# Patient Record
Sex: MALE | Race: WHITE | NOT HISPANIC OR LATINO | Employment: UNEMPLOYED | URBAN - NONMETROPOLITAN AREA
[De-identification: names, ages, dates, MRNs, and addresses within clinical notes are randomized per-mention and may not be internally consistent; named-entity substitution may affect disease eponyms.]

---

## 2017-02-21 ENCOUNTER — COMMUNICATION - GICH (OUTPATIENT)
Dept: FAMILY MEDICINE | Facility: OTHER | Age: 52
End: 2017-02-21

## 2017-02-21 DIAGNOSIS — K21.9 GASTRO-ESOPHAGEAL REFLUX DISEASE WITHOUT ESOPHAGITIS: ICD-10-CM

## 2017-02-21 DIAGNOSIS — I10 ESSENTIAL (PRIMARY) HYPERTENSION: ICD-10-CM

## 2017-02-21 DIAGNOSIS — Z86.79 PERSONAL HISTORY OF OTHER DISEASES OF THE CIRCULATORY SYSTEM (CODE): ICD-10-CM

## 2017-03-13 ENCOUNTER — HISTORY (OUTPATIENT)
Dept: FAMILY MEDICINE | Facility: OTHER | Age: 52
End: 2017-03-13

## 2017-03-13 ENCOUNTER — OFFICE VISIT - GICH (OUTPATIENT)
Dept: FAMILY MEDICINE | Facility: OTHER | Age: 52
End: 2017-03-13

## 2017-03-13 DIAGNOSIS — Z00.00 ENCOUNTER FOR GENERAL ADULT MEDICAL EXAMINATION WITHOUT ABNORMAL FINDINGS: ICD-10-CM

## 2017-03-13 DIAGNOSIS — E78.5 HYPERLIPIDEMIA: ICD-10-CM

## 2017-03-13 DIAGNOSIS — G47.00 INSOMNIA: ICD-10-CM

## 2017-03-13 DIAGNOSIS — Z87.19 PERSONAL HISTORY OF OTHER DISEASES OF THE DIGESTIVE SYSTEM (CODE): ICD-10-CM

## 2017-03-13 DIAGNOSIS — I10 ESSENTIAL (PRIMARY) HYPERTENSION: ICD-10-CM

## 2017-03-13 DIAGNOSIS — K21.9 GASTRO-ESOPHAGEAL REFLUX DISEASE WITHOUT ESOPHAGITIS: ICD-10-CM

## 2017-03-13 DIAGNOSIS — F40.240 CLAUSTROPHOBIA: ICD-10-CM

## 2017-03-13 DIAGNOSIS — N18.2 CHRONIC KIDNEY DISEASE, STAGE II (MILD): ICD-10-CM

## 2017-03-13 LAB
A/G RATIO - HISTORICAL: 1.2 (ref 1–2)
ABSOLUTE BASOPHILS - HISTORICAL: 0.1 THOU/CU MM
ABSOLUTE EOSINOPHILS - HISTORICAL: 0.1 THOU/CU MM
ABSOLUTE LYMPHOCYTES - HISTORICAL: 1.2 THOU/CU MM (ref 0.9–2.9)
ABSOLUTE MONOCYTES - HISTORICAL: 1.2 THOU/CU MM
ABSOLUTE NEUTROPHILS - HISTORICAL: 5.3 THOU/CU MM (ref 1.7–7)
ALBUMIN SERPL-MCNC: 4.6 G/DL (ref 3.5–5.7)
ALP SERPL-CCNC: 28 IU/L (ref 34–104)
ALT (SGPT) - HISTORICAL: 39 IU/L (ref 7–52)
ANION GAP - HISTORICAL: 17 (ref 5–18)
AST SERPL-CCNC: 40 IU/L (ref 13–39)
BASOPHILS # BLD AUTO: 1.2 %
BILIRUB SERPL-MCNC: 1 MG/DL (ref 0.3–1)
BUN SERPL-MCNC: 27 MG/DL (ref 7–25)
BUN/CREAT RATIO - HISTORICAL: 13
CALCIUM SERPL-MCNC: 9.8 MG/DL (ref 8.6–10.3)
CHLORIDE SERPLBLD-SCNC: 85 MMOL/L (ref 98–107)
CHOL/HDL RATIO - HISTORICAL: 2.36
CHOLESTEROL TOTAL: 222 MG/DL
CO2 SERPL-SCNC: 26 MMOL/L (ref 21–31)
CREAT SERPL-MCNC: 2.06 MG/DL (ref 0.7–1.3)
EOSINOPHIL NFR BLD AUTO: 1.8 %
ERYTHROCYTE [DISTWIDTH] IN BLOOD BY AUTOMATED COUNT: 11 % (ref 11.5–15.5)
GFR IF NOT AFRICAN AMERICAN - HISTORICAL: 34 ML/MIN/1.73M2
GLOBULIN - HISTORICAL: 3.9 G/DL (ref 2–3.7)
GLUCOSE SERPL-MCNC: 111 MG/DL (ref 70–105)
HCT VFR BLD AUTO: 39.3 % (ref 37–53)
HDLC SERPL-MCNC: 94 MG/DL (ref 23–92)
HEMOGLOBIN: 13.4 G/DL (ref 13.5–17.5)
LDLC SERPL CALC-MCNC: 93 MG/DL
LIPASE SERPL-CCNC: 320.9 IU/L (ref 11–82)
LYMPHOCYTES NFR BLD AUTO: 14.9 % (ref 20–44)
MCH RBC QN AUTO: 30.7 PG (ref 26–34)
MCHC RBC AUTO-ENTMCNC: 34 G/DL (ref 32–36)
MCV RBC AUTO: 91 FL (ref 80–100)
MONOCYTES NFR BLD AUTO: 15.2 %
NEUTROPHILS NFR BLD AUTO: 67 % (ref 42–72)
NON-HDL CHOLESTEROL - HISTORICAL: 128 MG/DL
PATIENT STATUS - HISTORICAL: ABNORMAL
PLATELET # BLD AUTO: 184 THOU/CU MM (ref 140–440)
PMV BLD: 7.7 FL (ref 6.5–11)
POTASSIUM SERPL-SCNC: 3.5 MMOL/L (ref 3.5–5.1)
PROT SERPL-MCNC: 8.5 G/DL (ref 6.4–8.9)
RED BLOOD COUNT - HISTORICAL: 4.35 MIL/CU MM (ref 4.3–5.9)
SODIUM SERPL-SCNC: 128 MMOL/L (ref 133–143)
TRIGL SERPL-MCNC: 173 MG/DL
WHITE BLOOD COUNT - HISTORICAL: 7.8 THOU/CU MM (ref 4.5–11)

## 2017-04-25 ENCOUNTER — HISTORY (OUTPATIENT)
Dept: FAMILY MEDICINE | Facility: OTHER | Age: 52
End: 2017-04-25

## 2017-04-25 ENCOUNTER — OFFICE VISIT - GICH (OUTPATIENT)
Dept: FAMILY MEDICINE | Facility: OTHER | Age: 52
End: 2017-04-25

## 2017-04-25 DIAGNOSIS — G47.00 INSOMNIA: ICD-10-CM

## 2017-04-25 DIAGNOSIS — Z87.19 PERSONAL HISTORY OF OTHER DISEASES OF THE DIGESTIVE SYSTEM (CODE): ICD-10-CM

## 2017-04-25 DIAGNOSIS — R10.10 UPPER ABDOMINAL PAIN: ICD-10-CM

## 2017-04-25 LAB
A/G RATIO - HISTORICAL: 1.5 (ref 1–2)
ALBUMIN SERPL-MCNC: 4.6 G/DL (ref 3.5–5.7)
ALP SERPL-CCNC: 33 IU/L (ref 34–104)
ALT (SGPT) - HISTORICAL: 21 IU/L (ref 7–52)
AMYLASE SERPL-CCNC: 52 IU/L (ref 29–103)
ANION GAP - HISTORICAL: 12 (ref 5–18)
AST SERPL-CCNC: 25 IU/L (ref 13–39)
BILIRUB SERPL-MCNC: 0.5 MG/DL (ref 0.3–1)
BUN SERPL-MCNC: 15 MG/DL (ref 7–25)
BUN/CREAT RATIO - HISTORICAL: 12
CALCIUM SERPL-MCNC: 9.7 MG/DL (ref 8.6–10.3)
CHLORIDE SERPLBLD-SCNC: 98 MMOL/L (ref 98–107)
CO2 SERPL-SCNC: 25 MMOL/L (ref 21–31)
CREAT SERPL-MCNC: 1.29 MG/DL (ref 0.7–1.3)
GFR IF NOT AFRICAN AMERICAN - HISTORICAL: 59 ML/MIN/1.73M2
GLOBULIN - HISTORICAL: 3.1 G/DL (ref 2–3.7)
GLUCOSE SERPL-MCNC: 105 MG/DL (ref 70–105)
LIPASE SERPL-CCNC: 112.2 IU/L (ref 11–82)
POTASSIUM SERPL-SCNC: 3.6 MMOL/L (ref 3.5–5.1)
PROT SERPL-MCNC: 7.7 G/DL (ref 6.4–8.9)
SODIUM SERPL-SCNC: 135 MMOL/L (ref 133–143)

## 2017-04-25 ASSESSMENT — PATIENT HEALTH QUESTIONNAIRE - PHQ9: SUM OF ALL RESPONSES TO PHQ QUESTIONS 1-9: 1

## 2017-10-11 ENCOUNTER — HISTORY (OUTPATIENT)
Dept: EMERGENCY MEDICINE | Facility: OTHER | Age: 52
End: 2017-10-11

## 2018-01-03 NOTE — TELEPHONE ENCOUNTER
Patient Information     Patient Name MRN Sex Kolton Blake [id number] Male 1965      Telephone Encounter by Micky Cruz RN at 2017 12:49 PM     Author:  Micky Cruz RN Service:  (none) Author Type:  NURS- Registered Nurse     Filed:  2017  1:19 PM Encounter Date:  2017 Status:  Signed     :  Micky Cruz RN (NURS- Registered Nurse)            A[employer]itors    Office visit in the past 12 months or per provider note.    Last visit with TOMI MORALES was on: 2016 in [employer] [facility]  Next visit with TOMI MORALES is on: No future appointment listed with this provider  Next visit with Family Practice is on: No future appointment listed in this department    Lab test requirements:  Creatinine and Potassium annually, if ordering lab, order BMP.  CREATININE (mg/dL)    Date Value   2016 1.37 (H)     POTASSIUM (mmol/L)    Date Value   2016 3.8     Max refill for 12 months from last office visit or per provider note    Diuretics (may be prescribed for edema)    Office visit in the past 12 months or per provider note.    Last visit with TOMI MORALES was on: 2016 in [facility] [facility]  Next visit with TOMI MORALES is on: No future appointment listed with this provider  Next visit with Family Practice is on: No future appointment listed in this department    Lab testing requirements:  Creatinine and Potassium annually, if ordering lab, order BMP.  CREATININE (mg/dL)    Date Value   2016 1.37 (H)     POTASSIUM (mmol/L)    Date Value   2016 3.8     BP Readings from Last 4 Encounters:    16 130/80   16 157/91   16 124/76   09/16/15 118/64     Review last provider visit note.  If BP reviewed and plan is noted, can refill.  Max refill for 12 months from last office visit or per provider note.    Patient was last seen by PCP for HTN on 3/9/16. Patient was instructed to continue current medications, of which included  lisinopril, HCTZ. Patient was also hospitalized on 6/20/16, of which d/c summary states patient was to remain on HCTZ and Lisinopril at current dose. However, patient is due for medication management appointment with PCP to support continued use. Limited refill provided at this time and letter sent for reminder to patient. Prescription refilled per RN Medication Refill Policy.................... Micky Cruz RN ....................  2/21/2017   12:56 PM

## 2018-01-03 NOTE — PROGRESS NOTES
Patient Information     Patient Name MRN Sex Kolton Blake [id number] Male 1965      Progress Notes by Jono Gonzalez MD at 3/13/2017 10:15 AM     Author:  Jono Gonzalez MD Service:  (none) Author Type:  Physician     Filed:  3/13/2017 10:57 AM Encounter Date:  3/13/2017 Status:  Signed     :  Jono Gonzalez MD (Physician)            There are no exam notes on file for this visit.    SUBJECTIVE:  Kolton Aragon  is a 51 y.o. male who comes in today for complete evaluation. I last saw him in the summer after he was hospitalized for acute pancreatitis. We discussed a low-fat diet and complete abstinence from alcohol. He was apparently drinking too able to sleep. We did a prescription for Ambien which according to the  he filled twice, once in July and once in 2016.  He seems to be sleep walking.    We ordered an MR of the abdomen with and without contrast for MRCP one-month out after hospitalization, but he never had this done. Him to GI for follow-up and they had reviewed his notes and recommended upper endoscopy and endoscopic ultrasound but they were unable to get him scheduled for an appointment because he did not respond to them.  He has not been drinking.  He has been avoiding eating fatty foods. He has been avoiding eating out.     He also had referred him to podiatry for plantar fasciitis. He was molded for orthotics but declined injection. It doesn't appear that he followed up after that.    He is up to date on health maintenance issues otherwise. He has been eating healthier and has lost some weight. He is not exercising much.     He had the stomach flu over the last week.  He feels better. He needs a note to go back to work.     We discussed colon cancer screening after age 50.     Past Medical, Family, and Social History reviewed and updated as noted below.   ROS is negative except as noted above       Allergies      Allergen   Reactions     Dust [House Dust]  Other -  Describe In Comment Field     Sneezing, itchy eyes    ,   Family History       Problem   Relation Age of Onset     Good Health  Mother      Other  Father      Deserted as a child, patient knows father had a history of heart attack at 50       Heart Disease  Father      patient knows father had a history of heart attack at 50       Cancer  Other      History of cancer     ,   Current Outpatient Prescriptions on File Prior to Visit       Medication  Sig Dispense Refill     albuterol HFA (VENTOLIN HFA) 90 mcg/actuation inhaler Inhale 2 Puffs by mouth 4 times daily if needed. 1 Inhaler 0     ketoconazole 2% topical (NIZORAL) cream Apply  topically to affected area(s) 2 times daily.  0     No current facility-administered medications on file prior to visit.    ,   Past Medical History     Diagnosis  Date     S/P vasectomy    ,   Patient Active Problem List       Diagnosis  Date Noted     Acute pancreatitis  06/20/2016     Rash  06/20/2016     Chronic kidney disease, stage II (mild)  03/09/2016     Gastroesophageal reflux disease without esophagitis  05/18/2015     Dyslipidemia  12/29/2014     Hypertension  09/12/2013     Spondylolisthesis at L5-S1 level  07/09/2013     Patient has been referred for PT and has not followed through.  He was sent for MRI scan,and he was not able to do either the closed or open MRI due to claustrophobia.        Pancreatitis, alcoholic  06/21/2013     Insomnia  06/20/2013     Chewing tobacco use  05/17/2013     DJD (degenerative joint disease), lumbar  04/22/2013     REACTIVE AIRWAY DISEASE     ,   Past Surgical History      Procedure  Laterality Date     Vasectomy      and   Social History        Substance Use Topics          Smoking status:   Never Smoker      Smokeless tobacco:   Current User      Types:  Chew       Comment: trying to quit       Alcohol use   16.8 oz/week     28 Standard drinks or equivalent per week       OBJECTIVE:  Visit Vitals       /74     Pulse 76     Ht 1.727  "m (5' 8\")     Wt 98 kg (216 lb)     BMI 32.84 kg/m2      EXAM:  General Appearance: Pleasant, alert, appropriate appearance for age. No acute distress  Head Exam: Normal. Normocephalic, atraumatic.  Eye Exam:  Normal external eye, conjunctiva, lids, cornea. BIJAL. EOMI  Ear Exam: Normal TM's bilaterally. Normal auditory canals and external ears. Non-tender.  Nose Exam: Normal external nose, mucus membranes, and septum.  OroPharynx Exam:  Dental hygiene adequate. Normal buccal mucosa. Normal pharynx.  Neck Exam:  Supple, no masses or nodes. No audible bruits  Thyroid Exam: No nodules or enlargement.  Chest/Respiratory Exam: Normal chest wall and respirations. Clear to auscultation.  Cardiovascular Exam: Regular rate and rhythm. S1, S2, no murmur, click, gallop, or rubs.  Gastrointestinal Exam: Soft, non-tender, no masses or organomegaly.  Lymphatic Exam: Non-palpable nodes in neck, clavicular, axillary, or inguinal regions.  Musculoskeletal Exam: Back is straight and non-tender, full ROM of upper and lower extremities.  Foot Exam: Left and right foot: good pedal pulses, no lesions, nail hygiene good.  Skin: no rash or abnormalities  Neurologic Exam: Nonfocal,  normal gross motor, tone coordination and no tremor.  Psychiatric Exam: Alert and oriented - appropriate affect.   ASSESSMENT/Plan :      Kolton was seen today for physical.    Diagnoses and all orders for this visit:    Visit for preventive health examination  -     CBC AND DIFFERENTIAL; Future  -     COMP METABOLIC PANEL; Future  -     LIPID PANEL; Future  -     LIPASE; Future    Hypertension  -     lisinopril (PRINIVIL; ZESTRIL) 20 mg tablet; Take 1 tablet by mouth once daily.  -     hydroCHLOROthiazide (HCTZ) 25 mg tablet; Take 1 tablet by mouth once daily.  -     COMP METABOLIC PANEL; Future  -     LIPID PANEL; Future    Gastroesophageal reflux disease without esophagitis  -     omeprazole (PRILOSEC) 40 mg Delayed-Release capsule; Take 1 capsule by mouth " once daily before a meal.  -     CBC AND DIFFERENTIAL; Future    History of acute pancreatitis  -     COMP METABOLIC PANEL; Future  -     LIPID PANEL; Future  -     LIPASE; Future  -     MR ABDOMEN MRCP PANCREAS WWO; Future    Chronic kidney disease, stage II (mild)    Dyslipidemia    Insomnia, unspecified type  -     mirtazapine (REMERON) 15 mg tablet; Take 1 tablet by mouth at bedtime.    Claustrophobia  -     ALPRAZolam (XANAX) 1 mg tablet; One tablet one hour prior to procedure, may repeat x 1 if needed.    Will notify of lab results when available. Discussed diet, exercise and healthy lifestyle changes. Continue current medications. Reinforced not drinking.      Feel we need follow-up imaging of his pancreas with MRCP and order placed for that. Alprazolam to help with claustrophobia. At this point, we'll hold off on GI referral since he's been feeling well.    Discussed colon cancer screening at age 50.    Discussed insomnia. Okay to try melatonin, but we'll do a trial of mirtazapine 15 mg at at bedtime he decreasing or increasing based on side effects and efficacy. This was discussed.    Mr. Aragon's Body mass index is 32.84 kg/(m^2). This is out of the normal range for a 51 y.o. Normal range for ages 18-64 is between 18.5 and 24.9; normal range for ages 65+ is 23-30. To lose weight we reviewed risks and benefits of appropriate options such as diet, exercise, and medications. Patient's strategy will be  self-directed nutrition plan and self-directed exercise program       Jono Gonzalez MD

## 2018-01-03 NOTE — TELEPHONE ENCOUNTER
Patient Information     Patient Name MRN Sex Kolton Blake [id number] Male 1965      Telephone Encounter by Micky Cruz RN at 2017  1:03 PM     Author:  Micky Cruz RN Service:  (none) Author Type:  NURS- Registered Nurse     Filed:  2017  1:19 PM Encounter Date:  2017 Status:  Signed     :  Micky Cruz RN (NURS- Registered Nurse)            This is a Refill request from: [facility]  Name of Medication: Omeprazole  Quantity requested: 90 capsules  Last fill date: Unknown, medication is listed as historical in patient's chart  Due for refill: See above  Last visit with TOMI GONZALEZ was on: 2016 in [facility]  PCP:  Tomi oGnzalez MD  Controlled Substance Agreement:  N/A   Diagnosis r/t this medication request: GERD    Per chart review, last office visit with PCP to address GERD and use of omeprazole on 3/9/2016. Orders at that time as follows:    omeprazole (PRILOSEC) 20 mg Delayed-Release capsule; TAKE 2 CAPSULES BY MOUTH DA KAY AS NEEDED    Patient with hospitalization on 16, of which medication was entered in chart upon discharge as:    Omeprazole 20 mg delayed release capsule-take 1 tablet by mouth daily.    Medication is listed as historical in patient's chart on active med list.    Current rx request from pharmacy as follows:    Omeprazole 40 mg delayed release capsule-Take one capsule by mouth daily as needed.    Writer is unable to distinguish which dosing patient should be taking. Did attempt to contact patient to clarify and was unsuccessful. Will route to PCP for his consideration/approval.     Unable to complete prescription refill per RN Medication Refill Policy.................... Micky Cruz RN ....................  2017   1:03 PM

## 2018-01-04 NOTE — PROGRESS NOTES
Patient Information     Patient Name MRN Sex Kolton Blake [id number] Male 1965      Progress Notes by Jono Gonzalez MD at 2017  2:30 PM     Author:  Jono Gonzalez MD Service:  (none) Author Type:  Physician     Filed:  2017  5:32 PM Encounter Date:  2017 Status:  Signed     :  Jono oGnzalez MD (Physician)            Nursing Notes:   Radha Junior  2017  2:41 PM  Signed  He had upper abdominal pain for a week. It has been gone for 2 weeks now except yesterday he had just a slight amount of pain.  Radha Junior LPN..................2017   2:34 PM'      SUBJECTIVE:  Kolton Aragon  is a 51 y.o. male who comes in today for evaluation of stomach pain. He had some upper abdominal pain for about a week after he ate.  It would go away then come back. It disappeared for 2 weeks, and then yesterday it seemed to come back slightly. He has some symptoms when he eats. He didn't have any change in his stools.     I saw him about 6 weeks ago for a physical. Those notes are reviewed. He has a history in the past of acute pancreatitis. When I saw him last he had not been drinking, he was avoiding fatty foods, and was avoiding eating out. We ordered an MRCP for him but he did not follow through with that.    He was having insomnia and we did a trial of mirtazapine. He finds that they are working fine but he is taking 1/2 tab.     Past Medical, Family, and Social History reviewed and updated as noted below.   ROS is negative except as noted above       Allergies      Allergen   Reactions     Dust [House Dust]  Other - Describe In Comment Field     Sneezing, itchy eyes    ,   Family History       Problem   Relation Age of Onset     Good Health  Mother      Other  Father      Deserted as a child, patient knows father had a history of heart attack at 50       Heart Disease  Father      patient knows father had a history of heart attack at 50       Cancer  Other      History of  cancer     ,   Current Outpatient Prescriptions on File Prior to Visit       Medication  Sig Dispense Refill     albuterol HFA (VENTOLIN HFA) 90 mcg/actuation inhaler Inhale 2 Puffs by mouth 4 times daily if needed. 1 Inhaler 0     ALPRAZolam (XANAX) 1 mg tablet One tablet one hour prior to procedure, may repeat x 1 if needed. 2 tablet 0     hydroCHLOROthiazide (HCTZ) 25 mg tablet Take 1 tablet by mouth once daily. 90 tablet 4     ketoconazole 2% topical (NIZORAL) cream Apply  topically to affected area(s) 2 times daily.  0     lisinopril (PRINIVIL; ZESTRIL) 20 mg tablet Take 1 tablet by mouth once daily. 90 tablet 4     mirtazapine (REMERON) 15 mg tablet Take 1 tablet by mouth at bedtime. 30 tablet 5     omeprazole (PRILOSEC) 40 mg Delayed-Release capsule Take 1 capsule by mouth once daily before a meal. 90 capsule 4     No current facility-administered medications on file prior to visit.    ,   Past Medical History:     Diagnosis  Date     S/P vasectomy    ,   Patient Active Problem List       Diagnosis  Date Noted     Acute pancreatitis  06/20/2016     Rash  06/20/2016     Chronic kidney disease, stage II (mild)  03/09/2016     Gastroesophageal reflux disease without esophagitis  05/18/2015     Dyslipidemia  12/29/2014     Hypertension  09/12/2013     Spondylolisthesis at L5-S1 level  07/09/2013     Patient has been referred for PT and has not followed through.  He was sent for MRI scan,and he was not able to do either the closed or open MRI due to claustrophobia.        Pancreatitis, alcoholic  06/21/2013     Insomnia  06/20/2013     Chewing tobacco use  05/17/2013     DJD (degenerative joint disease), lumbar  04/22/2013     REACTIVE AIRWAY DISEASE     ,   Past Surgical History:      Procedure  Laterality Date     VASECTOMY      and   Social History        Substance Use Topics          Smoking status:   Never Smoker      Smokeless tobacco:   Current User      Types:  Chew       Comment: trying to quit        Alcohol use   16.8 oz/week     28 Standard drinks or equivalent per week       OBJECTIVE:  /82  Pulse 88  Temp 98.7  F (37.1  C) (Tympanic)   Wt 95.7 kg (211 lb)  BMI 32.08 kg/m2   EXAM:  Alert and cooperative, no distress. Sclerae nonicteric. Mucous membranes are moist. Neck is supple without significant adenopathy. Lungs are clear, no rales rhonchi or wheezes are heard. Cardiac RRR without murmur. Abdomen is protuberant and soft. No real tenderness to deep palpation in epigastrium or right upper quadrant. No rebound guarding or peritoneal signs.  Results for orders placed or performed in visit on 04/25/17      AMYLASE      Result  Value Ref Range    AMYLASE 52 29 - 103 IU/L   LIPASE      Result  Value Ref Range    LIPASE 112.2 (H) 11.0 - 82.0 IU/L   COMP METABOLIC PANEL      Result  Value Ref Range    SODIUM 135 133 - 143 mmol/L    POTASSIUM 3.6 3.5 - 5.1 mmol/L    CHLORIDE 98 98 - 107 mmol/L    CO2,TOTAL 25 21 - 31 mmol/L    ANION GAP 12 5 - 18                    GLUCOSE 105 70 - 105 mg/dL    CALCIUM 9.7 8.6 - 10.3 mg/dL    BUN 15 7 - 25 mg/dL    CREATININE 1.29 0.70 - 1.30 mg/dL    BUN/CREAT RATIO           12                    GFR if African American >60 >60 ml/min/1.73m2    GFR if not African American 59 (L) >60 ml/min/1.73m2    ALBUMIN 4.6 3.5 - 5.7 g/dL    PROTEIN,TOTAL 7.7 6.4 - 8.9 g/dL    GLOBULIN                  3.1 2.0 - 3.7 g/dL    A/G RATIO 1.5 1.0 - 2.0                    BILIRUBIN,TOTAL 0.5 0.3 - 1.0 mg/dL    ALK PHOSPHATASE 33 (L) 34 - 104 IU/L    ALT (SGPT) 21 7 - 52 IU/L    AST (SGOT) 25 13 - 39 IU/L      ASSESSMENT/Plan :      Kolton was seen today for abdominal pain.    Diagnoses and all orders for this visit:    Pain of upper abdomen  -     Cancel: HEPATIC FUNCTION PANEL; Future  -     AMYLASE; Future  -     LIPASE; Future  -     MR ABDOMEN MRCP PANCREAS WWO; Future  -     COMP METABOLIC PANEL; Future  -     AMYLASE  -     LIPASE  -     COMP METABOLIC PANEL    History of  pancreatitis  -     Cancel: HEPATIC FUNCTION PANEL; Future  -     AMYLASE; Future  -     LIPASE; Future  -     MR ABDOMEN MRCP PANCREAS WWO; Future  -     COMP METABOLIC PANEL; Future  -     AMYLASE  -     LIPASE  -     COMP METABOLIC PANEL    Insomnia, unspecified type      Will notify of lab results when available. Recommended that he follow through with MRCP as previously ordered. Will notify of those results when available and consider GI consultation. It is reassuring that his labs seem to have improved markedly.    Continue current medications.     Jono Gonzalez MD

## 2018-01-04 NOTE — NURSING NOTE
Patient Information     Patient Name MRN Sex Kolton Blake [id number] Male 1965      Nursing Note by Radha Junior at 2017  2:30 PM     Author:  Radha Junior Service:  (none) Author Type:  (none)     Filed:  2017  2:41 PM Encounter Date:  2017 Status:  Signed     :  Radha Junior            He had upper abdominal pain for a week. It has been gone for 2 weeks now except yesterday he had just a slight amount of pain.  Radha Junior LPN..................2017   2:34 PM'

## 2018-01-25 ENCOUNTER — HISTORY (OUTPATIENT)
Dept: EMERGENCY MEDICINE | Facility: OTHER | Age: 53
End: 2018-01-25

## 2018-01-26 VITALS
BODY MASS INDEX: 32.08 KG/M2 | TEMPERATURE: 98.7 F | WEIGHT: 211 LBS | SYSTOLIC BLOOD PRESSURE: 122 MMHG | HEART RATE: 88 BPM | DIASTOLIC BLOOD PRESSURE: 82 MMHG

## 2018-01-26 VITALS
BODY MASS INDEX: 32.74 KG/M2 | HEART RATE: 76 BPM | DIASTOLIC BLOOD PRESSURE: 74 MMHG | WEIGHT: 216 LBS | HEIGHT: 68 IN | SYSTOLIC BLOOD PRESSURE: 104 MMHG

## 2018-02-01 ASSESSMENT — PATIENT HEALTH QUESTIONNAIRE - PHQ9: SUM OF ALL RESPONSES TO PHQ QUESTIONS 1-9: 1

## 2018-03-01 ENCOUNTER — TELEPHONE (OUTPATIENT)
Dept: FAMILY MEDICINE | Facility: OTHER | Age: 53
End: 2018-03-01

## 2018-03-01 DIAGNOSIS — K21.9 GASTROESOPHAGEAL REFLUX DISEASE WITHOUT ESOPHAGITIS: Primary | ICD-10-CM

## 2018-03-01 PROBLEM — J45.909 REACTIVE AIRWAY DISEASE: Status: ACTIVE | Noted: 2018-03-01

## 2018-03-01 PROBLEM — N17.9 AKI (ACUTE KIDNEY INJURY) (H): Status: ACTIVE | Noted: 2017-10-11

## 2018-03-01 PROBLEM — E87.1 ACUTE HYPONATREMIA: Status: ACTIVE | Noted: 2017-10-11

## 2018-03-01 PROBLEM — E87.6 ACUTE HYPOKALEMIA: Status: ACTIVE | Noted: 2017-10-11

## 2018-03-01 PROBLEM — F10.10 ALCOHOL ABUSE: Status: ACTIVE | Noted: 2017-10-12

## 2018-03-01 PROBLEM — E83.51 HYPOCALCEMIA: Status: ACTIVE | Noted: 2017-10-12

## 2018-03-01 PROBLEM — R40.4 ALTERED LEVEL OF CONSCIOUSNESS: Status: ACTIVE | Noted: 2017-10-11

## 2018-03-01 RX ORDER — OMEPRAZOLE 40 MG/1
40 CAPSULE, DELAYED RELEASE ORAL
COMMUNITY
Start: 2017-03-13 | End: 2018-04-12

## 2018-03-01 RX ORDER — HYDROCHLOROTHIAZIDE 25 MG/1
25 TABLET ORAL
COMMUNITY
Start: 2017-03-13 | End: 2018-03-18

## 2018-03-01 RX ORDER — LORAZEPAM 0.5 MG/1
0.5 TABLET ORAL
COMMUNITY
Start: 2017-10-14 | End: 2018-06-30

## 2018-03-01 RX ORDER — LISINOPRIL 20 MG/1
20 TABLET ORAL
COMMUNITY
Start: 2017-03-13 | End: 2018-03-18

## 2018-03-01 NOTE — TELEPHONE ENCOUNTER
Writer received a fax from [phone]. Per fax:    Omeprazole isn't covered by insurance. PA was denied a few months back. Is there an alternative he can take?? If so, please send us an rx. Thanks!!    Call placed to [facility]. Spoke to Casi, pharmacist. Pharmacist reports that they are unable to tell what is covered in substitution to rx for omeprazole, however she would suggest either prevacid or nexium.     Writer will route this encounter to PCP for his consideration/input of a new rx in substitution for omeprazole.    Unable to complete prescription refill per RN Medication Refill Policy. Micky Cruz 3/1/2018 1:56 PM

## 2018-03-01 NOTE — TELEPHONE ENCOUNTER
Writer contacted patient. Was unable to reach patient at this time, but was able to leave a detailed message advising him of new rx as noted below to replace omeprazole as per PCP:       Disp Refills Start End ESCOBAR   ranitidine (ZANTAC) 300 MG tablet 30 tablet 11 3/1/2018  --   Sig: Take 1 tablet (300 mg) by mouth At Bedtime   Class: E-Prescribe   Route: Oral   Order: [id number]   E-Prescribing Status: Receipt confirmed by pharmacy (3/1/2018  3:33 PM CST)     Writer advised patient to contact [facility] with any questions or concerns in relation to plan as noted above. Writer will close this encounter at this time.    Micky Cruz RN on 3/1/2018 at 4:11 PM

## 2018-03-12 ENCOUNTER — DOCUMENTATION ONLY (OUTPATIENT)
Dept: FAMILY MEDICINE | Facility: OTHER | Age: 53
End: 2018-03-12

## 2018-03-18 DIAGNOSIS — I10 HYPERTENSION: Primary | ICD-10-CM

## 2018-03-18 NOTE — LETTER
March 22, 2018      Kolton Aragon    [address] [place] [address]        Dear Kolton,       This is to remind you that you are due for your annual appointment with Jono Gonzalez. Your last visit was on 4/25/17. Additional refills of your medication require you to complete this visit.    Please call [phone] to schedule your appointment.    Thank you for choosing [facility] and [facility] for your health care needs.    Sincerely,      Refill RN  [facility]

## 2018-03-22 RX ORDER — HYDROCHLOROTHIAZIDE 25 MG/1
TABLET ORAL
Qty: 90 TABLET | Refills: 0 | Status: SHIPPED | OUTPATIENT
Start: 2018-03-22 | End: 2018-04-12

## 2018-03-22 RX ORDER — LISINOPRIL 20 MG/1
TABLET ORAL
Qty: 90 TABLET | Refills: 0 | Status: SHIPPED | OUTPATIENT
Start: 2018-03-22 | End: 2018-04-12

## 2018-03-22 NOTE — TELEPHONE ENCOUNTER
Chart review shows that patient was last seen by PCP on 4/25/17. Both rxs as requested were noted in office visit notes on that date as noted below:    hydroCHLOROthiazide (HCTZ) 25 mg tablet Take 1 tablet by mouth once daily.     lisinopril (PRINIVIL; ZESTRIL) 20 mg tablet Take 1 tablet by mouth once daily.     No changes to medications noted in office visit notes on that date. Patient is coming due for an annual office visit however. Writer will refill both rxs as requested for a limited supply at this time, as well as will send patient a reminder letter.    Prescription refilled per RN Medication Refill Policy..................Micky Cruz 3/22/2018 2:08 PM

## 2018-04-12 ENCOUNTER — OFFICE VISIT (OUTPATIENT)
Dept: FAMILY MEDICINE | Facility: OTHER | Age: 53
End: 2018-04-12
Attending: FAMILY MEDICINE
Payer: COMMERCIAL

## 2018-04-12 ENCOUNTER — TELEPHONE (OUTPATIENT)
Dept: FAMILY MEDICINE | Facility: OTHER | Age: 53
End: 2018-04-12

## 2018-04-12 VITALS
SYSTOLIC BLOOD PRESSURE: 122 MMHG | BODY MASS INDEX: 31.63 KG/M2 | WEIGHT: 208 LBS | HEART RATE: 96 BPM | DIASTOLIC BLOOD PRESSURE: 84 MMHG

## 2018-04-12 DIAGNOSIS — F10.10 ALCOHOL ABUSE: ICD-10-CM

## 2018-04-12 DIAGNOSIS — I10 HYPERTENSION, UNSPECIFIED TYPE: Primary | ICD-10-CM

## 2018-04-12 DIAGNOSIS — Z12.11 SPECIAL SCREENING FOR MALIGNANT NEOPLASMS, COLON: ICD-10-CM

## 2018-04-12 DIAGNOSIS — K21.9 GASTROESOPHAGEAL REFLUX DISEASE WITHOUT ESOPHAGITIS: ICD-10-CM

## 2018-04-12 DIAGNOSIS — E78.5 DYSLIPIDEMIA: ICD-10-CM

## 2018-04-12 DIAGNOSIS — N18.2 CHRONIC KIDNEY DISEASE, STAGE II (MILD): ICD-10-CM

## 2018-04-12 DIAGNOSIS — K86.1 CHRONIC PANCREATITIS, UNSPECIFIED PANCREATITIS TYPE (H): ICD-10-CM

## 2018-04-12 DIAGNOSIS — N42.9 PROSTATE DISORDER: ICD-10-CM

## 2018-04-12 PROBLEM — E83.51 HYPOCALCEMIA: Status: RESOLVED | Noted: 2017-10-12 | Resolved: 2018-04-12

## 2018-04-12 PROBLEM — N17.9 AKI (ACUTE KIDNEY INJURY) (H): Status: RESOLVED | Noted: 2017-10-11 | Resolved: 2018-04-12

## 2018-04-12 PROBLEM — E87.1 ACUTE HYPONATREMIA: Status: RESOLVED | Noted: 2017-10-11 | Resolved: 2018-04-12

## 2018-04-12 PROBLEM — E87.6 ACUTE HYPOKALEMIA: Status: RESOLVED | Noted: 2017-10-11 | Resolved: 2018-04-12

## 2018-04-12 LAB
ALBUMIN SERPL-MCNC: 3.9 G/DL (ref 3.5–5.7)
ALP SERPL-CCNC: 37 U/L (ref 34–104)
ALT SERPL W P-5'-P-CCNC: 14 U/L (ref 7–52)
ANION GAP SERPL CALCULATED.3IONS-SCNC: 12 MMOL/L (ref 3–14)
AST SERPL W P-5'-P-CCNC: 15 U/L (ref 13–39)
BASOPHILS # BLD AUTO: 0 10E9/L (ref 0–0.2)
BASOPHILS NFR BLD AUTO: 0.9 %
BILIRUB SERPL-MCNC: 0.2 MG/DL (ref 0.3–1)
BUN SERPL-MCNC: 19 MG/DL (ref 7–25)
CALCIUM SERPL-MCNC: 9.2 MG/DL (ref 8.6–10.3)
CHLORIDE SERPL-SCNC: 96 MMOL/L (ref 98–107)
CHOLEST SERPL-MCNC: 170 MG/DL
CO2 SERPL-SCNC: 23 MMOL/L (ref 21–31)
CREAT SERPL-MCNC: 1.24 MG/DL (ref 0.7–1.3)
DIFFERENTIAL METHOD BLD: ABNORMAL
EOSINOPHIL # BLD AUTO: 0.2 10E9/L (ref 0–0.7)
EOSINOPHIL NFR BLD AUTO: 5.6 %
ERYTHROCYTE [DISTWIDTH] IN BLOOD BY AUTOMATED COUNT: 10.8 % (ref 10–15)
GFR SERPL CREATININE-BSD FRML MDRD: 62 ML/MIN/1.7M2
GLUCOSE SERPL-MCNC: 145 MG/DL (ref 70–105)
HCT VFR BLD AUTO: 33.3 % (ref 40–53)
HDLC SERPL-MCNC: 49 MG/DL (ref 23–92)
HGB BLD-MCNC: 12 G/DL (ref 13.3–17.7)
IMM GRANULOCYTES # BLD: 0 10E9/L (ref 0–0.4)
IMM GRANULOCYTES NFR BLD: 0.5 %
LDLC SERPL CALC-MCNC: 52 MG/DL
LIPASE SERPL-CCNC: 108 U/L (ref 11–82)
LYMPHOCYTES # BLD AUTO: 1.2 10E9/L (ref 0.8–5.3)
LYMPHOCYTES NFR BLD AUTO: 27.9 %
MCH RBC QN AUTO: 33.1 PG (ref 26.5–33)
MCHC RBC AUTO-ENTMCNC: 36 G/DL (ref 31.5–36.5)
MCV RBC AUTO: 92 FL (ref 78–100)
MONOCYTES # BLD AUTO: 0.7 10E9/L (ref 0–1.3)
MONOCYTES NFR BLD AUTO: 15.7 %
NEUTROPHILS # BLD AUTO: 2.1 10E9/L (ref 1.6–8.3)
NEUTROPHILS NFR BLD AUTO: 49.4 %
NONHDLC SERPL-MCNC: 121 MG/DL
PLATELET # BLD AUTO: 245 10E9/L (ref 150–450)
POTASSIUM SERPL-SCNC: 3.8 MMOL/L (ref 3.5–5.1)
PROT SERPL-MCNC: 7.8 G/DL (ref 6.4–8.9)
PSA SERPL-MCNC: 0.28 NG/ML
RBC # BLD AUTO: 3.62 10E12/L (ref 4.4–5.9)
SODIUM SERPL-SCNC: 131 MMOL/L (ref 134–144)
TRIGL SERPL-MCNC: 347 MG/DL
WBC # BLD AUTO: 4.3 10E9/L (ref 4–11)

## 2018-04-12 PROCEDURE — 80061 LIPID PANEL: CPT | Performed by: FAMILY MEDICINE

## 2018-04-12 PROCEDURE — 85025 COMPLETE CBC W/AUTO DIFF WBC: CPT | Performed by: FAMILY MEDICINE

## 2018-04-12 PROCEDURE — 80053 COMPREHEN METABOLIC PANEL: CPT | Performed by: FAMILY MEDICINE

## 2018-04-12 PROCEDURE — G0463 HOSPITAL OUTPT CLINIC VISIT: HCPCS

## 2018-04-12 PROCEDURE — 99215 OFFICE O/P EST HI 40 MIN: CPT | Performed by: FAMILY MEDICINE

## 2018-04-12 PROCEDURE — 83690 ASSAY OF LIPASE: CPT | Performed by: FAMILY MEDICINE

## 2018-04-12 PROCEDURE — 36415 COLL VENOUS BLD VENIPUNCTURE: CPT | Performed by: FAMILY MEDICINE

## 2018-04-12 PROCEDURE — 84153 ASSAY OF PSA TOTAL: CPT | Performed by: FAMILY MEDICINE

## 2018-04-12 RX ORDER — HYDROCHLOROTHIAZIDE 25 MG/1
25 TABLET ORAL DAILY
Qty: 90 TABLET | Refills: 11 | Status: SHIPPED | OUTPATIENT
Start: 2018-04-12 | End: 2019-04-07

## 2018-04-12 RX ORDER — LISINOPRIL 20 MG/1
20 TABLET ORAL DAILY
Qty: 90 TABLET | Refills: 11 | Status: SHIPPED | OUTPATIENT
Start: 2018-04-12 | End: 2019-04-07

## 2018-04-12 RX ORDER — OMEPRAZOLE 40 MG/1
40 CAPSULE, DELAYED RELEASE ORAL DAILY
Qty: 90 CAPSULE | Refills: 11 | Status: SHIPPED | OUTPATIENT
Start: 2018-04-12 | End: 2019-04-17 | Stop reason: DRUGHIGH

## 2018-04-12 ASSESSMENT — PAIN SCALES - GENERAL: PAINLEVEL: NO PAIN (0)

## 2018-04-12 NOTE — LETTER
April 12, 2018      Kolton Aragon    [address] [place] [address]        Dear Kolton,     Your lab tests look ok. Your complete blood count is improved from the ER visit labs. Your comprehensive metabolic panel (a test that looks at liver and kidney function, blood sugar, electrolytes, and nutritional status) was normal. Your pancreatic function test indicates that the pancreas is still a bit inflamed. Your prostate test is normal.    As you know, completely staying away from alcohol is very important and will allow things to continue to normalize.    It was a pleasure seeing you the other day.  If you have any questions, please don't hesitate to call us.       Sincerely,        Jono Gonzalez MD                              Results for orders placed or performed in visit on 04/12/18   CBC with platelets differential   Result Value Ref Range    WBC 4.3 4.0 - 11.0 10e9/L    RBC Count 3.62 (L) 4.4 - 5.9 10e12/L    Hemoglobin 12.0 (L) 13.3 - 17.7 g/dL    Hematocrit 33.3 (L) 40.0 - 53.0 %    MCV 92 78 - 100 fl    MCH 33.1 (H) 26.5 - 33.0 pg    MCHC 36.0 31.5 - 36.5 g/dL    RDW 10.8 10.0 - 15.0 %    Platelet Count 245 150 - 450 10e9/L    Diff Method Automated Method     % Neutrophils 49.4 %    % Lymphocytes 27.9 %    % Monocytes 15.7 %    % Eosinophils 5.6 %    % Basophils 0.9 %    % Immature Granulocytes 0.5 %    Absolute Neutrophil 2.1 1.6 - 8.3 10e9/L    Absolute Lymphocytes 1.2 0.8 - 5.3 10e9/L    Absolute Monocytes 0.7 0.0 - 1.3 10e9/L    Absolute Eosinophils 0.2 0.0 - 0.7 10e9/L    Absolute Basophils 0.0 0.0 - 0.2 10e9/L    Abs Immature Granulocytes 0.0 0 - 0.4 10e9/L   Comprehensive metabolic panel   Result Value Ref Range    Sodium 131 (L) 134 - 144 mmol/L    Potassium 3.8 3.5 - 5.1 mmol/L    Chloride 96 (L) 98 - 107 mmol/L    Carbon Dioxide 23 21 - 31 mmol/L    Anion Gap 12 3 - 14 mmol/L    Glucose 145 (H) 70 - 105 mg/dL    Urea Nitrogen 19 7 - 25 mg/dL    Creatinine 1.24 0.70 - 1.30 mg/dL     GFR Estimate 62 >60 mL/min/1.7m2    GFR Estimate If Black 75 >60 mL/min/1.7m2    Calcium 9.2 8.6 - 10.3 mg/dL    Bilirubin Total 0.2 (L) 0.3 - 1.0 mg/dL    Albumin 3.9 3.5 - 5.7 g/dL    Protein Total 7.8 6.4 - 8.9 g/dL    Alkaline Phosphatase 37 34 - 104 U/L    ALT 14 7 - 52 U/L    AST 15 13 - 39 U/L   Lipid Profile   Result Value Ref Range    Cholesterol 170 <200 mg/dL    Triglycerides 347 (H) <150 mg/dL    HDL Cholesterol 49 23 - 92 mg/dL    LDL Cholesterol Calculated 52 <100 mg/dL    Non HDL Cholesterol 121 <130 mg/dL   Prostate Specific Antigen GH   Result Value Ref Range    Prostate Specific Antigen 0.282 <3.100 ng/mL   Lipase   Result Value Ref Range    Lipase 108 (H) 11 - 82 U/L

## 2018-04-12 NOTE — PROGRESS NOTES
Nursing Notes:   Radha Junior LPN  4/12/2018  9:44 AM  Signed  He is here today for medication refills.  Radha Junior LPN..................4/12/2018   9:40 AM      SUBJECTIVE:  Kolton Aragon  is a 52 year old male who comes in today for follow-up and medication renewal.  He continues on omeprazole for reflux and lisinopril 20 mg and HCTZ 25 mg for hypertension.    He was in the emergency room in January because apparently he lost his job and began drinking.  He was apparently suicidal and was brought in.  He was intoxicated, and his initial blood alcohol level was 0.345.  He was observed for an extended period of time to allow clearing of his intoxication and once he was sober, he was evaluated by CRT, a safety plan was made.  He was also hospitalized in October with alcoholic pancreatitis.  I have not seen him since April of last year.  At that time follow-up with GI have been done and he had been recommended to have MRCP since he would not respond to them regard to upper endoscopy and endoscopic ultrasound.    He is currently not working.  He has not been drinking.     He went off omeprazole and used Zantac but had recurrence of GERD.  He is better on the PPI. He was having all day long heartburn.     Past Medical, Family, and Social History reviewed and updated as noted below.   ROS is negative except as noted above       Allergies   Allergen Reactions     Dust Mite Extract Other (See Comments)     Sneezing, itchy eyes   ,   Family History   Problem Relation Age of Onset     Family History Negative Mother      Good Health     Other - See Comments Father      Deserted as a child, patient knows father had a history of heart attack at 50     HEART DISEASE Father      Heart Disease,patient knows father had a history of heart attack at 50     CANCER Other      Cancer,History of cancer   ,   Current Outpatient Prescriptions   Medication     hydrochlorothiazide (HYDRODIURIL) 25 MG tablet     lisinopril  (PRINIVIL/ZESTRIL) 20 MG tablet     omeprazole (PRILOSEC) 40 MG capsule     LORazepam (ATIVAN) 0.5 MG tablet     nicotine polacrilex (NICORETTE) 4 MG gum     ranitidine (ZANTAC) 300 MG tablet     [DISCONTINUED] lisinopril (PRINIVIL/ZESTRIL) 20 MG tablet     [DISCONTINUED] hydrochlorothiazide (HYDRODIURIL) 25 MG tablet     No current facility-administered medications for this visit.    ,   Past Medical History:   Diagnosis Date     Alcohol-induced acute pancreatitis without infection or necrosis     06/20/2016     Essential (primary) hypertension     9/12/2013     Gastro-esophageal reflux disease without esophagitis     5/18/2015     Spondylolisthesis of lumbosacral region     7/9/2013,Patient has been referred for PT and has not followed through.  He was sent for MRI scan,and he was not able to do either the closed or open MRI due to claustrophobia.     Spondylosis of lumbar region without myelopathy or radiculopathy     4/22/2013     Tobacco use     5/17/2013     Uncomplicated asthma     No Comments Provided   ,   Patient Active Problem List    Diagnosis Date Noted     Reactive airway disease 03/01/2018     Priority: Medium     Alcohol abuse 10/12/2017     Priority: Medium     Altered level of consciousness 10/11/2017     Priority: Medium     Acute alcoholic pancreatitis 06/20/2016     Priority: Medium     Chronic kidney disease, stage II (mild) 03/09/2016     Priority: Medium     Gastroesophageal reflux disease without esophagitis 05/18/2015     Priority: Medium     Dyslipidemia 12/29/2014     Priority: Medium     Hypertension 09/12/2013     Priority: Medium     Spondylolisthesis at L5-S1 level 07/09/2013     Priority: Medium     Overview:   Patient has been referred for PT and has not followed through.  He was sent for MRI scan,and he was not able to do either the closed or open MRI due to claustrophobia.       Insomnia 06/20/2013     Priority: Medium     Chewing tobacco use 05/17/2013     Priority: Medium      DJD (degenerative joint disease), lumbar 04/22/2013     Priority: Medium   ,   Past Surgical History:   Procedure Laterality Date     VASECTOMY      No Comments Provided    and   Social History   Substance Use Topics     Smoking status: Never Smoker     Smokeless tobacco: Current User     Types: Chew      Comment: Quit smoking: trying to quit chew     Alcohol use 16.8 oz/week     OBJECTIVE:  /84 (BP Location: Right arm, Patient Position: Sitting, Cuff Size: Adult Regular)  Pulse 96  Wt 208 lb (94.3 kg)  BMI 31.63 kg/m2   EXAM:  General Appearance: Pleasant, alert, appropriate appearance for age. No acute distress  Head Exam: Normal. Normocephalic, atraumatic.  Eye Exam:  Normal external eye, conjunctiva, lids, cornea. BIJAL. EOMI  Ear Exam: Normal TM's bilaterally. Normal auditory canals and external ears. Non-tender.  Nose Exam: Normal external nose, mucus membranes, and septum.  OroPharynx Exam:  Dental hygiene adequate. Normal buccal mucosa. Normal pharynx.  Neck Exam:  Supple, no masses or nodes. No audible bruits  Thyroid Exam: No nodules or enlargement.  Chest/Respiratory Exam: Normalchest wall and respirations. Clear to auscultation.  Cardiovascular Exam: Regular rate and rhythm. S1, S2, no murmur, click, gallop, or rubs.  Gastrointestinal Exam: Soft, non-tender, no masses or organomegaly.  Lymphatic Exam: Non-palpable nodes in neck, clavicular regions.  Musculoskeletal Exam: Back is straight and non-tender, full ROM of upper and lower extremities.  Foot Exam: Left and right foot: good pedal pulses  Skin: no rash or abnormalities  Neurologic Exam: Nonfocal, normal gross motor, tone coordinationand no tremor.  Psychiatric Exam: Alert and oriented - appropriate affect.     Results for orders placed or performed in visit on 04/12/18   CBC with platelets differential   Result Value Ref Range    WBC 4.3 4.0 - 11.0 10e9/L    RBC Count 3.62 (L) 4.4 - 5.9 10e12/L    Hemoglobin 12.0 (L) 13.3 - 17.7 g/dL     Hematocrit 33.3 (L) 40.0 - 53.0 %    MCV 92 78 - 100 fl    MCH 33.1 (H) 26.5 - 33.0 pg    MCHC 36.0 31.5 - 36.5 g/dL    RDW 10.8 10.0 - 15.0 %    Platelet Count 245 150 - 450 10e9/L    Diff Method Automated Method     % Neutrophils 49.4 %    % Lymphocytes 27.9 %    % Monocytes 15.7 %    % Eosinophils 5.6 %    % Basophils 0.9 %    % Immature Granulocytes 0.5 %    Absolute Neutrophil 2.1 1.6 - 8.3 10e9/L    Absolute Lymphocytes 1.2 0.8 - 5.3 10e9/L    Absolute Monocytes 0.7 0.0 - 1.3 10e9/L    Absolute Eosinophils 0.2 0.0 - 0.7 10e9/L    Absolute Basophils 0.0 0.0 - 0.2 10e9/L    Abs Immature Granulocytes 0.0 0 - 0.4 10e9/L   Comprehensive metabolic panel   Result Value Ref Range    Sodium 131 (L) 134 - 144 mmol/L    Potassium 3.8 3.5 - 5.1 mmol/L    Chloride 96 (L) 98 - 107 mmol/L    Carbon Dioxide 23 21 - 31 mmol/L    Anion Gap 12 3 - 14 mmol/L    Glucose 145 (H) 70 - 105 mg/dL    Urea Nitrogen 19 7 - 25 mg/dL    Creatinine 1.24 0.70 - 1.30 mg/dL    GFR Estimate 62 >60 mL/min/1.7m2    GFR Estimate If Black 75 >60 mL/min/1.7m2    Calcium 9.2 8.6 - 10.3 mg/dL    Bilirubin Total 0.2 (L) 0.3 - 1.0 mg/dL    Albumin 3.9 3.5 - 5.7 g/dL    Protein Total 7.8 6.4 - 8.9 g/dL    Alkaline Phosphatase 37 34 - 104 U/L    ALT 14 7 - 52 U/L    AST 15 13 - 39 U/L   Lipid Profile   Result Value Ref Range    Cholesterol 170 <200 mg/dL    Triglycerides 347 (H) <150 mg/dL    HDL Cholesterol 49 23 - 92 mg/dL    LDL Cholesterol Calculated 52 <100 mg/dL    Non HDL Cholesterol 121 <130 mg/dL   Prostate Specific Antigen GH   Result Value Ref Range    Prostate Specific Antigen 0.282 <3.100 ng/mL   Lipase   Result Value Ref Range    Lipase 108 (H) 11 - 82 U/L      ASSESSMENT/Plan :    Kolton was seen today for recheck medication.    Diagnoses and all orders for this visit:    Hypertension, unspecified type  -     hydrochlorothiazide (HYDRODIURIL) 25 MG tablet; Take 1 tablet (25 mg) by mouth daily  -     lisinopril (PRINIVIL/ZESTRIL) 20 MG  tablet; Take 1 tablet (20 mg) by mouth daily  -     Comprehensive metabolic panel    Chronic kidney disease, stage II (mild)  -     CBC with platelets differential  -     Comprehensive metabolic panel    Gastroesophageal reflux disease without esophagitis  -     omeprazole (PRILOSEC) 40 MG capsule; Take 1 capsule (40 mg) by mouth daily  -     CBC with platelets differential    Dyslipidemia  -     Comprehensive metabolic panel  -     Lipid Profile    Alcohol abuse    Prostate disorder  -     Prostate Specific Antigen GH    Chronic pancreatitis, unspecified pancreatitis type (H)  -     Lipase    Special screening for malignant neoplasms, colon  -     GASTROENTEROLOGY ADULT REF PROCEDURE ONLY    Will notify of lab results when available. Discussed diet, exercise and healthy lifestyle changes. Continue current medications.  He needs to be on a PPI as he had breakthrough reflux on H2 blocker.      Recommend abstaining completely from alcohol.    Referred for colonoscopy and should be low risk for same.      Jono Gonzalez MD

## 2018-04-12 NOTE — MR AVS SNAPSHOT
After Visit Summary   4/12/2018    Kolton Aragon    MRN: [id number]           Patient Information     Date Of Birth          1965        Visit Information        Provider Department      4/12/2018 9:30 AM Jono Gonzalez MD [facility]        Today's Diagnoses     Hypertension, unspecified type    -  1    Chronic kidney disease, stage II (mild)        Gastroesophageal reflux disease without esophagitis        Dyslipidemia        Alcohol abuse        Prostate disorder        Chronic pancreatitis, unspecified pancreatitis type (H)        Special screening for malignant neoplasms, colon           Follow-ups after your visit        Additional Services     GASTROENTEROLOGY ADULT REF PROCEDURE ONLY       Last Lab Result: Creatinine (mg/dL)       Date                     Value                 04/12/2018               1.24             ----------  Body mass index is 31.63 kg/(m^2).     Needed:  No  Language:  English    Patient will be contacted to schedule procedure.     Please be aware that coverage of these services is subject to the terms and limitations of your health insurance plan.  Call member services at your health plan with any benefit or coverage questions.  Any procedures must be performed at a [facility] facility OR coordinated by your clinic's referral office.    Please bring the following with you to your appointment:    (1) Any X-Rays, CTs or MRIs which have been performed.  Contact the facility where they were done to arrange for  prior to your scheduled appointment.    (2) List of current medications   (3) This referral request   (4) Any documents/labs given to you for this referral                  Who to contact     If you have questions or need follow up information about today's clinic visit or your schedule please contact [facility] directly at [phone].  Normal or non-critical lab and imaging results will be  "communicated to you by [employer], letter or phone within 4 business days after the clinic has received the results. If you do not hear from us within 7 days, please contact the clinic through [employer] or phone. If you have a critical or abnormal lab result, we will notify you by phone as soon as possible.  Submit refill requests through [employer] or call your pharmacy and they will forward the refill request to us. Please allow 3 business days for your refill to be completed.          Additional Information About Your Visit        [employer] Information     [employer] lets you send messages to your doctor, view your test results, renew your prescriptions, schedule appointments and more. To sign up, go to [email or web address] . Click on \"Log in\" on the left side of the screen, which will take you to the Welcome page. Then click on \"Sign up Now\" on the right side of the page.     You will be asked to enter the access code listed below, as well as some personal information. Please follow the directions to create your username and password.     Your access code is: [email or web address]  Expires: 2018  5:57 PM     Your access code will  in 90 days. If you need help or a new code, please call your [facility] clinic or [phone].        Care EveryWhere ID     This is your Care EveryWhere ID. This could be used by other organizations to access your [facility] medical records  [id number]        Your Vitals Were     Pulse BMI (Body Mass Index)                96 31.63 kg/m2           Blood Pressure from Last 3 Encounters:   18 122/84   17 122/82   17 104/74    Weight from Last 3 Encounters:   18 208 lb (94.3 kg)   17 211 lb (95.7 kg)   17 216 lb (98 kg)              We Performed the Following     CBC with platelets differential     Comprehensive metabolic panel     GASTROENTEROLOGY ADULT REF PROCEDURE ONLY     Lipase     Lipid Profile     Prostate Specific Antigen GH          Today's Medication " Changes          These changes are accurate as of 4/12/18  5:57 PM.  If you have any questions, ask your nurse or doctor.               These medicines have changed or have updated prescriptions.        Dose/Directions    hydrochlorothiazide 25 MG tablet   Commonly known as:  HYDRODIURIL   This may have changed:  See the new instructions.   Used for:  Hypertension, unspecified type   Changed by:  Jono Gonzalez MD        Dose:  25 mg   Take 1 tablet (25 mg) by mouth daily   Quantity:  90 tablet   Refills:  11       lisinopril 20 MG tablet   Commonly known as:  PRINIVIL/ZESTRIL   This may have changed:  See the new instructions.   Used for:  Hypertension, unspecified type   Changed by:  Jono Gonzalez MD        Dose:  20 mg   Take 1 tablet (20 mg) by mouth daily   Quantity:  90 tablet   Refills:  11       omeprazole 40 MG capsule   Commonly known as:  priLOSEC   This may have changed:  when to take this   Used for:  Gastroesophageal reflux disease without esophagitis   Changed by:  Jono Gonzalez MD        Dose:  40 mg   Take 1 capsule (40 mg) by mouth daily   Quantity:  90 capsule   Refills:  11            Where to get your medicines      These medications were sent to [id number] - [place], [place] - [address]  [address], [place] [address]     Phone:  [phone]     hydrochlorothiazide 25 MG tablet    lisinopril 20 MG tablet    omeprazole 40 MG capsule                Primary Care Provider Office Phone # Fax #    Jono Gonzalez [phone]       [address] [facility] [address]        Equal Access to Services     [facility] : Hadii carli thompson hadankito Sojacksonali, waaxda luqadaha, qaybta kaalmada adeegyada, andrew robles. So [facility] [phone].    ATENCIÓN: Si habla español, tiene a rachel disposición servicios gratuitos de asistencia lingüística. Llame al [phone].    We comply with applicable [facility] civil  rights laws and [place] laws. We do not discriminate on the basis of race, color, national origin, age, disability, sex, sexual orientation, or gender identity.            Thank you!     Thank you for choosing [facility] AND [facility]  for your care. Our goal is always to provide you with excellent care. Hearing back from our patients is one way we can continue to improve our services. Please take a few minutes to complete the written survey that you may receive in the mail after your visit with us. Thank you!             Your Updated Medication List - Protect others around you: Learn how to safely use, store and throw away your medicines at www.disposemymeds.org.          This list is accurate as of 4/12/18  5:57 PM.  Always use your most recent med list.                   Brand Name Dispense Instructions for use Diagnosis    hydrochlorothiazide 25 MG tablet    HYDRODIURIL    90 tablet    Take 1 tablet (25 mg) by mouth daily    Hypertension, unspecified type       lisinopril 20 MG tablet    PRINIVIL/ZESTRIL    90 tablet    Take 1 tablet (20 mg) by mouth daily    Hypertension, unspecified type       LORazepam 0.5 MG tablet    ATIVAN     Take 0.5 mg by mouth        nicotine polacrilex 4 MG gum    NICORETTE     Take 4 mg by mouth        omeprazole 40 MG capsule    priLOSEC    90 capsule    Take 1 capsule (40 mg) by mouth daily    Gastroesophageal reflux disease without esophagitis       ranitidine 300 MG tablet    ZANTAC    30 tablet    Take 1 tablet (300 mg) by mouth At Bedtime    Gastroesophageal reflux disease without esophagitis

## 2018-04-12 NOTE — TELEPHONE ENCOUNTER
Called patient to schedule screening colonoscopy,  He did not want to schedule at this time.  He said that he would call back to schedule at a later time. Hetal Ramirez on 4/12/2018 at 3:21 PM

## 2018-06-30 ENCOUNTER — HOSPITAL ENCOUNTER (EMERGENCY)
Facility: OTHER | Age: 53
Discharge: LEFT BEFORE DISCHARGE | End: 2018-06-30
Attending: EMERGENCY MEDICINE | Admitting: EMERGENCY MEDICINE
Payer: COMMERCIAL

## 2018-06-30 ENCOUNTER — APPOINTMENT (OUTPATIENT)
Dept: GENERAL RADIOLOGY | Facility: OTHER | Age: 53
End: 2018-06-30
Attending: EMERGENCY MEDICINE
Payer: COMMERCIAL

## 2018-06-30 VITALS
SYSTOLIC BLOOD PRESSURE: 128 MMHG | OXYGEN SATURATION: 90 % | RESPIRATION RATE: 20 BRPM | DIASTOLIC BLOOD PRESSURE: 92 MMHG | TEMPERATURE: 96.6 F

## 2018-06-30 DIAGNOSIS — R07.9 ACUTE CHEST PAIN: ICD-10-CM

## 2018-06-30 LAB
ANION GAP SERPL CALCULATED.3IONS-SCNC: 15 MMOL/L (ref 3–14)
BASOPHILS # BLD AUTO: 0.1 10E9/L (ref 0–0.2)
BASOPHILS NFR BLD AUTO: 0.5 %
BUN SERPL-MCNC: 22 MG/DL (ref 7–25)
CALCIUM SERPL-MCNC: 8.6 MG/DL (ref 8.6–10.3)
CHLORIDE SERPL-SCNC: 101 MMOL/L (ref 98–107)
CO2 SERPL-SCNC: 20 MMOL/L (ref 21–31)
CREAT SERPL-MCNC: 1.27 MG/DL (ref 0.7–1.3)
DIFFERENTIAL METHOD BLD: ABNORMAL
EOSINOPHIL # BLD AUTO: 0.1 10E9/L (ref 0–0.7)
EOSINOPHIL NFR BLD AUTO: 1.5 %
ERYTHROCYTE [DISTWIDTH] IN BLOOD BY AUTOMATED COUNT: 11.8 % (ref 10–15)
GFR SERPL CREATININE-BSD FRML MDRD: 60 ML/MIN/1.7M2
GLUCOSE SERPL-MCNC: 170 MG/DL (ref 70–105)
HCT VFR BLD AUTO: 34.8 % (ref 40–53)
HGB BLD-MCNC: 11.9 G/DL (ref 13.3–17.7)
IMM GRANULOCYTES # BLD: 0 10E9/L (ref 0–0.4)
IMM GRANULOCYTES NFR BLD: 0.4 %
LYMPHOCYTES # BLD AUTO: 1.1 10E9/L (ref 0.8–5.3)
LYMPHOCYTES NFR BLD AUTO: 11.7 %
MCH RBC QN AUTO: 32 PG (ref 26.5–33)
MCHC RBC AUTO-ENTMCNC: 34.2 G/DL (ref 31.5–36.5)
MCV RBC AUTO: 94 FL (ref 78–100)
MONOCYTES # BLD AUTO: 0.8 10E9/L (ref 0–1.3)
MONOCYTES NFR BLD AUTO: 8.6 %
NEUTROPHILS # BLD AUTO: 7.1 10E9/L (ref 1.6–8.3)
NEUTROPHILS NFR BLD AUTO: 77.3 %
PLATELET # BLD AUTO: 245 10E9/L (ref 150–450)
POTASSIUM SERPL-SCNC: 3.2 MMOL/L (ref 3.5–5.1)
RBC # BLD AUTO: 3.72 10E12/L (ref 4.4–5.9)
SODIUM SERPL-SCNC: 136 MMOL/L (ref 134–144)
TROPONIN I SERPL-MCNC: <0.03 UG/L (ref 0–0.03)
WBC # BLD AUTO: 9.2 10E9/L (ref 4–11)

## 2018-06-30 PROCEDURE — 93010 ELECTROCARDIOGRAM REPORT: CPT | Performed by: INTERNAL MEDICINE

## 2018-06-30 PROCEDURE — 36415 COLL VENOUS BLD VENIPUNCTURE: CPT | Performed by: EMERGENCY MEDICINE

## 2018-06-30 PROCEDURE — 99285 EMERGENCY DEPT VISIT HI MDM: CPT | Mod: 25 | Performed by: EMERGENCY MEDICINE

## 2018-06-30 PROCEDURE — 84484 ASSAY OF TROPONIN QUANT: CPT | Performed by: EMERGENCY MEDICINE

## 2018-06-30 PROCEDURE — 85025 COMPLETE CBC W/AUTO DIFF WBC: CPT | Performed by: EMERGENCY MEDICINE

## 2018-06-30 PROCEDURE — 71045 X-RAY EXAM CHEST 1 VIEW: CPT

## 2018-06-30 PROCEDURE — 80048 BASIC METABOLIC PNL TOTAL CA: CPT | Performed by: EMERGENCY MEDICINE

## 2018-06-30 PROCEDURE — 99284 EMERGENCY DEPT VISIT MOD MDM: CPT | Mod: Z6 | Performed by: EMERGENCY MEDICINE

## 2018-06-30 PROCEDURE — 93005 ELECTROCARDIOGRAM TRACING: CPT | Performed by: EMERGENCY MEDICINE

## 2018-06-30 ASSESSMENT — ENCOUNTER SYMPTOMS
CHILLS: 0
DIARRHEA: 0
ABDOMINAL PAIN: 1
FEVER: 0
LIGHT-HEADEDNESS: 0
WEAKNESS: 0
NAUSEA: 0
SHORTNESS OF BREATH: 0
VOMITING: 0
HEADACHES: 0
DIZZINESS: 0

## 2018-06-30 NOTE — ED AVS SNAPSHOT
[facility]    [address]    [place] [address]    Phone:  [phone]    Fax:  [phone]                                       Kolton Aragon   MRN: [id number]    Department:  [facility]   Date of Visit:  6/30/2018           Patient Information     Date Of Birth          1965        Your diagnoses for this visit were:     Acute chest pain        You were seen by Brendan Hart MD.        Discharge Instructions       1.  If having chest pain again return to ER  2.  Otherwise follow-up with your primary care physician Monday for further check    24 Hour Appointment Hotline     To schedule an appointment at [facility], please call [phone]. If you don't have a family doctor or clinic, we will help you find one. [facility] clinics are conveniently located to serve the needs of you and your family.           Review of your medicines      Our records show that you are taking the medicines listed below. If these are incorrect, please call your family doctor or clinic.        Dose / Directions Last dose taken    hydrochlorothiazide 25 MG tablet   Commonly known as:  HYDRODIURIL   Dose:  25 mg   Quantity:  90 tablet        Take 1 tablet (25 mg) by mouth daily   Refills:  11        lisinopril 20 MG tablet   Commonly known as:  PRINIVIL/ZESTRIL   Dose:  20 mg   Quantity:  90 tablet        Take 1 tablet (20 mg) by mouth daily   Refills:  11        omeprazole 40 MG capsule   Commonly known as:  priLOSEC   Dose:  40 mg   Quantity:  90 capsule        Take 1 capsule (40 mg) by mouth daily   Refills:  11        ranitidine 300 MG tablet   Commonly known as:  ZANTAC   Dose:  300 mg   Quantity:  30 tablet        Take 1 tablet (300 mg) by mouth At Bedtime   Refills:  11                Procedures and tests performed during your visit     Basic metabolic panel    CBC with platelets differential    EKG 12-lead, tracing only    Troponin I    XR Chest Port 1 View  "     Orders Needing Specimen Collection     None      Pending Results     Date and Time Order Name Status Description    2018 0932 XR Chest Port 1 View In process             Pending Culture Results     No orders found from 2018 to 2018.            Pending Results Instructions     If you had any lab results that were not finalized at the time of your Discharge, you can call the ED Lab Result RN at [phone]. You will be contacted by this team for any positive Lab results or changes in treatment. The nurses are available 7 days a week from 10A to 6:30P.  You can leave a message 24 hours per day and they will return your call.        Thank you for choosing [facility]       Thank you for choosing [facility] for your care. Our goal is always to provide you with excellent care. Hearing back from our patients is one way we can continue to improve our services. Please take a few minutes to complete the written survey that you may receive in the mail after you visit with us. Thank you!        [employer] Information     [employer] lets you send messages to your doctor, view your test results, renew your prescriptions, schedule appointments and more. To sign up, go to [email or web address] . Click on \"Log in\" on the left side of the screen, which will take you to the Welcome page. Then click on \"Sign up Now\" on the right side of the page.     You will be asked to enter the access code listed below, as well as some personal information. Please follow the directions to create your username and password.     Your access code is: [email or web address]  Expires: 2018  5:57 PM     Your access code will  in 90 days. If you need help or a new code, please call your [facility] clinic or [phone].        Care EveryWhere ID     This is your Care EveryWhere ID. This could be used by other organizations to access your [facility] medical records  [id number]        Equal Access to Services     [employer] [facility]: Rocío thompson " haley Torre, js whaley, jarek kemp, andrew robles. So [facility] [phone].    ATENCIÓN: Si habla español, tiene a rachel disposición servicios gratuitos de asistencia lingüística. Llame al [phone].    We comply with applicable federal civil rights laws and [place] laws. We do not discriminate on the basis of race, color, national origin, age, disability, sex, sexual orientation, or gender identity.            After Visit Summary       This is your record. Keep this with you and show to your community pharmacist(s) and doctor(s) at your next visit.

## 2018-06-30 NOTE — DISCHARGE INSTRUCTIONS
1.  If having chest pain again return to ER  2.  Otherwise follow-up with your primary care physician Monday for further check

## 2018-06-30 NOTE — ED PROVIDER NOTES
History     Chief Complaint   Patient presents with     Chest Pain     HPI Comments: This is a 52-year-old male with history of hypertension who denies any other significant past medical history brought into the emergency room by the paramedics from home for acute chest pain which started around 8:00 this morning and persisted since.  The paramedics report patient was hypotension with systolic blood pressure in the 80s when they arrived at his place and they gave him 250 cc IV bolus of normal saline which normalized with the systolic blood pressure.  Patient received nitroglycerin sprays twice which reportedly did not help the pain for the patient.  Patient states that he is not able to describe the character of the pain is having but he reports initially he was little sweaty when the pain started.  The paramedics also report patient was bradycardic in the 50s at home.  Patient denies lightheadedness or dizziness or faint sensation.  He says the pain seems to be moving from one side to the other but staying within the left side of the chest.  He denies abdominal pain, headache, fever or chills or cough.  Patient is sleepy keeping his eyes closed but answering questions appropriately.  He received fentanyl 50 mcg IV as he was coming to the emergency room by the paramedics.  He denies any illicit drug use but states he had alcohol to drink last night and he drinks on a regular basis.  Denies history of DVT or PE.  No leg pain or leg swelling or recent prolonged bed rest or travel or illness.      Problem List:    Patient Active Problem List    Diagnosis Date Noted     Reactive airway disease 03/01/2018     Priority: Medium     Alcohol abuse 10/12/2017     Priority: Medium     Altered level of consciousness 10/11/2017     Priority: Medium     Acute alcoholic pancreatitis 06/20/2016     Priority: Medium     Chronic kidney disease, stage II (mild) 03/09/2016     Priority: Medium     Gastroesophageal reflux disease  without esophagitis 05/18/2015     Priority: Medium     Dyslipidemia 12/29/2014     Priority: Medium     Hypertension 09/12/2013     Priority: Medium     Spondylolisthesis at L5-S1 level 07/09/2013     Priority: Medium     Overview:   Patient has been referred for PT and has not followed through.  He was sent for MRI scan,and he was not able to do either the closed or open MRI due to claustrophobia.       Insomnia 06/20/2013     Priority: Medium     Chewing tobacco use 05/17/2013     Priority: Medium     DJD (degenerative joint disease), lumbar 04/22/2013     Priority: Medium        Past Medical History:    Past Medical History:   Diagnosis Date     Alcohol-induced acute pancreatitis without infection or necrosis      Essential (primary) hypertension      Gastro-esophageal reflux disease without esophagitis      Spondylolisthesis of lumbosacral region      Spondylosis of lumbar region without myelopathy or radiculopathy      Tobacco use      Uncomplicated asthma        Past Surgical History:    Past Surgical History:   Procedure Laterality Date     VASECTOMY      No Comments Provided       Family History:    Family History   Problem Relation Age of Onset     Family History Negative Mother      Good Health     Other - See Comments Father      Deserted as a child, patient knows father had a history of heart attack at 50     HEART DISEASE Father      Heart Disease,patient knows father had a history of heart attack at 50     Cancer Other      Cancer,History of cancer       Social History:  Marital Status:   [4]  Social History   Substance Use Topics     Smoking status: Never Smoker     Smokeless tobacco: Current User     Types: Chew      Comment: Quit smoking: trying to quit chew     Alcohol use 16.8 oz/week        Medications:      hydrochlorothiazide (HYDRODIURIL) 25 MG tablet   lisinopril (PRINIVIL/ZESTRIL) 20 MG tablet   omeprazole (PRILOSEC) 40 MG capsule   ranitidine (ZANTAC) 300 MG tablet         Review  of Systems   Constitutional: Negative for chills and fever.   Respiratory: Negative for shortness of breath.    Cardiovascular: Positive for chest pain.   Gastrointestinal: Positive for abdominal pain. Negative for diarrhea, nausea and vomiting.   Neurological: Negative for dizziness, weakness, light-headedness and headaches.   All other systems reviewed and are negative.      Physical Exam   BP: 99/60  Heart Rate: 68  Temp: 96.6  F (35.9  C)  Resp: 15  SpO2: 91 %      Physical Exam   Constitutional: He is oriented to person, place, and time. He appears well-developed and well-nourished. No distress.   HENT:   Head: Normocephalic and atraumatic.   Cardiovascular: Normal rate, regular rhythm, normal heart sounds and intact distal pulses.    Pulmonary/Chest: Effort normal and breath sounds normal. No respiratory distress. He has no wheezes. He has no rales. He exhibits no tenderness.   Reproducible left-sided chest pain on palpation   Abdominal: Soft. Bowel sounds are normal. He exhibits no distension. There is no tenderness. There is no rebound and no guarding.   Musculoskeletal: Normal range of motion. He exhibits no edema or tenderness.   Neurological: He is oriented to person, place, and time.       ED Course     Presents with acute left-sided chest pain associated with reported hypotension systolic in the 80s and bradycardia with ventricular response in the 50s at home.  His blood pressure has improved and his heart rate is 60 bpm.  EKG reveals flipped T waves in inferior leads III as well as V1.  There is relatively peak T waves diffusely.  His previous EKGs revealed right bundle branch block which is not evident on today's EKG.  Patient's pain has completely resolved.  He did receive en to the emergency room nitroglycerin sprays ×2 which did not help with the pain.  However he received fentanyl 50 mcg which she thinks really made him sleepy.  His pain is gone likely secondary to the fentanyl.  Will be worked up  "for potential ACS.  PE is considered very low probability given the history and examination and overall clinical gestalt.  Symptoms have completely dissipated and his vitals remained stable.  Initial troponin is unremarkable.  Other basic labs are unrevealing.  Chest x-ray is also unremarkable.  Delta troponin ordered.  However, patient is now stating that he wants to leave because \"I feel fine.\"  He does not want to have the delta troponin drawn despite being convinced for us to do so.  He signed AMA.  He is able to ablate without difficulties and is telling me he has no pain or shortness of breath.  He is advised to consult with his primary care physician Monday.  Patient is also advised that should he develop chest pain again he should to ER for reevaluation.      ED Course     Procedures               Critical Care time:  none               Results for orders placed or performed during the hospital encounter of 06/30/18 (from the past 24 hour(s))   CBC with platelets differential   Result Value Ref Range    WBC 9.2 4.0 - 11.0 10e9/L    RBC Count 3.72 (L) 4.4 - 5.9 10e12/L    Hemoglobin 11.9 (L) 13.3 - 17.7 g/dL    Hematocrit 34.8 (L) 40.0 - 53.0 %    MCV 94 78 - 100 fl    MCH 32.0 26.5 - 33.0 pg    MCHC 34.2 31.5 - 36.5 g/dL    RDW 11.8 10.0 - 15.0 %    Platelet Count 245 150 - 450 10e9/L    Diff Method Automated Method     % Neutrophils 77.3 %    % Lymphocytes 11.7 %    % Monocytes 8.6 %    % Eosinophils 1.5 %    % Basophils 0.5 %    % Immature Granulocytes 0.4 %    Absolute Neutrophil 7.1 1.6 - 8.3 10e9/L    Absolute Lymphocytes 1.1 0.8 - 5.3 10e9/L    Absolute Monocytes 0.8 0.0 - 1.3 10e9/L    Absolute Eosinophils 0.1 0.0 - 0.7 10e9/L    Absolute Basophils 0.1 0.0 - 0.2 10e9/L    Abs Immature Granulocytes 0.0 0 - 0.4 10e9/L   Basic metabolic panel   Result Value Ref Range    Sodium 136 134 - 144 mmol/L    Potassium 3.2 (L) 3.5 - 5.1 mmol/L    Chloride 101 98 - 107 mmol/L    Carbon Dioxide 20 (L) 21 - 31 " mmol/L    Anion Gap 15 (H) 3 - 14 mmol/L    Glucose 170 (H) 70 - 105 mg/dL    Urea Nitrogen 22 7 - 25 mg/dL    Creatinine 1.27 0.70 - 1.30 mg/dL    GFR Estimate 60 (L) >60 mL/min/1.7m2    GFR Estimate If Black 73 >60 mL/min/1.7m2    Calcium 8.6 8.6 - 10.3 mg/dL   Troponin I   Result Value Ref Range    Troponin I ES <0.030 0.000 - 0.034 ug/L       Medications - No data to display    Assessments & Plan (with Medical Decision Making)     I have reviewed the nursing notes.    I have reviewed the findings, diagnosis, plan and need for follow up with the patient.       New Prescriptions    No medications on file       Final diagnoses:   Acute chest pain       6/30/2018   [facility]     Brendan Hart MD  06/30/18 [id number]

## 2018-06-30 NOTE — ED AVS SNAPSHOT
[facility]    [address]    [place] [address]    Phone:  [phone]    Fax:  [phone]                                       Kolton Aragon   MRN: [id number]    Department:  [facility]   Date of Visit:  6/30/2018           After Visit Summary Signature Page     I have received my discharge instructions, and my questions have been answered. I have discussed any challenges I see with this plan with the nurse or doctor.    ..........................................................................................................................................  Patient/Patient Representative Signature      ..........................................................................................................................................  Patient Representative Print Name and Relationship to Patient    ..................................................               ................................................  Date                                            Time    ..........................................................................................................................................  Reviewed by Signature/Title    ...................................................              ..............................................  Date                                                            Time

## 2018-07-19 ENCOUNTER — OFFICE VISIT (OUTPATIENT)
Dept: FAMILY MEDICINE | Facility: OTHER | Age: 53
End: 2018-07-19
Attending: NURSE PRACTITIONER
Payer: COMMERCIAL

## 2018-07-19 VITALS
WEIGHT: 208.13 LBS | HEART RATE: 86 BPM | DIASTOLIC BLOOD PRESSURE: 80 MMHG | BODY MASS INDEX: 31.65 KG/M2 | TEMPERATURE: 97.3 F | SYSTOLIC BLOOD PRESSURE: 124 MMHG

## 2018-07-19 DIAGNOSIS — R10.12 LUQ ABDOMINAL PAIN: Primary | ICD-10-CM

## 2018-07-19 DIAGNOSIS — R10.13 ABDOMINAL PAIN, EPIGASTRIC: ICD-10-CM

## 2018-07-19 LAB
ALBUMIN SERPL-MCNC: 3.7 G/DL (ref 3.5–5.7)
ALP SERPL-CCNC: 35 U/L (ref 34–104)
ALT SERPL W P-5'-P-CCNC: 8 U/L (ref 7–52)
AMYLASE SERPL-CCNC: 37 U/L (ref 29–103)
ANION GAP SERPL CALCULATED.3IONS-SCNC: 11 MMOL/L (ref 3–14)
AST SERPL W P-5'-P-CCNC: 12 U/L (ref 13–39)
BILIRUB SERPL-MCNC: 0.2 MG/DL (ref 0.3–1)
BUN SERPL-MCNC: 18 MG/DL (ref 7–25)
CALCIUM SERPL-MCNC: 9.3 MG/DL (ref 8.6–10.3)
CHLORIDE SERPL-SCNC: 96 MMOL/L (ref 98–107)
CO2 SERPL-SCNC: 22 MMOL/L (ref 21–31)
CREAT SERPL-MCNC: 1.13 MG/DL (ref 0.7–1.3)
ERYTHROCYTE [DISTWIDTH] IN BLOOD BY AUTOMATED COUNT: 11 % (ref 10–15)
GFR SERPL CREATININE-BSD FRML MDRD: 69 ML/MIN/1.7M2
GLUCOSE SERPL-MCNC: 173 MG/DL (ref 70–105)
HCT VFR BLD AUTO: 32.4 % (ref 40–53)
HGB BLD-MCNC: 11.1 G/DL (ref 13.3–17.7)
LIPASE SERPL-CCNC: 69 U/L (ref 11–82)
MCH RBC QN AUTO: 31.3 PG (ref 26.5–33)
MCHC RBC AUTO-ENTMCNC: 34.3 G/DL (ref 31.5–36.5)
MCV RBC AUTO: 91 FL (ref 78–100)
PLATELET # BLD AUTO: 372 10E9/L (ref 150–450)
POTASSIUM SERPL-SCNC: 3.9 MMOL/L (ref 3.5–5.1)
PROT SERPL-MCNC: 7.4 G/DL (ref 6.4–8.9)
RBC # BLD AUTO: 3.55 10E12/L (ref 4.4–5.9)
SODIUM SERPL-SCNC: 129 MMOL/L (ref 134–144)
WBC # BLD AUTO: 7.5 10E9/L (ref 4–11)

## 2018-07-19 PROCEDURE — 99214 OFFICE O/P EST MOD 30 MIN: CPT | Performed by: NURSE PRACTITIONER

## 2018-07-19 PROCEDURE — G0463 HOSPITAL OUTPT CLINIC VISIT: HCPCS

## 2018-07-19 PROCEDURE — 80053 COMPREHEN METABOLIC PANEL: CPT | Performed by: NURSE PRACTITIONER

## 2018-07-19 PROCEDURE — 82150 ASSAY OF AMYLASE: CPT | Performed by: NURSE PRACTITIONER

## 2018-07-19 PROCEDURE — 36415 COLL VENOUS BLD VENIPUNCTURE: CPT | Performed by: NURSE PRACTITIONER

## 2018-07-19 PROCEDURE — 83690 ASSAY OF LIPASE: CPT | Performed by: NURSE PRACTITIONER

## 2018-07-19 PROCEDURE — 85027 COMPLETE CBC AUTOMATED: CPT | Performed by: NURSE PRACTITIONER

## 2018-07-19 ASSESSMENT — ANXIETY QUESTIONNAIRES
2. NOT BEING ABLE TO STOP OR CONTROL WORRYING: NOT AT ALL
7. FEELING AFRAID AS IF SOMETHING AWFUL MIGHT HAPPEN: NOT AT ALL
6. BECOMING EASILY ANNOYED OR IRRITABLE: NOT AT ALL
3. WORRYING TOO MUCH ABOUT DIFFERENT THINGS: NOT AT ALL
GAD7 TOTAL SCORE: 0
1. FEELING NERVOUS, ANXIOUS, OR ON EDGE: NOT AT ALL
5. BEING SO RESTLESS THAT IT IS HARD TO SIT STILL: NOT AT ALL
IF YOU CHECKED OFF ANY PROBLEMS ON THIS QUESTIONNAIRE, HOW DIFFICULT HAVE THESE PROBLEMS MADE IT FOR YOU TO DO YOUR WORK, TAKE CARE OF THINGS AT HOME, OR GET ALONG WITH OTHER PEOPLE: NOT DIFFICULT AT ALL

## 2018-07-19 ASSESSMENT — PATIENT HEALTH QUESTIONNAIRE - PHQ9: 5. POOR APPETITE OR OVEREATING: NOT AT ALL

## 2018-07-19 ASSESSMENT — PAIN SCALES - GENERAL: PAINLEVEL: NO PAIN (1)

## 2018-07-19 NOTE — PROGRESS NOTES
HPI:    Kolton Aragon is a 52 year old male who presents to clinic today for LLQ abdominal pain. He reports the pain started about 2 weeks ago while lifting something. The pain was sharp shortly after lifting but not sure that this was related. Had increased pain after eating at that time. Has had soreness since then. Feels sx have been getting better. The pain was severe initially but this has improved. Reports no pain yesterday. He is eating and drinking well. No n/v, normal BM's. Pain worse with bending forward. Has used tylenol and aleve for the pain. He does take prilosec daily for reflux. Dx of alcohol abuse.     Past Medical History:   Diagnosis Date     Alcohol-induced acute pancreatitis without infection or necrosis     06/20/2016     Essential (primary) hypertension     9/12/2013     Gastro-esophageal reflux disease without esophagitis     5/18/2015     Spondylolisthesis of lumbosacral region     7/9/2013,Patient has been referred for PT and has not followed through.  He was sent for MRI scan,and he was not able to do either the closed or open MRI due to claustrophobia.     Spondylosis of lumbar region without myelopathy or radiculopathy     4/22/2013     Tobacco use     5/17/2013     Uncomplicated asthma     No Comments Provided       Past Surgical History:   Procedure Laterality Date     VASECTOMY      No Comments Provided       Current Outpatient Prescriptions   Medication Sig Dispense Refill     hydrochlorothiazide (HYDRODIURIL) 25 MG tablet Take 1 tablet (25 mg) by mouth daily 90 tablet 11     lisinopril (PRINIVIL/ZESTRIL) 20 MG tablet Take 1 tablet (20 mg) by mouth daily 90 tablet 11     omeprazole (PRILOSEC) 40 MG capsule Take 1 capsule (40 mg) by mouth daily 90 capsule 11       Allergies   Allergen Reactions     Dust Mite Extract Other (See Comments)     Sneezing, itchy eyes       ROS:  Pertinent positives and negatives are noted in HPI.    EXAM:  General appearance: well appearing overweight  male, in no acute distress  Respiratory: clear to auscultation bilaterally  Cardiac: RRR with no murmurs  Abdomen: soft, epigastric and LUQ tenderness with guarding, no masses or organomegally, normal BS  Psychological: normal affect, alert and pleasant  Lab:   Results for orders placed or performed in visit on 07/19/18   CBC W PLT No Diff   Result Value Ref Range    WBC 7.5 4.0 - 11.0 10e9/L    RBC Count 3.55 (L) 4.4 - 5.9 10e12/L    Hemoglobin 11.1 (L) 13.3 - 17.7 g/dL    Hematocrit 32.4 (L) 40.0 - 53.0 %    MCV 91 78 - 100 fl    MCH 31.3 26.5 - 33.0 pg    MCHC 34.3 31.5 - 36.5 g/dL    RDW 11.0 10.0 - 15.0 %    Platelet Count 372 150 - 450 10e9/L   Comprehensive Metabolic Panel   Result Value Ref Range    Sodium 129 (L) 134 - 144 mmol/L    Potassium 3.9 3.5 - 5.1 mmol/L    Chloride 96 (L) 98 - 107 mmol/L    Carbon Dioxide 22 21 - 31 mmol/L    Anion Gap 11 3 - 14 mmol/L    Glucose 173 (H) 70 - 105 mg/dL    Urea Nitrogen 18 7 - 25 mg/dL    Creatinine 1.13 0.70 - 1.30 mg/dL    GFR Estimate 69 >60 mL/min/1.7m2    GFR Estimate If Black 83 >60 mL/min/1.7m2    Calcium 9.3 8.6 - 10.3 mg/dL    Bilirubin Total 0.2 (L) 0.3 - 1.0 mg/dL    Albumin 3.7 3.5 - 5.7 g/dL    Protein Total 7.4 6.4 - 8.9 g/dL    Alkaline Phosphatase 35 34 - 104 U/L    ALT 8 7 - 52 U/L    AST 12 (L) 13 - 39 U/L   Amylase   Result Value Ref Range    Amylase 37 29 - 103 U/L   Lipase   Result Value Ref Range    Lipase 69 11 - 82 U/L         ASSESSMENT AND PLAN:    1. LUQ abdominal pain    2. Abdominal pain, epigastric      Labs are stable currently. Order for abdominal US. Will f/u with these results when available. Discussed differentials including pulled muscle, viral gastroenteritis, gall bladder, pancreatitis, etc.       Norma Villatoro..................7/19/2018 8:22 AM

## 2018-07-19 NOTE — MR AVS SNAPSHOT
"              After Visit Summary   7/19/2018    Kolton Aragon    MRN: [id number]           Patient Information     Date Of Birth          1965        Visit Information        Provider Department      7/19/2018 8:30 AM Norma Villatoro APRN CNP [facility]        Today's Diagnoses     LUQ abdominal pain    -  1    Abdominal pain, epigastric           Follow-ups after your visit        Future tests that were ordered for you today     Open Future Orders        Priority Expected Expires Ordered    US Abdomen Limited Routine  7/19/2019 7/19/2018            Who to contact     If you have questions or need follow up information about today's clinic visit or your schedule please contact [facility] directly at [phone].  Normal or non-critical lab and imaging results will be communicated to you by [employer], letter or phone within 4 business days after the clinic has received the results. If you do not hear from us within 7 days, please contact the clinic through [employer] or phone. If you have a critical or abnormal lab result, we will notify you by phone as soon as possible.  Submit refill requests through mSchool or call your pharmacy and they will forward the refill request to us. Please allow 3 business days for your refill to be completed.          Additional Information About Your Visit        MyChart Information     mSchool lets you send messages to your doctor, view your test results, renew your prescriptions, schedule appointments and more. To sign up, go to [email or web address] . Click on \"Log in\" on the left side of the screen, which will take you to the Welcome page. Then click on \"Sign up Now\" on the right side of the page.     You will be asked to enter the access code listed below, as well as some personal information. Please follow the directions to create your username and password.     Your access code is: [email or web address]  Expires: 10/17/2018 11:16 " AM     Your access code will  in 90 days. If you need help or a new code, please call your [place] clinic or [phone].        Care EveryWhere ID     This is your Care EveryWhere ID. This could be used by other organizations to access your [place] medical records  [id number]        Your Vitals Were     Pulse Temperature BMI (Body Mass Index)             86 97.3  F (36.3  C) (Tympanic) 31.65 kg/m2          Blood Pressure from Last 3 Encounters:   18 124/80   18 (!) 128/92   18 122/84    Weight from Last 3 Encounters:   18 208 lb 2 oz (94.4 kg)   18 208 lb (94.3 kg)   17 211 lb (95.7 kg)              We Performed the Following     Amylase     CBC W PLT No Diff     Comprehensive Metabolic Panel     Lipase        Primary Care Provider Office Phone # Fax #    Jono DELORIS Gonzalez [phone]       [address] [facility] [address]        Equal Access to Services     [facility]: Hadii aad ku hadasho Soomaali, waaxda luqadaha, qaybta kaalmada adeegyada, waxdelfino velázquez hayjulien jaramillo . So [facility] [phone].    ATENCIÓN: Si habla español, tiene a rachel disposición servicios gratuitos de asistencia lingüística. Llame al [phone].    We comply with applicable federal civil rights laws and [place] laws. We do not discriminate on the basis of race, color, national origin, age, disability, sex, sexual orientation, or gender identity.            Thank you!     Thank you for choosing [facility]  for your care. Our goal is always to provide you with excellent care. Hearing back from our patients is one way we can continue to improve our services. Please take a few minutes to complete the written survey that you may receive in the mail after your visit with us. Thank you!             Your Updated Medication List - Protect others around you: Learn how to safely use, store and throw away your medicines at  www.disposemymeds.org.          This list is accurate as of 7/19/18 11:16 AM.  Always use your most recent med list.                   Brand Name Dispense Instructions for use Diagnosis    hydrochlorothiazide 25 MG tablet    HYDRODIURIL    90 tablet    Take 1 tablet (25 mg) by mouth daily    Hypertension, unspecified type       lisinopril 20 MG tablet    PRINIVIL/ZESTRIL    90 tablet    Take 1 tablet (20 mg) by mouth daily    Hypertension, unspecified type       omeprazole 40 MG capsule    priLOSEC    90 capsule    Take 1 capsule (40 mg) by mouth daily    Gastroesophageal reflux disease without esophagitis

## 2018-07-19 NOTE — NURSING NOTE
Patient presents to clinic today for left sided abdominal pain. He states two weeks ago he was lifting something and he has been sore ever since. He does feel it's improving.   Aarti Sen LPN...................7/19/2018  8:25 AM

## 2018-07-20 ASSESSMENT — ANXIETY QUESTIONNAIRES: GAD7 TOTAL SCORE: 0

## 2018-07-20 ASSESSMENT — PATIENT HEALTH QUESTIONNAIRE - PHQ9: SUM OF ALL RESPONSES TO PHQ QUESTIONS 1-9: 0

## 2018-07-23 NOTE — PROGRESS NOTES
Patient Information     Patient Name  Kolton Aragon MRN  [id number] Sex  Male   1965      Letter by Jono Gonzalez MD at      Author:  Jono Gonzalez MD Service:  (none) Author Type:  (none)    Filed:   Encounter Date:  2017 Status:  (Other)           Kolton Aragon  [address]  [address] [facility] [address]          2017    Dear Mr. Aragon:    This is to remind you that you are due for your 1 year follow-up/medication management appointment with Jono Gonzalez MD in relation to HTN and GERD.  Your last visit was on 3/9/2016. Additional refills of your medication require you to complete this visit.    Please call [phone] to schedule your appointment.    Thank you for choosing [facility] for your health care needs.    Sincerely,      Refill RN  [facility]

## 2018-07-24 NOTE — PROGRESS NOTES
Patient Information     Patient Name  Kolton Aragon MRN  [id number] Sex  Male   1965      Letter by Jono Gonzalez MD at      Author:  Jono Gonzalez MD Service:  (none) Author Type:  (none)    Filed:   Encounter Date:  3/13/2017 Status:  (Other)             Work/School Excuse and Restrictions       Arrived:               Discharged:                                                              10:49 AM  3/13/2017        Kolton Aragon  was seen today in the [facility] for illness. He was ill from -2017.    Kolton is able to return to work with no restrictions.            If you have any questions regarding the validity of this note please call the number above.             Jono Gonzalez MD

## 2018-07-26 ENCOUNTER — HOSPITAL ENCOUNTER (OUTPATIENT)
Dept: ULTRASOUND IMAGING | Facility: OTHER | Age: 53
Discharge: HOME | End: 2018-07-26
Attending: NURSE PRACTITIONER | Admitting: NURSE PRACTITIONER
Payer: COMMERCIAL

## 2018-07-26 DIAGNOSIS — R10.13 ABDOMINAL PAIN, EPIGASTRIC: ICD-10-CM

## 2018-07-26 PROCEDURE — 76700 US EXAM ABDOM COMPLETE: CPT

## 2018-12-30 ENCOUNTER — HOSPITAL ENCOUNTER (EMERGENCY)
Facility: OTHER | Age: 53
Discharge: HOME | End: 2018-12-30
Attending: FAMILY MEDICINE | Admitting: FAMILY MEDICINE
Payer: COMMERCIAL

## 2018-12-30 VITALS
BODY MASS INDEX: 28.79 KG/M2 | HEIGHT: 68 IN | HEART RATE: 87 BPM | TEMPERATURE: 99.1 F | SYSTOLIC BLOOD PRESSURE: 109 MMHG | DIASTOLIC BLOOD PRESSURE: 95 MMHG | OXYGEN SATURATION: 90 % | RESPIRATION RATE: 24 BRPM | WEIGHT: 190 LBS

## 2018-12-30 DIAGNOSIS — E87.1 HYPONATREMIA: ICD-10-CM

## 2018-12-30 DIAGNOSIS — K85.20 ALCOHOL-INDUCED ACUTE PANCREATITIS WITHOUT INFECTION OR NECROSIS: ICD-10-CM

## 2018-12-30 DIAGNOSIS — N28.9 ACUTE RENAL INSUFFICIENCY: ICD-10-CM

## 2018-12-30 DIAGNOSIS — E86.0 DEHYDRATION: ICD-10-CM

## 2018-12-30 DIAGNOSIS — F10.21 ALCOHOL DEPENDENCE IN REMISSION (H): ICD-10-CM

## 2018-12-30 DIAGNOSIS — E87.8 HYPOCHLOREMIA: ICD-10-CM

## 2018-12-30 DIAGNOSIS — E87.6 HYPOKALEMIA: ICD-10-CM

## 2018-12-30 DIAGNOSIS — K52.9 GASTROENTERITIS: ICD-10-CM

## 2018-12-30 LAB
ABO + RH BLD: NORMAL
ABO + RH BLD: NORMAL
ALBUMIN SERPL-MCNC: 3.9 G/DL (ref 3.5–5.7)
ALBUMIN UR-MCNC: 30 MG/DL
ALP SERPL-CCNC: 75 U/L (ref 34–104)
ALT SERPL W P-5'-P-CCNC: 42 U/L (ref 7–52)
ANION GAP SERPL CALCULATED.3IONS-SCNC: 15 MMOL/L (ref 3–14)
APPEARANCE UR: ABNORMAL
AST SERPL W P-5'-P-CCNC: 55 U/L (ref 13–39)
BACTERIA #/AREA URNS HPF: ABNORMAL /HPF
BASOPHILS # BLD AUTO: 0 10E9/L (ref 0–0.2)
BASOPHILS NFR BLD AUTO: 0.3 %
BILIRUB SERPL-MCNC: 1.7 MG/DL (ref 0.3–1)
BILIRUB UR QL STRIP: NEGATIVE
BLD GP AB SCN SERPL QL: NORMAL
BLOOD BANK CMNT PATIENT-IMP: NORMAL
BUN SERPL-MCNC: 37 MG/DL (ref 7–25)
CALCIUM SERPL-MCNC: 8.4 MG/DL (ref 8.6–10.3)
CHLORIDE SERPL-SCNC: 89 MMOL/L (ref 98–107)
CO2 SERPL-SCNC: 19 MMOL/L (ref 21–31)
COLOR UR AUTO: YELLOW
CREAT SERPL-MCNC: 2.14 MG/DL (ref 0.7–1.3)
DIFFERENTIAL METHOD BLD: ABNORMAL
EOSINOPHIL # BLD AUTO: 0 10E9/L (ref 0–0.7)
EOSINOPHIL NFR BLD AUTO: 0.2 %
ERYTHROCYTE [DISTWIDTH] IN BLOOD BY AUTOMATED COUNT: 10.9 % (ref 10–15)
GFR SERPL CREATININE-BSD FRML MDRD: 33 ML/MIN/{1.73_M2}
GLUCOSE SERPL-MCNC: 200 MG/DL (ref 70–105)
GLUCOSE UR STRIP-MCNC: NEGATIVE MG/DL
HCT VFR BLD AUTO: 37.5 % (ref 40–53)
HGB BLD-MCNC: 13.4 G/DL (ref 13.3–17.7)
HGB UR QL STRIP: ABNORMAL
IMM GRANULOCYTES # BLD: 0.1 10E9/L (ref 0–0.4)
IMM GRANULOCYTES NFR BLD: 0.5 %
KETONES UR STRIP-MCNC: NEGATIVE MG/DL
LEUKOCYTE ESTERASE UR QL STRIP: NEGATIVE
LIPASE SERPL-CCNC: 133 U/L (ref 11–82)
LYMPHOCYTES # BLD AUTO: 0.9 10E9/L (ref 0.8–5.3)
LYMPHOCYTES NFR BLD AUTO: 9 %
MCH RBC QN AUTO: 31.2 PG (ref 26.5–33)
MCHC RBC AUTO-ENTMCNC: 35.7 G/DL (ref 31.5–36.5)
MCV RBC AUTO: 87 FL (ref 78–100)
MONOCYTES # BLD AUTO: 1.4 10E9/L (ref 0–1.3)
MONOCYTES NFR BLD AUTO: 13.6 %
NEUTROPHILS # BLD AUTO: 8 10E9/L (ref 1.6–8.3)
NEUTROPHILS NFR BLD AUTO: 76.4 %
NITRATE UR QL: NEGATIVE
NON-SQ EPI CELLS #/AREA URNS LPF: ABNORMAL /LPF
PH UR STRIP: 5 PH (ref 5–9)
PLATELET # BLD AUTO: 232 10E9/L (ref 150–450)
POTASSIUM SERPL-SCNC: 3 MMOL/L (ref 3.5–5.1)
PROT SERPL-MCNC: 7.8 G/DL (ref 6.4–8.9)
RBC # BLD AUTO: 4.3 10E12/L (ref 4.4–5.9)
RBC #/AREA URNS AUTO: ABNORMAL /HPF
SODIUM SERPL-SCNC: 123 MMOL/L (ref 134–144)
SOURCE: ABNORMAL
SP GR UR STRIP: >1.03 (ref 1–1.03)
SPECIMEN EXP DATE BLD: NORMAL
UROBILINOGEN UR STRIP-ACNC: 0.2 EU/DL (ref 0.2–1)
WBC # BLD AUTO: 10.5 10E9/L (ref 4–11)
WBC #/AREA URNS AUTO: ABNORMAL /HPF

## 2018-12-30 PROCEDURE — 85025 COMPLETE CBC W/AUTO DIFF WBC: CPT | Performed by: FAMILY MEDICINE

## 2018-12-30 PROCEDURE — 99284 EMERGENCY DEPT VISIT MOD MDM: CPT | Mod: 25 | Performed by: FAMILY MEDICINE

## 2018-12-30 PROCEDURE — 96361 HYDRATE IV INFUSION ADD-ON: CPT | Performed by: FAMILY MEDICINE

## 2018-12-30 PROCEDURE — 96374 THER/PROPH/DIAG INJ IV PUSH: CPT | Performed by: FAMILY MEDICINE

## 2018-12-30 PROCEDURE — 25000128 H RX IP 250 OP 636: Performed by: FAMILY MEDICINE

## 2018-12-30 PROCEDURE — 86900 BLOOD TYPING SEROLOGIC ABO: CPT | Performed by: FAMILY MEDICINE

## 2018-12-30 PROCEDURE — 81001 URINALYSIS AUTO W/SCOPE: CPT | Performed by: FAMILY MEDICINE

## 2018-12-30 PROCEDURE — 86850 RBC ANTIBODY SCREEN: CPT | Performed by: FAMILY MEDICINE

## 2018-12-30 PROCEDURE — 80053 COMPREHEN METABOLIC PANEL: CPT | Performed by: FAMILY MEDICINE

## 2018-12-30 PROCEDURE — 36415 COLL VENOUS BLD VENIPUNCTURE: CPT | Performed by: FAMILY MEDICINE

## 2018-12-30 PROCEDURE — 25000132 ZZH RX MED GY IP 250 OP 250 PS 637: Performed by: FAMILY MEDICINE

## 2018-12-30 PROCEDURE — 86901 BLOOD TYPING SEROLOGIC RH(D): CPT | Performed by: FAMILY MEDICINE

## 2018-12-30 PROCEDURE — 99283 EMERGENCY DEPT VISIT LOW MDM: CPT | Mod: Z6 | Performed by: FAMILY MEDICINE

## 2018-12-30 PROCEDURE — 83690 ASSAY OF LIPASE: CPT | Performed by: FAMILY MEDICINE

## 2018-12-30 RX ORDER — ONDANSETRON 4 MG/1
4 TABLET, ORALLY DISINTEGRATING ORAL EVERY 8 HOURS PRN
Qty: 5 TABLET | Refills: 0 | Status: SHIPPED | OUTPATIENT
Start: 2018-12-30 | End: 2019-04-23

## 2018-12-30 RX ORDER — POTASSIUM CHLORIDE 1500 MG/1
40 TABLET, EXTENDED RELEASE ORAL ONCE
Status: COMPLETED | OUTPATIENT
Start: 2018-12-30 | End: 2018-12-30

## 2018-12-30 RX ORDER — SODIUM CHLORIDE 9 MG/ML
1000 INJECTION, SOLUTION INTRAVENOUS CONTINUOUS
Status: DISCONTINUED | OUTPATIENT
Start: 2018-12-30 | End: 2018-12-30 | Stop reason: HOSPADM

## 2018-12-30 RX ORDER — SODIUM CHLORIDE 9 MG/ML
INJECTION, SOLUTION INTRAVENOUS CONTINUOUS
Status: DISCONTINUED | OUTPATIENT
Start: 2018-12-30 | End: 2018-12-30 | Stop reason: HOSPADM

## 2018-12-30 RX ORDER — ONDANSETRON 2 MG/ML
4 INJECTION INTRAMUSCULAR; INTRAVENOUS EVERY 30 MIN PRN
Status: DISCONTINUED | OUTPATIENT
Start: 2018-12-30 | End: 2018-12-30 | Stop reason: HOSPADM

## 2018-12-30 RX ADMIN — POTASSIUM CHLORIDE 40 MEQ: 1500 TABLET, EXTENDED RELEASE ORAL at 13:01

## 2018-12-30 RX ADMIN — SODIUM CHLORIDE: 900 INJECTION, SOLUTION INTRAVENOUS at 10:02

## 2018-12-30 RX ADMIN — SODIUM CHLORIDE 1000 ML: 900 INJECTION, SOLUTION INTRAVENOUS at 11:33

## 2018-12-30 RX ADMIN — ONDANSETRON 4 MG: 2 INJECTION INTRAMUSCULAR; INTRAVENOUS at 09:58

## 2018-12-30 ASSESSMENT — ENCOUNTER SYMPTOMS
HEMATURIA: 1
MUSCULOSKELETAL NEGATIVE: 1
VOMITING: 1
DIARRHEA: 1
SORE THROAT: 0
CARDIOVASCULAR NEGATIVE: 1
FEVER: 0
ABDOMINAL PAIN: 1
DYSURIA: 1
DYSPHORIC MOOD: 1
EYES NEGATIVE: 1
RESPIRATORY NEGATIVE: 1
NAUSEA: 1

## 2018-12-30 ASSESSMENT — MIFFLIN-ST. JEOR: SCORE: 1691.33

## 2018-12-30 NOTE — DISCHARGE INSTRUCTIONS
Kolton,  It was nice to meet you.  As we talked, you have several electrolyte abnormalities (low sodium, potassium, chloride) and kidney injury from your dehydration caused by your stomach flu as well as your alcohol use.  It will be most important to keep up with your fluids so your kidneys recover and your electrolytes return to normal.      Continue to stay away from alcohol and follow up with detox or in clinic if you need help.      Recheck in clinic in the next week.    Dr. Shaan Villatoro

## 2018-12-30 NOTE — ED TRIAGE NOTES
Patient states that he has been vomiting the last few days, but has been able to eat a little bit. He states that he has had blood in urine the last two days, and states to have abdominal pain rating 4/10.

## 2018-12-30 NOTE — ED AVS SNAPSHOT
[facility]  [address]  [place] [address]  Phone:  [phone]  Fax:  [phone]                                    Kolton Aragon   MRN: [id number]    Department:  [facility]   Date of Visit:  12/30/2018           After Visit Summary Signature Page    I have received my discharge instructions, and my questions have been answered. I have discussed any challenges I see with this plan with the nurse or doctor.    ..........................................................................................................................................  Patient/Patient Representative Signature      ..........................................................................................................................................  Patient Representative Print Name and Relationship to Patient    ..................................................               ................................................  Date                                   Time    ..........................................................................................................................................  Reviewed by Signature/Title    ...................................................              ..............................................  Date                                               Time          22EPIC Rev 08/18

## 2018-12-30 NOTE — ED PROVIDER NOTES
History     Chief Complaint   Patient presents with     Nausea, Vomiting, & Diarrhea     Hematuria     HPI  Kolton Aragon is a 53 year old male daily drinker of about a 6 pack a day presenting with 3 days of nausea/vomiting/diarrhea illness who reports gross hematuria and dysuria yesterday.  His hematuria seems to have resolved but he continues to have some dysuria.  No new sexual partners.  Chronic insomnia for which he escalates his alcohol use.    Problem List:    Patient Active Problem List    Diagnosis Date Noted     Reactive airway disease 03/01/2018     Priority: Medium     Alcohol abuse 10/12/2017     Priority: Medium     Altered level of consciousness 10/11/2017     Priority: Medium     Acute alcoholic pancreatitis 06/20/2016     Priority: Medium     Chronic kidney disease, stage II (mild) 03/09/2016     Priority: Medium     Gastroesophageal reflux disease without esophagitis 05/18/2015     Priority: Medium     Dyslipidemia 12/29/2014     Priority: Medium     Hypertension 09/12/2013     Priority: Medium     Spondylolisthesis at L5-S1 level 07/09/2013     Priority: Medium     Overview:   Patient has been referred for PT and has not followed through.  He was sent for MRI scan,and he was not able to do either the closed or open MRI due to claustrophobia.       Insomnia 06/20/2013     Priority: Medium     Chewing tobacco use 05/17/2013     Priority: Medium     DJD (degenerative joint disease), lumbar 04/22/2013     Priority: Medium        Past Medical History:    Past Medical History:   Diagnosis Date     Alcohol-induced acute pancreatitis without infection or necrosis      Essential (primary) hypertension      Gastro-esophageal reflux disease without esophagitis      Spondylolisthesis of lumbosacral region      Spondylosis of lumbar region without myelopathy or radiculopathy      Tobacco use      Uncomplicated asthma        Past Surgical History:    Past Surgical History:   Procedure Laterality Date      "VASECTOMY      No Comments Provided       Family History:    Family History   Problem Relation Age of Onset     Family History Negative Mother         Good Health     Other - See Comments Father         Deserted as a child, patient knows father had a history of heart attack at 50     Heart Disease Father         Heart Disease,patient knows father had a history of heart attack at 50     Cancer Other         Cancer,History of cancer       Social History:  Marital Status:   [4]  Social History     Tobacco Use     Smoking status: Never Smoker     Smokeless tobacco: Current User     Types: Chew     Tobacco comment: Quit smoking: trying to quit chew   Substance Use Topics     Alcohol use: Yes     Alcohol/week: 16.8 oz     Drug use: No     Comment: Drug use: No        Medications:      hydrochlorothiazide (HYDRODIURIL) 25 MG tablet   lisinopril (PRINIVIL/ZESTRIL) 20 MG tablet   omeprazole (PRILOSEC) 40 MG capsule   ondansetron (ZOFRAN-ODT) 4 MG ODT tab         Review of Systems   Constitutional: Negative for fever.   HENT: Negative for sore throat.    Eyes: Negative.    Respiratory: Negative.    Cardiovascular: Negative.    Gastrointestinal: Positive for abdominal pain (RLQ), diarrhea, nausea and vomiting.   Genitourinary: Positive for dysuria and hematuria.   Musculoskeletal: Negative.    Psychiatric/Behavioral: Positive for dysphoric mood.       Physical Exam   BP: (!) 131/92  Pulse: 130  Temp: 99.1  F (37.3  C)  Resp: 24  Height: 172.7 cm (5' 8\")  Weight: 86.2 kg (190 lb)  SpO2: 93 %      Physical Exam   Constitutional: He appears well-developed and well-nourished. He appears distressed.   HENT:   Head: Normocephalic and atraumatic.   Right Ear: External ear normal.   Left Ear: External ear normal.   Red throat.   Eyes: Conjunctivae and EOM are normal. Pupils are equal, round, and reactive to light.   Sallow sclera.   Neck: Normal range of motion. Neck supple.   Cardiovascular: Normal rate, regular rhythm and " normal heart sounds.   Pulmonary/Chest: Effort normal and breath sounds normal.   Abdominal: Soft. Bowel sounds are normal. He exhibits no distension. There is tenderness (RLQ).   Musculoskeletal: Normal range of motion.   Neurological: He is alert.   Skin: Skin is warm and dry. Capillary refill takes less than 2 seconds. No rash noted. He is not diaphoretic.   Nursing note and vitals reviewed.      ED Course        Procedures               Critical Care time:  none               Results for orders placed or performed during the hospital encounter of 12/30/18 (from the past 24 hour(s))   *UA reflex to Microscopic   Result Value Ref Range    Color Urine Yellow     Appearance Urine Cloudy     Glucose Urine Negative NEG^Negative mg/dL    Bilirubin Urine Negative NEG^Negative    Ketones Urine Negative NEG^Negative mg/dL    Specific Gravity Urine >1.030 (H) 1.000 - 1.030    Blood Urine Large (A) NEG^Negative    pH Urine 5.0 5.0 - 9.0 pH    Protein Albumin Urine 30 (A) NEG^Negative mg/dL    Urobilinogen Urine 0.2 0.2 - 1.0 EU/dL    Nitrite Urine Negative NEG^Negative    Leukocyte Esterase Urine Negative NEG^Negative    Source Midstream Urine    Urine Microscopic   Result Value Ref Range    WBC Urine 0 - 5 OTO5^0 - 5 /HPF    RBC Urine O - 2 OTO2^O - 2 /HPF    Squamous Epithelial /LPF Urine Few FEW^Few /LPF    Bacteria Urine Few (A) NEG^Negative /HPF   CBC with platelets differential   Result Value Ref Range    WBC 10.5 4.0 - 11.0 10e9/L    RBC Count 4.30 (L) 4.4 - 5.9 10e12/L    Hemoglobin 13.4 13.3 - 17.7 g/dL    Hematocrit 37.5 (L) 40.0 - 53.0 %    MCV 87 78 - 100 fl    MCH 31.2 26.5 - 33.0 pg    MCHC 35.7 31.5 - 36.5 g/dL    RDW 10.9 10.0 - 15.0 %    Platelet Count 232 150 - 450 10e9/L    Diff Method Automated Method     % Neutrophils 76.4 %    % Lymphocytes 9.0 %    % Monocytes 13.6 %    % Eosinophils 0.2 %    % Basophils 0.3 %    % Immature Granulocytes 0.5 %    Absolute Neutrophil 8.0 1.6 - 8.3 10e9/L    Absolute  Lymphocytes 0.9 0.8 - 5.3 10e9/L    Absolute Monocytes 1.4 (H) 0.0 - 1.3 10e9/L    Absolute Eosinophils 0.0 0.0 - 0.7 10e9/L    Absolute Basophils 0.0 0.0 - 0.2 10e9/L    Abs Immature Granulocytes 0.1 0 - 0.4 10e9/L   Comprehensive metabolic panel   Result Value Ref Range    Sodium 123 (L) 134 - 144 mmol/L    Potassium 3.0 (L) 3.5 - 5.1 mmol/L    Chloride 89 (L) 98 - 107 mmol/L    Carbon Dioxide 19 (L) 21 - 31 mmol/L    Anion Gap 15 (H) 3 - 14 mmol/L    Glucose 200 (H) 70 - 105 mg/dL    Urea Nitrogen 37 (H) 7 - 25 mg/dL    Creatinine 2.14 (H) 0.70 - 1.30 mg/dL    GFR Estimate 33 (L) >60 mL/min/[1.73_m2]    GFR Estimate If Black 40 (L) >60 mL/min/[1.73_m2]    Calcium 8.4 (L) 8.6 - 10.3 mg/dL    Bilirubin Total 1.7 (H) 0.3 - 1.0 mg/dL    Albumin 3.9 3.5 - 5.7 g/dL    Protein Total 7.8 6.4 - 8.9 g/dL    Alkaline Phosphatase 75 34 - 104 U/L    ALT 42 7 - 52 U/L    AST 55 (H) 13 - 39 U/L   ABO/Rh type and screen   Result Value Ref Range    ABO A     RH(D) Neg     Antibody Screen Neg     Test Valid Only At [facility]        Specimen Expires 01/02/2019    Lipase   Result Value Ref Range    Lipase 133 (H) 11 - 82 U/L       Medications   ondansetron (ZOFRAN) injection 4 mg (4 mg Intravenous Given 12/30/18 0979)   sodium chloride 0.9% infusion ( Intravenous Canceled Entry 12/30/18 1134)   0.9% sodium chloride BOLUS (1,000 mLs Intravenous New Bag 12/30/18 1133)     Followed by   sodium chloride 0.9% infusion (1,000 mLs Intravenous Rate/Dose Change 12/30/18 1134)   potassium chloride ER (K-DUR/KLOR-CON M) CR tablet 40 mEq (40 mEq Oral Given 12/30/18 1301)     1:14 PM recheck patient and he is feeling better.  Examined patient standing with chronic right inguinal tenderness but no hernia.  No penile discharge.  Normal testes.  Inguinal pain is different from his RLQ tenderness.  He is taking PO fluids well and took his Potassium well.     4:45 PM and is doing better and taking PO well and will discharge  home.    Assessments & Plan (with Medical Decision Making)   53-year-old male with alcohol dependence suffering from gastroenteritis over the past week now dehydrated with acute renal insufficiency and electrolyte abnormalities including hypokalemia hypo-natremia hypochloremia as well as mild pancreatitis.  He has been off alcohol for 3 days and does not seem to be suffering from significant withdrawal.  He declines detox referral.  He is much better after IV normal saline times 2 L with oral potassium replacement and taking p.o. now.  Urine is clearing.  He should follow-up in clinic in the next week for recheck of his electrolytes and pace.  He is encouraged to stay off alcohol.    I have reviewed the nursing notes.    I have reviewed the findings, diagnosis, plan and need for follow up with the patient.          Medication List      Started    ondansetron 4 MG ODT tab  Commonly known as:  ZOFRAN-ODT  4 mg, Oral, EVERY 8 HOURS PRN            Final diagnoses:   Gastroenteritis   Hyponatremia   Hypokalemia   Hypochloremia   Acute renal insufficiency   Dehydration   Alcohol dependence in remission (H)   Alcohol-induced acute pancreatitis without infection or necrosis       12/30/2018   [facility] [place]     Rick Villatoro MD  12/30/18 9437

## 2019-04-07 DIAGNOSIS — I10 HYPERTENSION, UNSPECIFIED TYPE: ICD-10-CM

## 2019-04-10 RX ORDER — LISINOPRIL 20 MG/1
20 TABLET ORAL DAILY
Qty: 90 TABLET | Refills: 3 | Status: SHIPPED | OUTPATIENT
Start: 2019-04-10 | End: 2019-09-17

## 2019-04-10 RX ORDER — HYDROCHLOROTHIAZIDE 25 MG/1
25 TABLET ORAL DAILY
Qty: 90 TABLET | Refills: 3 | Status: SHIPPED | OUTPATIENT
Start: 2019-04-10 | End: 2019-09-17

## 2019-04-10 NOTE — TELEPHONE ENCOUNTER
RN refill protocol fails for both Rxs as requested. Chart review shows that LOV with PCP was on 4/12/18. Call placed to patient to discuss. Patient reports that he is willing to see PCP in the office for annual med management and labs. He was transferred to scheduling staff for an appointment. Writer will clair up and route Rx request to PCP for his consideration/approval.    Unable to complete prescription refill per RN Medication Refill Policy. Micky Cruz 4/10/2019 10:49 AM

## 2019-04-17 ENCOUNTER — OFFICE VISIT (OUTPATIENT)
Dept: FAMILY MEDICINE | Facility: OTHER | Age: 54
End: 2019-04-17
Attending: FAMILY MEDICINE
Payer: COMMERCIAL

## 2019-04-17 VITALS
HEART RATE: 76 BPM | TEMPERATURE: 98.1 F | SYSTOLIC BLOOD PRESSURE: 126 MMHG | DIASTOLIC BLOOD PRESSURE: 88 MMHG | WEIGHT: 204 LBS | BODY MASS INDEX: 30.92 KG/M2 | HEIGHT: 68 IN | RESPIRATION RATE: 16 BRPM

## 2019-04-17 DIAGNOSIS — I10 ESSENTIAL HYPERTENSION: ICD-10-CM

## 2019-04-17 DIAGNOSIS — E83.42 HYPOMAGNESEMIA: ICD-10-CM

## 2019-04-17 DIAGNOSIS — K21.9 GASTROESOPHAGEAL REFLUX DISEASE WITHOUT ESOPHAGITIS: ICD-10-CM

## 2019-04-17 DIAGNOSIS — F10.10 ALCOHOL ABUSE: ICD-10-CM

## 2019-04-17 DIAGNOSIS — Z00.00 VISIT FOR PREVENTIVE HEALTH EXAMINATION: Primary | ICD-10-CM

## 2019-04-17 DIAGNOSIS — Z12.11 SPECIAL SCREENING FOR MALIGNANT NEOPLASMS, COLON: ICD-10-CM

## 2019-04-17 DIAGNOSIS — Z12.5 SCREENING FOR PROSTATE CANCER: ICD-10-CM

## 2019-04-17 DIAGNOSIS — E78.5 DYSLIPIDEMIA: ICD-10-CM

## 2019-04-17 DIAGNOSIS — Z13.0 SCREENING FOR DEFICIENCY ANEMIA: ICD-10-CM

## 2019-04-17 DIAGNOSIS — R73.9 HYPERGLYCEMIA: ICD-10-CM

## 2019-04-17 DIAGNOSIS — N18.2 CHRONIC KIDNEY DISEASE, STAGE II (MILD): ICD-10-CM

## 2019-04-17 DIAGNOSIS — Z13.228 SCREENING FOR METABOLIC DISORDER: ICD-10-CM

## 2019-04-17 DIAGNOSIS — E11.9 TYPE 2 DIABETES MELLITUS WITHOUT COMPLICATION, WITHOUT LONG-TERM CURRENT USE OF INSULIN (H): ICD-10-CM

## 2019-04-17 LAB
ALBUMIN SERPL-MCNC: 4.2 G/DL (ref 3.5–5.7)
ALP SERPL-CCNC: 33 U/L (ref 34–104)
ALT SERPL W P-5'-P-CCNC: 16 U/L (ref 7–52)
ANION GAP SERPL CALCULATED.3IONS-SCNC: 7 MMOL/L (ref 3–14)
AST SERPL W P-5'-P-CCNC: 13 U/L (ref 13–39)
BASOPHILS # BLD AUTO: 0.1 10E9/L (ref 0–0.2)
BASOPHILS NFR BLD AUTO: 1.1 %
BILIRUB SERPL-MCNC: 0.3 MG/DL (ref 0.3–1)
BUN SERPL-MCNC: 20 MG/DL (ref 7–25)
CALCIUM SERPL-MCNC: 9.2 MG/DL (ref 8.6–10.3)
CHLORIDE SERPL-SCNC: 94 MMOL/L (ref 98–107)
CHOLEST SERPL-MCNC: 147 MG/DL
CO2 SERPL-SCNC: 31 MMOL/L (ref 21–31)
CREAT SERPL-MCNC: 1.43 MG/DL (ref 0.7–1.3)
DIFFERENTIAL METHOD BLD: ABNORMAL
EOSINOPHIL # BLD AUTO: 0.4 10E9/L (ref 0–0.7)
EOSINOPHIL NFR BLD AUTO: 4.3 %
ERYTHROCYTE [DISTWIDTH] IN BLOOD BY AUTOMATED COUNT: 11.8 % (ref 10–15)
GFR SERPL CREATININE-BSD FRML MDRD: 52 ML/MIN/{1.73_M2}
GLUCOSE SERPL-MCNC: 338 MG/DL (ref 70–105)
HBA1C MFR BLD: 8.5 % (ref 4–6)
HCT VFR BLD AUTO: 34.9 % (ref 40–53)
HDLC SERPL-MCNC: 32 MG/DL (ref 23–92)
HGB BLD-MCNC: 11.7 G/DL (ref 13.3–17.7)
IMM GRANULOCYTES # BLD: 0 10E9/L (ref 0–0.4)
IMM GRANULOCYTES NFR BLD: 0.4 %
LDLC SERPL CALC-MCNC: 85 MG/DL
LYMPHOCYTES # BLD AUTO: 1.1 10E9/L (ref 0.8–5.3)
LYMPHOCYTES NFR BLD AUTO: 12.6 %
MAGNESIUM SERPL-MCNC: 1.2 MG/DL (ref 1.9–2.7)
MCH RBC QN AUTO: 32.1 PG (ref 26.5–33)
MCHC RBC AUTO-ENTMCNC: 33.5 G/DL (ref 31.5–36.5)
MCV RBC AUTO: 96 FL (ref 78–100)
MONOCYTES # BLD AUTO: 0.8 10E9/L (ref 0–1.3)
MONOCYTES NFR BLD AUTO: 9.3 %
NEUTROPHILS # BLD AUTO: 6.5 10E9/L (ref 1.6–8.3)
NEUTROPHILS NFR BLD AUTO: 72.3 %
NONHDLC SERPL-MCNC: 115 MG/DL
PLATELET # BLD AUTO: 315 10E9/L (ref 150–450)
POTASSIUM SERPL-SCNC: 4 MMOL/L (ref 3.5–5.1)
PROT SERPL-MCNC: 7.9 G/DL (ref 6.4–8.9)
PSA SERPL-MCNC: 0.21 NG/ML
RBC # BLD AUTO: 3.64 10E12/L (ref 4.4–5.9)
SODIUM SERPL-SCNC: 132 MMOL/L (ref 134–144)
TRIGL SERPL-MCNC: 149 MG/DL
WBC # BLD AUTO: 9 10E9/L (ref 4–11)

## 2019-04-17 PROCEDURE — 83036 HEMOGLOBIN GLYCOSYLATED A1C: CPT | Performed by: FAMILY MEDICINE

## 2019-04-17 PROCEDURE — G0463 HOSPITAL OUTPT CLINIC VISIT: HCPCS

## 2019-04-17 PROCEDURE — 99213 OFFICE O/P EST LOW 20 MIN: CPT | Performed by: FAMILY MEDICINE

## 2019-04-17 PROCEDURE — 83735 ASSAY OF MAGNESIUM: CPT | Performed by: FAMILY MEDICINE

## 2019-04-17 PROCEDURE — 84153 ASSAY OF PSA TOTAL: CPT | Performed by: FAMILY MEDICINE

## 2019-04-17 PROCEDURE — 36415 COLL VENOUS BLD VENIPUNCTURE: CPT | Performed by: FAMILY MEDICINE

## 2019-04-17 PROCEDURE — 80061 LIPID PANEL: CPT | Performed by: FAMILY MEDICINE

## 2019-04-17 PROCEDURE — 85025 COMPLETE CBC W/AUTO DIFF WBC: CPT | Performed by: FAMILY MEDICINE

## 2019-04-17 PROCEDURE — 99396 PREV VISIT EST AGE 40-64: CPT | Performed by: FAMILY MEDICINE

## 2019-04-17 PROCEDURE — 80053 COMPREHEN METABOLIC PANEL: CPT | Performed by: FAMILY MEDICINE

## 2019-04-17 RX ORDER — GLUCOSAMINE HCL/CHONDROITIN SU 500-400 MG
CAPSULE ORAL
Qty: 100 EACH | Refills: 3 | Status: SHIPPED | OUTPATIENT
Start: 2019-04-17 | End: 2020-06-04

## 2019-04-17 RX ORDER — LANCETS
EACH MISCELLANEOUS
Qty: 100 EACH | Refills: 11 | Status: SHIPPED | OUTPATIENT
Start: 2019-04-17 | End: 2020-05-15

## 2019-04-17 RX ORDER — METFORMIN HCL 500 MG
500 TABLET, EXTENDED RELEASE 24 HR ORAL 2 TIMES DAILY WITH MEALS
Qty: 60 TABLET | Refills: 11 | Status: SHIPPED | OUTPATIENT
Start: 2019-04-17 | End: 2019-08-20 | Stop reason: SINTOL

## 2019-04-17 ASSESSMENT — ASTHMA QUESTIONNAIRES
QUESTION_4 LAST FOUR WEEKS HOW OFTEN HAVE YOU USED YOUR RESCUE INHALER OR NEBULIZER MEDICATION (SUCH AS ALBUTEROL): NOT AT ALL
ACUTE_EXACERBATION_TODAY: NO
QUESTION_2 LAST FOUR WEEKS HOW OFTEN HAVE YOU HAD SHORTNESS OF BREATH: NOT AT ALL
QUESTION_5 LAST FOUR WEEKS HOW WOULD YOU RATE YOUR ASTHMA CONTROL: COMPLETELY CONTROLLED
QUESTION_1 LAST FOUR WEEKS HOW MUCH OF THE TIME DID YOUR ASTHMA KEEP YOU FROM GETTING AS MUCH DONE AT WORK, SCHOOL OR AT HOME: NONE OF THE TIME
QUESTION_3 LAST FOUR WEEKS HOW OFTEN DID YOUR ASTHMA SYMPTOMS (WHEEZING, COUGHING, SHORTNESS OF BREATH, CHEST TIGHTNESS OR PAIN) WAKE YOU UP AT NIGHT OR EARLIER THAN USUAL IN THE MORNING: NOT AT ALL
ACT_TOTALSCORE: 25

## 2019-04-17 ASSESSMENT — PAIN SCALES - GENERAL: PAINLEVEL: MILD PAIN (2)

## 2019-04-17 ASSESSMENT — MIFFLIN-ST. JEOR: SCORE: 1754.84

## 2019-04-17 NOTE — NURSING NOTE
"Chief Complaint   Patient presents with     Recheck Medication       Initial /88   Pulse 76   Temp 98.1  F (36.7  C) (Temporal)   Resp 16   Ht 1.727 m (5' 8\")   Wt 92.5 kg (204 lb)   BMI 31.02 kg/m   Estimated body mass index is 31.02 kg/m  as calculated from the following:    Height as of this encounter: 1.727 m (5' 8\").    Weight as of this encounter: 92.5 kg (204 lb).  Medication Reconciliation: complete    Radha Junior LPN  "

## 2019-04-17 NOTE — PROGRESS NOTES
"Nursing Notes:   Radha Junior, LPN  4/17/2019  4:19 PM  Signed  Chief Complaint   Patient presents with     Recheck Medication       Initial /88   Pulse 76   Temp 98.1  F (36.7  C) (Temporal)   Resp 16   Ht 1.727 m (5' 8\")   Wt 92.5 kg (204 lb)   BMI 31.02 kg/m    Estimated body mass index is 31.02 kg/m  as calculated from the following:    Height as of this encounter: 1.727 m (5' 8\").    Weight as of this encounter: 92.5 kg (204 lb).  Medication Reconciliation: complete    Radha Junior LPN    SUBJECTIVE:  Kolton Aragon  is a 53 year old male who comes in today for complete evaluation. I last saw him a year ago.  At the time of this visit then, he had relapsed and had alcoholic pancreatitis but then was back in recovery by last April.  It was recommended that he have MRCP and we referred him for GI consultation but he never followed through with that.  He was due for screening colonoscopy but again did not follow through with that.  He continues on omeprazole for GERD and lisinopril and HCTZ for hypertension. Patient with epigastric pain last summer showing dilation of the pancreatic duct and probable gallstones. He was seen in December of last year for dysuria, and at that time he was actively drinking regularly.  He was hydrated and felt better and was discharged on encouraged to abstain from alcohol.    He has not had colonoscopy.  Immunizations are up-to-date but he has not had Shingrix.     He was working at [employer] but found it stressful and quit.  He has been sober now for 17 days.  Still has friends with his ex-wife.  Primarily lives with his mother's house.  Currently unemployed.    Past Medical, Family, and Social History reviewed and updated as noted below.   ROS is negative except as noted above       Allergies   Allergen Reactions     Dust Mite Extract Other (See Comments)     Sneezing, itchy eyes   ,   Family History   Problem Relation Age of Onset     Family History Negative " Mother         Good Health     Other - See Comments Father         Deserted as a child, patient knows father had a history of heart attack at 50     Heart Disease Father         Heart Disease,patient knows father had a history of heart attack at 50     Cancer Other         Cancer,History of cancer   ,   Current Outpatient Medications   Medication     hydrochlorothiazide (HYDRODIURIL) 25 MG tablet     lisinopril (PRINIVIL/ZESTRIL) 20 MG tablet     omeprazole (PRILOSEC) 20 MG DR capsule     No current facility-administered medications for this visit.    ,   Past Medical History:   Diagnosis Date     Alcohol-induced acute pancreatitis without infection or necrosis     06/20/2016     Essential (primary) hypertension     9/12/2013     Gastro-esophageal reflux disease without esophagitis     5/18/2015     Spondylolisthesis of lumbosacral region     7/9/2013,Patient has been referred for PT and has not followed through.  He was sent for MRI scan,and he was not able to do either the closed or open MRI due to claustrophobia.     Spondylosis of lumbar region without myelopathy or radiculopathy     4/22/2013     Tobacco use     5/17/2013     Uncomplicated asthma     No Comments Provided   ,   Patient Active Problem List    Diagnosis Date Noted     Type 2 diabetes mellitus without complication, without long-term current use of insulin (H) 04/17/2019     Priority: Medium     Reactive airway disease 03/01/2018     Priority: Medium     Alcohol abuse 10/12/2017     Priority: Medium     Altered level of consciousness 10/11/2017     Priority: Medium     Acute alcoholic pancreatitis 06/20/2016     Priority: Medium     Chronic kidney disease, stage II (mild) 03/09/2016     Priority: Medium     Gastroesophageal reflux disease without esophagitis 05/18/2015     Priority: Medium     Dyslipidemia 12/29/2014     Priority: Medium     Hypertension 09/12/2013     Priority: Medium     Spondylolisthesis at L5-S1 level 07/09/2013     Priority:  "Medium     Overview:   Patient has been referred for PT and has not followed through.  He was sent for MRI scan,and he was not able to do either the closed or open MRI due to claustrophobia.       Insomnia 06/20/2013     Priority: Medium     Chewing tobacco use 05/17/2013     Priority: Medium     DJD (degenerative joint disease), lumbar 04/22/2013     Priority: Medium   ,   Past Surgical History:   Procedure Laterality Date     VASECTOMY      No Comments Provided    and   Social History     Tobacco Use     Smoking status: Never Smoker     Smokeless tobacco: Current User     Types: Chew     Tobacco comment: Quit smoking: trying to quit chew   Substance Use Topics     Alcohol use: Yes     Alcohol/week: 16.8 oz     OBJECTIVE:  /88   Pulse 76   Temp 98.1  F (36.7  C) (Temporal)   Resp 16   Ht 1.727 m (5' 8\")   Wt 92.5 kg (204 lb)   BMI 31.02 kg/m     EXAM:  General Appearance: Pleasant, alert, appropriate appearance for age. No acute distress  Head Exam: Normal. Normocephalic, atraumatic.  Eye Exam:  Normal external eyes, conjunctivae, lids, cornea. BIJAL. EOMI  Ear Exam: Normal TM's bilaterally. Normal auditory canals and external ears. Non-tender.  Nose Exam: Normal external nose, mucus membranes, and septum.  OroPharynx Exam:  Dental hygiene adequate. Normal buccal mucosa. Normal pharynx.  Neck Exam:  Supple, no masses or nodes. No audible bruits  Thyroid Exam: No nodules or enlargement.  Chest/Respiratory Exam: Normal chest wall and respirations. Clear to auscultation.  Cardiovascular Exam: Regular rate and rhythm. S1, S2, no murmur, click, gallop, or rubs.  Gastrointestinal Exam: Soft, non-tender, no masses or organomegaly.  Lymphatic Exam: Non-palpable nodes in neck, clavicular regions.  Musculoskeletal Exam: Back is straight and non-tender, full ROM of upper and lower extremities.  Foot Exam: Left and right foot: good pedal pulses  Skin: no rash or abnormalities  Neurologic Exam: Nonfocal, normal gross " motor, tone coordination and no tremor.  Psychiatric Exam: Alert and oriented - appropriate affect.     Results for orders placed or performed in visit on 04/17/19   Hemoglobin A1c   Result Value Ref Range    Hemoglobin A1C 8.5 (H) 4.0 - 6.0 %   Magnesium   Result Value Ref Range    Magnesium 1.2 (L) 1.9 - 2.7 mg/dL   Prostate Specific Antigen GH   Result Value Ref Range    Prostate Specific Antigen 0.214 <3.100 ng/mL   Lipid Profile   Result Value Ref Range    Cholesterol 147 <200 mg/dL    Triglycerides 149 <150 mg/dL    HDL Cholesterol 32 23 - 92 mg/dL    LDL Cholesterol Calculated 85 <100 mg/dL    Non HDL Cholesterol 115 <130 mg/dL   Comprehensive metabolic panel   Result Value Ref Range    Sodium 132 (L) 134 - 144 mmol/L    Potassium 4.0 3.5 - 5.1 mmol/L    Chloride 94 (L) 98 - 107 mmol/L    Carbon Dioxide 31 21 - 31 mmol/L    Anion Gap 7 3 - 14 mmol/L    Glucose 338 (H) 70 - 105 mg/dL    Urea Nitrogen 20 7 - 25 mg/dL    Creatinine 1.43 (H) 0.70 - 1.30 mg/dL    GFR Estimate 52 (L) >60 mL/min/[1.73_m2]    GFR Estimate If Black 63 >60 mL/min/[1.73_m2]    Calcium 9.2 8.6 - 10.3 mg/dL    Bilirubin Total 0.3 0.3 - 1.0 mg/dL    Albumin 4.2 3.5 - 5.7 g/dL    Protein Total 7.9 6.4 - 8.9 g/dL    Alkaline Phosphatase 33 (L) 34 - 104 U/L    ALT 16 7 - 52 U/L    AST 13 13 - 39 U/L   CBC with platelets differential   Result Value Ref Range    WBC 9.0 4.0 - 11.0 10e9/L    RBC Count 3.64 (L) 4.4 - 5.9 10e12/L    Hemoglobin 11.7 (L) 13.3 - 17.7 g/dL    Hematocrit 34.9 (L) 40.0 - 53.0 %    MCV 96 78 - 100 fl    MCH 32.1 26.5 - 33.0 pg    MCHC 33.5 31.5 - 36.5 g/dL    RDW 11.8 10.0 - 15.0 %    Platelet Count 315 150 - 450 10e9/L    Diff Method Automated Method     % Neutrophils 72.3 %    % Lymphocytes 12.6 %    % Monocytes 9.3 %    % Eosinophils 4.3 %    % Basophils 1.1 %    % Immature Granulocytes 0.4 %    Absolute Neutrophil 6.5 1.6 - 8.3 10e9/L    Absolute Lymphocytes 1.1 0.8 - 5.3 10e9/L    Absolute Monocytes 0.8 0.0 - 1.3  10e9/L    Absolute Eosinophils 0.4 0.0 - 0.7 10e9/L    Absolute Basophils 0.1 0.0 - 0.2 10e9/L    Abs Immature Granulocytes 0.0 0 - 0.4 10e9/L      ASSESSMENT/Plan :    Kolton was seen today for recheck medication.    Diagnoses and all orders for this visit:    Visit for preventive health examination    Essential hypertension  -     Comprehensive metabolic panel; Future  -     Comprehensive metabolic panel    Gastroesophageal reflux disease without esophagitis  -     CBC with platelets differential; Future  -     CBC with platelets differential    Chronic kidney disease, stage II (mild)  -     Comprehensive metabolic panel; Future  -     Comprehensive metabolic panel    Alcohol abuse  -     Comprehensive metabolic panel; Future  -     Comprehensive metabolic panel    Screening for deficiency anemia    Screening for metabolic disorder  -     Comprehensive metabolic panel; Future  -     Comprehensive metabolic panel    Screening for prostate cancer  -     Prostate Specific Antigen GH; Future  -     Prostate Specific Antigen GH    Hypomagnesemia  -     Magnesium; Future  -     Magnesium    Hyperglycemia  -     Comprehensive metabolic panel; Future  -     Hemoglobin A1c; Future  -     Hemoglobin A1c  -     Comprehensive metabolic panel    Dyslipidemia  -     Lipid Profile; Future  -     Lipid Profile    Special screening for malignant neoplasms, colon  -     Cancel: GASTROENTEROLOGY ADULT REF PROCEDURE ONLY Other; ([facility])    Type 2 diabetes mellitus without complication, without long-term current use of insulin (H)      Will notify of lab results when available. Continue current medications.  Congratulated on his sobriety and encouraged him to connect with AA for meetings.    Discussed colonoscopy but he would prefer to do Cologuard so order is sent for same.     Blood sugar is markedly elevated to 338 consistent with type 2 diabetes.  Hemoglobin A1c is 8.5%.  His GFR is 52 so should tolerate Metformin.  Referred to  diabetic education and will need prescription for diabetes monitoring supplies.  Recommend follow-up with me in 1 month, sooner if needed, or if he can get into diabetes education in a timely fashion.      Jono Gonzalez MD

## 2019-04-17 NOTE — LETTER
April 18, 2019      Kolton Aragon  [address] [address]        Dear John,     Your labs indicate that you have diabetes. Your blood sugar is markedly elevated at 338 and a test that tells what your blood sugar has been over several months was also elevated.     I sent a referral for diabetes education along with diabetic testing supplies.  I ordered a medication called metformin that should help to lower your blood sugars.    We also sent a referral for the dietitian.  The first steps for diet would be to eliminate simple sugars (pop, candy, sweetened beverages, etc) and limit carbohydrate intake (breads, crackers, potatoes, pasta, etc).    If you can't get into the diabetes nurse in a week or so, I'd like to see you back to talk about this a little more.    It was a pleasure seeing you the other day.  If you have any questions, please don't hesitate to call us.       Sincerely,        oJno Gonzalez MD                      Results for orders placed or performed in visit on 04/17/19   Hemoglobin A1c   Result Value Ref Range    Hemoglobin A1C 8.5 (H) 4.0 - 6.0 %   Magnesium   Result Value Ref Range    Magnesium 1.2 (L) 1.9 - 2.7 mg/dL   Prostate Specific Antigen GH   Result Value Ref Range    Prostate Specific Antigen 0.214 <3.100 ng/mL   Lipid Profile   Result Value Ref Range    Cholesterol 147 <200 mg/dL    Triglycerides 149 <150 mg/dL    HDL Cholesterol 32 23 - 92 mg/dL    LDL Cholesterol Calculated 85 <100 mg/dL    Non HDL Cholesterol 115 <130 mg/dL   Comprehensive metabolic panel   Result Value Ref Range    Sodium 132 (L) 134 - 144 mmol/L    Potassium 4.0 3.5 - 5.1 mmol/L    Chloride 94 (L) 98 - 107 mmol/L    Carbon Dioxide 31 21 - 31 mmol/L    Anion Gap 7 3 - 14 mmol/L    Glucose 338 (H) 70 - 105 mg/dL    Urea Nitrogen 20 7 - 25 mg/dL    Creatinine 1.43 (H) 0.70 - 1.30 mg/dL    GFR Estimate 52 (L) >60 mL/min/[1.73_m2]    GFR Estimate If Black 63 >60 mL/min/[1.73_m2]    Calcium 9.2  8.6 - 10.3 mg/dL    Bilirubin Total 0.3 0.3 - 1.0 mg/dL    Albumin 4.2 3.5 - 5.7 g/dL    Protein Total 7.9 6.4 - 8.9 g/dL    Alkaline Phosphatase 33 (L) 34 - 104 U/L    ALT 16 7 - 52 U/L    AST 13 13 - 39 U/L   CBC with platelets differential   Result Value Ref Range    WBC 9.0 4.0 - 11.0 10e9/L    RBC Count 3.64 (L) 4.4 - 5.9 10e12/L    Hemoglobin 11.7 (L) 13.3 - 17.7 g/dL    Hematocrit 34.9 (L) 40.0 - 53.0 %    MCV 96 78 - 100 fl    MCH 32.1 26.5 - 33.0 pg    MCHC 33.5 31.5 - 36.5 g/dL    RDW 11.8 10.0 - 15.0 %    Platelet Count 315 150 - 450 10e9/L    Diff Method Automated Method     % Neutrophils 72.3 %    % Lymphocytes 12.6 %    % Monocytes 9.3 %    % Eosinophils 4.3 %    % Basophils 1.1 %    % Immature Granulocytes 0.4 %    Absolute Neutrophil 6.5 1.6 - 8.3 10e9/L    Absolute Lymphocytes 1.1 0.8 - 5.3 10e9/L    Absolute Monocytes 0.8 0.0 - 1.3 10e9/L    Absolute Eosinophils 0.4 0.0 - 0.7 10e9/L    Absolute Basophils 0.1 0.0 - 0.2 10e9/L    Abs Immature Granulocytes 0.0 0 - 0.4 10e9/L

## 2019-04-18 ASSESSMENT — ASTHMA QUESTIONNAIRES: ACT_TOTALSCORE: 25

## 2019-04-22 ENCOUNTER — TELEPHONE (OUTPATIENT)
Dept: FAMILY MEDICINE | Facility: OTHER | Age: 54
End: 2019-04-22

## 2019-04-22 NOTE — TELEPHONE ENCOUNTER
I'm happy to see him. Diabetes is confirmed and the lab work doesn't need to be repeated yet, but I'd like to see him to discuss.  Jono Gonzalez MD on 4/22/2019 at 12:47 PM

## 2019-04-22 NOTE — TELEPHONE ENCOUNTER
J[facility]-Pt would like to discuss diabetes concerns and possible more bloodwork to confirm diagnosis. Please call. Thank you.  Yasemin Ochoa

## 2019-04-22 NOTE — TELEPHONE ENCOUNTER
After patient's name and date of birth verified the below information was given.  Transferred patient to the appointment line to assist in scheduling.    Luz Whitaker ---- 4/22/2019 2:05 PM

## 2019-04-22 NOTE — TELEPHONE ENCOUNTER
Pt is wondering if e could come in on wesdnesday and get another round of lab work done  Bettina Ram LPN on 4/22/2019 at 10:38 AM

## 2019-04-23 ENCOUNTER — OFFICE VISIT (OUTPATIENT)
Dept: FAMILY MEDICINE | Facility: OTHER | Age: 54
End: 2019-04-23
Attending: FAMILY MEDICINE
Payer: COMMERCIAL

## 2019-04-23 VITALS
TEMPERATURE: 97.7 F | WEIGHT: 204 LBS | BODY MASS INDEX: 31.02 KG/M2 | RESPIRATION RATE: 18 BRPM | OXYGEN SATURATION: 98 % | SYSTOLIC BLOOD PRESSURE: 122 MMHG | HEART RATE: 72 BPM | DIASTOLIC BLOOD PRESSURE: 78 MMHG

## 2019-04-23 DIAGNOSIS — E11.9 TYPE 2 DIABETES MELLITUS WITHOUT COMPLICATION, WITHOUT LONG-TERM CURRENT USE OF INSULIN (H): Primary | ICD-10-CM

## 2019-04-23 LAB — GLUCOSE SERPL-MCNC: 122 MG/DL (ref 70–105)

## 2019-04-23 PROCEDURE — 82947 ASSAY GLUCOSE BLOOD QUANT: CPT | Mod: ZL | Performed by: FAMILY MEDICINE

## 2019-04-23 PROCEDURE — G0463 HOSPITAL OUTPT CLINIC VISIT: HCPCS

## 2019-04-23 PROCEDURE — 99214 OFFICE O/P EST MOD 30 MIN: CPT | Performed by: FAMILY MEDICINE

## 2019-04-23 PROCEDURE — 36415 COLL VENOUS BLD VENIPUNCTURE: CPT | Mod: ZL | Performed by: FAMILY MEDICINE

## 2019-04-23 ASSESSMENT — PAIN SCALES - GENERAL: PAINLEVEL: NO PAIN (0)

## 2019-04-23 NOTE — PROGRESS NOTES
Nursing Notes:   Tita Snowden LPN  4/23/2019 10:41 AM  Signed  Patient is here for diabetic check, states is fasting since yesterday at 2 pm.  has not been checking sugar,  is not able to poke himself, and has not been taking metformin, due to possible side effect of loose stool.  is hoping last test was inaccurate due to previous intake of sugar food and sweets. Would like to have checked again.   Tita Snowden LPN .............4/23/2019     10:28 AM        Medication Reconciliation: complete    Tita Snowden LPN  4/23/2019 10:35 AM      SUBJECTIVE:  Kolton Aragon  is a 53 year old male who comes in today to discuss type 2 diabetes.  He was recently diagnosed.his visit last week and wanted to come in and talk about this.  He had a random blood sugar over 300 and hemoglobin A1c of 8.5%. He sees Karlene on May 15. He has been drinking more water. No urinary frequency.     He has been fasting since 2:00 yesterday and wanted to have his blood checked again.  We discussed with him the fact that regardless of what that showed that he met qualifications and diagnostic criteria for type 2 diabetes.    Past Medical, Family, and Social History reviewed and updated as noted below.   ROS is negative except as noted above       Allergies   Allergen Reactions     Dust Mite Extract Other (See Comments)     Sneezing, itchy eyes   ,   Family History   Problem Relation Age of Onset     Family History Negative Mother         Good Health     Other - See Comments Father         Deserted as a child, patient knows father had a history of heart attack at 50     Heart Disease Father         Heart Disease,patient knows father had a history of heart attack at 50     Cancer Other         Cancer,History of cancer   ,   Current Outpatient Medications   Medication     hydrochlorothiazide (HYDRODIURIL) 25 MG tablet     lisinopril (PRINIVIL/ZESTRIL) 20 MG tablet     omeprazole (PRILOSEC) 20 MG DR capsule     alcohol  swab prep pads     blood glucose (NO BRAND SPECIFIED) test strip     blood glucose calibration (NO BRAND SPECIFIED) solution     blood glucose monitoring (NO BRAND SPECIFIED) meter device kit     metFORMIN (GLUCOPHAGE-XR) 500 MG 24 hr tablet     thin (NO BRAND SPECIFIED) lancets     No current facility-administered medications for this visit.    ,   Past Medical History:   Diagnosis Date     Alcohol-induced acute pancreatitis without infection or necrosis     06/20/2016     Essential (primary) hypertension     9/12/2013     Gastro-esophageal reflux disease without esophagitis     5/18/2015     Spondylolisthesis of lumbosacral region     7/9/2013,Patient has been referred for PT and has not followed through.  He was sent for MRI scan,and he was not able to do either the closed or open MRI due to claustrophobia.     Spondylosis of lumbar region without myelopathy or radiculopathy     4/22/2013     Tobacco use     5/17/2013     Uncomplicated asthma     No Comments Provided   ,   Patient Active Problem List    Diagnosis Date Noted     Type 2 diabetes mellitus without complication, without long-term current use of insulin (H) 04/17/2019     Priority: Medium     Reactive airway disease 03/01/2018     Priority: Medium     Alcohol abuse 10/12/2017     Priority: Medium     Altered level of consciousness 10/11/2017     Priority: Medium     Acute alcoholic pancreatitis 06/20/2016     Priority: Medium     Chronic kidney disease, stage II (mild) 03/09/2016     Priority: Medium     Gastroesophageal reflux disease without esophagitis 05/18/2015     Priority: Medium     Dyslipidemia 12/29/2014     Priority: Medium     Hypertension 09/12/2013     Priority: Medium     Spondylolisthesis at L5-S1 level 07/09/2013     Priority: Medium     Overview:   Patient has been referred for PT and has not followed through.  He was sent for MRI scan,and he was not able to do either the closed or open MRI due to claustrophobia.       Insomnia  06/20/2013     Priority: Medium     Chewing tobacco use 05/17/2013     Priority: Medium     DJD (degenerative joint disease), lumbar 04/22/2013     Priority: Medium   ,   Past Surgical History:   Procedure Laterality Date     VASECTOMY      No Comments Provided    and   Social History     Tobacco Use     Smoking status: Never Smoker     Smokeless tobacco: Current User     Types: Chew     Tobacco comment: Quit smoking: trying to quit chew   Substance Use Topics     Alcohol use: Yes     Alcohol/week: 16.8 oz     OBJECTIVE:  /78 (BP Location: Right arm, Patient Position: Sitting, Cuff Size: Adult Large)   Pulse 72   Temp 97.7  F (36.5  C) (Tympanic)   Resp 18   Wt 92.5 kg (204 lb)   SpO2 98%   BMI 31.02 kg/m     EXAM:  Alert and cooperative, no distress.  Affect is appropriate.    Results for orders placed or performed in visit on 04/23/19   Glucose   Result Value Ref Range    Glucose 122 (H) 70 - 105 mg/dL      ASSESSMENT/Plan :    Kolton was seen today for diabetes.    Diagnoses and all orders for this visit:    Type 2 diabetes mellitus without complication, without long-term current use of insulin (H)  -     Glucose; Future  -     Glucose      Lengthy discussion with regard to type 2 diabetes.  Discussed insulin resistance, carbohydrates and the role in glycemic control.  Discussed insulin sensitizing medication such as metformin.  Discussed potential side effects of metformin but at this point feel that he would do well with a long-acting version.  Discussed blood sugar monitoring and referral to diabetes education has been sent.    We did not discuss aspirin or statin at this point, but may likely be appropriate in the future.    Recommend follow-up in 1 month, sooner if needed.    A total of 25 minutes was spent with the patient, greater than 50% of the time was spent in counseling/discussion of the aforementioned concerns.     Jono Gonzalez MD

## 2019-04-23 NOTE — NURSING NOTE
Patient is here for diabetic check, states is fasting since yesterday at 2 pm. States has not been checking sugar,  is not able to poke himself, and has not been taking metformin, due to possible side effect of loose stool. States is hoping last test was inaccurate due to previous intake of sugar food and sweets. Would like to have checked again.   Tita Snowden LPN .............4/23/2019     10:28 AM        Medication Reconciliation: complete    Tita Snowden LPN  4/23/2019 10:35 AM

## 2019-08-11 ENCOUNTER — HOSPITAL ENCOUNTER (EMERGENCY)
Facility: OTHER | Age: 54
Discharge: HOME | End: 2019-08-11
Attending: EMERGENCY MEDICINE | Admitting: EMERGENCY MEDICINE
Payer: COMMERCIAL

## 2019-08-11 ENCOUNTER — APPOINTMENT (OUTPATIENT)
Dept: GENERAL RADIOLOGY | Facility: OTHER | Age: 54
End: 2019-08-11
Attending: EMERGENCY MEDICINE
Payer: COMMERCIAL

## 2019-08-11 VITALS
TEMPERATURE: 99.1 F | SYSTOLIC BLOOD PRESSURE: 122 MMHG | DIASTOLIC BLOOD PRESSURE: 86 MMHG | BODY MASS INDEX: 29.42 KG/M2 | HEIGHT: 68 IN | OXYGEN SATURATION: 98 % | RESPIRATION RATE: 18 BRPM | WEIGHT: 194.1 LBS | HEART RATE: 82 BPM

## 2019-08-11 DIAGNOSIS — E86.0 DEHYDRATION: ICD-10-CM

## 2019-08-11 DIAGNOSIS — R55 NEAR SYNCOPE: ICD-10-CM

## 2019-08-11 LAB
ALBUMIN SERPL-MCNC: 4.2 G/DL (ref 3.5–5.7)
ALP SERPL-CCNC: 54 U/L (ref 34–104)
ALT SERPL W P-5'-P-CCNC: 93 U/L (ref 7–52)
ANION GAP SERPL CALCULATED.3IONS-SCNC: 11 MMOL/L (ref 3–14)
ANION GAP SERPL CALCULATED.3IONS-SCNC: 12 MMOL/L (ref 3–14)
AST SERPL W P-5'-P-CCNC: 62 U/L (ref 13–39)
BASOPHILS # BLD AUTO: 0.1 10E9/L (ref 0–0.2)
BASOPHILS NFR BLD AUTO: 0.9 %
BILIRUB SERPL-MCNC: 0.7 MG/DL (ref 0.3–1)
BUN SERPL-MCNC: 28 MG/DL (ref 7–25)
BUN SERPL-MCNC: 29 MG/DL (ref 7–25)
CALCIUM SERPL-MCNC: 7.7 MG/DL (ref 8.6–10.3)
CALCIUM SERPL-MCNC: 8.4 MG/DL (ref 8.6–10.3)
CHLORIDE SERPL-SCNC: 102 MMOL/L (ref 98–107)
CHLORIDE SERPL-SCNC: 99 MMOL/L (ref 98–107)
CO2 SERPL-SCNC: 22 MMOL/L (ref 21–31)
CO2 SERPL-SCNC: 26 MMOL/L (ref 21–31)
CREAT SERPL-MCNC: 1.53 MG/DL (ref 0.7–1.3)
CREAT SERPL-MCNC: 1.84 MG/DL (ref 0.7–1.3)
DIFFERENTIAL METHOD BLD: ABNORMAL
EOSINOPHIL # BLD AUTO: 0.2 10E9/L (ref 0–0.7)
EOSINOPHIL NFR BLD AUTO: 3.8 %
ERYTHROCYTE [DISTWIDTH] IN BLOOD BY AUTOMATED COUNT: 12 % (ref 10–15)
GFR SERPL CREATININE-BSD FRML MDRD: 39 ML/MIN/{1.73_M2}
GFR SERPL CREATININE-BSD FRML MDRD: 48 ML/MIN/{1.73_M2}
GLUCOSE SERPL-MCNC: 158 MG/DL (ref 70–105)
GLUCOSE SERPL-MCNC: 199 MG/DL (ref 70–105)
HCT VFR BLD AUTO: 37.4 % (ref 40–53)
HGB BLD-MCNC: 12.6 G/DL (ref 13.3–17.7)
IMM GRANULOCYTES # BLD: 0 10E9/L (ref 0–0.4)
IMM GRANULOCYTES NFR BLD: 0.3 %
LACTATE SERPL-SCNC: 1.3 MMOL/L (ref 0.5–2.2)
LACTATE SERPL-SCNC: 2.4 MMOL/L (ref 0.5–2.2)
LIPASE SERPL-CCNC: 31 U/L (ref 11–82)
LYMPHOCYTES # BLD AUTO: 1.3 10E9/L (ref 0.8–5.3)
LYMPHOCYTES NFR BLD AUTO: 22.8 %
MCH RBC QN AUTO: 30.1 PG (ref 26.5–33)
MCHC RBC AUTO-ENTMCNC: 33.7 G/DL (ref 31.5–36.5)
MCV RBC AUTO: 90 FL (ref 78–100)
MONOCYTES # BLD AUTO: 0.8 10E9/L (ref 0–1.3)
MONOCYTES NFR BLD AUTO: 13.2 %
NEUTROPHILS # BLD AUTO: 3.4 10E9/L (ref 1.6–8.3)
NEUTROPHILS NFR BLD AUTO: 59 %
NT-PROBNP SERPL-MCNC: 19 PG/ML (ref 0–100)
PLATELET # BLD AUTO: 242 10E9/L (ref 150–450)
POTASSIUM SERPL-SCNC: 3.3 MMOL/L (ref 3.5–5.1)
POTASSIUM SERPL-SCNC: 3.4 MMOL/L (ref 3.5–5.1)
PROT SERPL-MCNC: 7.7 G/DL (ref 6.4–8.9)
RBC # BLD AUTO: 4.18 10E12/L (ref 4.4–5.9)
SODIUM SERPL-SCNC: 135 MMOL/L (ref 134–144)
SODIUM SERPL-SCNC: 137 MMOL/L (ref 134–144)
TROPONIN I SERPL-MCNC: <0.03 UG/L (ref 0–0.03)
TROPONIN I SERPL-MCNC: <0.03 UG/L (ref 0–0.03)
WBC # BLD AUTO: 5.8 10E9/L (ref 4–11)

## 2019-08-11 PROCEDURE — 83605 ASSAY OF LACTIC ACID: CPT | Performed by: EMERGENCY MEDICINE

## 2019-08-11 PROCEDURE — 80048 BASIC METABOLIC PNL TOTAL CA: CPT | Performed by: EMERGENCY MEDICINE

## 2019-08-11 PROCEDURE — 83605 ASSAY OF LACTIC ACID: CPT | Mod: 91 | Performed by: EMERGENCY MEDICINE

## 2019-08-11 PROCEDURE — 25800030 ZZH RX IP 258 OP 636: Performed by: EMERGENCY MEDICINE

## 2019-08-11 PROCEDURE — 36415 COLL VENOUS BLD VENIPUNCTURE: CPT | Performed by: EMERGENCY MEDICINE

## 2019-08-11 PROCEDURE — 96361 HYDRATE IV INFUSION ADD-ON: CPT | Mod: XU | Performed by: EMERGENCY MEDICINE

## 2019-08-11 PROCEDURE — 99283 EMERGENCY DEPT VISIT LOW MDM: CPT | Mod: Z6 | Performed by: EMERGENCY MEDICINE

## 2019-08-11 PROCEDURE — 36415 COLL VENOUS BLD VENIPUNCTURE: CPT | Mod: 91,GZ | Performed by: EMERGENCY MEDICINE

## 2019-08-11 PROCEDURE — 80053 COMPREHEN METABOLIC PANEL: CPT | Performed by: EMERGENCY MEDICINE

## 2019-08-11 PROCEDURE — 71045 X-RAY EXAM CHEST 1 VIEW: CPT | Mod: TC

## 2019-08-11 PROCEDURE — 25000128 H RX IP 250 OP 636: Performed by: EMERGENCY MEDICINE

## 2019-08-11 PROCEDURE — 96360 HYDRATION IV INFUSION INIT: CPT | Mod: XU | Performed by: EMERGENCY MEDICINE

## 2019-08-11 PROCEDURE — 83880 ASSAY OF NATRIURETIC PEPTIDE: CPT | Performed by: EMERGENCY MEDICINE

## 2019-08-11 PROCEDURE — 84484 ASSAY OF TROPONIN QUANT: CPT | Mod: 91 | Performed by: EMERGENCY MEDICINE

## 2019-08-11 PROCEDURE — 93005 ELECTROCARDIOGRAM TRACING: CPT | Performed by: EMERGENCY MEDICINE

## 2019-08-11 PROCEDURE — 84484 ASSAY OF TROPONIN QUANT: CPT | Performed by: EMERGENCY MEDICINE

## 2019-08-11 PROCEDURE — 85025 COMPLETE CBC W/AUTO DIFF WBC: CPT | Performed by: EMERGENCY MEDICINE

## 2019-08-11 PROCEDURE — 93010 ELECTROCARDIOGRAM REPORT: CPT | Performed by: INTERNAL MEDICINE

## 2019-08-11 PROCEDURE — 83690 ASSAY OF LIPASE: CPT | Performed by: EMERGENCY MEDICINE

## 2019-08-11 PROCEDURE — 99285 EMERGENCY DEPT VISIT HI MDM: CPT | Mod: 25 | Performed by: EMERGENCY MEDICINE

## 2019-08-11 RX ORDER — SODIUM CHLORIDE 9 MG/ML
1000 INJECTION, SOLUTION INTRAVENOUS CONTINUOUS
Status: DISCONTINUED | OUTPATIENT
Start: 2019-08-11 | End: 2019-08-11 | Stop reason: HOSPADM

## 2019-08-11 RX ORDER — SODIUM CHLORIDE 9 MG/ML
1000 INJECTION, SOLUTION INTRAVENOUS CONTINUOUS
Status: DISCONTINUED | OUTPATIENT
Start: 2019-08-11 | End: 2019-08-11

## 2019-08-11 RX ADMIN — SODIUM CHLORIDE 1000 ML: 9 INJECTION, SOLUTION INTRAVENOUS at 14:19

## 2019-08-11 RX ADMIN — SODIUM CHLORIDE 1000 ML: 9 INJECTION, SOLUTION INTRAVENOUS at 15:55

## 2019-08-11 RX ADMIN — SODIUM CHLORIDE 1000 ML: 9 INJECTION, SOLUTION INTRAVENOUS at 12:59

## 2019-08-11 ASSESSMENT — MIFFLIN-ST. JEOR: SCORE: 1709.93

## 2019-08-11 NOTE — ED TRIAGE NOTES
EMS Arrival Note  ________________________________  Kolton Aragon is a 53 year old Male that arrives via [facility] ambulance [facility] from [facility]) after feeling and near syncopal with x8 vomiting of coffee ground emesis.  Pre hospital clinical presentation per patient  and EMS personnel includes near syncope as above..  Pre hospital personnel report vital signs of:  B/P 122/80; HR 98, RR 14;SpO2 100%  Pre Hospital Cardiac rhythm reported as Normal Sinus  Pre hospital care included: Medication: ASA:,   Patient arrives with:  GCS Total = 15  Airway intact  Breathing Assessment Normal.    Circulation Assessment Normal.  Patient arrives with a 18 gauge IV at his and her right anticubital with 100 ml of normal saline infused upon arrival.    Placed in room 901, gowned, warm blanket provided, side rails up,  ID verified and band placed, and call light within reach.       Previous living situation Alone

## 2019-08-11 NOTE — ED AVS SNAPSHOT
[facility]  [address]  [place] [address]  Phone:  [phone]  Fax:  [phone]                                    Kolton Aragon   MRN: [id number]    Department:  [facility]   Date of Visit:  8/11/2019           After Visit Summary Signature Page    I have received my discharge instructions, and my questions have been answered. I have discussed any challenges I see with this plan with the nurse or doctor.    ..........................................................................................................................................  Patient/Patient Representative Signature      ..........................................................................................................................................  Patient Representative Print Name and Relationship to Patient    ..................................................               ................................................  Date                                   Time    ..........................................................................................................................................  Reviewed by Signature/Title    ...................................................              ..............................................  Date                                               Time          22EPIC Rev 08/18

## 2019-08-11 NOTE — ED PROVIDER NOTES
[facility]  Emergency Department Visit Note    Loss of Consciousness and possible AMI      History of Present Illness     HPI:  Kolton Aragon is a 53 year old male presenting with near syncope while at work. This occurred 40 minutes ago and was witnessed by co-workers. Onset was while at work and he suddenly got hot and felt like he would pass out.  He sat down and coworkers called 911.. A similar episode has not happened before. The duration of symptoms was approximately 20 minutes and the patient never had confusion, headache, or cognitive delay. At time of emergency department evaluation.  Prior to the symptoms patient had had about a dozen episodes of emesis over the last 24 hours and did vomit at the time of his near syncopal episode.  Patient denies diarrhea.  Patient has been trying to keep up with fluids.  No chest pain, shortness of breath, recent trauma or fall.     Medications:  Prior to Admission medications    Medication Sig Last Dose Taking? Auth Provider   hydrochlorothiazide (HYDRODIURIL) 25 MG tablet TAKE 1 TABLET (25 MG) BY MOUTH DAILY 8/11/2019 at 0800 Yes Jono Gonzalez MD   lisinopril (PRINIVIL/ZESTRIL) 20 MG tablet TAKE 1 TABLET (20 MG) BY MOUTH DAILY 8/11/2019 at 0800 Yes Jono Gonzalez MD   omeprazole (PRILOSEC) 20 MG DR capsule Take 20 mg by mouth daily 8/11/2019 at 0800 Yes Reported, Patient   alcohol swab prep pads Use to swab area of injection/kellen as directed.  Patient not taking: Reported on 4/23/2019 Unknown at Unknown time  Jono Gonzalez MD   blood glucose (NO BRAND SPECIFIED) test strip Use to test blood sugar 4 times daily or as directed.  Patient not taking: Reported on 4/23/2019 Unknown at Unknown time  Jono Gonzalez MD   blood glucose calibration (NO BRAND SPECIFIED) solution Use to calibrate blood glucose monitor as needed as directed.  Patient not taking: Reported on 4/23/2019 Unknown at Unknown time  Jono Gonzalez MD   blood glucose  monitoring (NO BRAND SPECIFIED) meter device kit Use to test blood sugar 4 times daily or as directed.  Patient not taking: Reported on 4/23/2019 Unknown at Unknown time  Jono Gonzalez MD   metFORMIN (GLUCOPHAGE-XR) 500 MG 24 hr tablet Take 1 tablet (500 mg) by mouth 2 times daily (with meals)  Patient not taking: Reported on 4/23/2019 More than a month at Unknown time  Jono Gonzalez MD   thin (NO BRAND SPECIFIED) lancets Use with lanceting device.  Patient not taking: Reported on 4/23/2019 Unknown at Unknown time  Jono Gonzalez MD       Allergies:  Allergies   Allergen Reactions     Dust Mite Extract Other (See Comments)     Sneezing, itchy eyes       Problem List:  Patient Active Problem List   Diagnosis     Acute alcoholic pancreatitis     Alcohol abuse     Altered level of consciousness     Chewing tobacco use     Chronic kidney disease, stage II (mild)     DJD (degenerative joint disease), lumbar     Dyslipidemia     Gastroesophageal reflux disease without esophagitis     Hypertension     Insomnia     Reactive airway disease     Spondylolisthesis at L5-S1 level     Type 2 diabetes mellitus without complication, without long-term current use of insulin (H)       Past Medical History:  Past Medical History:   Diagnosis Date     Alcohol-induced acute pancreatitis without infection or necrosis     06/20/2016     Essential (primary) hypertension     9/12/2013     Gastro-esophageal reflux disease without esophagitis     5/18/2015     Spondylolisthesis of lumbosacral region     7/9/2013,Patient has been referred for PT and has not followed through.  He was sent for MRI scan,and he was not able to do either the closed or open MRI due to claustrophobia.     Spondylosis of lumbar region without myelopathy or radiculopathy     4/22/2013     Tobacco use     5/17/2013     Uncomplicated asthma     No Comments Provided       Past Surgical History:  Past Surgical History:   Procedure Laterality Date     VASECTOMY    "   No Comments Provided       Social History:  Social History     Tobacco Use     Smoking status: Never Smoker     Smokeless tobacco: Current User     Types: Chew     Tobacco comment: Quit smoking: trying to quit chew using 1 pack week   Substance Use Topics     Alcohol use: Yes     Alcohol/week: 16.8 oz     Comment: a few beers a day     Drug use: No     Comment: Drug use: No       Review of Systems:  10 point review of systems obtained and pertinent positive and negative findings noted in HPI. Review of systems otherwise negative.      Physical Exam     Vital signs: /86   Pulse 82   Temp 99.1  F (37.3  C) (Tympanic)   Resp 18   Ht 1.727 m (5' 8\")   Wt 88 kg (194 lb 1.6 oz)   SpO2 98%   BMI 29.51 kg/m      Physical Exam:    General: awake and alert, comfortable  HEENT: atraumatic, no scleral injection, no nasal discharge, neck supple  Chest: clear to auscultation bilaterally without wheezes or crackles, non labored respirations  Cardiovascular: regular rate and rhythm, no murmurs or gallops  Abdomen: soft, nontender, no rebound or guarding, nondistended  Extremities: no deformities, edema, or tenderness  Skin: warm, dry, no rashes  Neuro: alert and oriented x 3, moving extremities x 4, ambulates without difficulty      Medical Decision Making & ED Course     Kolton Aragon is a 53 year old male presenting with syncope. Differential includes arrhythmia, acute coronary syndrome, medication adverse effect, hypoglycemia, hypovolemia, orthostatic hypotension, vasovagal syncope, seizure, anemia, intracranial hemorrhage. Given the patient's age, preceding prodrome, lack of a post-ictal period, low risk medical history, benign physical exam including vital signs, and unremarkable laboratory results, this patient is unlikely to have syncope secondary to a life threatening condition. The history and lab studies suggests that the etiology of this patient's syncope may be dehydration secondary to his GI illness. "  His delta troponin is unchanged.  His EKG did show right bundle branch block.  With this analysis, the patient does not require further emergent diagnostic or therapeutic intervention and is stable for outpatient evaluation and management. Patient given instructions on follow-up and warning signs for which to return to ED. All questions were answered and the patient is comfortable with plan for discharge. The patient was discharged in stable condition.     I have reviewed the patient's medical record, medical imaging, laboratory studies, and EKG.    Diagnosis & Disposition     Diagnosis:  1. Near syncope    2. Dehydration          Disposition:  HOme    Yasemin Sigala MD  08/11/19 [id number]

## 2019-08-20 ENCOUNTER — OFFICE VISIT (OUTPATIENT)
Dept: FAMILY MEDICINE | Facility: OTHER | Age: 54
End: 2019-08-20
Attending: NURSE PRACTITIONER
Payer: COMMERCIAL

## 2019-08-20 VITALS
WEIGHT: 205.6 LBS | RESPIRATION RATE: 16 BRPM | SYSTOLIC BLOOD PRESSURE: 138 MMHG | DIASTOLIC BLOOD PRESSURE: 62 MMHG | BODY MASS INDEX: 31.26 KG/M2 | TEMPERATURE: 97.6 F | HEART RATE: 60 BPM

## 2019-08-20 DIAGNOSIS — I15.8 OTHER SECONDARY HYPERTENSION: ICD-10-CM

## 2019-08-20 DIAGNOSIS — K27.9 PUD (PEPTIC ULCER DISEASE): ICD-10-CM

## 2019-08-20 DIAGNOSIS — E11.9 DM TYPE 2, NOT AT GOAL (H): Primary | ICD-10-CM

## 2019-08-20 DIAGNOSIS — F10.10 ALCOHOL ABUSE: ICD-10-CM

## 2019-08-20 LAB
ANION GAP SERPL CALCULATED.3IONS-SCNC: 8 MMOL/L (ref 3–14)
BUN SERPL-MCNC: 22 MG/DL (ref 7–25)
CALCIUM SERPL-MCNC: 8.7 MG/DL (ref 8.6–10.3)
CHLORIDE SERPL-SCNC: 102 MMOL/L (ref 98–107)
CO2 SERPL-SCNC: 27 MMOL/L (ref 21–31)
CREAT SERPL-MCNC: 1.32 MG/DL (ref 0.7–1.3)
GFR SERPL CREATININE-BSD FRML MDRD: 57 ML/MIN/{1.73_M2}
GLUCOSE SERPL-MCNC: 158 MG/DL (ref 70–105)
HBA1C MFR BLD: 7.5 % (ref 4–6)
POTASSIUM SERPL-SCNC: 3.9 MMOL/L (ref 3.5–5.1)
SODIUM SERPL-SCNC: 137 MMOL/L (ref 134–144)

## 2019-08-20 PROCEDURE — 80048 BASIC METABOLIC PNL TOTAL CA: CPT | Mod: ZL | Performed by: NURSE PRACTITIONER

## 2019-08-20 PROCEDURE — 36415 COLL VENOUS BLD VENIPUNCTURE: CPT | Mod: ZL | Performed by: NURSE PRACTITIONER

## 2019-08-20 PROCEDURE — 83036 HEMOGLOBIN GLYCOSYLATED A1C: CPT | Mod: ZL | Performed by: NURSE PRACTITIONER

## 2019-08-20 PROCEDURE — G0463 HOSPITAL OUTPT CLINIC VISIT: HCPCS

## 2019-08-20 PROCEDURE — 99214 OFFICE O/P EST MOD 30 MIN: CPT | Performed by: NURSE PRACTITIONER

## 2019-08-20 RX ORDER — GLIPIZIDE 5 MG/1
5 TABLET, FILM COATED, EXTENDED RELEASE ORAL DAILY
Qty: 90 TABLET | Refills: 0 | Status: SHIPPED | OUTPATIENT
Start: 2019-08-20 | End: 2019-09-17

## 2019-08-20 ASSESSMENT — ENCOUNTER SYMPTOMS: LIGHT-HEADEDNESS: 1

## 2019-08-20 ASSESSMENT — PAIN SCALES - GENERAL: PAINLEVEL: NO PAIN (0)

## 2019-08-20 NOTE — LETTER
August 20, 2019      Kolton Aragon  [address] [address]        Dear ,    I am happy to report your A1c has improved from previous without taking the metformin.  I have sent a prescription for medication called glipizide extended release that you   Can try taking it breakfast in the morning daily, please call if you are having any side effects from this--otherwise I would like you to review this with Dr. Gonzalez at your follow-up visit in September.  Your other labs show normal electrolytes, your kidney function lab is slightly above normal limit  This can be checked when you see Dr. Gonzalez in September.  Overall things have improved.  Please let me know if you need a referral for any support to help reduce if not quit your drinking as this causes cardiovascular disease and worsens diabetic control of glucose.    Results for orders placed or performed in visit on 08/20/19   Hemoglobin A1c   Result Value Ref Range    Hemoglobin A1C 7.5 (H) 4.0 - 6.0 %   Basic Metabolic Panel   Result Value Ref Range    Sodium 137 134 - 144 mmol/L    Potassium 3.9 3.5 - 5.1 mmol/L    Chloride 102 98 - 107 mmol/L    Carbon Dioxide 27 21 - 31 mmol/L    Anion Gap 8 3 - 14 mmol/L    Glucose 158 (H) 70 - 105 mg/dL    Urea Nitrogen 22 7 - 25 mg/dL    Creatinine 1.32 (H) 0.70 - 1.30 mg/dL    GFR Estimate 57 (L) >60 mL/min/[1.73_m2]    GFR Estimate If Black 69 >60 mL/min/[1.73_m2]    Calcium 8.7 8.6 - 10.3 mg/dL             If you have any questions or concerns, please call the clinic at the number listed above.       Sincerely,        TAYLOR Payan CNP

## 2019-08-20 NOTE — PROGRESS NOTES
"Nursing Notes:   Mary Bhat LPN  8/20/2019  2:06 PM  Signed  Chief Complaint   Patient presents with     RECHECK     Er F/u Dehydration.       Initial /62 (BP Location: Right arm, Patient Position: Sitting, Cuff Size: Adult Regular)   Pulse 60   Temp 97.6  F (36.4  C) (Tympanic)   Resp 16   Wt 93.3 kg (205 lb 9.6 oz)   BMI 31.26 kg/m    Estimated body mass index is 31.26 kg/m  as calculated from the following:    Height as of 8/11/19: 1.727 m (5' 8\").    Weight as of this encounter: 93.3 kg (205 lb 9.6 oz).  Medication Reconciliation: Completed     Mary Bhat LPN  Nursing note reviewed with patient.  Accurracy and completeness verified.   Mr. Aragon is a 53 year old male who:  Patient presents with:  RECHECK: Er F/u Dehydration.      ICD-10-CM    1. DM type 2, not at goal (H) E11.9 Basic Metabolic Panel     Hemoglobin A1c     Hemoglobin A1c     Basic Metabolic Panel     glipiZIDE (GLUCOTROL XL) 5 MG 24 hr tablet   2. Alcohol abuse F10.10    3. Other secondary hypertension I15.8 Basic Metabolic Panel     Hemoglobin A1c     Hemoglobin A1c     Basic Metabolic Panel   4. PUD (peptic ulcer disease) K27.9 omeprazole (PRILOSEC) 20 MG DR capsule     HPI     Follow-up about 10 days ago is working felt dizzy lightheaded and fainted was brought into the ER  Treated for dehydration.  He reports he had had vomiting several times in the 24 hours before this occurred.  He reports a history of peptic ulcer disease  He does report drinking 4-6 beers a day or every other day, has not thought about quitting his drinking until I mentioned it today.  He would like a refill on his omeprazole, he was taking it once a day because his insurance was not covering it  Would like me to refill it at twice a day.  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  Denies any vomiting or diarrhea, denies any fever chills or weakness  Has been working full shifts since the day after his ED episode and feeling completely back to baseline  He has been taking in water and sports drinks to maintain hydration  He is due for A1c, and follow-up labs had some mild kidney injury with his dehydration however labs 's were trending back to normal range on discharge  He denies any chest pain, pressure, dyspnea or any presyncopal or syncopal episodes  Certainly at risk for vascular disease--- would like him to follow-up with his primary in a few weeks       Review of Systems   Neurological: Positive for light-headedness.   All other systems reviewed and are negative.     All other systems reviewed and negative.     GEMMA:   GEMMA-7 SCORE 7/19/2018   Total Score 0     PHQ9:  PHQ-9 SCORE 4/25/2017 7/19/2018   PHQ-9 Total Score 1 0       I have personally reviewed the past medical history, past surgical history, medications, allergies, family and social history as listed below, on 8/20/2019.    Allergies   Allergen Reactions     Dust Mite Extract Other (See Comments)     Sneezing, itchy eyes       Current Outpatient Medications   Medication Sig Dispense Refill     glipiZIDE (GLUCOTROL XL) 5 MG 24 hr tablet Take 1 tablet (5 mg) by mouth daily 90 tablet 0     omeprazole (PRILOSEC) 20 MG DR capsule Take 1 capsule (20 mg) by mouth 2 times daily 120 capsule 1     alcohol swab prep pads Use to swab area of injection/kellen as directed. (Patient not taking: Reported on 4/23/2019) 100 each 3     blood glucose (NO BRAND SPECIFIED) test strip Use to test blood sugar 4 times daily or as directed. (Patient not taking: Reported on 4/23/2019) 100 strip 6     blood glucose calibration (NO BRAND SPECIFIED) solution Use to calibrate blood glucose monitor as needed as directed. (Patient not taking: Reported on 4/23/2019) 1 Bottle 3     blood  glucose monitoring (NO BRAND SPECIFIED) meter device kit Use to test blood sugar 4 times daily or as directed. (Patient not taking: Reported on 4/23/2019) 1 kit 0     hydrochlorothiazide (HYDRODIURIL) 25 MG tablet TAKE 1 TABLET (25 MG) BY MOUTH DAILY 90 tablet 3     lisinopril (PRINIVIL/ZESTRIL) 20 MG tablet TAKE 1 TABLET (20 MG) BY MOUTH DAILY 90 tablet 3     thin (NO BRAND SPECIFIED) lancets Use with lanceting device. (Patient not taking: Reported on 4/23/2019) 100 each 11        Patient Active Problem List    Diagnosis Date Noted     Type 2 diabetes mellitus without complication, without long-term current use of insulin (H) 04/17/2019     Priority: Medium     Reactive airway disease 03/01/2018     Priority: Medium     Alcohol abuse 10/12/2017     Priority: Medium     Altered level of consciousness 10/11/2017     Priority: Medium     Acute alcoholic pancreatitis 06/20/2016     Priority: Medium     Chronic kidney disease, stage II (mild) 03/09/2016     Priority: Medium     Gastroesophageal reflux disease without esophagitis 05/18/2015     Priority: Medium     Dyslipidemia 12/29/2014     Priority: Medium     Hypertension 09/12/2013     Priority: Medium     Spondylolisthesis at L5-S1 level 07/09/2013     Priority: Medium     Overview:   Patient has been referred for PT and has not followed through.  He was sent for MRI scan,and he was not able to do either the closed or open MRI due to claustrophobia.       Insomnia 06/20/2013     Priority: Medium     Chewing tobacco use 05/17/2013     Priority: Medium     DJD (degenerative joint disease), lumbar 04/22/2013     Priority: Medium     Past Medical History:   Diagnosis Date     Alcohol-induced acute pancreatitis without infection or necrosis     06/20/2016     Essential (primary) hypertension     9/12/2013     Gastro-esophageal reflux disease without esophagitis     5/18/2015     Spondylolisthesis of lumbosacral region     7/9/2013,Patient has been referred for PT and  has not followed through.  He was sent for MRI scan,and he was not able to do either the closed or open MRI due to claustrophobia.     Spondylosis of lumbar region without myelopathy or radiculopathy     4/22/2013     Tobacco use     5/17/2013     Uncomplicated asthma     No Comments Provided     Past Surgical History:   Procedure Laterality Date     VASECTOMY      No Comments Provided     Social History     Socioeconomic History     Marital status:      Spouse name: None     Number of children: None     Years of education: None     Highest education level: None   Occupational History     None   Social Needs     Financial resource strain: None     Food insecurity:     Worry: None     Inability: None     Transportation needs:     Medical: None     Non-medical: None   Tobacco Use     Smoking status: Never Smoker     Smokeless tobacco: Current User     Types: Chew     Tobacco comment: Quit smoking: trying to quit chew using 1 pack week   Substance and Sexual Activity     Alcohol use: Yes     Alcohol/week: 16.8 oz     Comment: 4-6 beers a day or every other day     Drug use: No     Comment: Drug use: No     Sexual activity: Not Currently   Lifestyle     Physical activity:     Days per week: None     Minutes per session: None     Stress: None   Relationships     Social connections:     Talks on phone: None     Gets together: None     Attends Oriental orthodox service: None     Active member of club or organization: None     Attends meetings of clubs or organizations: None     Relationship status: None     Intimate partner violence:     Fear of current or ex partner: None     Emotionally abused: None     Physically abused: None     Forced sexual activity: None   Other Topics Concern     Parent/sibling w/ CABG, MI or angioplasty before 65F 55M? Not Asked   Social History Narrative    , Two children, Worked at [employer], lives independently in [place]. He is working at [employer].         He was in  "the [employer] for 2 years.      Family History   Problem Relation Age of Onset     Family History Negative Mother         Good Health     Other - See Comments Father         Deserted as a child, patient knows father had a history of heart attack at 50     Heart Disease Father         Heart Disease,patient knows father had a history of heart attack at 50     Cancer Other         Cancer,History of cancer       EXAM:   Vitals:    08/20/19 1350   BP: 138/62   BP Location: Right arm   Patient Position: Sitting   Cuff Size: Adult Regular   Pulse: 60   Resp: 16   Temp: 97.6  F (36.4  C)   TempSrc: Tympanic   Weight: 93.3 kg (205 lb 9.6 oz)       Current Pain Score: No Pain (0)     BP Readings from Last 3 Encounters:   08/20/19 138/62   08/11/19 122/86   04/23/19 122/78      Wt Readings from Last 3 Encounters:   08/20/19 93.3 kg (205 lb 9.6 oz)   08/11/19 88 kg (194 lb 1.6 oz)   04/23/19 92.5 kg (204 lb)      Estimated body mass index is 31.26 kg/m  as calculated from the following:    Height as of 8/11/19: 1.727 m (5' 8\").    Weight as of this encounter: 93.3 kg (205 lb 9.6 oz).     Physical Exam   Constitutional: He is oriented to person, place, and time. He appears well-developed and well-nourished.   Cardiovascular: Normal rate and regular rhythm.   Pulmonary/Chest: Effort normal.   Musculoskeletal: Normal range of motion.   Neurological: He is alert and oriented to person, place, and time.   Skin: Skin is warm and dry.   Psychiatric: He has a normal mood and affect. His behavior is normal. Judgment and thought content normal.   Nursing note and vitals reviewed.      INVESTIGATIONS:  Results for orders placed or performed in visit on 08/20/19   Hemoglobin A1c   Result Value Ref Range    Hemoglobin A1C 7.5 (H) 4.0 - 6.0 %   Basic Metabolic Panel   Result Value Ref Range    Sodium 137 134 - 144 mmol/L    Potassium 3.9 3.5 - 5.1 mmol/L    Chloride 102 98 - 107 mmol/L    Carbon Dioxide 27 21 - 31 mmol/L    Anion Gap 8 3 - 14 " mmol/L    Glucose 158 (H) 70 - 105 mg/dL    Urea Nitrogen 22 7 - 25 mg/dL    Creatinine 1.32 (H) 0.70 - 1.30 mg/dL    GFR Estimate 57 (L) >60 mL/min/[1.73_m2]    GFR Estimate If Black 69 >60 mL/min/[1.73_m2]    Calcium 8.7 8.6 - 10.3 mg/dL       ASSESSMENT AND PLAN:  Problem List Items Addressed This Visit        Nervous and Auditory    Alcohol abuse       Endocrine    Type 2 diabetes mellitus without complication, without long-term current use of insulin (H) - Primary    Relevant Medications    glipiZIDE (GLUCOTROL XL) 5 MG 24 hr tablet       Circulatory    Hypertension    Relevant Orders    Basic Metabolic Panel (Completed)    Hemoglobin A1c (Completed)      Other Visit Diagnoses     PUD (peptic ulcer disease)        Relevant Medications    omeprazole (PRILOSEC) 20 MG DR capsule        Letter sent with lab results--scheduled follow-up with Dr. Gonzalez in a couple of weeks to follow-up on his drinking, diabetes control  Peptic ulcer disease, and progress after his syncopal episode    Strongly encouraged to quit drinking--- pre-contemplative    Refilled omeprazole at twice daily    Is already returned back to work signed form as he is able to and has been back at full capacity    Was assisted with scheduling follow-up appointment with primary before leaving the clinic today      -- Expected clinical course discussed    -- Medications and their side effects discussed    There are no Patient Instructions on file for this visit.  Vicki Paniagua CNP  [facility]     Portions of this note were dictated using speech recognition software. The note has been proofread but errors in the text may have been overlooked. Please contact me if there are any concerns regarding the accuracy of the dictation.

## 2019-08-20 NOTE — NURSING NOTE
"Chief Complaint   Patient presents with     RECHECK     Er F/u Dehydration.       Initial /62 (BP Location: Right arm, Patient Position: Sitting, Cuff Size: Adult Regular)   Pulse 60   Temp 97.6  F (36.4  C) (Tympanic)   Resp 16   Wt 93.3 kg (205 lb 9.6 oz)   BMI 31.26 kg/m   Estimated body mass index is 31.26 kg/m  as calculated from the following:    Height as of 8/11/19: 1.727 m (5' 8\").    Weight as of this encounter: 93.3 kg (205 lb 9.6 oz).  Medication Reconciliation: Completed     Mary Bhat LPN  "

## 2019-08-21 ENCOUNTER — TELEPHONE (OUTPATIENT)
Dept: FAMILY MEDICINE | Facility: OTHER | Age: 54
End: 2019-08-21

## 2019-08-21 DIAGNOSIS — K21.9 ACID REFLUX: ICD-10-CM

## 2019-08-21 DIAGNOSIS — K27.9 PUD (PEPTIC ULCER DISEASE): Primary | ICD-10-CM

## 2019-08-21 RX ORDER — OMEPRAZOLE 40 MG/1
40 CAPSULE, DELAYED RELEASE ORAL DAILY
Qty: 30 CAPSULE | Refills: 11 | Status: SHIPPED | OUTPATIENT
Start: 2019-08-21 | End: 2019-09-17

## 2019-08-21 NOTE — TELEPHONE ENCOUNTER
Need new Rx for the dose and amount they are asking for insurance to cover.  Guy Dee LPN, MITZIN ..............8/21/2019 1:28 PM

## 2019-08-21 NOTE — TELEPHONE ENCOUNTER
I am not certain what you are asking  Do You need a new prescription ?    Thanks  Vicki Paniagua, APRN CNP   August 21, 2019

## 2019-08-21 NOTE — TELEPHONE ENCOUNTER
[employer] denied the prior authorization request for the Omeprazole because of quantity limit. They advise one 40MG/day and 30 capsules per 30 days. I will fax this to [phone] for nurse review if an appeal is needed.

## 2019-09-17 ENCOUNTER — OFFICE VISIT (OUTPATIENT)
Dept: FAMILY MEDICINE | Facility: OTHER | Age: 54
End: 2019-09-17
Attending: FAMILY MEDICINE
Payer: COMMERCIAL

## 2019-09-17 VITALS
RESPIRATION RATE: 16 BRPM | BODY MASS INDEX: 32.05 KG/M2 | TEMPERATURE: 98.4 F | OXYGEN SATURATION: 98 % | SYSTOLIC BLOOD PRESSURE: 129 MMHG | HEART RATE: 69 BPM | DIASTOLIC BLOOD PRESSURE: 78 MMHG | HEIGHT: 67 IN | WEIGHT: 204.2 LBS

## 2019-09-17 DIAGNOSIS — I10 HYPERTENSION, UNSPECIFIED TYPE: ICD-10-CM

## 2019-09-17 DIAGNOSIS — F10.20 ALCOHOLISM (H): ICD-10-CM

## 2019-09-17 DIAGNOSIS — K27.9 PUD (PEPTIC ULCER DISEASE): ICD-10-CM

## 2019-09-17 DIAGNOSIS — E11.9 TYPE 2 DIABETES MELLITUS WITHOUT COMPLICATION, WITHOUT LONG-TERM CURRENT USE OF INSULIN (H): Primary | ICD-10-CM

## 2019-09-17 DIAGNOSIS — I15.8 OTHER SECONDARY HYPERTENSION: ICD-10-CM

## 2019-09-17 DIAGNOSIS — E11.9 DM TYPE 2, NOT AT GOAL (H): ICD-10-CM

## 2019-09-17 DIAGNOSIS — N18.2 CHRONIC KIDNEY DISEASE, STAGE II (MILD): ICD-10-CM

## 2019-09-17 PROCEDURE — 99214 OFFICE O/P EST MOD 30 MIN: CPT | Performed by: FAMILY MEDICINE

## 2019-09-17 PROCEDURE — G0463 HOSPITAL OUTPT CLINIC VISIT: HCPCS

## 2019-09-17 RX ORDER — OMEPRAZOLE 40 MG/1
40 CAPSULE, DELAYED RELEASE ORAL DAILY
Qty: 30 CAPSULE | Refills: 11 | Status: SHIPPED | OUTPATIENT
Start: 2019-09-17 | End: 2020-05-05 | Stop reason: DRUGHIGH

## 2019-09-17 RX ORDER — HYDROCHLOROTHIAZIDE 25 MG/1
25 TABLET ORAL DAILY
Qty: 90 TABLET | Refills: 3 | Status: SHIPPED | OUTPATIENT
Start: 2019-09-17 | End: 2020-05-05

## 2019-09-17 RX ORDER — LISINOPRIL 20 MG/1
20 TABLET ORAL DAILY
Qty: 90 TABLET | Refills: 3 | Status: SHIPPED | OUTPATIENT
Start: 2019-09-17 | End: 2020-05-05

## 2019-09-17 RX ORDER — GLIPIZIDE 5 MG/1
5 TABLET, FILM COATED, EXTENDED RELEASE ORAL DAILY
Qty: 90 TABLET | Refills: 11 | Status: SHIPPED | OUTPATIENT
Start: 2019-09-17 | End: 2020-05-05

## 2019-09-17 ASSESSMENT — PAIN SCALES - GENERAL: PAINLEVEL: MILD PAIN (3)

## 2019-09-17 ASSESSMENT — MIFFLIN-ST. JEOR: SCORE: 1743.84

## 2019-09-17 NOTE — NURSING NOTE
"Patient presents to clinic for follow up ED-dehydration and near syncope. States his heels hurt him quite a bit, especially after walking. States pain in legs and wondering if related to Lisinopril.   Chief Complaint   Patient presents with     Clinic Care Coordination - Follow-up     dehydration and near syncope       Initial /78   Pulse 69   Temp 98.4  F (36.9  C) (Tympanic)   Resp 16   Ht 1.708 m (5' 7.25\")   Wt 92.6 kg (204 lb 3.2 oz)   SpO2 98%   BMI 31.74 kg/m   Estimated body mass index is 31.74 kg/m  as calculated from the following:    Height as of this encounter: 1.708 m (5' 7.25\").    Weight as of this encounter: 92.6 kg (204 lb 3.2 oz).  Medication Reconciliation: complete    Elysia aLboy LPN    "

## 2019-09-17 NOTE — PROGRESS NOTES
"Nursing Notes:   Elysia Laboy LPN  9/17/2019  3:49 PM  Signed  Patient presents to clinic for follow up ED-dehydration and near syncope. States his heels hurt him quite a bit, especially after walking. States pain in legs and wondering if related to Lisinopril.   Chief Complaint   Patient presents with     Clinic Care Coordination - Follow-up     dehydration and near syncope       Initial /78   Pulse 69   Temp 98.4  F (36.9  C) (Tympanic)   Resp 16   Ht 1.708 m (5' 7.25\")   Wt 92.6 kg (204 lb 3.2 oz)   SpO2 98%   BMI 31.74 kg/m    Estimated body mass index is 31.74 kg/m  as calculated from the following:    Height as of this encounter: 1.708 m (5' 7.25\").    Weight as of this encounter: 92.6 kg (204 lb 3.2 oz).  Medication Reconciliation: alta Laboy LPN      SUBJECTIVE:  Kolton Aragon  is a 53 year old male who comes in today for follow-up.  I last saw him in April.  He has type 2 diabetes that was diagnosed at that time.  He was referred to diabetes education on May 15 but did not keep his appointment.  We started him on metformin extended release and recommended follow-up in a month.  He did not return.  He was not started on aspirin or statin at his first diagnostic appointment.      He was seen in the emergency room in August with near syncope and dehydration.  He had a GI illness and had been vomiting a lot.  He was fluid resuscitated and sent home.  He had follow-up with Vicki Paniagua a week later.  He had hemoglobin A1c done at that time which was 7.5%.  He had minimal elevation of his creatinine at 1.32.  Blood sugar was 158 at that time.  He was continuing omeprazole twice daily and apparently had been drinking again. He has been sober again and is doing well.  He apparently was not taking metformin and was placed on glipizide 5 mg daily.  He stopped metformin because of diarrhea.     He is taking melatonin and is sleeping well. He is working at [employer] as a . His feet " are better since he is not drinking and his blood sugars are better.     Past Medical, Family, and Social History reviewed and updated as noted below.   ROS is negative except as noted above       Allergies   Allergen Reactions     Dust Mite Extract Other (See Comments)     Sneezing, itchy eyes   ,   Family History   Problem Relation Age of Onset     Family History Negative Mother         Good Health     Other - See Comments Father         Deserted as a child, patient knows father had a history of heart attack at 50     Heart Disease Father         Heart Disease,patient knows father had a history of heart attack at 50     Cancer Other         Cancer,History of cancer   ,   Current Outpatient Medications   Medication     alcohol swab prep pads     blood glucose (NO BRAND SPECIFIED) test strip     blood glucose calibration (NO BRAND SPECIFIED) solution     blood glucose monitoring (NO BRAND SPECIFIED) meter device kit     glipiZIDE (GLUCOTROL XL) 5 MG 24 hr tablet     hydrochlorothiazide (HYDRODIURIL) 25 MG tablet     lisinopril (PRINIVIL/ZESTRIL) 20 MG tablet     omeprazole (PRILOSEC) 40 MG DR capsule     thin (NO BRAND SPECIFIED) lancets     No current facility-administered medications for this visit.    ,   Past Medical History:   Diagnosis Date     Alcohol-induced acute pancreatitis without infection or necrosis     06/20/2016     Essential (primary) hypertension     9/12/2013     Gastro-esophageal reflux disease without esophagitis     5/18/2015     Spondylolisthesis of lumbosacral region     7/9/2013,Patient has been referred for PT and has not followed through.  He was sent for MRI scan,and he was not able to do either the closed or open MRI due to claustrophobia.     Spondylosis of lumbar region without myelopathy or radiculopathy     4/22/2013     Tobacco use     5/17/2013     Uncomplicated asthma     No Comments Provided   ,   Patient Active Problem List    Diagnosis Date Noted     Type 2 diabetes mellitus  "without complication, without long-term current use of insulin (H) 04/17/2019     Priority: Medium     Reactive airway disease 03/01/2018     Priority: Medium     Alcoholism (H) 10/12/2017     Priority: Medium     Altered level of consciousness 10/11/2017     Priority: Medium     Acute alcoholic pancreatitis 06/20/2016     Priority: Medium     Chronic kidney disease, stage II (mild) 03/09/2016     Priority: Medium     Gastroesophageal reflux disease without esophagitis 05/18/2015     Priority: Medium     Dyslipidemia 12/29/2014     Priority: Medium     Hypertension 09/12/2013     Priority: Medium     Spondylolisthesis at L5-S1 level 07/09/2013     Priority: Medium     Overview:   Patient has been referred for PT and has not followed through.  He was sent for MRI scan,and he was not able to do either the closed or open MRI due to claustrophobia.       Insomnia 06/20/2013     Priority: Medium     Chewing tobacco use 05/17/2013     Priority: Medium     DJD (degenerative joint disease), lumbar 04/22/2013     Priority: Medium   ,   Past Surgical History:   Procedure Laterality Date     VASECTOMY      No Comments Provided    and   Social History     Tobacco Use     Smoking status: Never Smoker     Smokeless tobacco: Current User     Types: Chew     Tobacco comment: Quit smoking: trying to quit chew using 1 pack week   Substance Use Topics     Alcohol use: Yes     Alcohol/week: 16.8 oz     Comment: 4-6 beers a day or every other day     OBJECTIVE:  /78   Pulse 69   Temp 98.4  F (36.9  C) (Tympanic)   Resp 16   Ht 1.708 m (5' 7.25\")   Wt 92.6 kg (204 lb 3.2 oz)   SpO2 98%   BMI 31.74 kg/m     EXAM:  Alert and cooperative, no distress.  Has some heel pain consistent with plantar fasciitis.  Improvement in the tingling in his feet since he quit drinking and his blood sugars are better.  We did not do monofilament testing today we will plan to do at his next visit.  ASSESSMENT/Plan :    Kolton was seen today for " clinic care coordination - follow-up.    Diagnoses and all orders for this visit:    Type 2 diabetes mellitus without complication, without long-term current use of insulin (H)    Other secondary hypertension    Chronic kidney disease, stage II (mild)    DM type 2, not at goal (H)  -     glipiZIDE (GLUCOTROL XL) 5 MG 24 hr tablet; Take 1 tablet (5 mg) by mouth daily    PUD (peptic ulcer disease)  -     omeprazole (PRILOSEC) 40 MG DR capsule; Take 1 capsule (40 mg) by mouth daily    Hypertension, unspecified type  -     hydrochlorothiazide (HYDRODIURIL) 25 MG tablet; Take 1 tablet (25 mg) by mouth daily  -     lisinopril (PRINIVIL/ZESTRIL) 20 MG tablet; Take 1 tablet (20 mg) by mouth daily    Alcoholism (H)      Lengthy discussion again with regard to dietary modifications with carbohydrate etc. recommend that he continue with his current medications and follow-up in 3 months, sooner if needed.  Note given for him because he is sun sensitive which he will give to his employer to limit his time outdoors.    Congratulated on his ongoing sobriety.    A total of 25 minutes was spent with the patient, greater than 50% of the time was spent in counseling/discussion of the aforementioned concerns.     Jono Gonzalez MD

## 2019-09-17 NOTE — LETTER
[facility] \[facility]\""  [address]  [place] [address]  Phone: [phone]  Fax: [phone]    September 17, 2019        Kolton Aragon  [address] [place] [id number]          To whom it may concern:    RE: Kolton Aragon    This patient has sun sensitivity and needs to limit time in the sunlight.    Please contact me for questions or concerns.      Sincerely,        Jono Gonzalez MD

## 2020-04-23 ENCOUNTER — TRANSFERRED RECORDS (OUTPATIENT)
Dept: HEALTH INFORMATION MANAGEMENT | Facility: OTHER | Age: 55
End: 2020-04-23

## 2020-04-23 ENCOUNTER — HOSPITAL ENCOUNTER (EMERGENCY)
Facility: OTHER | Age: 55
Discharge: ANOTHER HOSPITAL | End: 2020-04-23
Attending: STUDENT IN AN ORGANIZED HEALTH CARE EDUCATION/TRAINING PROGRAM | Admitting: STUDENT IN AN ORGANIZED HEALTH CARE EDUCATION/TRAINING PROGRAM
Payer: COMMERCIAL

## 2020-04-23 ENCOUNTER — APPOINTMENT (OUTPATIENT)
Dept: ULTRASOUND IMAGING | Facility: OTHER | Age: 55
End: 2020-04-23
Attending: STUDENT IN AN ORGANIZED HEALTH CARE EDUCATION/TRAINING PROGRAM
Payer: COMMERCIAL

## 2020-04-23 ENCOUNTER — APPOINTMENT (OUTPATIENT)
Dept: CT IMAGING | Facility: OTHER | Age: 55
End: 2020-04-23
Attending: STUDENT IN AN ORGANIZED HEALTH CARE EDUCATION/TRAINING PROGRAM
Payer: COMMERCIAL

## 2020-04-23 ENCOUNTER — APPOINTMENT (OUTPATIENT)
Dept: GENERAL RADIOLOGY | Facility: OTHER | Age: 55
End: 2020-04-23
Attending: STUDENT IN AN ORGANIZED HEALTH CARE EDUCATION/TRAINING PROGRAM
Payer: COMMERCIAL

## 2020-04-23 VITALS
SYSTOLIC BLOOD PRESSURE: 153 MMHG | RESPIRATION RATE: 17 BRPM | WEIGHT: 204 LBS | OXYGEN SATURATION: 99 % | HEART RATE: 92 BPM | BODY MASS INDEX: 31.71 KG/M2 | DIASTOLIC BLOOD PRESSURE: 111 MMHG | TEMPERATURE: 96.1 F

## 2020-04-23 DIAGNOSIS — E11.10 DIABETIC KETOACIDOSIS WITHOUT COMA ASSOCIATED WITH TYPE 2 DIABETES MELLITUS (H): ICD-10-CM

## 2020-04-23 DIAGNOSIS — K63.1 PERFORATION OF INTESTINE (H): ICD-10-CM

## 2020-04-23 DIAGNOSIS — N17.9 ACUTE RENAL FAILURE, UNSPECIFIED ACUTE RENAL FAILURE TYPE (H): ICD-10-CM

## 2020-04-23 DIAGNOSIS — A41.9 SEPSIS, DUE TO UNSPECIFIED ORGANISM, UNSPECIFIED WHETHER ACUTE ORGAN DYSFUNCTION PRESENT (H): ICD-10-CM

## 2020-04-23 DIAGNOSIS — K81.0 ACUTE CHOLECYSTITIS: ICD-10-CM

## 2020-04-23 LAB
ALBUMIN SERPL-MCNC: 3.6 G/DL (ref 3.5–5.7)
ALBUMIN UR-MCNC: 30 MG/DL
ALP SERPL-CCNC: 89 U/L (ref 34–104)
ALT SERPL W P-5'-P-CCNC: 17 U/L (ref 7–52)
ANION GAP SERPL CALCULATED.3IONS-SCNC: 31 MMOL/L (ref 3–14)
ANION GAP SERPL CALCULATED.3IONS-SCNC: 34 MMOL/L (ref 3–14)
APPEARANCE UR: CLEAR
AST SERPL W P-5'-P-CCNC: 10 U/L (ref 13–39)
BACTERIA #/AREA URNS HPF: ABNORMAL /HPF
BASOPHILS # BLD AUTO: 0 10E9/L (ref 0–0.2)
BASOPHILS NFR BLD AUTO: 0 %
BILIRUB SERPL-MCNC: 0.4 MG/DL (ref 0.3–1)
BILIRUB UR QL STRIP: NEGATIVE
BUN SERPL-MCNC: 103 MG/DL (ref 7–25)
BUN SERPL-MCNC: 106 MG/DL (ref 7–25)
CALCIUM SERPL-MCNC: 8.4 MG/DL (ref 8.6–10.3)
CALCIUM SERPL-MCNC: 9.2 MG/DL (ref 8.6–10.3)
CHLORIDE SERPL-SCNC: 66 MMOL/L (ref 98–107)
CHLORIDE SERPL-SCNC: 75 MMOL/L (ref 98–107)
CO2 SERPL-SCNC: 10 MMOL/L (ref 21–31)
CO2 SERPL-SCNC: 8 MMOL/L (ref 21–31)
COLOR UR AUTO: ABNORMAL
CREAT SERPL-MCNC: 4.07 MG/DL (ref 0.7–1.3)
CREAT SERPL-MCNC: 4.57 MG/DL (ref 0.7–1.3)
CRP SERPL-MCNC: 19.8 MG/L
DIFFERENTIAL METHOD BLD: ABNORMAL
EOSINOPHIL # BLD AUTO: 0 10E9/L (ref 0–0.7)
EOSINOPHIL NFR BLD AUTO: 0 %
ERYTHROCYTE [DISTWIDTH] IN BLOOD BY AUTOMATED COUNT: 12.3 % (ref 10–15)
GFR SERPL CREATININE-BSD FRML MDRD: 14 ML/MIN/{1.73_M2}
GFR SERPL CREATININE-BSD FRML MDRD: 16 ML/MIN/{1.73_M2}
GLUCOSE SERPL-MCNC: 1030 MG/DL (ref 70–105)
GLUCOSE SERPL-MCNC: 1245 MG/DL (ref 70–105)
GLUCOSE UR STRIP-MCNC: >1000 MG/DL
HCO3 BLDV-SCNC: 10 MMOL/L (ref 21–28)
HCO3 BLDV-SCNC: 9 MMOL/L (ref 21–28)
HCT VFR BLD AUTO: 36.3 % (ref 40–53)
HGB BLD-MCNC: 12.2 G/DL (ref 13.3–17.7)
HGB UR QL STRIP: ABNORMAL
KETONES BLD-SCNC: 6.5 MMOL/L (ref 0–0.6)
KETONES UR STRIP-MCNC: 40 MG/DL
LACTATE BLD-SCNC: 2.1 MMOL/L (ref 0.7–2)
LACTATE BLD-SCNC: 3.6 MMOL/L (ref 0.7–2)
LEUKOCYTE ESTERASE UR QL STRIP: NEGATIVE
LIPASE SERPL-CCNC: 745 U/L (ref 11–82)
LYMPHOCYTES # BLD AUTO: 0.7 10E9/L (ref 0.8–5.3)
LYMPHOCYTES NFR BLD AUTO: 4 %
MCH RBC QN AUTO: 31.5 PG (ref 26.5–33)
MCHC RBC AUTO-ENTMCNC: 33.6 G/DL (ref 31.5–36.5)
MCV RBC AUTO: 94 FL (ref 78–100)
MONOCYTES # BLD AUTO: 1 10E9/L (ref 0–1.3)
MONOCYTES NFR BLD AUTO: 6 %
NEUTROPHILS # BLD AUTO: 15.6 10E9/L (ref 1.6–8.3)
NEUTROPHILS NFR BLD AUTO: 90 %
NITRATE UR QL: NEGATIVE
O2/TOTAL GAS SETTING VFR VENT: 0 %
O2/TOTAL GAS SETTING VFR VENT: 0 %
OXYHGB MFR BLDV: 51 %
OXYHGB MFR BLDV: 80 %
PCO2 BLDV: 29 MM HG (ref 40–50)
PCO2 BLDV: 33 MM HG (ref 40–50)
PH BLDV: 7.09 PH (ref 7.32–7.43)
PH BLDV: 7.1 PH (ref 7.32–7.43)
PH UR STRIP: 5 PH (ref 5–7)
PLATELET # BLD AUTO: 475 10E9/L (ref 150–450)
PLATELET # BLD EST: ABNORMAL 10*3/UL
PO2 BLDV: 36 MM HG (ref 25–47)
PO2 BLDV: 60 MM HG (ref 25–47)
POTASSIUM SERPL-SCNC: 4.6 MMOL/L (ref 3.5–5.1)
POTASSIUM SERPL-SCNC: 5.2 MMOL/L (ref 3.5–5.1)
PROT SERPL-MCNC: 8.2 G/DL (ref 6.4–8.9)
RBC # BLD AUTO: 3.87 10E12/L (ref 4.4–5.9)
RBC #/AREA URNS AUTO: 1 /HPF (ref 0–2)
RBC MORPH BLD: ABNORMAL
SODIUM SERPL-SCNC: 110 MMOL/L (ref 134–144)
SODIUM SERPL-SCNC: 114 MMOL/L (ref 134–144)
SOURCE: ABNORMAL
SP GR UR STRIP: 1.02 (ref 1–1.03)
TROPONIN I SERPL-MCNC: 10.6 PG/ML
UROBILINOGEN UR STRIP-MCNC: NORMAL MG/DL (ref 0–2)
WBC # BLD AUTO: 17.3 10E9/L (ref 4–11)
WBC #/AREA URNS AUTO: 2 /HPF (ref 0–5)

## 2020-04-23 PROCEDURE — 83605 ASSAY OF LACTIC ACID: CPT | Mod: 91 | Performed by: STUDENT IN AN ORGANIZED HEALTH CARE EDUCATION/TRAINING PROGRAM

## 2020-04-23 PROCEDURE — 93005 ELECTROCARDIOGRAM TRACING: CPT | Performed by: STUDENT IN AN ORGANIZED HEALTH CARE EDUCATION/TRAINING PROGRAM

## 2020-04-23 PROCEDURE — 74176 CT ABD & PELVIS W/O CONTRAST: CPT

## 2020-04-23 PROCEDURE — 86140 C-REACTIVE PROTEIN: CPT | Performed by: STUDENT IN AN ORGANIZED HEALTH CARE EDUCATION/TRAINING PROGRAM

## 2020-04-23 PROCEDURE — 25000131 ZZH RX MED GY IP 250 OP 636 PS 637: Performed by: STUDENT IN AN ORGANIZED HEALTH CARE EDUCATION/TRAINING PROGRAM

## 2020-04-23 PROCEDURE — 99285 EMERGENCY DEPT VISIT HI MDM: CPT | Mod: 25 | Performed by: STUDENT IN AN ORGANIZED HEALTH CARE EDUCATION/TRAINING PROGRAM

## 2020-04-23 PROCEDURE — 82010 KETONE BODYS QUAN: CPT | Performed by: STUDENT IN AN ORGANIZED HEALTH CARE EDUCATION/TRAINING PROGRAM

## 2020-04-23 PROCEDURE — 84484 ASSAY OF TROPONIN QUANT: CPT | Performed by: STUDENT IN AN ORGANIZED HEALTH CARE EDUCATION/TRAINING PROGRAM

## 2020-04-23 PROCEDURE — 25800030 ZZH RX IP 258 OP 636: Performed by: STUDENT IN AN ORGANIZED HEALTH CARE EDUCATION/TRAINING PROGRAM

## 2020-04-23 PROCEDURE — 96366 THER/PROPH/DIAG IV INF ADDON: CPT | Performed by: STUDENT IN AN ORGANIZED HEALTH CARE EDUCATION/TRAINING PROGRAM

## 2020-04-23 PROCEDURE — 87040 BLOOD CULTURE FOR BACTERIA: CPT | Performed by: STUDENT IN AN ORGANIZED HEALTH CARE EDUCATION/TRAINING PROGRAM

## 2020-04-23 PROCEDURE — 96365 THER/PROPH/DIAG IV INF INIT: CPT | Performed by: STUDENT IN AN ORGANIZED HEALTH CARE EDUCATION/TRAINING PROGRAM

## 2020-04-23 PROCEDURE — 76705 ECHO EXAM OF ABDOMEN: CPT

## 2020-04-23 PROCEDURE — 87077 CULTURE AEROBIC IDENTIFY: CPT | Performed by: STUDENT IN AN ORGANIZED HEALTH CARE EDUCATION/TRAINING PROGRAM

## 2020-04-23 PROCEDURE — 96375 TX/PRO/DX INJ NEW DRUG ADDON: CPT | Performed by: STUDENT IN AN ORGANIZED HEALTH CARE EDUCATION/TRAINING PROGRAM

## 2020-04-23 PROCEDURE — 93010 ELECTROCARDIOGRAM REPORT: CPT | Performed by: INTERNAL MEDICINE

## 2020-04-23 PROCEDURE — 80048 BASIC METABOLIC PNL TOTAL CA: CPT | Performed by: STUDENT IN AN ORGANIZED HEALTH CARE EDUCATION/TRAINING PROGRAM

## 2020-04-23 PROCEDURE — 71046 X-RAY EXAM CHEST 2 VIEWS: CPT

## 2020-04-23 PROCEDURE — 82805 BLOOD GASES W/O2 SATURATION: CPT | Performed by: STUDENT IN AN ORGANIZED HEALTH CARE EDUCATION/TRAINING PROGRAM

## 2020-04-23 PROCEDURE — 81001 URINALYSIS AUTO W/SCOPE: CPT | Performed by: STUDENT IN AN ORGANIZED HEALTH CARE EDUCATION/TRAINING PROGRAM

## 2020-04-23 PROCEDURE — 80053 COMPREHEN METABOLIC PANEL: CPT | Performed by: STUDENT IN AN ORGANIZED HEALTH CARE EDUCATION/TRAINING PROGRAM

## 2020-04-23 PROCEDURE — 99285 EMERGENCY DEPT VISIT HI MDM: CPT | Mod: Z6 | Performed by: STUDENT IN AN ORGANIZED HEALTH CARE EDUCATION/TRAINING PROGRAM

## 2020-04-23 PROCEDURE — 25000125 ZZHC RX 250: Performed by: STUDENT IN AN ORGANIZED HEALTH CARE EDUCATION/TRAINING PROGRAM

## 2020-04-23 PROCEDURE — 25000128 H RX IP 250 OP 636: Performed by: STUDENT IN AN ORGANIZED HEALTH CARE EDUCATION/TRAINING PROGRAM

## 2020-04-23 PROCEDURE — 83690 ASSAY OF LIPASE: CPT | Performed by: STUDENT IN AN ORGANIZED HEALTH CARE EDUCATION/TRAINING PROGRAM

## 2020-04-23 PROCEDURE — 36415 COLL VENOUS BLD VENIPUNCTURE: CPT | Mod: XU | Performed by: STUDENT IN AN ORGANIZED HEALTH CARE EDUCATION/TRAINING PROGRAM

## 2020-04-23 PROCEDURE — 85025 COMPLETE CBC W/AUTO DIFF WBC: CPT | Performed by: STUDENT IN AN ORGANIZED HEALTH CARE EDUCATION/TRAINING PROGRAM

## 2020-04-23 PROCEDURE — 93005 ELECTROCARDIOGRAM TRACING: CPT | Mod: 76 | Performed by: STUDENT IN AN ORGANIZED HEALTH CARE EDUCATION/TRAINING PROGRAM

## 2020-04-23 RX ORDER — HYDROMORPHONE HYDROCHLORIDE 1 MG/ML
0.5 INJECTION, SOLUTION INTRAMUSCULAR; INTRAVENOUS; SUBCUTANEOUS ONCE
Status: COMPLETED | OUTPATIENT
Start: 2020-04-23 | End: 2020-04-23

## 2020-04-23 RX ORDER — ONDANSETRON 2 MG/ML
4 INJECTION INTRAMUSCULAR; INTRAVENOUS EVERY 30 MIN PRN
Status: DISCONTINUED | OUTPATIENT
Start: 2020-04-23 | End: 2020-04-23 | Stop reason: HOSPADM

## 2020-04-23 RX ORDER — NICOTINE POLACRILEX 4 MG
15-30 LOZENGE BUCCAL
Status: DISCONTINUED | OUTPATIENT
Start: 2020-04-23 | End: 2020-04-23 | Stop reason: HOSPADM

## 2020-04-23 RX ORDER — ONDANSETRON 2 MG/ML
4 INJECTION INTRAMUSCULAR; INTRAVENOUS ONCE
Status: COMPLETED | OUTPATIENT
Start: 2020-04-23 | End: 2020-04-23

## 2020-04-23 RX ORDER — SODIUM CHLORIDE 9 MG/ML
1000 INJECTION, SOLUTION INTRAVENOUS CONTINUOUS
Status: DISCONTINUED | OUTPATIENT
Start: 2020-04-23 | End: 2020-04-23

## 2020-04-23 RX ORDER — SODIUM BICARBONATE 42 MG/ML
5 INJECTION, SOLUTION INTRAVENOUS ONCE
Status: COMPLETED | OUTPATIENT
Start: 2020-04-23 | End: 2020-04-23

## 2020-04-23 RX ORDER — DEXTROSE MONOHYDRATE 25 G/50ML
25-50 INJECTION, SOLUTION INTRAVENOUS
Status: DISCONTINUED | OUTPATIENT
Start: 2020-04-23 | End: 2020-04-23 | Stop reason: HOSPADM

## 2020-04-23 RX ADMIN — SODIUM CHLORIDE 1000 ML: 9 INJECTION, SOLUTION INTRAVENOUS at 09:55

## 2020-04-23 RX ADMIN — SODIUM BICARBONATE 5 MEQ: 42 INJECTION, SOLUTION INTRAVENOUS at 13:35

## 2020-04-23 RX ADMIN — SODIUM CHLORIDE 5.5 UNITS/HR: 9 INJECTION, SOLUTION INTRAVENOUS at 12:04

## 2020-04-23 RX ADMIN — ONDANSETRON 4 MG: 2 INJECTION INTRAMUSCULAR; INTRAVENOUS at 09:55

## 2020-04-23 RX ADMIN — TAZOBACTAM SODIUM AND PIPERACILLIN SODIUM 4.5 G: 500; 4 INJECTION, SOLUTION INTRAVENOUS at 11:02

## 2020-04-23 RX ADMIN — HYDROMORPHONE HYDROCHLORIDE 0.5 MG: 1 INJECTION, SOLUTION INTRAMUSCULAR; INTRAVENOUS; SUBCUTANEOUS at 12:13

## 2020-04-23 RX ADMIN — SODIUM CHLORIDE 1000 ML: 9 INJECTION, SOLUTION INTRAVENOUS at 11:03

## 2020-04-23 NOTE — ED NOTES
BG >500 on BG monitor, MD aware.  Patient kept on the monitor and accompanied by RN to CT.  Patient is now back from CT, attempting to obtain second 18G PIV at this time

## 2020-04-23 NOTE — ED NOTES
Patient left facility via life flight.  Report given to [facility] ICU.  Mother updated on patient status

## 2020-04-23 NOTE — ED PROVIDER NOTES
Kolton Aragon  : 1965 Age: 54 year old Sex: male MRN: [id number]    CC: abd pain, n/v    HPI: Kolton Aragon is a 54 year old male with PMH significant for Dm2, h/o alcoholic pancreatitis, GERD who is presenting for evaluation of abdominal pain, nausea and vomiting.  Patient states that his symptoms started over a week ago,  with generalized malaise, mild epigastric right upper quadrant abdominal pain, intermittent nausea and vomiting, initially he was trying to manage it at home however the symptoms have gotten progressively worse to the point where he is having constant nausea and emesis in last 48 hours, he feels extremely weak and dehydrated, continues to have right-sided abdominal pain that radiates to his back.  No fevers no chills, no changes to urination, no falls, he does endorse some mild chest discomfort with the symptoms, no cough.  No headache, no changes to vision, no focal weakness.    ED Course and MDM:  Pt is a 54-year-old man with a past medical history of type 2 diabetes not on insulin, history of alcoholic pancreatitis, GERD who is presenting for 1 week of abdominal pain and nausea and vomiting.  On arrival patient ill-appearing, vital signs stable, afebrile, physical exam notable for epigastric and right upper quadrant tenderness; reassuringly abdomen is soft and there is no guarding or rigidity.  Presentation quite concerning given her ill appearance and not feeling well for an extended period of time therefore evaluated for broad differential diagnosis of intra-abdominal pathologies including hepatobiliary causes, infection, perforation, pancreatitis, etc.  Including CMP, CBC, lipase, BCx2, VBG, UA/UC, CRP, CXR, trop, lactate, and abd US and CT; given 1L NS bolus immediately while workup pending. The labs show DKA w/ DKA (Glu  1245, bicarb 10, pH 7.10, ketones 6.5, AG 34), renal failure w/ Creatinine 4.57 (from prior 1.3), K 5.2, Na 110; elevated lactate, elevated lipase  (745) and leukocytosis.  Abdominal imaging concerning for duodenal perforation and cholecystitis versus gallbladder wall perforation.  Given the findings begin treatment for DKA and consulted general surgery for consideration operative treatment, after discussion with hospitalist and general surgeon initiated transfer to higher level of care given medical/surgical complexity. Spoken w/ Dr. Resendez who accepted the pt for transfer.    At the time of transfer pt remains critically ill however w/ relatively stable hemodynamics: SBP in 120s, HR 80-90, no resp distress, sats > 95% on RA. Repeat EKG prior to transfer unchanged (no peaking of t-waves), reassuringly pt urinated ~ 250cc of urine, repeat lactate 2.1 (from 3.6), VBG unchanged, BMP pending; pt protecting airway - no indication for intubation, no indication for starting pressors at this time.    Addendum: pt's BMP resulted as pt departing ED shows K 4.6 (from 5.2), Na 114 (from 110), Cr 4.57 (4.07), bicarb 8 (from 10), AG 31 (from 34).    Total tx at the time of departure:  - 3L of NS IVF  - insulin gtt  - zosyn 4.5mg given  - zofran x1  - diluadid 0.5mg  - 1amp bicarb prior to departure    Final Plan:  - sp 3L IVF, abx given, insulin drip started  - BCx2 pending  - tx to higher level of care for mgmt of sepsis, DKA and abd perforation    Final Clinical Impression:  - Sepsis  - DKA  - Pancreatitis  - renal failure  - GI vs. Gallbladder perforation    Jayy Middleton MD      Physical Exam:  /77   Pulse 87   Temp 96.1  F (35.6  C) (Tympanic)   Resp 18   Wt 92.5 kg (204 lb)   SpO2 99%   BMI 31.71 kg/m      Gen: ill appearing  HEENT: Normocephalic, atraumatic, pupils equal and reactive  CV: Regular rate and rhythm, no murmurs or gallop  Resp: Chest clear, no respiratory distress, no wheezing or crackles, normal work of breathing  Abd: Obese, soft, mild tenderness to palpation in right upper quadrant  Skin: No rashes  Neuro: Alert and oriented x3, no  focal deficits, cranial nerves grossly intact    ROS:  ROS performed and noted in HPI    Family history:  Family History   Problem Relation Age of Onset     Family History Negative Mother         Good Health     Other - See Comments Father         Deserted as a child, patient knows father had a history of heart attack at 50     Heart Disease Father         Heart Disease,patient knows father had a history of heart attack at 50     Cancer Other         Cancer,History of cancer       Social History:   Social History     Socioeconomic History     Marital status:      Spouse name: Not on file     Number of children: Not on file     Years of education: Not on file     Highest education level: Not on file   Occupational History     Not on file   Social Needs     Financial resource strain: Not on file     Food insecurity     Worry: Not on file     Inability: Not on file     Transportation needs     Medical: Not on file     Non-medical: Not on file   Tobacco Use     Smoking status: Never Smoker     Smokeless tobacco: Current User     Types: Chew     Tobacco comment: Quit smoking: trying to quit chew using 1 pack week   Substance and Sexual Activity     Alcohol use: Yes     Alcohol/week: 28.0 standard drinks     Comment: 4-6 beers a day or every other day     Drug use: No     Comment: Drug use: No     Sexual activity: Not Currently   Lifestyle     Physical activity     Days per week: Not on file     Minutes per session: Not on file     Stress: Not on file   Relationships     Social connections     Talks on phone: Not on file     Gets together: Not on file     Attends Bahai service: Not on file     Active member of club or organization: Not on file     Attends meetings of clubs or organizations: Not on file     Relationship status: Not on file     Intimate partner violence     Fear of current or ex partner: Not on file     Emotionally abused: Not on file     Physically abused: Not on file     Forced sexual  activity: Not on file   Other Topics Concern     Parent/sibling w/ CABG, MI or angioplasty before 65F 55M? Not Asked   Social History Narrative    , Two children, Worked at [employer], lives independently in [place]. He was working at [employer]. He is working at [employer] again.         He was in the [employer] for 2 years.        Surgical History:  Past Surgical History:   Procedure Laterality Date     VASECTOMY      No Comments Provided          Jayy Middleton MD  04/23/20 5855       Jayy Middleton MD  04/23/20 8829

## 2020-04-23 NOTE — ED TRIAGE NOTES
Patient presents to the ED with complaints of generalized weakness, nausea, an vomiting x48hrs.  Patient states he has attempted to drink Gatorade and water, however, has not been able to keep anything down.  Patient also states feeling light headed/dizzy upon standing.  Patient denies any recent exposure to anyone he knows has been sick.

## 2020-04-24 LAB
INTERPRETATION ECG - MUSE: NORMAL
INTERPRETATION ECG - MUSE: NORMAL

## 2020-04-26 LAB
BACTERIA SPEC CULT: ABNORMAL
SPECIMEN SOURCE: ABNORMAL

## 2020-04-27 LAB
BACTERIA SPEC CULT: NORMAL
SPECIMEN SOURCE: NORMAL

## 2020-04-30 ENCOUNTER — TELEPHONE (OUTPATIENT)
Dept: FAMILY MEDICINE | Facility: OTHER | Age: 55
End: 2020-04-30

## 2020-04-30 DIAGNOSIS — E11.10 DKA (DIABETIC KETOACIDOSES): ICD-10-CM

## 2020-04-30 DIAGNOSIS — K63.1 PERFORATION OF INTESTINE (H): Primary | ICD-10-CM

## 2020-04-30 DIAGNOSIS — E11.9 TYPE 2 DIABETES MELLITUS WITHOUT COMPLICATION, WITHOUT LONG-TERM CURRENT USE OF INSULIN (H): Primary | ICD-10-CM

## 2020-04-30 RX ORDER — INSULIN GLARGINE 100 [IU]/ML
22 INJECTION, SOLUTION SUBCUTANEOUS
COMMUNITY
Start: 2020-04-29 | End: 2020-06-24

## 2020-04-30 NOTE — TELEPHONE ENCOUNTER
Patient just need pen needles but not lantus.  He was just sent home from hospital ([facility])  He states that they sent him home with the insulin pens but not the needles to administer it with.  States that the has already called them about this and was told to call his primary doctor.   Mary Bhat LPN........................4/30/2020  9:41 AM

## 2020-05-05 ENCOUNTER — OFFICE VISIT (OUTPATIENT)
Dept: FAMILY MEDICINE | Facility: OTHER | Age: 55
End: 2020-05-05
Attending: FAMILY MEDICINE
Payer: COMMERCIAL

## 2020-05-05 VITALS
OXYGEN SATURATION: 98 % | BODY MASS INDEX: 31.86 KG/M2 | HEART RATE: 76 BPM | HEIGHT: 67 IN | WEIGHT: 203 LBS | TEMPERATURE: 97.3 F | RESPIRATION RATE: 18 BRPM | DIASTOLIC BLOOD PRESSURE: 82 MMHG | SYSTOLIC BLOOD PRESSURE: 136 MMHG

## 2020-05-05 DIAGNOSIS — G47.00 INSOMNIA, UNSPECIFIED TYPE: ICD-10-CM

## 2020-05-05 DIAGNOSIS — N18.2 CHRONIC KIDNEY DISEASE, STAGE II (MILD): ICD-10-CM

## 2020-05-05 DIAGNOSIS — B96.1 BACTEREMIA DUE TO KLEBSIELLA PNEUMONIAE: Primary | ICD-10-CM

## 2020-05-05 DIAGNOSIS — K26.9 DUODENAL ULCER: ICD-10-CM

## 2020-05-05 DIAGNOSIS — E11.9 TYPE 2 DIABETES MELLITUS WITHOUT COMPLICATION, WITHOUT LONG-TERM CURRENT USE OF INSULIN (H): ICD-10-CM

## 2020-05-05 DIAGNOSIS — E83.42 HYPOMAGNESEMIA: ICD-10-CM

## 2020-05-05 DIAGNOSIS — R78.81 BACTEREMIA DUE TO KLEBSIELLA PNEUMONIAE: Primary | ICD-10-CM

## 2020-05-05 DIAGNOSIS — F10.20 ALCOHOLISM (H): ICD-10-CM

## 2020-05-05 DIAGNOSIS — I10 ESSENTIAL HYPERTENSION: ICD-10-CM

## 2020-05-05 LAB
ALBUMIN SERPL-MCNC: 3.2 G/DL (ref 3.5–5.7)
ALP SERPL-CCNC: 38 U/L (ref 34–104)
ALT SERPL W P-5'-P-CCNC: 12 U/L (ref 7–52)
ANION GAP SERPL CALCULATED.3IONS-SCNC: 9 MMOL/L (ref 3–14)
AST SERPL W P-5'-P-CCNC: 16 U/L (ref 13–39)
BASOPHILS # BLD AUTO: 0.1 10E9/L (ref 0–0.2)
BASOPHILS NFR BLD AUTO: 0.8 %
BILIRUB SERPL-MCNC: 0.2 MG/DL (ref 0.3–1)
BUN SERPL-MCNC: 7 MG/DL (ref 7–25)
CALCIUM SERPL-MCNC: 8.4 MG/DL (ref 8.6–10.3)
CHLORIDE SERPL-SCNC: 107 MMOL/L (ref 98–107)
CO2 SERPL-SCNC: 23 MMOL/L (ref 21–31)
CREAT SERPL-MCNC: 1.13 MG/DL (ref 0.7–1.3)
DIFFERENTIAL METHOD BLD: ABNORMAL
EOSINOPHIL # BLD AUTO: 0.2 10E9/L (ref 0–0.7)
EOSINOPHIL NFR BLD AUTO: 2.5 %
ERYTHROCYTE [DISTWIDTH] IN BLOOD BY AUTOMATED COUNT: 13 % (ref 10–15)
GFR SERPL CREATININE-BSD FRML MDRD: 68 ML/MIN/{1.73_M2}
GLUCOSE SERPL-MCNC: 83 MG/DL (ref 70–105)
HBA1C MFR BLD: >14 % (ref 4–6)
HCT VFR BLD AUTO: 27.4 % (ref 40–53)
HGB BLD-MCNC: 8.6 G/DL (ref 13.3–17.7)
IMM GRANULOCYTES # BLD: 0.1 10E9/L (ref 0–0.4)
IMM GRANULOCYTES NFR BLD: 1 %
LYMPHOCYTES # BLD AUTO: 1.3 10E9/L (ref 0.8–5.3)
LYMPHOCYTES NFR BLD AUTO: 17.8 %
MAGNESIUM SERPL-MCNC: 1.3 MG/DL (ref 1.9–2.7)
MCH RBC QN AUTO: 30.4 PG (ref 26.5–33)
MCHC RBC AUTO-ENTMCNC: 31.4 G/DL (ref 31.5–36.5)
MCV RBC AUTO: 97 FL (ref 78–100)
MONOCYTES # BLD AUTO: 0.9 10E9/L (ref 0–1.3)
MONOCYTES NFR BLD AUTO: 12.2 %
NEUTROPHILS # BLD AUTO: 4.7 10E9/L (ref 1.6–8.3)
NEUTROPHILS NFR BLD AUTO: 65.7 %
PLATELET # BLD AUTO: 334 10E9/L (ref 150–450)
POTASSIUM SERPL-SCNC: 3.5 MMOL/L (ref 3.5–5.1)
PROT SERPL-MCNC: 6.6 G/DL (ref 6.4–8.9)
RBC # BLD AUTO: 2.83 10E12/L (ref 4.4–5.9)
SODIUM SERPL-SCNC: 139 MMOL/L (ref 134–144)
WBC # BLD AUTO: 7.1 10E9/L (ref 4–11)

## 2020-05-05 PROCEDURE — 85025 COMPLETE CBC W/AUTO DIFF WBC: CPT | Mod: ZL | Performed by: FAMILY MEDICINE

## 2020-05-05 PROCEDURE — 83735 ASSAY OF MAGNESIUM: CPT | Mod: ZL | Performed by: FAMILY MEDICINE

## 2020-05-05 PROCEDURE — 99215 OFFICE O/P EST HI 40 MIN: CPT | Performed by: FAMILY MEDICINE

## 2020-05-05 PROCEDURE — 83036 HEMOGLOBIN GLYCOSYLATED A1C: CPT | Mod: ZL | Performed by: FAMILY MEDICINE

## 2020-05-05 PROCEDURE — 36415 COLL VENOUS BLD VENIPUNCTURE: CPT | Mod: ZL | Performed by: FAMILY MEDICINE

## 2020-05-05 PROCEDURE — 80053 COMPREHEN METABOLIC PANEL: CPT | Mod: ZL | Performed by: FAMILY MEDICINE

## 2020-05-05 PROCEDURE — G0463 HOSPITAL OUTPT CLINIC VISIT: HCPCS

## 2020-05-05 RX ORDER — CIPROFLOXACIN 500 MG/1
TABLET, FILM COATED ORAL
COMMUNITY
Start: 2020-04-29 | End: 2020-06-10

## 2020-05-05 RX ORDER — THIAMINE MONONITRATE (VIT B1) 100 MG
TABLET ORAL
COMMUNITY
Start: 2020-04-29 | End: 2020-06-10

## 2020-05-05 RX ORDER — TRAZODONE HYDROCHLORIDE 50 MG/1
50 TABLET, FILM COATED ORAL AT BEDTIME
Qty: 30 TABLET | Refills: 3 | Status: SHIPPED | OUTPATIENT
Start: 2020-05-05 | End: 2020-09-10

## 2020-05-05 RX ORDER — INSULIN ASPART 100 [IU]/ML
5 INJECTION, SOLUTION INTRAVENOUS; SUBCUTANEOUS
COMMUNITY
Start: 2020-04-29 | End: 2020-06-24

## 2020-05-05 RX ORDER — MAGNESIUM OXIDE 400 MG/1
TABLET ORAL
COMMUNITY
Start: 2020-04-29 | End: 2020-06-10

## 2020-05-05 RX ORDER — VANCOMYCIN HYDROCHLORIDE 125 MG/1
CAPSULE ORAL
COMMUNITY
Start: 2020-04-29 | End: 2020-06-10

## 2020-05-05 RX ORDER — MELATONIN/PYRIDOXINE HCL (B6) 10 MG-10MG
10 TABLET,IMMED, EXTENDED RELEASE, BIPHASIC ORAL AT BEDTIME
COMMUNITY

## 2020-05-05 ASSESSMENT — PAIN SCALES - GENERAL: PAINLEVEL: NO PAIN (0)

## 2020-05-05 ASSESSMENT — ASTHMA QUESTIONNAIRES
QUESTION_3 LAST FOUR WEEKS HOW OFTEN DID YOUR ASTHMA SYMPTOMS (WHEEZING, COUGHING, SHORTNESS OF BREATH, CHEST TIGHTNESS OR PAIN) WAKE YOU UP AT NIGHT OR EARLIER THAN USUAL IN THE MORNING: NOT AT ALL
QUESTION_2 LAST FOUR WEEKS HOW OFTEN HAVE YOU HAD SHORTNESS OF BREATH: NOT AT ALL
ACT_TOTALSCORE: 25
QUESTION_4 LAST FOUR WEEKS HOW OFTEN HAVE YOU USED YOUR RESCUE INHALER OR NEBULIZER MEDICATION (SUCH AS ALBUTEROL): NOT AT ALL
QUESTION_5 LAST FOUR WEEKS HOW WOULD YOU RATE YOUR ASTHMA CONTROL: COMPLETELY CONTROLLED
QUESTION_1 LAST FOUR WEEKS HOW MUCH OF THE TIME DID YOUR ASTHMA KEEP YOU FROM GETTING AS MUCH DONE AT WORK, SCHOOL OR AT HOME: NONE OF THE TIME

## 2020-05-05 ASSESSMENT — MIFFLIN-ST. JEOR: SCORE: 1733.39

## 2020-05-05 NOTE — PROGRESS NOTES
"Nursing Notes:   Radha Junior LPN  5/5/2020  1:53 PM  Signed  Chief Complaint   Patient presents with     Hospital F/U       Initial /82   Pulse 76   Temp 97.3  F (36.3  C) (Temporal)   Resp 18   Ht 1.708 m (5' 7.25\")   Wt 92.1 kg (203 lb)   SpO2 98%   BMI 31.56 kg/m   Estimated body mass index is 31.56 kg/m  as calculated from the following:    Height as of this encounter: 1.708 m (5' 7.25\").    Weight as of this encounter: 92.1 kg (203 lb).  Medication Reconciliation: complete    Radha Junior LPN     Previous A1C is at goal of <8  Lab Results   Component Value Date    A1C 7.5 08/20/2019    A1C 8.5 04/17/2019     Urine microalbumin:creatine: due  Foot exam due  Eye exam last year    Tobacco User no  Patient is not on a daily aspirin  Patient is not on a Statin.  Blood pressure today of:     BP Readings from Last 1 Encounters:   05/05/20 136/82      is at the goal of <139/89 for diabetics.    Radha Junior LPN on 5/5/2020 at 1:51 PM      SUBJECTIVE:  Kolton Aragon  is a 54 year old male who comes in today for post hospital follow-up.  He was admitted to [facility] on April 23-29, 2020.  He was found to be in DKA and treated appropriately.  About a month prior, he had had an alcohol induced pancreatitis but had been alcohol free since that time.  He had a question of a perforated duodenal ulcer or thickened gallbladder wall.  He had acute renal failure and it was felt that he needed medical treatment.  He was found to have a pneumonia and a possible microperforation of his duodenal ulcer.  Blood cultures were positive for Klebsiella.  He was treated for several days with IV antibiotics in the form of Zosyn.  General surgery recommended a watchful waiting.  Without intervention with surgery.  His diet was gradually advanced from clears to full liquids after a week.  It was recommended that he have EGD completed in 6 weeks to evaluate his duodenal ulcer and potentially have " evaluation for his gallbladder electively at the future if he became symptomatic in the interim.  He received insulin infusion and was transitioned to subcutaneous Lantus and mealtime NovoLog.  His discharge dose was 22 units of Lantus and mealtime NovoLog at 5 units per meal.    He was found to have diarrhea and was positive for C. difficile was sent home on oral vancomycin.  He was transitioned from Zosyn to Cipro 500 mg twice daily for a 14-day course for his Klebsiella pneumoniae bacteremia and will complete that in 2 more days.  He was supposed to continue oral vancomycin through 16 May.  He was also sent home on a magnesium supplement.  His renal function had normalized to 1.13 creatinine prior to discharge.    He has been using 50 mg Unisom and melatonin and still has a hard time sleeping.     He has quit chewing after 35 years.    He was working at [employer] and worked one shift before being sick. 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         Past Medical, Family, and Social History reviewed and updated as noted below.   ROS is negative except as noted above       Allergies   Allergen Reactions     Dust Mite Extract Other (See Comments)     Sneezing, itchy eyes   ,   Family History   Problem Relation Age of Onset     Family History Negative Mother         Good Health     Other - See Comments Father         Deserted as a child, patient knows father had a history of heart attack at 50     Heart Disease Father         Heart Disease,patient knows father had a history of heart attack at 50     Cancer Other         Cancer,History of cancer   ,   Current Outpatient Medications   Medication     alcohol swab prep pads     blood glucose (NO BRAND SPECIFIED) test strip     blood glucose calibration (NO BRAND SPECIFIED) solution     blood glucose monitoring (NO BRAND SPECIFIED) meter device kit     ciprofloxacin (CIPRO) 500 MG tablet     diclofenac (VOLTAREN) 1 % topical gel     doxylamine (UNISOM) 25 MG TABS tablet      insulin pen needle (29G X 12.7MM) 29G X 12.7MM miscellaneous     LANTUS SOLOSTAR 100 UNIT/ML soln     magnesium oxide (MAG-OX) 400 MG tablet     Melatonin 10-10 MG TBCR     NOVOLOG FLEXPEN 100 UNIT/ML soln     omeprazole (PRILOSEC) 20 MG DR capsule     thin (NO BRAND SPECIFIED) lancets     traZODone (DESYREL) 50 MG tablet     vancomycin (VANCOCIN) 125 MG capsule     VITAMIN B-1 100 MG tablet     No current facility-administered medications for this visit.    ,   Past Medical History:   Diagnosis Date     Alcohol-induced acute pancreatitis without infection or necrosis     06/20/2016     Essential (primary) hypertension     9/12/2013     Gastro-esophageal reflux disease without esophagitis     5/18/2015     Spondylolisthesis of lumbosacral region     7/9/2013,Patient has been referred for PT and has not followed through.  He was sent for MRI scan,and he was not able to do either the closed or open MRI due to claustrophobia.     Spondylosis of lumbar region without myelopathy or radiculopathy     4/22/2013     Tobacco use     5/17/2013     Uncomplicated asthma     No Comments Provided   ,   Patient Active Problem List    Diagnosis Date Noted     Type 2 diabetes mellitus without complication, without long-term current use of insulin (H) 04/17/2019     Priority: Medium     Reactive airway disease 03/01/2018     Priority: Medium     Alcoholism (H) 10/12/2017     Priority: Medium     Altered level of consciousness 10/11/2017     Priority: Medium     Acute alcoholic pancreatitis 06/20/2016     Priority: Medium     Chronic kidney disease, stage II (mild) 03/09/2016     Priority: Medium     Gastroesophageal reflux disease without esophagitis 05/18/2015     Priority: Medium     Dyslipidemia 12/29/2014     Priority: Medium     Hypertension 09/12/2013     Priority: Medium     Spondylolisthesis at L5-S1 level 07/09/2013     Priority: Medium     Overview:   Patient has been referred for PT and has not followed through.  He  "was sent for MRI scan,and he was not able to do either the closed or open MRI due to claustrophobia.       Insomnia 06/20/2013     Priority: Medium     Chewing tobacco use 05/17/2013     Priority: Medium     DJD (degenerative joint disease), lumbar 04/22/2013     Priority: Medium   ,   Past Surgical History:   Procedure Laterality Date     VASECTOMY      No Comments Provided    and   Social History     Tobacco Use     Smoking status: Never Smoker     Smokeless tobacco: Former User     Types: Chew     Tobacco comment: Quit smoking: trying to quit chew using 1 pack week   Substance Use Topics     Alcohol use: Not Currently     Alcohol/week: 28.0 standard drinks     OBJECTIVE:  /82   Pulse 76   Temp 97.3  F (36.3  C) (Temporal)   Resp 18   Ht 1.708 m (5' 7.25\")   Wt 92.1 kg (203 lb)   SpO2 98%   BMI 31.56 kg/m     EXAM:  Alert and cooperative, no distress.  Affect is appropriate.  Patient is very talkative today.  He is wearing a mask.  Neck is supple without significant adenopathy.  Lungs are clear, no rales rhonchi or wheezes are heard.  Cardiac RRR without murmur.  Abdomen is soft and nontender.  He has no pedal edema    Results for orders placed or performed in visit on 05/05/20   Magnesium     Status: Abnormal   Result Value Ref Range    Magnesium 1.3 (L) 1.9 - 2.7 mg/dL   Comprehensive metabolic panel     Status: Abnormal   Result Value Ref Range    Sodium 139 134 - 144 mmol/L    Potassium 3.5 3.5 - 5.1 mmol/L    Chloride 107 98 - 107 mmol/L    Carbon Dioxide 23 21 - 31 mmol/L    Anion Gap 9 3 - 14 mmol/L    Glucose 83 70 - 105 mg/dL    Urea Nitrogen 7 7 - 25 mg/dL    Creatinine 1.13 0.70 - 1.30 mg/dL    GFR Estimate 68 >60 mL/min/[1.73_m2]    GFR Estimate If Black 82 >60 mL/min/[1.73_m2]    Calcium 8.4 (L) 8.6 - 10.3 mg/dL    Bilirubin Total 0.2 (L) 0.3 - 1.0 mg/dL    Albumin 3.2 (L) 3.5 - 5.7 g/dL    Protein Total 6.6 6.4 - 8.9 g/dL    Alkaline Phosphatase 38 34 - 104 U/L    ALT 12 7 - 52 U/L    " AST 16 13 - 39 U/L   CBC with platelets differential     Status: Abnormal   Result Value Ref Range    WBC 7.1 4.0 - 11.0 10e9/L    RBC Count 2.83 (L) 4.4 - 5.9 10e12/L    Hemoglobin 8.6 (L) 13.3 - 17.7 g/dL    Hematocrit 27.4 (L) 40.0 - 53.0 %    MCV 97 78 - 100 fl    MCH 30.4 26.5 - 33.0 pg    MCHC 31.4 (L) 31.5 - 36.5 g/dL    RDW 13.0 10.0 - 15.0 %    Platelet Count 334 150 - 450 10e9/L    Diff Method Automated Method     % Neutrophils 65.7 %    % Lymphocytes 17.8 %    % Monocytes 12.2 %    % Eosinophils 2.5 %    % Basophils 0.8 %    % Immature Granulocytes 1.0 %    Absolute Neutrophil 4.7 1.6 - 8.3 10e9/L    Absolute Lymphocytes 1.3 0.8 - 5.3 10e9/L    Absolute Monocytes 0.9 0.0 - 1.3 10e9/L    Absolute Eosinophils 0.2 0.0 - 0.7 10e9/L    Absolute Basophils 0.1 0.0 - 0.2 10e9/L    Abs Immature Granulocytes 0.1 0 - 0.4 10e9/L   Hemoglobin A1c     Status: Abnormal   Result Value Ref Range    Hemoglobin A1C >14.0 (H) 4.0 - 6.0 %      ASSESSMENT/Plan :    Kolton was seen today for hospital f/u.    Diagnoses and all orders for this visit:    Bacteremia due to Klebsiella pneumoniae    Type 2 diabetes mellitus without complication, without long-term current use of insulin (H)  -     AMBULATORY ADULT DIABETES EDUCATOR REFERRAL; Future  -     Hemoglobin A1c; Future  -     Hemoglobin A1c    Chronic kidney disease, stage II (mild)  -     CBC with platelets differential; Future  -     Comprehensive metabolic panel; Future  -     Comprehensive metabolic panel  -     CBC with platelets differential    Alcoholism (H)    Essential hypertension    Hypomagnesemia  -     Magnesium; Future  -     Magnesium    Insomnia, unspecified type  -     traZODone (DESYREL) 50 MG tablet; Take 1 tablet (50 mg) by mouth At Bedtime    Duodenal ulcer  -     GASTROENTEROLOGY ADULT REF PROCEDURE ONLY [facility] [phone] (6 weeks); Future      His admission hemoglobin was 12 but there is no subsequent listing of his  hemoglobin anywhere and has paperwork that we got from [facility] over not sure what his discharge hemoglobin was.  His renal function has stayed stable and his liver function tests are fine.  Hemoglobin A1c is greater than 14 which is not surprising given his recent DKA.    At this time we will continue with his current Lantus and NovoLog doses.  Referral sent for diabetes nurse educator.  He will continue to watch his carbohydrates as he advances his diet.    Continue with his PPI and okay to advance to a regular diet at this time.  We will plan to do EGD in 6 weeks post hospitalization.    He will stay off the lisinopril and HCTZ as his blood pressures been well controlled and likely will stay that way as long as he is not drinking.    Congratulated on his sobriety and again encouraged him to use some type of support mechanism although he did not really find AA terribly helpful for him in the past.    He will finish out his last 2 days of Cipro and then also finish out the vancomycin orally for C. Difficile.    Letter given for him to be off work until his follow-up appointment with me in early June.  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 difficile and asymptomatic.    A total of 40 minutes was spent with the patient, greater than 50% of the time was spent in counseling/discussion of the aforementioned concerns.     Jono Gonzalez MD

## 2020-05-05 NOTE — PATIENT INSTRUCTIONS
Ok to advance diet to more regular food.  Watch the carbohydrates.     Referral sent for the diabetes educator.    Trial of trazodone 50 mg at bedtime for sleep.    Finish out the antibiotics (both).    Stay off the lisinopril and HCTZ.     You will need to finish the antibiotics for the C. Diff and have no further diarrhea prior to being able to go back to work at [employer].  Follow up with Dr. Gonzalez in another month and we will consider releasing you to go back to work/school.    We will schedule your upper scope to look into your stomach in about 6 weeks.

## 2020-05-05 NOTE — LETTER
May 6, 2020      Kolton Aragon  [address] [address]        Dear John,     Your blood tests look as expected. Your comprehensive metabolic panel (a test that looks at liver and kidney function, blood sugar, electrolytes, and nutritional status) was normal. Your magnesium is still a bit low, so stay on the magnesium supplement.  Your hemoglobin A1C was over 14% which will improve over the next 3 months with better blood sugar control.    Your hemoglobin is low after being so sick consistent with anemia.  This will improve over time and we will follow it.  Eating red meat and higher iron containing foods like green leafy vegetables will also help.    It was a pleasure seeing you the other day.  If you have any questions, please don't hesitate to call us.       Sincerely,        Jono Gonzalez MD                              Results for orders placed or performed in visit on 05/05/20   Magnesium     Status: Abnormal   Result Value Ref Range    Magnesium 1.3 (L) 1.9 - 2.7 mg/dL   Comprehensive metabolic panel     Status: Abnormal   Result Value Ref Range    Sodium 139 134 - 144 mmol/L    Potassium 3.5 3.5 - 5.1 mmol/L    Chloride 107 98 - 107 mmol/L    Carbon Dioxide 23 21 - 31 mmol/L    Anion Gap 9 3 - 14 mmol/L    Glucose 83 70 - 105 mg/dL    Urea Nitrogen 7 7 - 25 mg/dL    Creatinine 1.13 0.70 - 1.30 mg/dL    GFR Estimate 68 >60 mL/min/[1.73_m2]    GFR Estimate If Black 82 >60 mL/min/[1.73_m2]    Calcium 8.4 (L) 8.6 - 10.3 mg/dL    Bilirubin Total 0.2 (L) 0.3 - 1.0 mg/dL    Albumin 3.2 (L) 3.5 - 5.7 g/dL    Protein Total 6.6 6.4 - 8.9 g/dL    Alkaline Phosphatase 38 34 - 104 U/L    ALT 12 7 - 52 U/L    AST 16 13 - 39 U/L   CBC with platelets differential     Status: Abnormal   Result Value Ref Range    WBC 7.1 4.0 - 11.0 10e9/L    RBC Count 2.83 (L) 4.4 - 5.9 10e12/L    Hemoglobin 8.6 (L) 13.3 - 17.7 g/dL    Hematocrit 27.4 (L) 40.0 - 53.0 %    MCV 97 78 - 100 fl    MCH 30.4 26.5 - 33.0  pg    MCHC 31.4 (L) 31.5 - 36.5 g/dL    RDW 13.0 10.0 - 15.0 %    Platelet Count 334 150 - 450 10e9/L    Diff Method Automated Method     % Neutrophils 65.7 %    % Lymphocytes 17.8 %    % Monocytes 12.2 %    % Eosinophils 2.5 %    % Basophils 0.8 %    % Immature Granulocytes 1.0 %    Absolute Neutrophil 4.7 1.6 - 8.3 10e9/L    Absolute Lymphocytes 1.3 0.8 - 5.3 10e9/L    Absolute Monocytes 0.9 0.0 - 1.3 10e9/L    Absolute Eosinophils 0.2 0.0 - 0.7 10e9/L    Absolute Basophils 0.1 0.0 - 0.2 10e9/L    Abs Immature Granulocytes 0.1 0 - 0.4 10e9/L   Hemoglobin A1c     Status: Abnormal   Result Value Ref Range    Hemoglobin A1C >14.0 (H) 4.0 - 6.0 %

## 2020-05-05 NOTE — NURSING NOTE
"Chief Complaint   Patient presents with     Hospital F/U       Initial /82   Pulse 76   Temp 97.3  F (36.3  C) (Temporal)   Resp 18   Ht 1.708 m (5' 7.25\")   Wt 92.1 kg (203 lb)   SpO2 98%   BMI 31.56 kg/m   Estimated body mass index is 31.56 kg/m  as calculated from the following:    Height as of this encounter: 1.708 m (5' 7.25\").    Weight as of this encounter: 92.1 kg (203 lb).  Medication Reconciliation: complete    Radha Junior LPN     Previous A1C is at goal of <8  Lab Results   Component Value Date    A1C 7.5 08/20/2019    A1C 8.5 04/17/2019     Urine microalbumin:creatine: due  Foot exam due  Eye exam last year    Tobacco User no  Patient is not on a daily aspirin  Patient is not on a Statin.  Blood pressure today of:     BP Readings from Last 1 Encounters:   05/05/20 136/82      is at the goal of <139/89 for diabetics.    Radha Junior LPN on 5/5/2020 at 1:51 PM      "

## 2020-05-05 NOTE — LETTER
May 5, 2020      RE:Kolton Aragon (: 1965)  [address]   [facility] [address]        To Whom It May Concern,     I am the primary physician for this patient. He was gravely ill with sepsis and had C. Diff colitis thereafter.  He did not have Covid-19.  He had a prolonged hospitalization. He will complete his C. Diff treatment on May 16. He will have follow up with me around early  at which time we are hopeful that he will be released to go back to work without restrictions.        Sincerely,        Jono Gonzalez MD

## 2020-05-06 ASSESSMENT — ASTHMA QUESTIONNAIRES: ACT_TOTALSCORE: 25

## 2020-05-12 ENCOUNTER — PATIENT OUTREACH (OUTPATIENT)
Dept: EDUCATION SERVICES | Facility: OTHER | Age: 55
End: 2020-05-12
Attending: FAMILY MEDICINE
Payer: COMMERCIAL

## 2020-05-12 DIAGNOSIS — E11.9 TYPE 2 DIABETES MELLITUS WITHOUT COMPLICATION, WITHOUT LONG-TERM CURRENT USE OF INSULIN (H): ICD-10-CM

## 2020-05-12 NOTE — PROGRESS NOTES
Unable to visit with patient today.  Request patient call to reschedule DBED visit, as needed.    Karlene Fletcher RN, BSN, [facility]  5/12/2020 10:48 AM

## 2020-05-14 DIAGNOSIS — E11.9 TYPE 2 DIABETES MELLITUS WITHOUT COMPLICATION, WITHOUT LONG-TERM CURRENT USE OF INSULIN (H): ICD-10-CM

## 2020-05-15 NOTE — TELEPHONE ENCOUNTER
" Disp  Refills  Start  End  ESCOBAR    blood glucose (NO BRAND SPECIFIED) test strip  100 strip  6  4/17/2019   --    Sig: Use to test blood sugar 4 times daily or as directed.    Sent to pharmacy as: blood glucose (NO BRAND SPECIFIED) test strip      thin (NO BRAND SPECIFIED) lancets  100 each  11  4/17/2019   --    Sig: Use with lanceting device.        LOV: 5/5/2020  Future Office visit: No future appointment scheduled at this time.      Requested Prescriptions   Pending Prescriptions Disp Refills     CONTOUR NEXT TEST test strip [Pharmacy Med Name: CONTOUR NEXT TEST STRIP] 100 each      Sig: USE TO TEST BLOOD SUGAR 4 TIMES DAILY OR AS DIRECTED.       Diabetic Supplies Protocol Passed - 5/14/2020 10:10 AM        Passed - Medication is active on med list        Passed - Patient is 18 years of age or older        Passed - Recent (6 mo) or future (30 days) visit within the authorizing provider's specialty     Patient had office visit in the last 6 months or has a visit in the next 30 days with authorizing provider.  See \"Patient Info\" tab in inbasket, or \"Choose Columns\" in Meds & Orders section of the refill encounter.               blood glucose monitoring (JOEY MICROLET) lancets [Pharmacy Med Name: MICROLET LANCET] 100 each 11     Sig: USE WITH LANCETING DEVICE. TESTING 4 TIMES DAILY.       Diabetic Supplies Protocol Passed - 5/14/2020 10:10 AM        Passed - Medication is active on med list        Passed - Patient is 18 years of age or older        Passed - Recent (6 mo) or future (30 days) visit within the authorizing provider's specialty     Patient had office visit in the last 6 months or has a visit in the next 30 days with authorizing provider.  See \"Patient Info\" tab in inbasket, or \"Choose Columns\" in Meds & Orders section of the refill encounter.             Susie Mason RN  ....................  5/15/2020   2:20 PM      "

## 2020-05-18 ENCOUNTER — TELEPHONE (OUTPATIENT)
Dept: SURGERY | Facility: OTHER | Age: 55
End: 2020-05-18

## 2020-05-18 NOTE — TELEPHONE ENCOUNTER
Screening Questions for the Scheduling of Screening Colonoscopies   (If Colonoscopy is diagnostic, Provider should review the chart before scheduling.)  Are you younger than 50 or older than 80?  NO   Do you take aspirin or fish oil?  NO  (if yes, tell patient to stop 1 week prior to Colonoscopy)  Do you take warfarin (Coumadin), clopidogrel (Plavix), apixaban (Eliquis), dabigatram (Pradaxa), rivaroxaban (Xarelto) or any blood thinner? NO   Do you use oxygen at home?  NO   Do you have kidney disease? YES - STAGE 1 ?  Are you on dialysis? NO   Have you had a stroke or heart attack in the last year? NO   Have you had a stent in your heart or any blood vessel in the last year? NO  Have you had a transplant of any organ? NO   Have you had a colonoscopy or upper endoscopy (EGD) before? NO          When?    Date of scheduled EGD 06/16/2020  Provider [facility]   Pharmacy

## 2020-06-04 DIAGNOSIS — E11.9 TYPE 2 DIABETES MELLITUS WITHOUT COMPLICATION, WITHOUT LONG-TERM CURRENT USE OF INSULIN (H): ICD-10-CM

## 2020-06-04 RX ORDER — ISOPROPYL ALCOHOL 0.75 G/1
SWAB TOPICAL
Qty: 100 EACH | Refills: 3 | Status: SHIPPED | OUTPATIENT
Start: 2020-06-04 | End: 2022-07-14

## 2020-06-04 NOTE — TELEPHONE ENCOUNTER
Requested Prescriptions   Pending Prescriptions Disp Refills     Alcohol Swabs (B-D SINGLE USE SWABS REGULAR) PADS [Pharmacy Med Name: BD ALCOHOL SWABS] 100 each 3     Sig: USE TO SWAB AREA OF INJECTION/SOM AS DIRECTED.       There is no refill protocol information for this order      Prescription approved per [facility] Refill Protocol.  LOV 05/05/2020 with Jono Gonzalez MD.  Lisbeth Sharma RN on 6/4/2020 at 11:09 AM

## 2020-06-10 ENCOUNTER — OFFICE VISIT (OUTPATIENT)
Dept: FAMILY MEDICINE | Facility: OTHER | Age: 55
End: 2020-06-10
Attending: FAMILY MEDICINE
Payer: COMMERCIAL

## 2020-06-10 VITALS
WEIGHT: 196 LBS | BODY MASS INDEX: 30.47 KG/M2 | TEMPERATURE: 97.6 F | HEART RATE: 81 BPM | RESPIRATION RATE: 16 BRPM | DIASTOLIC BLOOD PRESSURE: 78 MMHG | OXYGEN SATURATION: 98 % | SYSTOLIC BLOOD PRESSURE: 148 MMHG

## 2020-06-10 DIAGNOSIS — Z20.822 ENCOUNTER FOR LABORATORY TESTING FOR COVID-19 VIRUS: ICD-10-CM

## 2020-06-10 DIAGNOSIS — K21.9 GASTROESOPHAGEAL REFLUX DISEASE WITHOUT ESOPHAGITIS: ICD-10-CM

## 2020-06-10 DIAGNOSIS — D64.9 ANEMIA, UNSPECIFIED TYPE: ICD-10-CM

## 2020-06-10 DIAGNOSIS — I10 HYPERTENSION, UNSPECIFIED TYPE: ICD-10-CM

## 2020-06-10 DIAGNOSIS — N18.2 CHRONIC KIDNEY DISEASE, STAGE II (MILD): ICD-10-CM

## 2020-06-10 DIAGNOSIS — K26.4 GASTROINTESTINAL HEMORRHAGE ASSOCIATED WITH DUODENAL ULCER: ICD-10-CM

## 2020-06-10 DIAGNOSIS — E11.9 TYPE 2 DIABETES MELLITUS WITHOUT COMPLICATION, WITHOUT LONG-TERM CURRENT USE OF INSULIN (H): ICD-10-CM

## 2020-06-10 DIAGNOSIS — I10 ESSENTIAL HYPERTENSION: Primary | ICD-10-CM

## 2020-06-10 DIAGNOSIS — F10.20 ALCOHOLISM (H): ICD-10-CM

## 2020-06-10 DIAGNOSIS — H61.21 IMPACTED CERUMEN OF RIGHT EAR: ICD-10-CM

## 2020-06-10 LAB
ALBUMIN SERPL-MCNC: 4.6 G/DL (ref 3.5–5.7)
ALP SERPL-CCNC: 103 U/L (ref 34–104)
ALT SERPL W P-5'-P-CCNC: 285 U/L (ref 7–52)
ANION GAP SERPL CALCULATED.3IONS-SCNC: 9 MMOL/L (ref 3–14)
AST SERPL W P-5'-P-CCNC: 276 U/L (ref 13–39)
BASOPHILS # BLD AUTO: 0 10E9/L (ref 0–0.2)
BASOPHILS NFR BLD AUTO: 0 %
BILIRUB SERPL-MCNC: 1 MG/DL (ref 0.3–1)
BUN SERPL-MCNC: 13 MG/DL (ref 7–25)
CALCIUM SERPL-MCNC: 9.7 MG/DL (ref 8.6–10.3)
CHLORIDE SERPL-SCNC: 96 MMOL/L (ref 98–107)
CO2 SERPL-SCNC: 30 MMOL/L (ref 21–31)
CREAT SERPL-MCNC: 1.23 MG/DL (ref 0.7–1.3)
DIFFERENTIAL METHOD BLD: NORMAL
EOSINOPHIL # BLD AUTO: 0 10E9/L (ref 0–0.7)
EOSINOPHIL NFR BLD AUTO: 1 %
ERYTHROCYTE [DISTWIDTH] IN BLOOD BY AUTOMATED COUNT: 13.7 % (ref 10–15)
GFR SERPL CREATININE-BSD FRML MDRD: 62 ML/MIN/{1.73_M2}
GLUCOSE SERPL-MCNC: 89 MG/DL (ref 70–105)
HCT VFR BLD AUTO: 41.8 % (ref 40–53)
HGB BLD-MCNC: 13.7 G/DL (ref 13.3–17.7)
LYMPHOCYTES # BLD AUTO: 1.1 10E9/L (ref 0.8–5.3)
LYMPHOCYTES NFR BLD AUTO: 25 %
MCH RBC QN AUTO: 31.1 PG (ref 26.5–33)
MCHC RBC AUTO-ENTMCNC: 32.8 G/DL (ref 31.5–36.5)
MCV RBC AUTO: 95 FL (ref 78–100)
MONOCYTES # BLD AUTO: 0.9 10E9/L (ref 0–1.3)
MONOCYTES NFR BLD AUTO: 21 %
NEUTROPHILS # BLD AUTO: 2.4 10E9/L (ref 1.6–8.3)
NEUTROPHILS NFR BLD AUTO: 53 %
PLATELET # BLD AUTO: 265 10E9/L (ref 150–450)
POTASSIUM SERPL-SCNC: 4 MMOL/L (ref 3.5–5.1)
PROT SERPL-MCNC: 8.8 G/DL (ref 6.4–8.9)
RBC # BLD AUTO: 4.4 10E12/L (ref 4.4–5.9)
SODIUM SERPL-SCNC: 135 MMOL/L (ref 134–144)
WBC # BLD AUTO: 4.5 10E9/L (ref 4–11)

## 2020-06-10 PROCEDURE — 80053 COMPREHEN METABOLIC PANEL: CPT | Mod: ZL | Performed by: FAMILY MEDICINE

## 2020-06-10 PROCEDURE — G0463 HOSPITAL OUTPT CLINIC VISIT: HCPCS | Mod: 25

## 2020-06-10 PROCEDURE — 99214 OFFICE O/P EST MOD 30 MIN: CPT | Performed by: FAMILY MEDICINE

## 2020-06-10 PROCEDURE — 69209 REMOVE IMPACTED EAR WAX UNI: CPT

## 2020-06-10 PROCEDURE — G0463 HOSPITAL OUTPT CLINIC VISIT: HCPCS

## 2020-06-10 PROCEDURE — 85025 COMPLETE CBC W/AUTO DIFF WBC: CPT | Mod: ZL | Performed by: FAMILY MEDICINE

## 2020-06-10 PROCEDURE — 36415 COLL VENOUS BLD VENIPUNCTURE: CPT | Mod: ZL | Performed by: FAMILY MEDICINE

## 2020-06-10 RX ORDER — LISINOPRIL 20 MG/1
10 TABLET ORAL DAILY
Qty: 90 TABLET | Refills: 3 | COMMUNITY
Start: 2020-06-10 | End: 2020-08-17

## 2020-06-10 ASSESSMENT — PAIN SCALES - GENERAL: PAINLEVEL: NO PAIN (0)

## 2020-06-10 NOTE — LETTER
Silvana 10, 2020      Kolton Aragon  [address] [id number]        Dear Kolton,     Your labs are done.  Your complete blood count is back to normal. Your comprehensive metabolic panel (a test that looks at liver and kidney function, blood sugar, electrolytes, and nutritional status) was normal with the exception of markedly elevated liver function tests that are probably directly related to your alcohol use.  You really do need to abstain completely from alcohol as your body is not tolerating this well.    I look forward to seeing you in a couple weeks to recheck your labs to make sure that they have improved and that her blood pressure is improved.  I think the alcohol is having a direct impact on your blood pressure control as well.    It was a pleasure seeing you the other day.  If you have any questions, please don't hesitate to call us.       Sincerely,        Jono Gonzalez MD                            Results for orders placed or performed in visit on 06/10/20   Comprehensive metabolic panel     Status: Abnormal   Result Value Ref Range    Sodium 135 134 - 144 mmol/L    Potassium 4.0 3.5 - 5.1 mmol/L    Chloride 96 (L) 98 - 107 mmol/L    Carbon Dioxide 30 21 - 31 mmol/L    Anion Gap 9 3 - 14 mmol/L    Glucose 89 70 - 105 mg/dL    Urea Nitrogen 13 7 - 25 mg/dL    Creatinine 1.23 0.70 - 1.30 mg/dL    GFR Estimate 62 >60 mL/min/[1.73_m2]    GFR Estimate If Black 75 >60 mL/min/[1.73_m2]    Calcium 9.7 8.6 - 10.3 mg/dL    Bilirubin Total 1.0 0.3 - 1.0 mg/dL    Albumin 4.6 3.5 - 5.7 g/dL    Protein Total 8.8 6.4 - 8.9 g/dL    Alkaline Phosphatase 103 34 - 104 U/L     (H) 7 - 52 U/L     (H) 13 - 39 U/L   CBC with platelets differential     Status: None (In process)   Result Value Ref Range    WBC 4.5 4.0 - 11.0 10e9/L    RBC Count 4.40 4.4 - 5.9 10e12/L    Hemoglobin 13.7 13.3 - 17.7 g/dL    Hematocrit 41.8 40.0 - 53.0 %    MCV 95 78 - 100 fl    MCH 31.1 26.5 - 33.0 pg     MCHC 32.8 31.5 - 36.5 g/dL    RDW 13.7 10.0 - 15.0 %    Platelet Count 265 150 - 450 10e9/L    Diff Method PENDING

## 2020-06-10 NOTE — PROGRESS NOTES
"Nursing Notes:   Radha Junior LPN  6/10/2020  4:00 PM  Signed  Chief Complaint   Patient presents with     RECHECK     2 week follow up   He is here to follow up and hoping to be released to go back to work.  Radha Junior LPN..................6/10/2020   3:59 PM      Initial BP (!) 164/94   Pulse 81   Temp 97.6  F (36.4  C) (Temporal)   Resp 16   Wt 88.9 kg (196 lb)   SpO2 98%   BMI 30.47 kg/m   Estimated body mass index is 30.47 kg/m  as calculated from the following:    Height as of 5/5/20: 1.708 m (5' 7.25\").    Weight as of this encounter: 88.9 kg (196 lb).  Medication Reconciliation: complete    Radha Junior LPN   Previous A1C is not at goal of <8  Lab Results   Component Value Date    A1C >14.0 05/05/2020    A1C 7.5 08/20/2019    A1C 8.5 04/17/2019     Urine microalbumin:creatine: due  Foot exam due  Eye exam is going to set up an appointment    Tobacco User no  Patient is not on a daily aspirin  Patient is not on a Statin.  Blood pressure today of:     BP Readings from Last 1 Encounters:   06/10/20 (!) 164/94      is not at the goal of <139/89 for diabetics.    Radha Junior LPN on 6/10/2020 at 3:59 PM        SUBJECTIVE:  Kolton Aragon  is a 54 year old male who comes in today for follow-up. I last saw him about a month ago after he was hospitalized at [facility] for pneumonia and possible microperforation of a duodenal ulcer.  He was treated conservatively with watchful waiting and his pneumonia treated with antibiotics.  He is scheduled for EGD next week to evaluate for healing of his duodenal ulcer.    His hemoglobin A1c was greater than 14.  His hemoglobin itself was 8.6.  He needs follow-up of that.  Renal function had normalized.  At his last visit, we sent in referral to the diabetic nurse educator but has not been there yet.  He was continuing on Lantus and NovoLog.  He was to continue with his PPI until after his EGD.  He has been on a regular diet now for about a month.  " He has been off of lisinopril and hydrochlorothiazide.    His right ear is plugged. He has had trouble with wax in the past.     His blood sugars have been better.  It has been around the low 100's.  He is watching his diet well.      He had a few drinks a few days ago.  He has not been chewing for a couple of months.     Past Medical, Family, and Social History reviewed and updated as noted below.   ROS is negative except as noted above       Allergies   Allergen Reactions     Dust Mite Extract Other (See Comments)     Sneezing, itchy eyes   ,   Family History   Problem Relation Age of Onset     Family History Negative Mother         Good Health     Other - See Comments Father         Deserted as a child, patient knows father had a history of heart attack at 50     Heart Disease Father         Heart Disease,patient knows father had a history of heart attack at 50     Cancer Other         Cancer,History of cancer   ,   Current Outpatient Medications   Medication     Alcohol Swabs (B-D SINGLE USE SWABS REGULAR) PADS     blood glucose calibration (NO BRAND SPECIFIED) solution     blood glucose monitoring (JOEY MICROLET) lancets     blood glucose monitoring (NO BRAND SPECIFIED) meter device kit     CONTOUR NEXT TEST test strip     diclofenac (VOLTAREN) 1 % topical gel     doxylamine (UNISOM) 25 MG TABS tablet     glucose (BD GLUCOSE) 4 g chewable tablet     insulin pen needle (29G X 12.7MM) 29G X 12.7MM miscellaneous     LANTUS SOLOSTAR 100 UNIT/ML soln     lisinopril (ZESTRIL) 20 MG tablet     Melatonin 10-10 MG TBCR     NOVOLOG FLEXPEN 100 UNIT/ML soln     omeprazole (PRILOSEC) 20 MG DR capsule     traZODone (DESYREL) 50 MG tablet     No current facility-administered medications for this visit.    ,   Past Medical History:   Diagnosis Date     Alcohol-induced acute pancreatitis without infection or necrosis     06/20/2016     Essential (primary) hypertension     9/12/2013     Gastro-esophageal reflux disease without  esophagitis     5/18/2015     Spondylolisthesis of lumbosacral region     7/9/2013,Patient has been referred for PT and has not followed through.  He was sent for MRI scan,and he was not able to do either the closed or open MRI due to claustrophobia.     Spondylosis of lumbar region without myelopathy or radiculopathy     4/22/2013     Tobacco use     5/17/2013     Uncomplicated asthma     No Comments Provided   ,   Patient Active Problem List    Diagnosis Date Noted     Type 2 diabetes mellitus without complication, without long-term current use of insulin (H) 04/17/2019     Priority: Medium     Reactive airway disease 03/01/2018     Priority: Medium     Alcoholism (H) 10/12/2017     Priority: Medium     Altered level of consciousness 10/11/2017     Priority: Medium     Acute alcoholic pancreatitis 06/20/2016     Priority: Medium     Chronic kidney disease, stage II (mild) 03/09/2016     Priority: Medium     Gastroesophageal reflux disease without esophagitis 05/18/2015     Priority: Medium     Dyslipidemia 12/29/2014     Priority: Medium     Hypertension 09/12/2013     Priority: Medium     Spondylolisthesis at L5-S1 level 07/09/2013     Priority: Medium     Overview:   Patient has been referred for PT and has not followed through.  He was sent for MRI scan,and he was not able to do either the closed or open MRI due to claustrophobia.       Insomnia 06/20/2013     Priority: Medium     Chewing tobacco use 05/17/2013     Priority: Medium     DJD (degenerative joint disease), lumbar 04/22/2013     Priority: Medium   ,   Past Surgical History:   Procedure Laterality Date     VASECTOMY      No Comments Provided    and   Social History     Tobacco Use     Smoking status: Never Smoker     Smokeless tobacco: Former User     Types: Chew     Tobacco comment: Quit smoking: trying to quit chew using 1 pack week   Substance Use Topics     Alcohol use: Not Currently     Alcohol/week: 28.0 standard drinks     OBJECTIVE:  BP (!)  148/78   Pulse 81   Temp 97.6  F (36.4  C) (Temporal)   Resp 16   Wt 88.9 kg (196 lb)   SpO2 98%   BMI 30.47 kg/m     EXAM:  Alert and cooperative, no distress.  Mucous membranes are moist.  TM on the right is obstructed by impacted cerumen on the left is clear.  Throat is clear.  Neck is supple without significant adenopathy.  Lungs are clear, no rales rhonchi or wheezes are heard.  Cardiac RRR without murmur    Results for orders placed or performed in visit on 06/10/20   Comprehensive metabolic panel     Status: Abnormal   Result Value Ref Range    Sodium 135 134 - 144 mmol/L    Potassium 4.0 3.5 - 5.1 mmol/L    Chloride 96 (L) 98 - 107 mmol/L    Carbon Dioxide 30 21 - 31 mmol/L    Anion Gap 9 3 - 14 mmol/L    Glucose 89 70 - 105 mg/dL    Urea Nitrogen 13 7 - 25 mg/dL    Creatinine 1.23 0.70 - 1.30 mg/dL    GFR Estimate 62 >60 mL/min/[1.73_m2]    GFR Estimate If Black 75 >60 mL/min/[1.73_m2]    Calcium 9.7 8.6 - 10.3 mg/dL    Bilirubin Total 1.0 0.3 - 1.0 mg/dL    Albumin 4.6 3.5 - 5.7 g/dL    Protein Total 8.8 6.4 - 8.9 g/dL    Alkaline Phosphatase 103 34 - 104 U/L     (H) 7 - 52 U/L     (H) 13 - 39 U/L   CBC with platelets differential     Status: None (In process)   Result Value Ref Range    WBC 4.5 4.0 - 11.0 10e9/L    RBC Count 4.40 4.4 - 5.9 10e12/L    Hemoglobin 13.7 13.3 - 17.7 g/dL    Hematocrit 41.8 40.0 - 53.0 %    MCV 95 78 - 100 fl    MCH 31.1 26.5 - 33.0 pg    MCHC 32.8 31.5 - 36.5 g/dL    RDW 13.7 10.0 - 15.0 %    Platelet Count 265 150 - 450 10e9/L    Diff Method PENDING       ASSESSMENT/Plan :    Kolton was seen today for recheck.    Diagnoses and all orders for this visit:    Essential hypertension  -     CBC with platelets differential; Future  -     Comprehensive metabolic panel; Future  -     Comprehensive metabolic panel  -     CBC with platelets differential    Anemia, unspecified type  -     CBC with platelets differential; Future  -     CBC with platelets  differential    Gastrointestinal hemorrhage associated with duodenal ulcer  -     CBC with platelets differential; Future  -     CBC with platelets differential    Chronic kidney disease, stage II (mild)  -     CBC with platelets differential; Future  -     Comprehensive metabolic panel; Future  -     Comprehensive metabolic panel  -     CBC with platelets differential    Type 2 diabetes mellitus without complication, without long-term current use of insulin (H)  -     Comprehensive metabolic panel; Future  -     Comprehensive metabolic panel  -     AMBULATORY ADULT DIABETES EDUCATOR REFERRAL; Future  -     glucose (BD GLUCOSE) 4 g chewable tablet; Take 1 tablet by mouth every hour as needed for low blood sugar    Gastroesophageal reflux disease without esophagitis  -     CBC with platelets differential; Future  -     Comprehensive metabolic panel; Future  -     Comprehensive metabolic panel  -     CBC with platelets differential    Alcoholism (H)    Encounter for laboratory testing for COVID-19 virus  -     Asymptomatic COVID-19 Virus (Coronavirus) by PCR; Future    Hypertension, unspecified type    Impacted cerumen of right ear  -     Removal Impacted Cerumen - GICH ONLY      Cerumen lavaged to clear from his right ear.    His anemia has resolved.  He should be fine for going ahead with upper endoscopy but will need a COVID test 72 hours before and that is ordered and instructions were given to him in that regard.    He has significant liver function elevation which is due to his recent alcohol use.  Again discussed abstinence completely from alcohol.  I suspect that is why his blood pressures been back up again so we will reinstitute lisinopril at 10 mg daily.  He has a bunch of the 20 mg tablets he will use half a tablet.  Recommend that he follow-up in a couple weeks for recheck and then consideration of return to work at that time.    A total of 25 minutes was spent with the patient, greater than 50% of the  time was spent in counseling/discussion of the aforementioned concerns.     Joon Gonzalez MD

## 2020-06-10 NOTE — NURSING NOTE
"Chief Complaint   Patient presents with     RECHECK     2 week follow up   He is here to follow up and hoping to be released to go back to work.  Radha Junior LPN..................6/10/2020   3:59 PM      Initial BP (!) 164/94   Pulse 81   Temp 97.6  F (36.4  C) (Temporal)   Resp 16   Wt 88.9 kg (196 lb)   SpO2 98%   BMI 30.47 kg/m   Estimated body mass index is 30.47 kg/m  as calculated from the following:    Height as of 5/5/20: 1.708 m (5' 7.25\").    Weight as of this encounter: 88.9 kg (196 lb).  Medication Reconciliation: complete    Radha Junior LPN   Previous A1C is not at goal of <8  Lab Results   Component Value Date    A1C >14.0 05/05/2020    A1C 7.5 08/20/2019    A1C 8.5 04/17/2019     Urine microalbumin:creatine: due  Foot exam due  Eye exam is going to set up an appointment    Tobacco User no  Patient is not on a daily aspirin  Patient is not on a Statin.  Blood pressure today of:     BP Readings from Last 1 Encounters:   06/10/20 (!) 164/94      is not at the goal of <139/89 for diabetics.    Radha Junior LPN on 6/10/2020 at 3:59 PM      "

## 2020-06-10 NOTE — NURSING NOTE
Chief Complaint   Patient presents with     RECHECK     2 week follow up       The right canal was irrigated with body-temperature tap water withthe jet of water directed superiorly.  The right ear canal was then re-examined and cleared of the impaction.  The patient tolerated the procedure well.  Radha Junior LPN..................6/10/2020   5:20 PM

## 2020-06-10 NOTE — PATIENT INSTRUCTIONS
On the day of your upper endoscopy, give 1/2 of your normal dose of the Lantus (11 units).  Don't give the Novolog until you eat later that day.    You will need to have a COVID-19 test 72 hours prior to your procedure. Typically that would be on Saturday, June 13, 2020 here and you will have curbside testing for that. When you arrive, call [phone], they will come out to your car.     Karlene the diabetes nurse educator will call and get you set up to do the continuous glucose monitoring.    Restart the lisinopril but just do 1/2 tab daily. Check blood pressure at home.  140/90 or less is good. 130/80 or less is great.      Lets have you come back in a couple of weeks for follow up of blood pressure and to get a note for return to work.

## 2020-06-11 ENCOUNTER — TELEPHONE (OUTPATIENT)
Dept: FAMILY MEDICINE | Facility: OTHER | Age: 55
End: 2020-06-11

## 2020-06-11 NOTE — TELEPHONE ENCOUNTER
Patient called and stated that he got a message but is unsure as to what he needs it for if you could call him back thank you

## 2020-06-11 NOTE — TELEPHONE ENCOUNTER
He stated he was calling back to set up an appointment for diabetic education. He was transferred to do that.  Radha Junior LPN..................6/11/2020   3:57 PM

## 2020-06-13 ENCOUNTER — ALLIED HEALTH/NURSE VISIT (OUTPATIENT)
Dept: FAMILY MEDICINE | Facility: OTHER | Age: 55
End: 2020-06-13
Attending: SURGERY
Payer: COMMERCIAL

## 2020-06-13 DIAGNOSIS — Z20.822 ENCOUNTER FOR LABORATORY TESTING FOR COVID-19 VIRUS: ICD-10-CM

## 2020-06-13 PROCEDURE — 99207 ZZC NO CHARGE NURSE ONLY: CPT

## 2020-06-13 PROCEDURE — U0003 INFECTIOUS AGENT DETECTION BY NUCLEIC ACID (DNA OR RNA); SEVERE ACUTE RESPIRATORY SYNDROME CORONAVIRUS 2 (SARS-COV-2) (CORONAVIRUS DISEASE [COVID-19]), AMPLIFIED PROBE TECHNIQUE, MAKING USE OF HIGH THROUGHPUT TECHNOLOGIES AS DESCRIBED BY CMS-2020-01-R: HCPCS | Mod: ZL | Performed by: FAMILY MEDICINE

## 2020-06-13 PROCEDURE — C9803 HOPD COVID-19 SPEC COLLECT: HCPCS

## 2020-06-14 LAB
SARS-COV-2 RNA SPEC QL NAA+PROBE: NOT DETECTED
SPECIMEN SOURCE: NORMAL

## 2020-06-16 ENCOUNTER — HOSPITAL ENCOUNTER (OUTPATIENT)
Facility: OTHER | Age: 55
Discharge: HOME | End: 2020-06-16
Attending: SURGERY | Admitting: SURGERY
Payer: COMMERCIAL

## 2020-06-16 ENCOUNTER — ANESTHESIA (OUTPATIENT)
Dept: SURGERY | Facility: OTHER | Age: 55
End: 2020-06-16
Payer: COMMERCIAL

## 2020-06-16 ENCOUNTER — ANESTHESIA EVENT (OUTPATIENT)
Dept: SURGERY | Facility: OTHER | Age: 55
End: 2020-06-16
Payer: COMMERCIAL

## 2020-06-16 VITALS
OXYGEN SATURATION: 99 % | TEMPERATURE: 98.8 F | SYSTOLIC BLOOD PRESSURE: 181 MMHG | WEIGHT: 196 LBS | HEIGHT: 67 IN | HEART RATE: 68 BPM | DIASTOLIC BLOOD PRESSURE: 106 MMHG | BODY MASS INDEX: 30.76 KG/M2

## 2020-06-16 DIAGNOSIS — K29.90 GASTRITIS AND DUODENITIS: ICD-10-CM

## 2020-06-16 DIAGNOSIS — K20.90 ESOPHAGITIS: Primary | ICD-10-CM

## 2020-06-16 PROCEDURE — 25000125 ZZHC RX 250: Performed by: SURGERY

## 2020-06-16 PROCEDURE — 25000128 H RX IP 250 OP 636: Performed by: NURSE ANESTHETIST, CERTIFIED REGISTERED

## 2020-06-16 PROCEDURE — 88305 TISSUE EXAM BY PATHOLOGIST: CPT

## 2020-06-16 PROCEDURE — 43239 EGD BIOPSY SINGLE/MULTIPLE: CPT | Performed by: NURSE ANESTHETIST, CERTIFIED REGISTERED

## 2020-06-16 PROCEDURE — 43239 EGD BIOPSY SINGLE/MULTIPLE: CPT | Performed by: SURGERY

## 2020-06-16 PROCEDURE — 25000132 ZZH RX MED GY IP 250 OP 250 PS 637: Performed by: SURGERY

## 2020-06-16 PROCEDURE — 25800030 ZZH RX IP 258 OP 636: Performed by: SURGERY

## 2020-06-16 PROCEDURE — 25000125 ZZHC RX 250: Performed by: NURSE ANESTHETIST, CERTIFIED REGISTERED

## 2020-06-16 PROCEDURE — 88342 IMHCHEM/IMCYTCHM 1ST ANTB: CPT

## 2020-06-16 PROCEDURE — 40000010 ZZH STATISTIC ANES STAT CODE-CRNA PER MINUTE: Performed by: SURGERY

## 2020-06-16 RX ORDER — LIDOCAINE 40 MG/G
CREAM TOPICAL
Status: DISCONTINUED | OUTPATIENT
Start: 2020-06-16 | End: 2020-06-16 | Stop reason: HOSPADM

## 2020-06-16 RX ORDER — NALOXONE HYDROCHLORIDE 0.4 MG/ML
.1-.4 INJECTION, SOLUTION INTRAMUSCULAR; INTRAVENOUS; SUBCUTANEOUS
Status: DISCONTINUED | OUTPATIENT
Start: 2020-06-16 | End: 2020-06-16 | Stop reason: HOSPADM

## 2020-06-16 RX ORDER — LIDOCAINE HYDROCHLORIDE 20 MG/ML
INJECTION, SOLUTION INFILTRATION; PERINEURAL PRN
Status: DISCONTINUED | OUTPATIENT
Start: 2020-06-16 | End: 2020-06-16

## 2020-06-16 RX ORDER — DEXTROSE, SODIUM CHLORIDE, SODIUM LACTATE, POTASSIUM CHLORIDE, AND CALCIUM CHLORIDE 5; .6; .31; .03; .02 G/100ML; G/100ML; G/100ML; G/100ML; G/100ML
INJECTION, SOLUTION INTRAVENOUS CONTINUOUS
Status: DISCONTINUED | OUTPATIENT
Start: 2020-06-16 | End: 2020-06-16 | Stop reason: HOSPADM

## 2020-06-16 RX ORDER — PROPOFOL 10 MG/ML
INJECTION, EMULSION INTRAVENOUS PRN
Status: DISCONTINUED | OUTPATIENT
Start: 2020-06-16 | End: 2020-06-16

## 2020-06-16 RX ORDER — PROPOFOL 10 MG/ML
INJECTION, EMULSION INTRAVENOUS CONTINUOUS PRN
Status: DISCONTINUED | OUTPATIENT
Start: 2020-06-16 | End: 2020-06-16

## 2020-06-16 RX ORDER — SIMETHICONE
LIQUID (ML) MISCELLANEOUS PRN
Status: DISCONTINUED | OUTPATIENT
Start: 2020-06-16 | End: 2020-06-16 | Stop reason: HOSPADM

## 2020-06-16 RX ORDER — SODIUM CHLORIDE, SODIUM LACTATE, POTASSIUM CHLORIDE, CALCIUM CHLORIDE 600; 310; 30; 20 MG/100ML; MG/100ML; MG/100ML; MG/100ML
INJECTION, SOLUTION INTRAVENOUS CONTINUOUS
Status: DISCONTINUED | OUTPATIENT
Start: 2020-06-16 | End: 2020-06-16 | Stop reason: HOSPADM

## 2020-06-16 RX ORDER — FLUMAZENIL 0.1 MG/ML
0.2 INJECTION, SOLUTION INTRAVENOUS
Status: DISCONTINUED | OUTPATIENT
Start: 2020-06-16 | End: 2020-06-16 | Stop reason: HOSPADM

## 2020-06-16 RX ADMIN — SODIUM CHLORIDE, POTASSIUM CHLORIDE, SODIUM LACTATE AND CALCIUM CHLORIDE 30 ML/HR: 600; 310; 30; 20 INJECTION, SOLUTION INTRAVENOUS at 08:03

## 2020-06-16 RX ADMIN — PROPOFOL 160 MCG/KG/MIN: 10 INJECTION, EMULSION INTRAVENOUS at 08:11

## 2020-06-16 RX ADMIN — LIDOCAINE HYDROCHLORIDE 40 MG: 20 INJECTION, SOLUTION INFILTRATION; PERINEURAL at 08:11

## 2020-06-16 RX ADMIN — SODIUM CHLORIDE, SODIUM LACTATE, POTASSIUM CHLORIDE, CALCIUM CHLORIDE, AND DEXTROSE MONOHYDRATE 125 ML/HR: 600; 310; 30; 20; 5 INJECTION, SOLUTION INTRAVENOUS at 08:06

## 2020-06-16 RX ADMIN — PROPOFOL 40 MG: 10 INJECTION, EMULSION INTRAVENOUS at 08:13

## 2020-06-16 RX ADMIN — PROPOFOL 70 MG: 10 INJECTION, EMULSION INTRAVENOUS at 08:11

## 2020-06-16 ASSESSMENT — MIFFLIN-ST. JEOR: SCORE: 1697.68

## 2020-06-16 NOTE — ANESTHESIA CARE TRANSFER NOTE
Patient: Kolton Aragon    Procedure(s):  ESOPHAGOGASTRODUODENOSCOPY (EGD)    Diagnosis: Duodenal ulcer [K26.9]  Diagnosis Additional Information: No value filed.    Anesthesia Type:   MAC     Note:  Airway :Room Air  Patient transferred to:Phase II  Handoff Report: Identifed the Patient, Identified the Reponsible Provider, Reviewed the pertinent medical history, Discussed the surgical course, Reviewed Intra-OP anesthesia mangement and issues during anesthesia, Set expectations for post-procedure period and Allowed opportunity for questions and acknowledgement of understanding      Vitals: (Last set prior to Anesthesia Care Transfer)    CRNA VITALS  6/16/2020 0757 - 6/16/2020 0828      6/16/2020             Pulse:  89    Ht Rate:  89    SpO2:  96 %                Electronically Signed By: TAYLOR BAKER CRNA  June 16, 2020  8:28 AM

## 2020-06-16 NOTE — ANESTHESIA PREPROCEDURE EVALUATION
Anesthesia Pre-Procedure Evaluation    Patient: Kolton Aragon   MRN: [id number] : 1965          Preoperative Diagnosis: Duodenal ulcer [K26.9]    Procedure(s):  ESOPHAGOGASTRODUODENOSCOPY (EGD)    Past Medical History:   Diagnosis Date     Alcohol-induced acute pancreatitis without infection or necrosis     2016     Essential (primary) hypertension     2013     Gastro-esophageal reflux disease without esophagitis     2015     Spondylolisthesis of lumbosacral region     2013,Patient has been referred for PT and has not followed through.  He was sent for MRI scan,and he was not able to do either the closed or open MRI due to claustrophobia.     Spondylosis of lumbar region without myelopathy or radiculopathy     2013     Tobacco use     2013     Uncomplicated asthma     No Comments Provided     Past Surgical History:   Procedure Laterality Date     VASECTOMY      No Comments Provided       Anesthesia Evaluation     . Pt has had prior anesthetic.     No history of anesthetic complications          ROS/MED HX    ENT/Pulmonary:  - neg pulmonary ROS     Neurologic:  - neg neurologic ROS     Cardiovascular:     (+) hypertension----. : . . . :. .       METS/Exercise Tolerance:  >4 METS   Hematologic:  - neg hematologic  ROS       Musculoskeletal:  - neg musculoskeletal ROS       GI/Hepatic:     (+) GERD Asymptomatic on medication,       Renal/Genitourinary:     (+) chronic renal disease, type: CRI,       Endo:     (+) type II DM .      Psychiatric:  - neg psychiatric ROS       Infectious Disease:  - neg infectious disease ROS       Malignancy:      - no malignancy   Other: Comment: Chronic alcohol use.  Last drank .  Quit tobacco use one month ago.                          Physical Exam  Normal systems: cardiovascular and pulmonary    Airway   Mallampati: II  TM distance: >3 FB  Neck ROM: full  Comment: Full beard    Dental   (+) chipped    Cardiovascular   Rhythm and rate:  "regular and normal      Pulmonary    breath sounds clear to auscultation            Lab Results   Component Value Date    WBC 4.5 06/10/2020    HGB 13.7 06/10/2020    HCT 41.8 06/10/2020     06/10/2020    CRP 19.8 (H) 04/23/2020     06/10/2020    POTASSIUM 4.0 06/10/2020    CHLORIDE 96 (L) 06/10/2020    CO2 30 06/10/2020    BUN 13 06/10/2020    CR 1.23 06/10/2020    GLC 89 06/10/2020    CIARA 9.7 06/10/2020    PHOS 2.4 (L) 10/14/2017    MAG 1.3 (L) 05/05/2020    ALBUMIN 4.6 06/10/2020    PROTTOTAL 8.8 06/10/2020     (H) 06/10/2020     (H) 06/10/2020    ALKPHOS 103 06/10/2020    BILITOTAL 1.0 06/10/2020    BILIDIRECT 0.16 06/21/2016    LIPASE 745 (H) 04/23/2020    AMYLASE 37 07/19/2018    INR 1.1 06/21/2016       Preop Vitals  BP Readings from Last 3 Encounters:   06/16/20 (!) 189/110   06/10/20 (!) 148/78   05/05/20 136/82    Pulse Readings from Last 3 Encounters:   06/16/20 70   06/10/20 81   05/05/20 76      Resp Readings from Last 3 Encounters:   06/10/20 16   05/05/20 18   04/23/20 17    SpO2 Readings from Last 3 Encounters:   06/16/20 96%   06/10/20 98%   05/05/20 98%      Temp Readings from Last 1 Encounters:   06/16/20 97.8  F (36.6  C) (Tympanic)    Ht Readings from Last 1 Encounters:   06/16/20 1.702 m (5' 7\")      Wt Readings from Last 1 Encounters:   06/16/20 88.9 kg (196 lb)    Estimated body mass index is 30.7 kg/m  as calculated from the following:    Height as of this encounter: 1.702 m (5' 7\").    Weight as of this encounter: 88.9 kg (196 lb).       Anesthesia Plan      History & Physical Review      ASA Status:  3 .    NPO Status:  > 6 hours    Plan for MAC with Intravenous induction.            Postoperative Care      Consents  Anesthetic plan, risks, benefits and alternatives discussed with:  Patient..                 TAYLOR ROMERO CRNA  "

## 2020-06-16 NOTE — H&P
GENERAL SURGERY CONSULTATION NOTE    Kolton Aragon   [address] [address]  54 year old  male    Primary Care Provider:  Jono Gonzalez      HPI: Kolton Aragon presents to day surgery in need of EGD for history of GERD, concern for peptic ulcer. History of moderate GERD that is well controlled with PPi. He denies epigastric or abdominal pain. No discomfort with eating. He denies dysphagia or odynophagia. No early satiety, dark / bloody bowel movements or weight loss.        REVIEW OF SYSTEMS:    GENERAL: No fevers or chills. Denies fatigue, recent weight loss.  HEENT: No sinus drainage. No changes with vision or hearing. No difficulty swallowing.   LYMPHATICS:  Noswollen nodes in axilla, neck or groin.  CARDIOVASCULAR: Denies chest pain, palpitations and dyspnea on exertion.  PULMONARY: No shortness of breath or cough. No increase in sputum production.  GI: Denies melena,bright red blood in stools. No hematemesis. No constipation or diarrhea.  : No dysuria or hematuria.  SKIN: No recent rashes or ulcers.   HEMATOLOGY:  No history of easy bruising or bleeding.  ENDOCRINE:  No history of diabetes or thyroid problems.  NEUROLOGY:  No history of seizures or headaches. No motor or sensory changes.        Patient Active Problem List   Diagnosis     Acute alcoholic pancreatitis     Alcoholism (H)     Altered level of consciousness     Chewing tobacco use     Chronic kidney disease, stage II (mild)     DJD (degenerative joint disease), lumbar     Dyslipidemia     Gastroesophageal reflux disease without esophagitis     Hypertension     Insomnia     Reactive airway disease     Spondylolisthesis at L5-S1 level     Type 2 diabetes mellitus without complication, without long-term current use of insulin (H)       Past Medical History:   Diagnosis Date     Alcohol-induced acute pancreatitis without infection or necrosis     06/20/2016     Essential (primary) hypertension     9/12/2013      Gastro-esophageal reflux disease without esophagitis     5/18/2015     Spondylolisthesis of lumbosacral region     7/9/2013,Patient has been referred for PT and has not followed through.  He was sent for MRI scan,and he was not able to do either the closed or open MRI due to claustrophobia.     Spondylosis of lumbar region without myelopathy or radiculopathy     4/22/2013     Tobacco use     5/17/2013     Uncomplicated asthma     No Comments Provided       Past Surgical History:   Procedure Laterality Date     VASECTOMY      No Comments Provided       Family History   Problem Relation Age of Onset     Family History Negative Mother         Good Health     Other - See Comments Father         Deserted as a child, patient knows father had a history of heart attack at 50     Heart Disease Father         Heart Disease,patient knows father had a history of heart attack at 50     Cancer Other         Cancer,History of cancer       Social History     Social History Narrative    , Two children, Worked at [employer], lives independently in [place]. He was working at [employer]. He is working at [employer] again.         He was in the [employer] for 2 years.        Social History     Socioeconomic History     Marital status:      Spouse name: Not on file     Number of children: Not on file     Years of education: Not on file     Highest education level: Not on file   Occupational History     Not on file   Social Needs     Financial resource strain: Not on file     Food insecurity     Worry: Not on file     Inability: Not on file     Transportation needs     Medical: Not on file     Non-medical: Not on file   Tobacco Use     Smoking status: Never Smoker     Smokeless tobacco: Former User     Types: Chew     Tobacco comment: Quit smoking: trying to quit chew using 1 pack week   Substance and Sexual Activity     Alcohol use: Not Currently     Alcohol/week: 28.0 standard drinks     Drug use: No     Comment:  Drug use: No     Sexual activity: Not Currently   Lifestyle     Physical activity     Days per week: Not on file     Minutes per session: Not on file     Stress: Not on file   Relationships     Social connections     Talks on phone: Not on file     Gets together: Not on file     Attends Anglican service: Not on file     Active member of club or organization: Not on file     Attends meetings of clubs or organizations: Not on file     Relationship status: Not on file     Intimate partner violence     Fear of current or ex partner: Not on file     Emotionally abused: Not on file     Physically abused: Not on file     Forced sexual activity: Not on file   Other Topics Concern     Parent/sibling w/ CABG, MI or angioplasty before 65F 55M? Not Asked   Social History Narrative    , Two children, Worked at [employer], lives independently in [place]. He was working at [employer]. He is working at [employer] again.         He was in the [employer] for 2 years.        No current facility-administered medications on file prior to encounter.   LANTUS SOLOSTAR 100 UNIT/ML soln, 22 Units   Melatonin 10-10 MG TBCR, Take by mouth At Bedtime  NOVOLOG FLEXPEN 100 UNIT/ML soln,   omeprazole (PRILOSEC) 20 MG DR capsule, Take 20 mg by mouth daily  traZODone (DESYREL) 50 MG tablet, Take 1 tablet (50 mg) by mouth At Bedtime  blood glucose calibration (NO BRAND SPECIFIED) solution, Use to calibrate blood glucose monitor as needed as directed.  blood glucose monitoring (ShutterCalET) lancets, USE WITH LANCETING DEVICE. TESTING 4 TIMES DAILY.  blood glucose monitoring (NO BRAND SPECIFIED) meter device kit, Use to test blood sugar 4 times daily or as directed.  CONTOUR NEXT TEST test strip, USE TO TEST BLOOD SUGAR 4 TIMES DAILY OR AS DIRECTED.  diclofenac (VOLTAREN) 1 % topical gel, Place onto the skin 4 times daily 2 gram topical 4 times per day as needed  doxylamine (UNISOM) 25 MG TABS tablet, Take 25 mg by mouth At  "Bedtime  insulin pen needle (29G X 12.7MM) 29G X 12.7MM miscellaneous, Use 1 pen needles daily or as directed.          ALLERGIES/SENSITIVITIES:   Allergies   Allergen Reactions     Dust Mite Extract Other (See Comments)     Sneezing, itchy eyes       PHYSICAL EXAM:     BP (!) 189/110   Pulse 70   Temp 97.8  F (36.6  C) (Tympanic)   Ht 1.702 m (5' 7\")   Wt 88.9 kg (196 lb)   SpO2 96%   BMI 30.70 kg/m      General Appearance:   Sitting up in bed, no apparent distress  HEENT: Pupils are equal and reactive, no scleral icterus   Heart & CV:  RRR, no murmur.  LUNGS: No increased work of breathing. Lungs are CTA B/L, no wheezing or crackles.  Abd:  soft, non-tender, no masses   Ext: no lower extremity edema   Neuro: alert and oriented, normal speech and mentation         CONSULTATION ASSESSMENT AND PLAN:    54 year old male with GERD and concern for peptic ulcer disease in need of diagnostic EGD with possible biopsy.      The technical details of EGD with possible biopsy were discussed with the patient along with the risks and benefits to include bleeding, perforation and aspiration. Kolton Aragon demonstrated understanding and is willing to proceed.       Giovanni Reed MD on 6/16/2020 at 7:58 AM      "

## 2020-06-16 NOTE — OP NOTE
PROCEDURE NOTE    DATE OF SERVICE: 6/16/2020    SURGEON: KENIA Reed MD     PRE-OP DIAGNOSIS:  GERD    POST-OP DIAGNOSIS:  Esophagitis, gastritis, duodenitis    PROCEDURE:   EGD with biopsy    ASSISTANT:  Circulator: Mary Hoffman RN  Scrub Person: Jaz Gimenez  Pre-Op Nurse: Elizabeth De Leon RN    ANESTHESIA:  MAC                            Monitor Anesthesia CareCRNA Independent: Dina Moreira APRN CRNA    INDICATION FOR THE PROCEDURE: Kolton Aragon is a 54 year old male. The patient presents with GERD. I explained to the patient the risks, benefits and alternatives to diagnostic EGD. We specifically discussed the risks of bleeding, infection,perforation and the risks of sedation. The patient's questions were answered and the patient wished to proceed. Informed consent paperwork was completed.    PROCEDURE:The patient was taken to the endoscopy suite. Appropriate monitors were attached. The patient was placed in the left lateral decubitus position. Bite block was positioned.Timeout was performed confirming the patient's identity and procedure to be performed. After appropriate sedation was confirmed, the flexible endoscope was advanced into the oropharynx. The posterior oropharynx appeared grossly normal. The scope was advanced into the proximal esophagus. The esophagus was insufflated with air. The scope was advanced under direct visualization. No acute abnormalities of the esophagus were noted. The scope was advanced into the stomach. The scope was advanced through the pylorus with some difficulty into the duodenal bulb. The distal duodenum appeared grossly normal. The duodenal bulb was moderately inflamed, no ulcers were seen. The scope was withdrawn back into the stomach. Antral biopsy was obtained and sent to pathology. Moderate gastritis was seen throughout the stomach, no ulcers. The scope was retroflexed and the GE junction inspected. No abnormalities were noted.  The scope was returned to a  neutral position and the stomach was decompressed. The scope was withdrawn to the GE junction and biopsy obtained. LA grade B esophagitis is noted. The mucosa of the esophagus was inspected while withdrawing the scope. No abnormalities were noted. The scope was withdrawn from the patient. The bite block was removed. The patient tolerated the procedure with no immediately apparent complication. The patient was taken to recovery instable condition.     ESTIMATED BLOOD LOSS: none    COMPLICATIONS:  None    TISSUE REMOVED:  Yes    RECOMMEND:    Continue PPi  Avoid NSAIDs, alcohol, smoking, limit stress  Follow up biopsies for h pylori - treat as indicated     KENIA Reed MD

## 2020-06-16 NOTE — ANESTHESIA POSTPROCEDURE EVALUATION
Patient: Kolton Aragon    Procedure(s):  ESOPHAGOGASTRODUODENOSCOPY, WITH BIOPSY    Diagnosis:Duodenal ulcer [K26.9]  Diagnosis Additional Information: No value filed.    Anesthesia Type:  MAC    Note:  Anesthesia Post Evaluation    Patient location during evaluation: Phase 2  Patient participation: Able to fully participate in evaluation  Level of consciousness: awake and alert  Pain management: adequate  Airway patency: patent  Cardiovascular status: acceptable  Respiratory status: acceptable  Hydration status: acceptable  PONV: none             Last vitals:  Vitals:    06/16/20 0900 06/16/20 0905 06/16/20 0915   BP: (!) 159/147 (!) 187/115 (!) 181/106   Pulse: 105  68   Temp:      SpO2:            Electronically Signed By: TAYLOR BAKER CRNA  June 16, 2020  10:11 AM

## 2020-06-16 NOTE — DISCHARGE INSTRUCTIONS
[facility]  Adult Discharge Orders & Instructions    ________________________________________________________________          For 12 hours after surgery  1. Get plenty of rest.  A responsible adult must stay with you for at least 12 hours after you leave the hospital.   2. You may feel lightheaded.  IF so, sit for a few minutes before standing.  Have someone help you get up.   3. You may have a slight fever. Call the doctor if your fever is over 101 F (38.3 C) (taken under the tongue) or lasts longer than 24 hours.  4. You may have a dry mouth, a sore throat, muscle aches or trouble sleeping.  These should go away after 24 hours.  5. Do not make important or legal decisions.  6.   Do not drive or use heavy equipment.  If you have weakness or tingling, don't drive or use heavy equipment until this feeling goes away.    To contact a doctor, call   [phone]_______________________

## 2020-06-17 ENCOUNTER — TELEPHONE (OUTPATIENT)
Dept: EDUCATION SERVICES | Facility: OTHER | Age: 55
End: 2020-06-17

## 2020-06-17 DIAGNOSIS — E11.9 TYPE 2 DIABETES MELLITUS WITHOUT COMPLICATION, UNSPECIFIED WHETHER LONG TERM INSULIN USE (H): Primary | ICD-10-CM

## 2020-06-17 RX ORDER — PROCHLORPERAZINE 25 MG/1
SUPPOSITORY RECTAL
Qty: 9 EACH | Refills: 3 | Status: SHIPPED | OUTPATIENT
Start: 2020-06-17 | End: 2022-07-14

## 2020-06-17 RX ORDER — PROCHLORPERAZINE 25 MG/1
SUPPOSITORY RECTAL
Qty: 1 EACH | Refills: 0 | Status: SHIPPED | OUTPATIENT
Start: 2020-06-17 | End: 2022-07-14

## 2020-06-17 RX ORDER — PROCHLORPERAZINE 25 MG/1
SUPPOSITORY RECTAL
Qty: 1 EACH | Refills: 3 | Status: SHIPPED | OUTPATIENT
Start: 2020-06-17 | End: 2022-07-14

## 2020-06-17 NOTE — TELEPHONE ENCOUNTER
Patient states he has been approved by insurance for CGM.  Answered patient questions regarding use of CGM.  He shares he is doing well with current diabetes care and will follow up with [facility] at a later date.  He is interested in Dexcom G6 CGM and would like supply order sent.    Dexcom G6 CGM , transmitter and sensors sent eRx to [place], [place], per patient and PCP request.    If accepted as written, please sign pending orders.    Thank you,    Karlene Fletcher RN, BSN, [facility]  6/17/2020 9:01 AM

## 2020-06-17 NOTE — TELEPHONE ENCOUNTER
Patient notified.    Karlene Fletcher RN, BSN, [facility]  6/17/2020 11:17 AM

## 2020-06-23 ENCOUNTER — OFFICE VISIT (OUTPATIENT)
Dept: FAMILY MEDICINE | Facility: OTHER | Age: 55
End: 2020-06-23
Attending: CHIROPRACTOR
Payer: COMMERCIAL

## 2020-06-23 VITALS
DIASTOLIC BLOOD PRESSURE: 100 MMHG | HEART RATE: 76 BPM | WEIGHT: 196.4 LBS | TEMPERATURE: 99.4 F | RESPIRATION RATE: 16 BRPM | SYSTOLIC BLOOD PRESSURE: 138 MMHG | BODY MASS INDEX: 30.76 KG/M2

## 2020-06-23 DIAGNOSIS — K21.9 GASTROESOPHAGEAL REFLUX DISEASE WITHOUT ESOPHAGITIS: Primary | ICD-10-CM

## 2020-06-23 PROCEDURE — G0463 HOSPITAL OUTPT CLINIC VISIT: HCPCS

## 2020-06-23 PROCEDURE — 99213 OFFICE O/P EST LOW 20 MIN: CPT | Performed by: CHIROPRACTOR

## 2020-06-23 NOTE — NURSING NOTE
Kolton is requesting a return to work form. He has been ill and now is able to return to work. His Primary Care Provider, Jono Gonzalez MD is out of the office.  Medication Reconciliation: complete     Provider notified of elevated blood pressure. Patient will discuss with his own PCP when he returns to clinic.    Joann Montana LPN  6/23/2020 9:43 AM

## 2020-06-23 NOTE — PROGRESS NOTES
"  Chief Complaint   Patient presents with     Patient Request for Note/Letter     letter to return to work       HISTORY OF PRESENTING INJURY     Mr. Aragon is here for a return to work note.  His PCP is unavailable this week.  He recently had an esophagogastroduodenoscopy on 6/16/2020, results of which I placed in this note.  He reports being symptom free since June 7, 2020 and believes he is capable of returning to work now.   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  He denies having any lifting requirements or patient care responsibilities.        PAST MEDICAL HISTORY:  Past Medical History:   Diagnosis Date     Alcohol-induced acute pancreatitis without infection or necrosis     06/20/2016     Essential (primary) hypertension     9/12/2013     Gastro-esophageal reflux disease without esophagitis     5/18/2015     Spondylolisthesis of lumbosacral region     7/9/2013,Patient has been referred for PT and has not followed through.  He was sent for MRI scan,and he was not able to do either the closed or open MRI due to claustrophobia.     Spondylosis of lumbar region without myelopathy or radiculopathy     4/22/2013     Tobacco use     5/17/2013     Uncomplicated asthma     No Comments Provided       PAST SURGICAL HISTORY:  Past Surgical History:   Procedure Laterality Date     ESOPHAGOSCOPY, GASTROSCOPY, DUODENOSCOPY (EGD), COMBINED N/A 6/16/2020    Procedure: ESOPHAGOGASTRODUODENOSCOPY, WITH BIOPSY;  Surgeon: Giovanni Reed MD;  Location: [facility] OR     VASECTOMY      No Comments Provided       ALLERGIES:  Allergies   Allergen Reactions     Dust Mite Extract Other (See Comments)     Sneezing, itchy eyes       CURRENT MEDICATIONS:  Current Outpatient Medications   Medication Sig Dispense Refill     Alcohol Swabs (B-D SINGLE USE SWABS REGULAR) PADS USE TO SWAB AREA OF INJECTION/SOM AS DIRECTED. 100 each 3     blood glucose calibration (NO BRAND " SPECIFIED) solution Use to calibrate blood glucose monitor as needed as directed. 1 Bottle 3     blood glucose monitoring (ScriptPad MICROLET) lancets USE WITH LANCETING DEVICE. TESTING 4 TIMES DAILY. 100 each 11     blood glucose monitoring (NO BRAND SPECIFIED) meter device kit Use to test blood sugar 4 times daily or as directed. 1 kit 0     Continuous Blood Gluc  (DEXCOM G6 ) CHRISTINA Use to read blood sugars as per 's instructions. 1 each 0     Continuous Blood Gluc Sensor (DEXCOM G6 SENSOR) MISC Change every 10 days per 's instructions. 9 each 3     Continuous Blood Gluc Transmit (DEXCOM G6 TRANSMITTER) MISC Change every 3 months per 's instructions. 1 each 3     CONTOUR NEXT TEST test strip USE TO TEST BLOOD SUGAR 4 TIMES DAILY OR AS DIRECTED. 100 each 6     diclofenac (VOLTAREN) 1 % topical gel Place onto the skin 4 times daily 2 gram topical 4 times per day as needed       doxylamine (UNISOM) 25 MG TABS tablet Take 25 mg by mouth At Bedtime       glucose (BD GLUCOSE) 4 g chewable tablet Take 1 tablet by mouth every hour as needed for low blood sugar 30 tablet 11     insulin pen needle (29G X 12.7MM) 29G X 12.7MM miscellaneous Use 1 pen needles daily or as directed. 100 each 3     LANTUS SOLOSTAR 100 UNIT/ML soln 22 Units        lisinopril (ZESTRIL) 20 MG tablet Take 0.5 tablets (10 mg) by mouth daily 90 tablet 3     Melatonin 10-10 MG TBCR Take by mouth At Bedtime       NOVOLOG FLEXPEN 100 UNIT/ML soln Inject 5 Units Subcutaneous 3 times daily (with meals)        omeprazole (PRILOSEC) 20 MG DR capsule Take 20 mg by mouth daily       traZODone (DESYREL) 50 MG tablet Take 1 tablet (50 mg) by mouth At Bedtime 30 tablet 3       SOCIAL HISTORY:  Social History     Socioeconomic History     Marital status:      Spouse name: Not on file     Number of children: Not on file     Years of education: Not on file     Highest education level: Not on file   Occupational  History     Not on file   Social Needs     Financial resource strain: Not on file     Food insecurity     Worry: Not on file     Inability: Not on file     Transportation needs     Medical: Not on file     Non-medical: Not on file   Tobacco Use     Smoking status: Never Smoker     Smokeless tobacco: Former User     Types: Chew     Tobacco comment: Quit smoking: trying to quit chew using 1 pack week   Substance and Sexual Activity     Alcohol use: Not Currently     Alcohol/week: 28.0 standard drinks     Drug use: No     Comment: Drug use: No     Sexual activity: Not Currently   Lifestyle     Physical activity     Days per week: Not on file     Minutes per session: Not on file     Stress: Not on file   Relationships     Social connections     Talks on phone: Not on file     Gets together: Not on file     Attends Baptist service: Not on file     Active member of club or organization: Not on file     Attends meetings of clubs or organizations: Not on file     Relationship status: Not on file     Intimate partner violence     Fear of current or ex partner: Not on file     Emotionally abused: Not on file     Physically abused: Not on file     Forced sexual activity: Not on file   Other Topics Concern     Parent/sibling w/ CABG, MI or angioplasty before 65F 55M? Not Asked   Social History Narrative    , Two children, Worked at [employer], lives independently in [place]. He was working at [employer]. He is working at [employer] again.         He was in the [employer] for 2 years.        FAMILY HISTORY:  Family History   Problem Relation Age of Onset     Family History Negative Mother         Good Health     Other - See Comments Father         Deserted as a child, patient knows father had a history of heart attack at 50     Heart Disease Father         Heart Disease,patient knows father had a history of heart attack at 50     Cancer Other         Cancer,History of cancer       REVIEW OF SYSTEMS:    Nursing  "Notes:   Joann Montana, LPN  6/23/2020  9:59 AM  Signed  Kolton is requesting a return to work form. He has been ill and now is able to return to work. His Primary Care Provider, Jono Gonzalez MD is out of the office.  Medication Reconciliation: complete     Provider notified of elevated blood pressure. Patient will discuss with his own PCP when he returns to clinic.    Joann HARDENDrew Montana LPN  6/23/2020 9:43 AM    All ROS is unremarkable.      PHYSICAL EXAM:   BP (!) 138/100   Pulse 76   Temp 99.4  F (37.4  C) (Oral)   Resp 16   Wt 89.1 kg (196 lb 6.4 oz)   BMI 30.76 kg/m   Body mass index is 30.76 kg/m .General Appearance: No acute distress, pleasant, normal mentation.    Path report dated 6/17/2020:  \"The patient is a 54-year-old man who presents with symptoms of reflux.  Upper endoscopy examination revealed a grossly normal distal duodenum, inflammation in the duodenal bulb, and changes suggestive of moderate gastritis.  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  He was instructed to follow up with PCP for clinical management of any return of symptoms.     Face-to-face time spent was 19 minutes.  He agreed to the RTW plan and signed the workability form.          Ramu Hall DC, [place]  Director - Occupational Medicine Department  [address] [address]  Phone [phone]  Fax [phone]    Disclaimer:  This note consists of words and symbols derived from keyboarding, dictation, or using voice recognition software. As a result, there may be errors in the script that have gone undetected. Please consider this when interpreting information found in this note.    10:29 AM 6/23/2020    "

## 2020-06-24 ENCOUNTER — TELEPHONE (OUTPATIENT)
Dept: FAMILY MEDICINE | Facility: OTHER | Age: 55
End: 2020-06-24

## 2020-06-24 DIAGNOSIS — E11.9 TYPE 2 DIABETES MELLITUS WITHOUT COMPLICATION, WITHOUT LONG-TERM CURRENT USE OF INSULIN (H): Primary | ICD-10-CM

## 2020-06-25 RX ORDER — INSULIN GLARGINE 100 [IU]/ML
22 INJECTION, SOLUTION SUBCUTANEOUS EVERY MORNING
Qty: 15 ML | Refills: 3 | Status: ON HOLD | OUTPATIENT
Start: 2020-06-25 | End: 2021-03-19

## 2020-06-25 RX ORDER — INSULIN ASPART 100 [IU]/ML
5 INJECTION, SOLUTION INTRAVENOUS; SUBCUTANEOUS
Qty: 15 ML | Refills: 3 | Status: ON HOLD | OUTPATIENT
Start: 2020-06-25 | End: 2021-03-19

## 2020-08-16 DIAGNOSIS — I10 HYPERTENSION, UNSPECIFIED TYPE: ICD-10-CM

## 2020-08-17 RX ORDER — HYDROCHLOROTHIAZIDE 25 MG/1
25 TABLET ORAL DAILY
Qty: 90 TABLET | Refills: 4 | Status: SHIPPED | OUTPATIENT
Start: 2020-08-17 | End: 2020-09-10

## 2020-08-17 RX ORDER — LISINOPRIL 20 MG/1
TABLET ORAL
Qty: 90 TABLET | Refills: 2 | Status: SHIPPED | OUTPATIENT
Start: 2020-08-17 | End: 2021-03-09

## 2020-08-17 NOTE — TELEPHONE ENCOUNTER
" Disp  Refills  Start  End  ESCOBAR    hydrochlorothiazide (HYDRODIURIL) 25 MG tablet (Discontinued)  90 tablet  3  9/17/2019 5/5/2020  No    Sig - Route: Take 1 tablet (25 mg) by mouth daily - Oral      Order Status  Reason  By  On    Discontinued  Stop at Discharge  Vernon Radha CHICAS, LPN  5/5/20 [id number]         Disp  Refills  Start  End  ESCOBAR    lisinopril (ZESTRIL) 20 MG tablet  90 tablet  3  6/10/2020   No    Sig - Route: Take 0.5 tablets (10 mg) by mouth daily - Oral    Class: Historical      Pharmacy note: \"Patient enrolled in our Rx Med Sync service to improveadherence. We are requesting a refill authorization inadvance to ensure an active prescription is on file.\"      LOV: 6/10/2020  Future Office visit: No future appointment scheduled at this time.     Routing refill request to provider for review/approval because:    Blood pressure out of range     Drug not active on patient's medication list    Medication is reported/historical    Requested Prescriptions   Pending Prescriptions Disp Refills     lisinopril (ZESTRIL) 20 MG tablet [Pharmacy Med Name: LISINOPRIL 20MG TABLET] 90 tablet 3     Sig: TAKE 1 TABLET (20 MG) BY MOUTH DAILY       ACE Inhibitors (Including Combos) Protocol Failed - 8/16/2020  5:01 AM        Failed - Blood pressure under 140/90 in past 12 months     BP Readings from Last 3 Encounters:   06/23/20 (!) 138/100   06/16/20 (!) 181/106   06/10/20 (!) 148/78                 Passed - Recent (12 mo) or future (30 days) visit within the authorizing provider's specialty     Patient has had an office visit with the authorizing provider or a provider within the authorizing providers department within the previous 12 mos or has a future within next 30 days. See \"Patient Info\" tab in inbasket, or \"Choose Columns\" in Meds & Orders section of the refill encounter.              Passed - Medication is active on med list        Passed - Patient is age 18 or older        Passed - Normal serum creatinine on file " I believe we have already let her know that she needs to make an appointment. I do not know her well enough to start medications over the phone safely. She should see Margo sooner for this evaluation    "in past 12 months     Recent Labs   Lab Test 06/10/20  1600   CR 1.23       Ok to refill medication if creatinine is low          Passed - Normal serum potassium on file in past 12 months     Recent Labs   Lab Test 06/10/20  1600   POTASSIUM 4.0                hydrochlorothiazide (HYDRODIURIL) 25 MG tablet [Pharmacy Med Name: HYDROCHLOROTHIAZIDE 25MG TAB] 90 tablet 3     Sig: TAKE 1 TABLET (25 MG) BY MOUTH DAILY       Diuretics (Including Combos) Protocol Failed - 8/16/2020  5:01 AM        Failed - Blood pressure under 140/90 in past 12 months     BP Readings from Last 3 Encounters:   06/23/20 (!) 138/100   06/16/20 (!) 181/106   06/10/20 (!) 148/78                 Failed - Medication is active on med list        Passed - Recent (12 mo) or future (30 days) visit within the authorizing provider's specialty     Patient has had an office visit with the authorizing provider or a provider within the authorizing providers department within the previous 12 mos or has a future within next 30 days. See \"Patient Info\" tab in inbasket, or \"Choose Columns\" in Meds & Orders section of the refill encounter.              Passed - Patient is age 18 or older        Passed - Normal serum creatinine on file in past 12 months     Recent Labs   Lab Test 06/10/20  1600   CR 1.23              Passed - Normal serum potassium on file in past 12 months     Recent Labs   Lab Test 06/10/20  1600   POTASSIUM 4.0                    Passed - Normal serum sodium on file in past 12 months     Recent Labs   Lab Test 06/10/20  1600                  Unable to complete prescription refill per RN Medication Refill Policy.................... Susie Mason RN ....................  8/17/2020   2:21 PM        "

## 2020-09-01 ENCOUNTER — TRANSFERRED RECORDS (OUTPATIENT)
Dept: HEALTH INFORMATION MANAGEMENT | Facility: CLINIC | Age: 55
End: 2020-09-01

## 2020-09-01 LAB — RETINOPATHY: NORMAL

## 2020-09-10 ENCOUNTER — HOSPITAL ENCOUNTER (OUTPATIENT)
Dept: GENERAL RADIOLOGY | Facility: OTHER | Age: 55
End: 2020-09-10
Attending: FAMILY MEDICINE
Payer: COMMERCIAL

## 2020-09-10 ENCOUNTER — OFFICE VISIT (OUTPATIENT)
Dept: FAMILY MEDICINE | Facility: OTHER | Age: 55
End: 2020-09-10
Attending: FAMILY MEDICINE
Payer: COMMERCIAL

## 2020-09-10 VITALS
TEMPERATURE: 99.2 F | BODY MASS INDEX: 29.4 KG/M2 | OXYGEN SATURATION: 97 % | HEART RATE: 92 BPM | SYSTOLIC BLOOD PRESSURE: 160 MMHG | DIASTOLIC BLOOD PRESSURE: 86 MMHG | HEIGHT: 68 IN | WEIGHT: 194 LBS | RESPIRATION RATE: 24 BRPM

## 2020-09-10 DIAGNOSIS — E11.9 TYPE 2 DIABETES MELLITUS WITHOUT COMPLICATION, WITHOUT LONG-TERM CURRENT USE OF INSULIN (H): ICD-10-CM

## 2020-09-10 DIAGNOSIS — M75.42 IMPINGEMENT SYNDROME OF SHOULDER REGION, LEFT: ICD-10-CM

## 2020-09-10 DIAGNOSIS — S39.012A STRAIN OF LUMBAR REGION, INITIAL ENCOUNTER: ICD-10-CM

## 2020-09-10 DIAGNOSIS — Z00.00 VISIT FOR PREVENTIVE HEALTH EXAMINATION: Primary | ICD-10-CM

## 2020-09-10 DIAGNOSIS — Z13.228 SCREENING FOR METABOLIC DISORDER: ICD-10-CM

## 2020-09-10 DIAGNOSIS — I10 HYPERTENSION, UNSPECIFIED TYPE: ICD-10-CM

## 2020-09-10 DIAGNOSIS — E78.5 DYSLIPIDEMIA: ICD-10-CM

## 2020-09-10 DIAGNOSIS — Z13.0 SCREENING FOR DEFICIENCY ANEMIA: ICD-10-CM

## 2020-09-10 DIAGNOSIS — K21.9 GASTROESOPHAGEAL REFLUX DISEASE WITHOUT ESOPHAGITIS: ICD-10-CM

## 2020-09-10 DIAGNOSIS — Z23 NEED FOR DIPHTHERIA-TETANUS-PERTUSSIS (TDAP) VACCINE: ICD-10-CM

## 2020-09-10 DIAGNOSIS — G47.00 INSOMNIA, UNSPECIFIED TYPE: ICD-10-CM

## 2020-09-10 DIAGNOSIS — Z12.5 SCREENING FOR PROSTATE CANCER: ICD-10-CM

## 2020-09-10 DIAGNOSIS — N18.2 CHRONIC KIDNEY DISEASE, STAGE II (MILD): ICD-10-CM

## 2020-09-10 DIAGNOSIS — F10.20 ALCOHOLISM (H): ICD-10-CM

## 2020-09-10 LAB
ALBUMIN SERPL-MCNC: 3.8 G/DL (ref 3.5–5.7)
ALP SERPL-CCNC: 45 U/L (ref 34–104)
ALT SERPL W P-5'-P-CCNC: 12 U/L (ref 7–52)
ANION GAP SERPL CALCULATED.3IONS-SCNC: 7 MMOL/L (ref 3–14)
AST SERPL W P-5'-P-CCNC: 15 U/L (ref 13–39)
BASOPHILS # BLD AUTO: 0.1 10E9/L (ref 0–0.2)
BASOPHILS NFR BLD AUTO: 0.7 %
BILIRUB SERPL-MCNC: 0.5 MG/DL (ref 0.3–1)
BUN SERPL-MCNC: 13 MG/DL (ref 7–25)
CALCIUM SERPL-MCNC: 8.9 MG/DL (ref 8.6–10.3)
CHLORIDE SERPL-SCNC: 105 MMOL/L (ref 98–107)
CHOLEST SERPL-MCNC: 100 MG/DL
CO2 SERPL-SCNC: 27 MMOL/L (ref 21–31)
CREAT SERPL-MCNC: 1.43 MG/DL (ref 0.7–1.3)
DIFFERENTIAL METHOD BLD: ABNORMAL
EOSINOPHIL # BLD AUTO: 0.8 10E9/L (ref 0–0.7)
EOSINOPHIL NFR BLD AUTO: 6.4 %
ERYTHROCYTE [DISTWIDTH] IN BLOOD BY AUTOMATED COUNT: 11.7 % (ref 10–15)
GFR SERPL CREATININE-BSD FRML MDRD: 52 ML/MIN/{1.73_M2}
GLUCOSE SERPL-MCNC: 157 MG/DL (ref 70–105)
HBA1C MFR BLD: 5.7 % (ref 4–6)
HCT VFR BLD AUTO: 35.7 % (ref 40–53)
HDLC SERPL-MCNC: 19 MG/DL (ref 23–92)
HGB BLD-MCNC: 11.6 G/DL (ref 13.3–17.7)
IMM GRANULOCYTES # BLD: 0.1 10E9/L (ref 0–0.4)
IMM GRANULOCYTES NFR BLD: 0.5 %
LDLC SERPL CALC-MCNC: 63 MG/DL
LYMPHOCYTES # BLD AUTO: 1.3 10E9/L (ref 0.8–5.3)
LYMPHOCYTES NFR BLD AUTO: 10.4 %
MCH RBC QN AUTO: 31.5 PG (ref 26.5–33)
MCHC RBC AUTO-ENTMCNC: 32.5 G/DL (ref 31.5–36.5)
MCV RBC AUTO: 97 FL (ref 78–100)
MONOCYTES # BLD AUTO: 1.2 10E9/L (ref 0–1.3)
MONOCYTES NFR BLD AUTO: 9.9 %
NEUTROPHILS # BLD AUTO: 8.8 10E9/L (ref 1.6–8.3)
NEUTROPHILS NFR BLD AUTO: 72.1 %
NONHDLC SERPL-MCNC: 81 MG/DL
PLATELET # BLD AUTO: 334 10E9/L (ref 150–450)
POTASSIUM SERPL-SCNC: 4.8 MMOL/L (ref 3.5–5.1)
PROT SERPL-MCNC: 7.1 G/DL (ref 6.4–8.9)
PSA SERPL-MCNC: 0.64 NG/ML
RBC # BLD AUTO: 3.68 10E12/L (ref 4.4–5.9)
SODIUM SERPL-SCNC: 139 MMOL/L (ref 134–144)
TRIGL SERPL-MCNC: 91 MG/DL
TSH SERPL DL<=0.05 MIU/L-ACNC: 2.07 IU/ML (ref 0.34–5.6)
WBC # BLD AUTO: 12.3 10E9/L (ref 4–11)

## 2020-09-10 PROCEDURE — 90471 IMMUNIZATION ADMIN: CPT

## 2020-09-10 PROCEDURE — 82043 UR ALBUMIN QUANTITATIVE: CPT | Mod: ZL | Performed by: FAMILY MEDICINE

## 2020-09-10 PROCEDURE — 85025 COMPLETE CBC W/AUTO DIFF WBC: CPT | Mod: ZL | Performed by: FAMILY MEDICINE

## 2020-09-10 PROCEDURE — 84443 ASSAY THYROID STIM HORMONE: CPT | Mod: ZL | Performed by: FAMILY MEDICINE

## 2020-09-10 PROCEDURE — 90715 TDAP VACCINE 7 YRS/> IM: CPT

## 2020-09-10 PROCEDURE — 20610 DRAIN/INJ JOINT/BURSA W/O US: CPT | Performed by: FAMILY MEDICINE

## 2020-09-10 PROCEDURE — 83036 HEMOGLOBIN GLYCOSYLATED A1C: CPT | Mod: ZL | Performed by: FAMILY MEDICINE

## 2020-09-10 PROCEDURE — 36415 COLL VENOUS BLD VENIPUNCTURE: CPT | Mod: ZL | Performed by: FAMILY MEDICINE

## 2020-09-10 PROCEDURE — 73030 X-RAY EXAM OF SHOULDER: CPT | Mod: LT

## 2020-09-10 PROCEDURE — 80061 LIPID PANEL: CPT | Mod: ZL | Performed by: FAMILY MEDICINE

## 2020-09-10 PROCEDURE — 80053 COMPREHEN METABOLIC PANEL: CPT | Mod: ZL | Performed by: FAMILY MEDICINE

## 2020-09-10 PROCEDURE — 99214 OFFICE O/P EST MOD 30 MIN: CPT | Mod: 25 | Performed by: FAMILY MEDICINE

## 2020-09-10 PROCEDURE — 84153 ASSAY OF PSA TOTAL: CPT | Mod: ZL | Performed by: FAMILY MEDICINE

## 2020-09-10 PROCEDURE — 72100 X-RAY EXAM L-S SPINE 2/3 VWS: CPT

## 2020-09-10 PROCEDURE — G0463 HOSPITAL OUTPT CLINIC VISIT: HCPCS

## 2020-09-10 PROCEDURE — G0463 HOSPITAL OUTPT CLINIC VISIT: HCPCS | Mod: 25

## 2020-09-10 PROCEDURE — 25000128 H RX IP 250 OP 636: Performed by: FAMILY MEDICINE

## 2020-09-10 PROCEDURE — 99396 PREV VISIT EST AGE 40-64: CPT | Mod: 25 | Performed by: FAMILY MEDICINE

## 2020-09-10 RX ORDER — TRAZODONE HYDROCHLORIDE 100 MG/1
100 TABLET ORAL AT BEDTIME
Qty: 30 TABLET | Refills: 11 | Status: SHIPPED | OUTPATIENT
Start: 2020-09-10 | End: 2021-08-20

## 2020-09-10 RX ORDER — NALTREXONE HYDROCHLORIDE 50 MG/1
50 TABLET, FILM COATED ORAL DAILY
Qty: 30 TABLET | Refills: 11 | Status: SHIPPED | OUTPATIENT
Start: 2020-09-10 | End: 2021-09-07

## 2020-09-10 RX ORDER — TRIAMCINOLONE ACETONIDE 40 MG/ML
80 INJECTION, SUSPENSION INTRA-ARTICULAR; INTRAMUSCULAR ONCE
Status: COMPLETED | OUTPATIENT
Start: 2020-09-10 | End: 2020-09-10

## 2020-09-10 RX ADMIN — TRIAMCINOLONE ACETONIDE 80 MG: 40 INJECTION, SUSPENSION INTRA-ARTICULAR; INTRAMUSCULAR at 16:27

## 2020-09-10 ASSESSMENT — MIFFLIN-ST. JEOR: SCORE: 1700.51

## 2020-09-10 ASSESSMENT — PAIN SCALES - GENERAL: PAINLEVEL: EXTREME PAIN (8)

## 2020-09-10 NOTE — NURSING NOTE
"Chief Complaint   Patient presents with     Recheck Medication     Shoulder Pain     left shoulder pain       Initial BP (!) 174/90   Pulse 92   Temp 99.2  F (37.3  C) (Tympanic)   Resp 24   Ht 1.721 m (5' 7.75\")   Wt 88 kg (194 lb)   SpO2 97%   BMI 29.72 kg/m   Estimated body mass index is 29.72 kg/m  as calculated from the following:    Height as of this encounter: 1.721 m (5' 7.75\").    Weight as of this encounter: 88 kg (194 lb).  Medication Reconciliation: complete    Smiley Tejeda LPN     Previous A1C is at goal of <8  Lab Results   Component Value Date    A1C >14.0 05/05/2020    A1C 7.5 08/20/2019    A1C 8.5 04/17/2019     Urine microalbumin:creatine: due  Foot exam due  Eye exam due    Tobacco User no  Patient is not on a daily aspirin  Patient is not on a Statin.  Blood pressure today of:     BP Readings from Last 1 Encounters:   09/10/20 (!) 174/90      is not at the goal of <139/89 for diabetics.    Smiley Tejeda LPN on 9/10/2020 at 2:59 PM      "

## 2020-09-10 NOTE — PROGRESS NOTES
"Nursing Notes:   Smiley Tejeda LPN  9/10/2020  3:56 PM  Signed  Chief Complaint   Patient presents with     Recheck Medication     Shoulder Pain     left shoulder pain       Initial BP (!) 174/90   Pulse 92   Temp 99.2  F (37.3  C) (Tympanic)   Resp 24   Ht 1.721 m (5' 7.75\")   Wt 88 kg (194 lb)   SpO2 97%   BMI 29.72 kg/m   Estimated body mass index is 29.72 kg/m  as calculated from the following:    Height as of this encounter: 1.721 m (5' 7.75\").    Weight as of this encounter: 88 kg (194 lb).  Medication Reconciliation: complete    Smiley Tejeda LPN     Previous A1C is at goal of <8  Lab Results   Component Value Date    A1C >14.0 05/05/2020    A1C 7.5 08/20/2019    A1C 8.5 04/17/2019     Urine microalbumin:creatine: due  Foot exam due  Eye exam due    Tobacco User no  Patient is not on a daily aspirin  Patient is not on a Statin.  Blood pressure today of:     BP Readings from Last 1 Encounters:   09/10/20 (!) 174/90      is not at the goal of <139/89 for diabetics.    Smiley Tejeda LPN on 9/10/2020 at 2:59 PM        SUBJECTIVE:  Kolton Aragon  is a 54 year old male who comes in today for complete evaluation and follow-up of his chronic medical problems.  He has type 2 diabetes and hypertension.  He is an alcoholic.I last saw him in June after he was recovered from pneumonia and  Microperforation of a duodenal ulcer.  His hemoglobin A1c was greater than 14 and his hemoglobin was 8.6.  He is not on a statin.  He is off aspirin because of his ulcer.    He had upper endoscopy in June.  It was recommended that he continue his PPI.  He saw the diabetic educator via virtual visitAnd was approved for a CGM.    He is due for his tetanus booster.  He has not had Shingrix.    He has had left shoulder pain that has been worsening.  He noticed it when he woke up.  It has been going on for 2-3 weeks. He has been taking ibuprofen and tylenol. He has tried ice but hasn't tried heat. He has not been to the " chiropractor.  He sees Dr. Diaz.     He had an episode of leg heaviness when pushing a cart in the [employer] parking lot.     He wonders about his prostate.  He doesn't feel he is emptying his bladder.  No pain or achiness. No dysuria.  He has a good stream.  Nocturia x 1.      He has noticed that 2 trazodone helps better than 1.  He has been taking melatonin and Unisom and sleeping 7 hours at night.     Past Medical, Family, and Social History reviewed and updated as noted below.   ROS is negative except as noted above       Allergies   Allergen Reactions     Dust Mite Extract Other (See Comments)     Sneezing, itchy eyes   ,   Family History   Problem Relation Age of Onset     Family History Negative Mother         Good Health     Other - See Comments Father         Deserted as a child, patient knows father had a history of heart attack at 50     Heart Disease Father         Heart Disease,patient knows father had a history of heart attack at 50     Cancer Other         Cancer,History of cancer   ,   Current Outpatient Medications   Medication     naltrexone (DEPADE/REVIA) 50 MG tablet     traZODone (DESYREL) 100 MG tablet     Alcohol Swabs (B-D SINGLE USE SWABS REGULAR) PADS     blood glucose calibration (NO BRAND SPECIFIED) solution     blood glucose monitoring (JOEY MICROLET) lancets     blood glucose monitoring (NO BRAND SPECIFIED) meter device kit     Continuous Blood Gluc  (DEXCOM G6 ) CHRISTINA     Continuous Blood Gluc Sensor (DEXCOM G6 SENSOR) MISC     Continuous Blood Gluc Transmit (DEXCOM G6 TRANSMITTER) MISC     CONTOUR NEXT TEST test strip     diclofenac (VOLTAREN) 1 % topical gel     doxylamine (UNISOM) 25 MG TABS tablet     glucose (BD GLUCOSE) 4 g chewable tablet     insulin pen needle (29G X 12.7MM) 29G X 12.7MM miscellaneous     LANTUS SOLOSTAR 100 UNIT/ML soln     lisinopril (ZESTRIL) 20 MG tablet     Melatonin 10-10 MG TBCR     NOVOLOG FLEXPEN 100 UNIT/ML soln     omeprazole  (PRILOSEC) 20 MG DR capsule     No current facility-administered medications for this visit.    ,   Past Medical History:   Diagnosis Date     Alcohol-induced acute pancreatitis without infection or necrosis     06/20/2016     Essential (primary) hypertension     9/12/2013     Gastro-esophageal reflux disease without esophagitis     5/18/2015     Spondylolisthesis of lumbosacral region     7/9/2013,Patient has been referred for PT and has not followed through.  He was sent for MRI scan,and he was not able to do either the closed or open MRI due to claustrophobia.     Spondylosis of lumbar region without myelopathy or radiculopathy     4/22/2013     Tobacco use     5/17/2013     Uncomplicated asthma     No Comments Provided   ,   Patient Active Problem List    Diagnosis Date Noted     Type 2 diabetes mellitus without complication, without long-term current use of insulin (H) 04/17/2019     Priority: Medium     Reactive airway disease 03/01/2018     Priority: Medium     Alcoholism (H) 10/12/2017     Priority: Medium     Altered level of consciousness 10/11/2017     Priority: Medium     Acute alcoholic pancreatitis 06/20/2016     Priority: Medium     Chronic kidney disease, stage II (mild) 03/09/2016     Priority: Medium     Gastroesophageal reflux disease without esophagitis 05/18/2015     Priority: Medium     Dyslipidemia 12/29/2014     Priority: Medium     Hypertension 09/12/2013     Priority: Medium     Spondylolisthesis at L5-S1 level 07/09/2013     Priority: Medium     Overview:   Patient has been referred for PT and has not followed through.  He was sent for MRI scan,and he was not able to do either the closed or open MRI due to claustrophobia.       Insomnia 06/20/2013     Priority: Medium     Chewing tobacco use 05/17/2013     Priority: Medium     DJD (degenerative joint disease), lumbar 04/22/2013     Priority: Medium   ,   Past Surgical History:   Procedure Laterality Date     ESOPHAGOSCOPY, GASTROSCOPY,  "DUODENOSCOPY (EGD), COMBINED N/A 6/16/2020    Procedure: ESOPHAGOGASTRODUODENOSCOPY, WITH BIOPSY;  Surgeon: Giovanni Reed MD;  Location: [facility] OR     VASECTOMY      No Comments Provided    and   Social History     Tobacco Use     Smoking status: Never Smoker     Smokeless tobacco: Former User     Types: Chew     Tobacco comment: Quit smoking: trying to quit chew using 1 pack week   Substance Use Topics     Alcohol use: Not Currently     Alcohol/week: 28.0 standard drinks     OBJECTIVE:  BP (!) 160/86   Pulse 92   Temp 99.2  F (37.3  C) (Tympanic)   Resp 24   Ht 1.721 m (5' 7.75\")   Wt 88 kg (194 lb)   SpO2 97%   BMI 29.72 kg/m     EXAM:  General Appearance: Pleasant, alert, appropriate appearance for age. No acute distress  Head Exam: Normal. Normocephalic, atraumatic.  Eye Exam:  Normal external eyes, conjunctivae, lids, cornea. BIJAL. EOMI  Ear Exam: Normal TM's bilaterally. Normal auditory canals and external ears. Non-tender.  Nose Exam: Normal external nose, mucus membranes, and septum.  OroPharynx Exam:  Dental hygiene adequate. Normal buccal mucosa. Normal pharynx.  Neck Exam:  Supple, no masses or nodes. No audible bruits  Thyroid Exam: No nodules or enlargement.  Chest/Respiratory Exam: Normal chest wall and respirations. Clear to auscultation.  Cardiovascular Exam: Regular rate and rhythm. S1, S2, no murmur, click, gallop, or rubs.  Gastrointestinal Exam: Soft, non-tender, no masses or organomegaly.  Lymphatic Exam: Non-palpable nodes in neck, clavicular regions.  Musculoskeletal Exam: Back is straight and non-tender, full ROM of upper and lower extremities.  Shoulder range of motion is preserved but he has pain on abduction and positive impingement testing on the left without weakness on isolation of the supraspinatus. Examination of the low back reveals no significant paraspinal muscle spasm.  Normal lumbar range of motion including lateral bending and flexion and extension.  There is no SI " joint tenderness.  There is no sciatic notch tenderness.  The patient is able to stand on each foot alternately and raise on the toes.  Is able to stand on heels.  Standing flat-footed on each foot alternately and extending the back does not cause any increased SI joint pain. SLR is negative bilaterally.  No loss of strength or reflexes in the lower extremities.   Foot Exam: Left and right foot: good pedal pulses, no lesions, nail hygiene good. Normal sensory testing with #10 monofilament.   Skin: no rash or abnormalities  Neurologic Exam: Nonfocal, normal gross motor, tone coordination and no tremor.  Psychiatric Exam: Alert and oriented - appropriate affect.     Results for orders placed or performed during the hospital encounter of 09/10/20   XR Lumbar Spine 2/3 Views     Status: None    Narrative    PROCEDURE: XR LUMBAR SPINE 2-3 VIEWS 9/10/2020 4:37 PM    HISTORY: Strain of lumbar region, initial encounter    COMPARISONS: None.    TECHNIQUE: AP and lateral    FINDINGS: There is lumbar scoliosis concave left. There is  spondylolisthesis of L5 on S1. There is decrease in height in the  L3-L4, L4-L5 and L5-S1 discs. Lower lumbar facet joint degenerative  changes are noted.         Impression    IMPRESSION: Spondylolisthesis L5 on S1 with advanced degenerative  changes in the lower lumbar spine    JERALD RASMUSSEN MD   Results for orders placed or performed during the hospital encounter of 09/10/20   XR Shoulder Left G/E 3 Views     Status: None    Narrative    PROCEDURE:  XR SHOULDER LT G/E 3 VW    HISTORY: Impingement syndrome of shoulder region, left    COMPARISON:  None.    TECHNIQUE:  3 views of the left shoulder were obtained.    FINDINGS:  The acromioclavicular and glenohumeral articulations are  normally aligned. The scapula and distal clavicle and proximal humerus  are intact. Articular spaces are normal in height. The subacromial  distance appears normal.       Impression    IMPRESSION: Normal left  shoulder      JERALD RASMUSSEN MD   Results for orders placed or performed in visit on 09/10/20   Prostate Specific Antigen [facility]     Status: None   Result Value Ref Range    Prostate Specific Antigen 0.643 <3.100 ng/mL   Thyrotropin [facility]     Status: None   Result Value Ref Range    Thyrotropin 2.07 0.34 - 5.60 IU/mL   Hemoglobin A1c     Status: None   Result Value Ref Range    Hemoglobin A1C 5.7 4.0 - 6.0 %   Lipid Profile     Status: Abnormal   Result Value Ref Range    Cholesterol 100 <200 mg/dL    Triglycerides 91 <150 mg/dL    HDL Cholesterol 19 (L) 23 - 92 mg/dL    LDL Cholesterol Calculated 63 <100 mg/dL    Non HDL Cholesterol 81 <130 mg/dL   Comprehensive metabolic panel     Status: Abnormal   Result Value Ref Range    Sodium 139 134 - 144 mmol/L    Potassium 4.8 3.5 - 5.1 mmol/L    Chloride 105 98 - 107 mmol/L    Carbon Dioxide 27 21 - 31 mmol/L    Anion Gap 7 3 - 14 mmol/L    Glucose 157 (H) 70 - 105 mg/dL    Urea Nitrogen 13 7 - 25 mg/dL    Creatinine 1.43 (H) 0.70 - 1.30 mg/dL    GFR Estimate 52 (L) >60 mL/min/[1.73_m2]    GFR Estimate If Black 63 >60 mL/min/[1.73_m2]    Calcium 8.9 8.6 - 10.3 mg/dL    Bilirubin Total 0.5 0.3 - 1.0 mg/dL    Albumin 3.8 3.5 - 5.7 g/dL    Protein Total 7.1 6.4 - 8.9 g/dL    Alkaline Phosphatase 45 34 - 104 U/L    ALT 12 7 - 52 U/L    AST 15 13 - 39 U/L   CBC with platelets differential     Status: Abnormal   Result Value Ref Range    WBC 12.3 (H) 4.0 - 11.0 10e9/L    RBC Count 3.68 (L) 4.4 - 5.9 10e12/L    Hemoglobin 11.6 (L) 13.3 - 17.7 g/dL    Hematocrit 35.7 (L) 40.0 - 53.0 %    MCV 97 78 - 100 fl    MCH 31.5 26.5 - 33.0 pg    MCHC 32.5 31.5 - 36.5 g/dL    RDW 11.7 10.0 - 15.0 %    Platelet Count 334 150 - 450 10e9/L    Diff Method Automated Method     % Neutrophils 72.1 %    % Lymphocytes 10.4 %    % Monocytes 9.9 %    % Eosinophils 6.4 %    % Basophils 0.7 %    % Immature Granulocytes 0.5 %    Absolute Neutrophil 8.8 (H) 1.6 - 8.3 10e9/L    Absolute Lymphocytes 1.3 0.8 -  5.3 10e9/L    Absolute Monocytes 1.2 0.0 - 1.3 10e9/L    Absolute Eosinophils 0.8 (H) 0.0 - 0.7 10e9/L    Absolute Basophils 0.1 0.0 - 0.2 10e9/L    Abs Immature Granulocytes 0.1 0 - 0.4 10e9/L      ASSESSMENT/Plan :    Kolton was seen today for recheck medication and shoulder pain.    Diagnoses and all orders for this visit:    Visit for preventive health examination    Type 2 diabetes mellitus without complication, without long-term current use of insulin (H)  -     CBC with platelets differential; Future  -     Comprehensive metabolic panel; Future  -     Lipid Profile; Future  -     Hemoglobin A1c; Future  -     Thyrotropin [facility]; Future  -     Albumin Random Urine Quantitative with Creat Ratio; Future  -     Thyrotropin [facility]  -     Hemoglobin A1c  -     Lipid Profile  -     Comprehensive metabolic panel  -     CBC with platelets differential  -     Albumin Random Urine Quantitative with Creat Ratio    Chronic kidney disease, stage II (mild)    Gastroesophageal reflux disease without esophagitis    Hypertension, unspecified type  -     Comprehensive metabolic panel; Future  -     Comprehensive metabolic panel    Screening for deficiency anemia  -     CBC with platelets differential; Future  -     CBC with platelets differential    Screening for metabolic disorder  -     Comprehensive metabolic panel; Future  -     Comprehensive metabolic panel    Screening for prostate cancer  -     Prostate Specific Antigen [facility]; Future  -     Prostate Specific Antigen [facility]    Dyslipidemia  -     Comprehensive metabolic panel; Future  -     Lipid Profile; Future  -     Lipid Profile  -     Comprehensive metabolic panel    Need for diphtheria-tetanus-pertussis (Tdap) vaccine  -     [facility] IMM-  TDAP VACCINE (BOOSTRIX )    Insomnia, unspecified type  -     traZODone (DESYREL) 100 MG tablet; Take 1 tablet (100 mg) by mouth At Bedtime    Impingement syndrome of shoulder region, left  -     XR Shoulder Left G/E 3 Views; Future  -     Large  Joint/Bursa injection and/or drainage (Shoulder, Knee)  -     triamcinolone (KENALOG-40) injection 80 mg    Strain of lumbar region, initial encounter  -     XR Lumbar Spine 2/3 Views; Future    Alcoholism (H)  -     naltrexone (DEPADE/REVIA) 50 MG tablet; Take 1 tablet (50 mg) by mouth daily To help with alcohol cravings      I suspect his abdominal discomfort is due to taking ibuprofen and recommended that he stop that especially in light of his previous ulcer.  He has had a bit of a drop in his hemoglobin which could be a slow leak but hopefully will resolve with cessation of his NSAIDs.  Discussed options for his shoulder and elected to go ahead with injection     LEFT SHOULDER INJECTION: Risks, benefits, alternatives discussed, consent obtained Pt wishes to proceed.  Chloraprep used for sterile prep x2 prior to injection. Timeout is performed. Ethyl Chloride used as skin refrigerant for topical anesthesia. A posterior approach is used.  The subacromial space is injected with a mixture of 2ml each of 1% lidocaine without epinephrine, 0.5% Marcaine, and Kenalog 40mg/ml. After injection, bandaid placed. Patient noted improvement in symptoms.  Ice 20 min tid and prn.  RTC if not improved over next 7 days.     Reviewed his low back x-ray which shows some degenerative change but is unchanged from previous.    Boostrix today.    We will increase trazodone to 100 mg at bedtime.    He is doing well with his sobriety but has been sober now for over a month and a half.  He is feeling much better.  Diabetes control is better.  Discussed use of naltrexone to help with alcohol cravings and he would be interested in trying that.  Discussed that that typically is part of a comprehensive chemical dependency treatment program but he is not really interested in doing that and agreed to prescribe this for him to see if it would be helpful and help to extend his sobriety.    His labs look good and these were reviewed with him.    A  total of 25 minutes was spent with the patient, greater than 50% of the time was spent in counseling/discussion of the aforementioned concerns in addition to his wellness exam    Jono Gonzalez MD

## 2020-09-11 LAB
CREAT UR-MCNC: 84 MG/DL
MICROALBUMIN UR-MCNC: 26 MG/L
MICROALBUMIN/CREAT UR: 31.39 MG/G CR (ref 0–17)

## 2020-09-15 DIAGNOSIS — K27.9 PUD (PEPTIC ULCER DISEASE): ICD-10-CM

## 2020-09-15 DIAGNOSIS — E11.9 DM TYPE 2, NOT AT GOAL (H): ICD-10-CM

## 2020-09-16 RX ORDER — GLIPIZIDE 5 MG/1
5 TABLET, FILM COATED, EXTENDED RELEASE ORAL DAILY
Qty: 90 TABLET | Refills: 11 | Status: SHIPPED | OUTPATIENT
Start: 2020-09-16 | End: 2021-09-07

## 2020-09-16 RX ORDER — OMEPRAZOLE 40 MG/1
40 CAPSULE, DELAYED RELEASE ORAL DAILY
Qty: 30 CAPSULE | Refills: 11 | Status: SHIPPED | OUTPATIENT
Start: 2020-09-16 | End: 2021-03-30

## 2020-09-16 NOTE — TELEPHONE ENCOUNTER
[id number] sent Rx request for the following:      OMEPRAZOLE 40MG DR CAPSULE   Sig: Take 1 capsule (40 mg) by mouth daily      Last Prescription Date:   historical  Last Fill Qty/Refills:         n/a, R-n/a    Last Office Visit:              9/10/2020   Future Office visit:           none       GLIPIZIDE 5MG ER TABLET   Sig: Take 1 tablet (5 mg) by mouth daily      Last Prescription Date:   9/17/2019 (dc'd on 5/5/2020)  Last Fill Qty/Refills:         90, R-11        Routing refill request to provider for review/approval because:  Drug not active on patient's medication list  Medication is reported/historical    Unable to complete prescription refill per RN Medication Refill Policy.................... Yobani Banks RN ....................  9/16/2020   1:43 PM

## 2020-10-08 ENCOUNTER — OFFICE VISIT (OUTPATIENT)
Dept: FAMILY MEDICINE | Facility: OTHER | Age: 55
End: 2020-10-08
Attending: FAMILY MEDICINE
Payer: COMMERCIAL

## 2020-10-08 VITALS
RESPIRATION RATE: 18 BRPM | BODY MASS INDEX: 30.05 KG/M2 | HEART RATE: 90 BPM | OXYGEN SATURATION: 98 % | WEIGHT: 196.2 LBS | TEMPERATURE: 98 F | SYSTOLIC BLOOD PRESSURE: 134 MMHG | DIASTOLIC BLOOD PRESSURE: 82 MMHG

## 2020-10-08 DIAGNOSIS — D64.9 ANEMIA, UNSPECIFIED TYPE: ICD-10-CM

## 2020-10-08 DIAGNOSIS — E11.9 TYPE 2 DIABETES MELLITUS WITHOUT COMPLICATION, WITHOUT LONG-TERM CURRENT USE OF INSULIN (H): ICD-10-CM

## 2020-10-08 DIAGNOSIS — R10.13 ABDOMINAL PAIN, EPIGASTRIC: ICD-10-CM

## 2020-10-08 DIAGNOSIS — I10 ESSENTIAL HYPERTENSION: ICD-10-CM

## 2020-10-08 DIAGNOSIS — K57.32 DIVERTICULITIS OF COLON: Primary | ICD-10-CM

## 2020-10-08 DIAGNOSIS — Z12.11 SPECIAL SCREENING FOR MALIGNANT NEOPLASMS, COLON: ICD-10-CM

## 2020-10-08 DIAGNOSIS — M19.90 OSTEOARTHRITIS, UNSPECIFIED OSTEOARTHRITIS TYPE, UNSPECIFIED SITE: ICD-10-CM

## 2020-10-08 DIAGNOSIS — F10.20 ALCOHOLISM (H): ICD-10-CM

## 2020-10-08 DIAGNOSIS — K27.9 PUD (PEPTIC ULCER DISEASE): ICD-10-CM

## 2020-10-08 LAB
ALBUMIN SERPL-MCNC: 3.9 G/DL (ref 3.5–5.7)
ALP SERPL-CCNC: 57 U/L (ref 34–104)
ALT SERPL W P-5'-P-CCNC: 30 U/L (ref 7–52)
ANION GAP SERPL CALCULATED.3IONS-SCNC: 6 MMOL/L (ref 3–14)
AST SERPL W P-5'-P-CCNC: 21 U/L (ref 13–39)
BASOPHILS # BLD AUTO: 0.1 10E9/L (ref 0–0.2)
BASOPHILS NFR BLD AUTO: 0.5 %
BILIRUB SERPL-MCNC: 0.3 MG/DL (ref 0.3–1)
BUN SERPL-MCNC: 17 MG/DL (ref 7–25)
CALCIUM SERPL-MCNC: 9 MG/DL (ref 8.6–10.3)
CHLORIDE SERPL-SCNC: 108 MMOL/L (ref 98–107)
CO2 SERPL-SCNC: 26 MMOL/L (ref 21–31)
CREAT SERPL-MCNC: 1.22 MG/DL (ref 0.7–1.3)
DIFFERENTIAL METHOD BLD: ABNORMAL
EOSINOPHIL # BLD AUTO: 0.2 10E9/L (ref 0–0.7)
EOSINOPHIL NFR BLD AUTO: 2.1 %
ERYTHROCYTE [DISTWIDTH] IN BLOOD BY AUTOMATED COUNT: 11.9 % (ref 10–15)
GFR SERPL CREATININE-BSD FRML MDRD: 62 ML/MIN/{1.73_M2}
GLUCOSE SERPL-MCNC: 131 MG/DL (ref 70–105)
HCT VFR BLD AUTO: 37.9 % (ref 40–53)
HGB BLD-MCNC: 12.1 G/DL (ref 13.3–17.7)
IMM GRANULOCYTES # BLD: 0 10E9/L (ref 0–0.4)
IMM GRANULOCYTES NFR BLD: 0.3 %
LIPASE SERPL-CCNC: 773 U/L (ref 11–82)
LYMPHOCYTES # BLD AUTO: 1.7 10E9/L (ref 0.8–5.3)
LYMPHOCYTES NFR BLD AUTO: 17.8 %
MCH RBC QN AUTO: 31.2 PG (ref 26.5–33)
MCHC RBC AUTO-ENTMCNC: 31.9 G/DL (ref 31.5–36.5)
MCV RBC AUTO: 98 FL (ref 78–100)
MONOCYTES # BLD AUTO: 0.8 10E9/L (ref 0–1.3)
MONOCYTES NFR BLD AUTO: 8.1 %
NEUTROPHILS # BLD AUTO: 6.7 10E9/L (ref 1.6–8.3)
NEUTROPHILS NFR BLD AUTO: 71.2 %
PLATELET # BLD AUTO: 360 10E9/L (ref 150–450)
POTASSIUM SERPL-SCNC: 3.9 MMOL/L (ref 3.5–5.1)
PROT SERPL-MCNC: 7.5 G/DL (ref 6.4–8.9)
RBC # BLD AUTO: 3.88 10E12/L (ref 4.4–5.9)
SODIUM SERPL-SCNC: 140 MMOL/L (ref 134–144)
WBC # BLD AUTO: 9.4 10E9/L (ref 4–11)

## 2020-10-08 PROCEDURE — 80053 COMPREHEN METABOLIC PANEL: CPT | Mod: ZL | Performed by: FAMILY MEDICINE

## 2020-10-08 PROCEDURE — 85025 COMPLETE CBC W/AUTO DIFF WBC: CPT | Mod: ZL | Performed by: FAMILY MEDICINE

## 2020-10-08 PROCEDURE — 99214 OFFICE O/P EST MOD 30 MIN: CPT | Performed by: FAMILY MEDICINE

## 2020-10-08 PROCEDURE — 36415 COLL VENOUS BLD VENIPUNCTURE: CPT | Mod: ZL | Performed by: FAMILY MEDICINE

## 2020-10-08 PROCEDURE — 83690 ASSAY OF LIPASE: CPT | Mod: ZL | Performed by: FAMILY MEDICINE

## 2020-10-08 PROCEDURE — G0463 HOSPITAL OUTPT CLINIC VISIT: HCPCS

## 2020-10-08 RX ORDER — CIPROFLOXACIN 500 MG/1
500 TABLET, FILM COATED ORAL 2 TIMES DAILY
Qty: 20 TABLET | Refills: 0 | Status: SHIPPED | OUTPATIENT
Start: 2020-10-08 | End: 2020-10-18

## 2020-10-08 RX ORDER — METRONIDAZOLE 500 MG/1
500 TABLET ORAL 3 TIMES DAILY
Qty: 30 TABLET | Refills: 0 | Status: SHIPPED | OUTPATIENT
Start: 2020-10-08 | End: 2020-10-18

## 2020-10-08 ASSESSMENT — PAIN SCALES - GENERAL: PAINLEVEL: MODERATE PAIN (5)

## 2020-10-08 NOTE — PROGRESS NOTES
"Nursing Notes:   Radha Junior LPN  10/8/2020  4:37 PM  Signed  Chief Complaint   Patient presents with     Abdominal Pain     middle upper abdominal pain for over 3 weeks   He had been upper abdominal pain after he eats for over 3 weeks.  Radha Junior LPN..................10/8/2020   4:35 PM    Initial /82   Pulse 90   Temp 98  F (36.7  C)   Resp 18   Wt 89 kg (196 lb 3.2 oz)   SpO2 98%   BMI 30.05 kg/m   Estimated body mass index is 30.05 kg/m  as calculated from the following:    Height as of 9/10/20: 1.721 m (5' 7.75\").    Weight as of this encounter: 89 kg (196 lb 3.2 oz).  Medication Reconciliation: complete    Radha Junior LPN     Previous A1C is at goal of <8  Lab Results   Component Value Date    A1C 5.7 09/10/2020    A1C >14.0 05/05/2020    A1C 7.5 08/20/2019    A1C 8.5 04/17/2019     Urine microalbumin:creatine: 09/10/2020  Foot exam 09/20/20  Eye exam 09/2020    Tobacco User no  Patient is on a daily aspirin  Patient is on a Statin.  Blood pressure today of:     BP Readings from Last 1 Encounters:   10/08/20 134/82      is at the goal of <139/89 for diabetics.    Radha Junior LPN on 10/8/2020 at 4:35 PM          SUBJECTIVE:  Kolton Aragon  is a 54 year old male comes in today because of lower abdominal pain.  He is an alcoholic.  When I last saw him, he was fighting actively drinking but was holding onto his sobriety.  We elected to place him on naltrexone to help with cravings after discussing the fact that this is typically done as a part of a comprehensive program for management and treatment of his alcoholism.  He has a history of a gastritis and a microperforation of an ulcer and was having some abdominal discomfort a month ago when he came in for a physical.  We had him stop taking ibuprofen.  He had a slightly low hemoglobin and needs follow-up of that.  We also injected his left shoulder.    He has some LLQ pain with defecation. He has been taking magnesium and " he has been regular. He has not had colonoscopy. He didn't send in his Cologuard.     He is sleeping better.  He is working at [employer]. He is still training.     Past Medical, Family, and Social History reviewed and updated as noted below.   ROS is negative except as noted above       Allergies   Allergen Reactions     Dust Mite Extract Other (See Comments)     Sneezing, itchy eyes   ,   Family History   Problem Relation Age of Onset     Family History Negative Mother         Good Health     Other - See Comments Father         Deserted as a child, patient knows father had a history of heart attack at 50     Heart Disease Father         Heart Disease,patient knows father had a history of heart attack at 50     Cancer Other         Cancer,History of cancer   ,   Current Outpatient Medications   Medication     Alcohol Swabs (B-D SINGLE USE SWABS REGULAR) PADS     blood glucose calibration (NO BRAND SPECIFIED) solution     blood glucose monitoring (JOEY MICROLET) lancets     blood glucose monitoring (NO BRAND SPECIFIED) meter device kit     ciprofloxacin (CIPRO) 500 MG tablet     Continuous Blood Gluc  (DEXCOM G6 ) CHRISTINA     Continuous Blood Gluc Sensor (DEXCOM G6 SENSOR) MISC     Continuous Blood Gluc Transmit (DEXCOM G6 TRANSMITTER) MISC     CONTOUR NEXT TEST test strip     diclofenac (VOLTAREN) 1 % topical gel     doxylamine (UNISOM) 25 MG TABS tablet     glipiZIDE (GLUCOTROL XL) 5 MG 24 hr tablet     glucose (BD GLUCOSE) 4 g chewable tablet     insulin pen needle (29G X 12.7MM) 29G X 12.7MM miscellaneous     LANTUS SOLOSTAR 100 UNIT/ML soln     lisinopril (ZESTRIL) 20 MG tablet     Melatonin 10-10 MG TBCR     metroNIDAZOLE (FLAGYL) 500 MG tablet     naltrexone (DEPADE/REVIA) 50 MG tablet     NOVOLOG FLEXPEN 100 UNIT/ML soln     omeprazole (PRILOSEC) 40 MG DR capsule     traZODone (DESYREL) 100 MG tablet     No current facility-administered medications for this visit.    ,   Past Medical History:    Diagnosis Date     Alcohol-induced acute pancreatitis without infection or necrosis     06/20/2016     Essential (primary) hypertension     9/12/2013     Gastro-esophageal reflux disease without esophagitis     5/18/2015     Spondylolisthesis of lumbosacral region     7/9/2013,Patient has been referred for PT and has not followed through.  He was sent for MRI scan,and he was not able to do either the closed or open MRI due to claustrophobia.     Spondylosis of lumbar region without myelopathy or radiculopathy     4/22/2013     Tobacco use     5/17/2013     Uncomplicated asthma     No Comments Provided   ,   Patient Active Problem List    Diagnosis Date Noted     Type 2 diabetes mellitus without complication, without long-term current use of insulin (H) 04/17/2019     Priority: Medium     Reactive airway disease 03/01/2018     Priority: Medium     Alcoholism (H) 10/12/2017     Priority: Medium     Altered level of consciousness 10/11/2017     Priority: Medium     Acute alcoholic pancreatitis 06/20/2016     Priority: Medium     Chronic kidney disease, stage II (mild) 03/09/2016     Priority: Medium     Gastroesophageal reflux disease without esophagitis 05/18/2015     Priority: Medium     Dyslipidemia 12/29/2014     Priority: Medium     Hypertension 09/12/2013     Priority: Medium     Spondylolisthesis at L5-S1 level 07/09/2013     Priority: Medium     Overview:   Patient has been referred for PT and has not followed through.  He was sent for MRI scan,and he was not able to do either the closed or open MRI due to claustrophobia.       Insomnia 06/20/2013     Priority: Medium     Chewing tobacco use 05/17/2013     Priority: Medium     DJD (degenerative joint disease), lumbar 04/22/2013     Priority: Medium   ,   Past Surgical History:   Procedure Laterality Date     ESOPHAGOSCOPY, GASTROSCOPY, DUODENOSCOPY (EGD), COMBINED N/A 6/16/2020    Procedure: ESOPHAGOGASTRODUODENOSCOPY, WITH BIOPSY;  Surgeon: Brianna  Giovanni HARDEN MD;  Location: [facility] OR     VASECTOMY      No Comments Provided    and   Social History     Tobacco Use     Smoking status: Never Smoker     Smokeless tobacco: Former User     Types: Chew     Tobacco comment: Quit smoking: trying to quit chew using 1 pack week   Substance Use Topics     Alcohol use: Not Currently     Alcohol/week: 28.0 standard drinks     OBJECTIVE:  /82   Pulse 90   Temp 98  F (36.7  C)   Resp 18   Wt 89 kg (196 lb 3.2 oz)   SpO2 98%   BMI 30.05 kg/m     EXAM:  Alert cooperative, no distress.  Mucous membranes are moist.  Lungs are clear, no rales rhonchi or wheezes are heard.  Cardiac RRR without murmur.  Abdomen is soft.  He has tenderness to palpation in the left upper quadrant and also in the lower left quadrant.  No rebound guarding or peritoneal signs.    Results for orders placed or performed in visit on 10/08/20   CBC with platelets differential     Status: Abnormal   Result Value Ref Range    WBC 9.4 4.0 - 11.0 10e9/L    RBC Count 3.88 (L) 4.4 - 5.9 10e12/L    Hemoglobin 12.1 (L) 13.3 - 17.7 g/dL    Hematocrit 37.9 (L) 40.0 - 53.0 %    MCV 98 78 - 100 fl    MCH 31.2 26.5 - 33.0 pg    MCHC 31.9 31.5 - 36.5 g/dL    RDW 11.9 10.0 - 15.0 %    Platelet Count 360 150 - 450 10e9/L    Diff Method Automated Method     % Neutrophils 71.2 %    % Lymphocytes 17.8 %    % Monocytes 8.1 %    % Eosinophils 2.1 %    % Basophils 0.5 %    % Immature Granulocytes 0.3 %    Absolute Neutrophil 6.7 1.6 - 8.3 10e9/L    Absolute Lymphocytes 1.7 0.8 - 5.3 10e9/L    Absolute Monocytes 0.8 0.0 - 1.3 10e9/L    Absolute Eosinophils 0.2 0.0 - 0.7 10e9/L    Absolute Basophils 0.1 0.0 - 0.2 10e9/L    Abs Immature Granulocytes 0.0 0 - 0.4 10e9/L      ASSESSMENT/Plan :    Kolton was seen today for abdominal pain.    Diagnoses and all orders for this visit:    Diverticulitis of colon  -     ciprofloxacin (CIPRO) 500 MG tablet; Take 1 tablet (500 mg) by mouth 2 times daily for 10 days  -      metroNIDAZOLE (FLAGYL) 500 MG tablet; Take 1 tablet (500 mg) by mouth 3 times daily for 10 days    PUD (peptic ulcer disease)  -     CBC with platelets differential; Future  -     CBC with platelets differential    Anemia, unspecified type  -     CBC with platelets differential; Future  -     CBC with platelets differential    Abdominal pain, epigastric  -     Comprehensive metabolic panel; Future  -     Lipase; Future  -     Lipase  -     Comprehensive metabolic panel    Osteoarthritis, unspecified osteoarthritis type, unspecified site  -     diclofenac (VOLTAREN) 1 % topical gel; 2 gram topical 4 times per day as needed Place onto the skin 4 times daily 2 gram topical 4 times per day as needed    Special screening for malignant neoplasms, colon  -     GASTROENTEROLOGY ADULT REF PROCEDURE ONLY; Future    Type 2 diabetes mellitus without complication, without long-term current use of insulin (H)    Alcoholism (H)    Essential hypertension      I think he presumptively has diverticulitis.  I think that is causing radiating pain from the left lower quadrant up to the left upper quadrant.  His hemoglobin has gone up and he still taking his acid blockade.  He is no longer drinking and seems to be doing well with naltrexone to cut down on his cravings.  He has not had colonoscopy.  At this point will empirically treat with Cipro and Flagyl for 10 days. If not improved over the next 4 days, would consider CT abdomen and pelvis.   Will plan colonoscopy after he completes treatment.     Support encouragement offered with regard to his success with drinking.  Congratulated on his new job at [employer].  Follow-up worsening or not improving    A total of 25 minutes was spent with the patient, greater than 50% of the time was spent in counseling/discussion of the aforementioned concerns.     Jono Gonzalez MD

## 2020-10-08 NOTE — NURSING NOTE
"Chief Complaint   Patient presents with     Abdominal Pain     middle upper abdominal pain for over 3 weeks   He had been upper abdominal pain after he eats for over 3 weeks.  Radha Junior LPN..................10/8/2020   4:35 PM    Initial /82   Pulse 90   Temp 98  F (36.7  C)   Resp 18   Wt 89 kg (196 lb 3.2 oz)   SpO2 98%   BMI 30.05 kg/m   Estimated body mass index is 30.05 kg/m  as calculated from the following:    Height as of 9/10/20: 1.721 m (5' 7.75\").    Weight as of this encounter: 89 kg (196 lb 3.2 oz).  Medication Reconciliation: complete    Radha Junior LPN     Previous A1C is at goal of <8  Lab Results   Component Value Date    A1C 5.7 09/10/2020    A1C >14.0 05/05/2020    A1C 7.5 08/20/2019    A1C 8.5 04/17/2019     Urine microalbumin:creatine: 09/10/2020  Foot exam 09/20/20  Eye exam 09/2020    Tobacco User no  Patient is on a daily aspirin  Patient is on a Statin.  Blood pressure today of:     BP Readings from Last 1 Encounters:   10/08/20 134/82      is at the goal of <139/89 for diabetics.    Radha Junior LPN on 10/8/2020 at 4:35 PM      "

## 2020-10-08 NOTE — LETTER
October 13, 2020      Kolton Aragon  [address] [id number]        Dear John,     Your labs looked ok but your pancreas test was elevated.  I think you got a phone call about this while I was gone.  This could explain the upper abdominal pain with eating.  If you are feeling better with the medication we put you on, I think we can safely watch and wait.  I would suggest you eat a very low fat diet (avoiding fried foods, processed meats, gravy, cream sauces, ice cream, etc) and that will help.  I'm pleased that you have been doing well with your sobriety which is crucial to settling down the pancreas as well.     I would like you to have a follow up with labs in a couple of weeks to make sure things are getting back to normal. If you're feeling worse again, please let us know.    It was a pleasure seeing you the other day.  If you have any questions, please don't hesitate to call us.       Sincerely,        Jono Gonzalez MD                            Results for orders placed or performed in visit on 10/08/20   Lipase     Status: Abnormal   Result Value Ref Range    Lipase 773 (H) 11 - 82 U/L   Comprehensive metabolic panel     Status: Abnormal   Result Value Ref Range    Sodium 140 134 - 144 mmol/L    Potassium 3.9 3.5 - 5.1 mmol/L    Chloride 108 (H) 98 - 107 mmol/L    Carbon Dioxide 26 21 - 31 mmol/L    Anion Gap 6 3 - 14 mmol/L    Glucose 131 (H) 70 - 105 mg/dL    Urea Nitrogen 17 7 - 25 mg/dL    Creatinine 1.22 0.70 - 1.30 mg/dL    GFR Estimate 62 >60 mL/min/[1.73_m2]    GFR Estimate If Black 75 >60 mL/min/[1.73_m2]    Calcium 9.0 8.6 - 10.3 mg/dL    Bilirubin Total 0.3 0.3 - 1.0 mg/dL    Albumin 3.9 3.5 - 5.7 g/dL    Protein Total 7.5 6.4 - 8.9 g/dL    Alkaline Phosphatase 57 34 - 104 U/L    ALT 30 7 - 52 U/L    AST 21 13 - 39 U/L   CBC with platelets differential     Status: Abnormal   Result Value Ref Range    WBC 9.4 4.0 - 11.0 10e9/L    RBC Count 3.88 (L) 4.4 - 5.9 10e12/L     Hemoglobin 12.1 (L) 13.3 - 17.7 g/dL    Hematocrit 37.9 (L) 40.0 - 53.0 %    MCV 98 78 - 100 fl    MCH 31.2 26.5 - 33.0 pg    MCHC 31.9 31.5 - 36.5 g/dL    RDW 11.9 10.0 - 15.0 %    Platelet Count 360 150 - 450 10e9/L    Diff Method Automated Method     % Neutrophils 71.2 %    % Lymphocytes 17.8 %    % Monocytes 8.1 %    % Eosinophils 2.1 %    % Basophils 0.5 %    % Immature Granulocytes 0.3 %    Absolute Neutrophil 6.7 1.6 - 8.3 10e9/L    Absolute Lymphocytes 1.7 0.8 - 5.3 10e9/L    Absolute Monocytes 0.8 0.0 - 1.3 10e9/L    Absolute Eosinophils 0.2 0.0 - 0.7 10e9/L    Absolute Basophils 0.1 0.0 - 0.2 10e9/L    Abs Immature Granulocytes 0.0 0 - 0.4 10e9/L

## 2020-10-09 ASSESSMENT — ASTHMA QUESTIONNAIRES: ACT_TOTALSCORE: 25

## 2020-10-13 ENCOUNTER — TELEPHONE (OUTPATIENT)
Dept: FAMILY MEDICINE | Facility: OTHER | Age: 55
End: 2020-10-13

## 2020-10-13 NOTE — TELEPHONE ENCOUNTER
See lab result note from Kira Esquivel yesterday. I gave the patient that information.  Radha Junior LPN..................10/13/2020   11:11 AM

## 2021-03-09 DIAGNOSIS — I10 HYPERTENSION, UNSPECIFIED TYPE: ICD-10-CM

## 2021-03-09 RX ORDER — LISINOPRIL 20 MG/1
TABLET ORAL
Qty: 90 TABLET | Refills: 2 | Status: ON HOLD | OUTPATIENT
Start: 2021-03-09 | End: 2021-03-19

## 2021-03-09 NOTE — TELEPHONE ENCOUNTER
Lisinopril 20 mg daily      Last Written Prescription Date:  8/17/2020  Last Fill Quantity: 90,   # refills: 2  Last Office Visit: 10/8/2020  Future Office visit:       Routing refill request to provider for review/approval because:  Patient reported taking 10 mg daily to LPN at office visit per alert when RN went to refill medication. Unable to find documentation to support dose change to smaller dose. T-up medication for Jono Gonzalez to refill. Maricarmen Whitfield RN on 3/9/2021 at 10:25 AM

## 2021-03-18 ENCOUNTER — HOSPITAL ENCOUNTER (OUTPATIENT)
Facility: OTHER | Age: 56
Setting detail: OBSERVATION
Discharge: HOME | End: 2021-03-19
Attending: EMERGENCY MEDICINE | Admitting: FAMILY MEDICINE
Payer: COMMERCIAL

## 2021-03-18 ENCOUNTER — APPOINTMENT (OUTPATIENT)
Dept: CT IMAGING | Facility: OTHER | Age: 56
End: 2021-03-18
Attending: EMERGENCY MEDICINE
Payer: COMMERCIAL

## 2021-03-18 DIAGNOSIS — N18.2 CHRONIC KIDNEY DISEASE, STAGE II (MILD): ICD-10-CM

## 2021-03-18 DIAGNOSIS — I12.9 MALIGNANT HYPERTENSIVE KIDNEY DISEASE WITH CHRONIC KIDNEY DISEASE STAGE I THROUGH STAGE IV, OR UNSPECIFIED(403.00): ICD-10-CM

## 2021-03-18 DIAGNOSIS — R16.0 LIVER MASS, LEFT LOBE: ICD-10-CM

## 2021-03-18 DIAGNOSIS — N18.2 TYPE 2 DIABETES MELLITUS WITH STAGE 2 CHRONIC KIDNEY DISEASE, UNSPECIFIED WHETHER LONG TERM INSULIN USE (H): ICD-10-CM

## 2021-03-18 DIAGNOSIS — F40.240 CLAUSTROPHOBIA: Primary | ICD-10-CM

## 2021-03-18 DIAGNOSIS — R10.11 RUQ ABDOMINAL PAIN: ICD-10-CM

## 2021-03-18 DIAGNOSIS — Z11.52 ENCOUNTER FOR SCREENING LABORATORY TESTING FOR COVID-19 VIRUS: ICD-10-CM

## 2021-03-18 DIAGNOSIS — Z79.891 ADMISSION FOR LONG-TERM OPIATE ANALGESIC USE: ICD-10-CM

## 2021-03-18 DIAGNOSIS — E11.22 TYPE 2 DIABETES MELLITUS WITH STAGE 2 CHRONIC KIDNEY DISEASE, UNSPECIFIED WHETHER LONG TERM INSULIN USE (H): ICD-10-CM

## 2021-03-18 DIAGNOSIS — K21.9 GASTROESOPHAGEAL REFLUX DISEASE WITHOUT ESOPHAGITIS: ICD-10-CM

## 2021-03-18 DIAGNOSIS — R16.0 HEPATOMEGALY: ICD-10-CM

## 2021-03-18 PROBLEM — K92.2 GIB (GASTROINTESTINAL BLEEDING): Status: ACTIVE | Noted: 2021-03-18

## 2021-03-18 LAB
ALBUMIN SERPL-MCNC: 3.6 G/DL (ref 3.5–5.7)
ALBUMIN UR-MCNC: 30 MG/DL
ALP SERPL-CCNC: 84 U/L (ref 34–104)
ALT SERPL W P-5'-P-CCNC: 75 U/L (ref 7–52)
ANION GAP SERPL CALCULATED.3IONS-SCNC: 8 MMOL/L (ref 3–14)
APPEARANCE UR: CLEAR
AST SERPL W P-5'-P-CCNC: 33 U/L (ref 13–39)
BASOPHILS # BLD AUTO: 0.1 10E9/L (ref 0–0.2)
BASOPHILS NFR BLD AUTO: 0.8 %
BILIRUB SERPL-MCNC: 0.5 MG/DL (ref 0.3–1)
BILIRUB UR QL STRIP: NEGATIVE
BUN SERPL-MCNC: 17 MG/DL (ref 7–25)
CALCIUM SERPL-MCNC: 8.7 MG/DL (ref 8.6–10.3)
CHLORIDE SERPL-SCNC: 97 MMOL/L (ref 98–107)
CO2 SERPL-SCNC: 26 MMOL/L (ref 21–31)
COLOR UR AUTO: ABNORMAL
CREAT SERPL-MCNC: 1.45 MG/DL (ref 0.7–1.3)
DIFFERENTIAL METHOD BLD: ABNORMAL
EOSINOPHIL # BLD AUTO: 0.1 10E9/L (ref 0–0.7)
EOSINOPHIL NFR BLD AUTO: 1 %
ERYTHROCYTE [DISTWIDTH] IN BLOOD BY AUTOMATED COUNT: 15.1 % (ref 10–15)
GFR SERPL CREATININE-BSD FRML MDRD: 51 ML/MIN/{1.73_M2}
GLUCOSE SERPL-MCNC: 236 MG/DL (ref 70–105)
GLUCOSE UR STRIP-MCNC: NEGATIVE MG/DL
HCT VFR BLD AUTO: 35.6 % (ref 40–53)
HGB BLD-MCNC: 11.4 G/DL (ref 13.3–17.7)
HGB UR QL STRIP: NEGATIVE
IMM GRANULOCYTES # BLD: 0.1 10E9/L (ref 0–0.4)
IMM GRANULOCYTES NFR BLD: 1 %
KETONES UR STRIP-MCNC: NEGATIVE MG/DL
LABORATORY COMMENT REPORT: NORMAL
LEUKOCYTE ESTERASE UR QL STRIP: NEGATIVE
LIPASE SERPL-CCNC: 360 U/L (ref 11–82)
LYMPHOCYTES # BLD AUTO: 1.3 10E9/L (ref 0.8–5.3)
LYMPHOCYTES NFR BLD AUTO: 10.5 %
MCH RBC QN AUTO: 26.8 PG (ref 26.5–33)
MCHC RBC AUTO-ENTMCNC: 32 G/DL (ref 31.5–36.5)
MCV RBC AUTO: 84 FL (ref 78–100)
MONOCYTES # BLD AUTO: 1.8 10E9/L (ref 0–1.3)
MONOCYTES NFR BLD AUTO: 14.4 %
MUCOUS THREADS #/AREA URNS LPF: PRESENT /LPF
NEUTROPHILS # BLD AUTO: 9.1 10E9/L (ref 1.6–8.3)
NEUTROPHILS NFR BLD AUTO: 72.3 %
NITRATE UR QL: NEGATIVE
PH UR STRIP: 5.5 PH (ref 5–7)
PLATELET # BLD AUTO: 438 10E9/L (ref 150–450)
POTASSIUM SERPL-SCNC: 4.6 MMOL/L (ref 3.5–5.1)
PROT SERPL-MCNC: 7.1 G/DL (ref 6.4–8.9)
RBC # BLD AUTO: 4.26 10E12/L (ref 4.4–5.9)
RBC #/AREA URNS AUTO: 1 /HPF (ref 0–2)
SARS-COV-2 RNA RESP QL NAA+PROBE: NEGATIVE
SODIUM SERPL-SCNC: 131 MMOL/L (ref 134–144)
SOURCE: ABNORMAL
SP GR UR STRIP: 1.04 (ref 1–1.03)
SPECIMEN SOURCE: NORMAL
SQUAMOUS #/AREA URNS AUTO: 1 /HPF (ref 0–1)
UROBILINOGEN UR STRIP-MCNC: NORMAL MG/DL (ref 0–2)
WBC # BLD AUTO: 12.5 10E9/L (ref 4–11)
WBC #/AREA URNS AUTO: <1 /HPF (ref 0–5)

## 2021-03-18 PROCEDURE — U0005 INFEC AGEN DETEC AMPLI PROBE: HCPCS | Performed by: STUDENT IN AN ORGANIZED HEALTH CARE EDUCATION/TRAINING PROGRAM

## 2021-03-18 PROCEDURE — 81001 URINALYSIS AUTO W/SCOPE: CPT | Performed by: STUDENT IN AN ORGANIZED HEALTH CARE EDUCATION/TRAINING PROGRAM

## 2021-03-18 PROCEDURE — 255N000002 HC RX 255 OP 636: Performed by: EMERGENCY MEDICINE

## 2021-03-18 PROCEDURE — 93010 ELECTROCARDIOGRAM REPORT: CPT | Performed by: INTERNAL MEDICINE

## 2021-03-18 PROCEDURE — G0378 HOSPITAL OBSERVATION PER HR: HCPCS

## 2021-03-18 PROCEDURE — 96374 THER/PROPH/DIAG INJ IV PUSH: CPT | Mod: XU | Performed by: EMERGENCY MEDICINE

## 2021-03-18 PROCEDURE — 85025 COMPLETE CBC W/AUTO DIFF WBC: CPT | Performed by: EMERGENCY MEDICINE

## 2021-03-18 PROCEDURE — 96375 TX/PRO/DX INJ NEW DRUG ADDON: CPT | Performed by: EMERGENCY MEDICINE

## 2021-03-18 PROCEDURE — 83036 HEMOGLOBIN GLYCOSYLATED A1C: CPT | Performed by: INTERNAL MEDICINE

## 2021-03-18 PROCEDURE — U0003 INFECTIOUS AGENT DETECTION BY NUCLEIC ACID (DNA OR RNA); SEVERE ACUTE RESPIRATORY SYNDROME CORONAVIRUS 2 (SARS-COV-2) (CORONAVIRUS DISEASE [COVID-19]), AMPLIFIED PROBE TECHNIQUE, MAKING USE OF HIGH THROUGHPUT TECHNOLOGIES AS DESCRIBED BY CMS-2020-01-R: HCPCS | Performed by: STUDENT IN AN ORGANIZED HEALTH CARE EDUCATION/TRAINING PROGRAM

## 2021-03-18 PROCEDURE — 36415 COLL VENOUS BLD VENIPUNCTURE: CPT | Performed by: EMERGENCY MEDICINE

## 2021-03-18 PROCEDURE — 250N000011 HC RX IP 250 OP 636: Performed by: EMERGENCY MEDICINE

## 2021-03-18 PROCEDURE — C9803 HOPD COVID-19 SPEC COLLECT: HCPCS | Performed by: EMERGENCY MEDICINE

## 2021-03-18 PROCEDURE — 258N000003 HC RX IP 258 OP 636: Performed by: EMERGENCY MEDICINE

## 2021-03-18 PROCEDURE — 99285 EMERGENCY DEPT VISIT HI MDM: CPT | Performed by: EMERGENCY MEDICINE

## 2021-03-18 PROCEDURE — 93005 ELECTROCARDIOGRAM TRACING: CPT | Performed by: EMERGENCY MEDICINE

## 2021-03-18 PROCEDURE — 80053 COMPREHEN METABOLIC PANEL: CPT | Performed by: EMERGENCY MEDICINE

## 2021-03-18 PROCEDURE — 83690 ASSAY OF LIPASE: CPT | Performed by: EMERGENCY MEDICINE

## 2021-03-18 PROCEDURE — 99285 EMERGENCY DEPT VISIT HI MDM: CPT | Mod: 25 | Performed by: EMERGENCY MEDICINE

## 2021-03-18 PROCEDURE — 74177 CT ABD & PELVIS W/CONTRAST: CPT

## 2021-03-18 PROCEDURE — 250N000013 HC RX MED GY IP 250 OP 250 PS 637: Performed by: STUDENT IN AN ORGANIZED HEALTH CARE EDUCATION/TRAINING PROGRAM

## 2021-03-18 RX ORDER — HYDROMORPHONE HYDROCHLORIDE 1 MG/ML
0.5 INJECTION, SOLUTION INTRAMUSCULAR; INTRAVENOUS; SUBCUTANEOUS
Status: COMPLETED | OUTPATIENT
Start: 2021-03-18 | End: 2021-03-18

## 2021-03-18 RX ORDER — OXYCODONE HYDROCHLORIDE 5 MG/1
5 TABLET ORAL EVERY 6 HOURS PRN
Qty: 20 TABLET | Refills: 0 | Status: SHIPPED | OUTPATIENT
Start: 2021-03-18 | End: 2021-03-19

## 2021-03-18 RX ORDER — ONDANSETRON 2 MG/ML
4 INJECTION INTRAMUSCULAR; INTRAVENOUS EVERY 30 MIN PRN
Status: DISCONTINUED | OUTPATIENT
Start: 2021-03-18 | End: 2021-03-19 | Stop reason: HOSPADM

## 2021-03-18 RX ORDER — TRAZODONE HYDROCHLORIDE 50 MG/1
100 TABLET, FILM COATED ORAL
Status: DISCONTINUED | OUTPATIENT
Start: 2021-03-18 | End: 2021-03-19 | Stop reason: HOSPADM

## 2021-03-18 RX ORDER — SODIUM CHLORIDE 9 MG/ML
INJECTION, SOLUTION INTRAVENOUS CONTINUOUS
Status: DISCONTINUED | OUTPATIENT
Start: 2021-03-18 | End: 2021-03-19 | Stop reason: HOSPADM

## 2021-03-18 RX ORDER — IODIXANOL 320 MG/ML
100 INJECTION, SOLUTION INTRAVASCULAR ONCE
Status: COMPLETED | OUTPATIENT
Start: 2021-03-18 | End: 2021-03-18

## 2021-03-18 RX ORDER — LORAZEPAM 1 MG/1
1 TABLET ORAL
Status: DISCONTINUED | OUTPATIENT
Start: 2021-03-18 | End: 2021-03-19 | Stop reason: HOSPADM

## 2021-03-18 RX ADMIN — SODIUM CHLORIDE: 9 INJECTION, SOLUTION INTRAVENOUS at 18:54

## 2021-03-18 RX ADMIN — ONDANSETRON 4 MG: 2 INJECTION INTRAMUSCULAR; INTRAVENOUS at 18:55

## 2021-03-18 RX ADMIN — IODIXANOL 100 ML: 320 INJECTION, SOLUTION INTRAVASCULAR at 19:38

## 2021-03-18 RX ADMIN — HYDROMORPHONE HYDROCHLORIDE 0.5 MG: 1 INJECTION, SOLUTION INTRAMUSCULAR; INTRAVENOUS; SUBCUTANEOUS at 18:51

## 2021-03-18 ASSESSMENT — MIFFLIN-ST. JEOR: SCORE: 1727.5

## 2021-03-18 NOTE — ED PROVIDER NOTES
Transfer of care from previous shift provider. Sign out details: Right abdominal rebound with concern for surgical abdomen possibly appy vs ly. CT pending. See separate Emergency Department note below.    Final diagnoses:   RUQ abdominal pain   Liver mass, left lobe - 7 cm (3/18/20)       Assessment and Plan:  CT scan demonstrating 7 cm left liver lobe mass.  Discussion with radiology reveals unclear diagnosis.  [facility] hepatology Dr. Leventhal consulted who recommends follow-up arterial and venous phase MR outpatient imaging to further characterize mass. Dr. Leventhal will be available for the next week for consult as needed. Upon discussion with patient initially desired to discharge, but later reconsidered, and patient was therefore admitted to monitor pain control and MR study in the morning.  MR ordered, and may need to be updated to arterial and venous phase studies.  Ativan as needed ordered as patient does have claustrophobia with MR imaging.  Case discussed with hospitalist Dr. Montemayor who accepts admission.  Patient stable at time of transfer care.    Alexandro Trevizo MD   3/18/2021  [facility]    History     Chief Complaint   Patient presents with     Chest Pain     HPI  Kolton Aragon is a 55 year old male who is here with abdominal pain.  He says that 2 days ago he had some epigastric abdominal pain which seemed to get better.  Yesterday he was feeling.  Today he developed some pain but it seems to have moved now more to his right mid abdomen.  He says it began right after he ate earlier today.  It is worse to take deep breaths or some movements.  Does not radiate.  It is quite severe.  No nausea vomiting.  No chest pain heaviness tightness or squeezing.  No palpitations.  No fevers or chills.  No change in bowel or bladder.9    Allergies:  Allergies   Allergen Reactions     Dust Mite Extract Other (See Comments)     Sneezing, itchy eyes       Problem List:    Patient Active Problem List     Diagnosis Date Noted     GIB (gastrointestinal bleeding) 03/18/2021     Priority: Medium     RUQ abdominal pain 03/18/2021     Priority: Medium     Liver mass, left lobe 03/18/2021     Priority: Medium     Type 2 diabetes mellitus without complication, without long-term current use of insulin (H) 04/17/2019     Priority: Medium     Reactive airway disease 03/01/2018     Priority: Medium     Alcoholism (H) 10/12/2017     Priority: Medium     Altered level of consciousness 10/11/2017     Priority: Medium     Acute alcoholic pancreatitis 06/20/2016     Priority: Medium     Chronic kidney disease, stage II (mild) 03/09/2016     Priority: Medium     Gastroesophageal reflux disease without esophagitis 05/18/2015     Priority: Medium     Dyslipidemia 12/29/2014     Priority: Medium     Hypertension 09/12/2013     Priority: Medium     Spondylolisthesis at L5-S1 level 07/09/2013     Priority: Medium     Overview:   Patient has been referred for PT and has not followed through.  He was sent for MRI scan,and he was not able to do either the closed or open MRI due to claustrophobia.       Insomnia 06/20/2013     Priority: Medium     Chewing tobacco use 05/17/2013     Priority: Medium     DJD (degenerative joint disease), lumbar 04/22/2013     Priority: Medium        Past Medical History:    Past Medical History:   Diagnosis Date     Alcohol-induced acute pancreatitis without infection or necrosis      Essential (primary) hypertension      Gastro-esophageal reflux disease without esophagitis      Spondylolisthesis of lumbosacral region      Spondylosis of lumbar region without myelopathy or radiculopathy      Tobacco use      Uncomplicated asthma        Past Surgical History:    Past Surgical History:   Procedure Laterality Date     ESOPHAGOSCOPY, GASTROSCOPY, DUODENOSCOPY (EGD), COMBINED N/A 6/16/2020    Procedure: ESOPHAGOGASTRODUODENOSCOPY, WITH BIOPSY;  Surgeon: Giovanni Reed MD;  Location: [facility] OR     VASECTOMY    "   No Comments Provided       Family History:    Family History   Problem Relation Age of Onset     Family History Negative Mother         Good Health     Other - See Comments Father         Deserted as a child, patient knows father had a history of heart attack at 50     Heart Disease Father         Heart Disease,patient knows father had a history of heart attack at 50     Cancer Other         Cancer,History of cancer       Social History:  Marital Status:   [4]  Social History     Tobacco Use     Smoking status: Never Smoker     Smokeless tobacco: Former User     Types: Chew     Tobacco comment: Quit smoking: trying to quit chew using 1 pack week   Substance Use Topics     Alcohol use: Not Currently     Alcohol/week: 28.0 standard drinks     Drug use: No     Comment: Drug use: No        Medications:    oxyCODONE (ROXICODONE) 5 MG tablet          Review of Systems   Constitutional: Negative for chills and fever.   HENT: Negative for congestion.    Eyes: Negative for visual disturbance.   Respiratory: Negative for chest tightness and shortness of breath.    Cardiovascular: Negative for chest pain.   Gastrointestinal: Positive for abdominal pain. Negative for constipation, diarrhea, nausea and vomiting.   Genitourinary: Negative for dysuria.   Musculoskeletal: Negative for arthralgias.   Skin: Negative for rash.   Neurological: Negative for light-headedness.   Psychiatric/Behavioral: Negative for agitation.       Physical Exam   BP: 122/80  Pulse: 98  Temp: 98.9  F (37.2  C)  Resp: 20  Height: 172.7 cm (5' 8\")  Weight: 90.8 kg (200 lb 2.8 oz)  SpO2: 99 %      Physical Exam  Vitals signs and nursing note reviewed.   Constitutional:       Appearance: He is well-developed.   HENT:      Head: Normocephalic and atraumatic.      Mouth/Throat:      Mouth: Mucous membranes are moist.   Eyes:      Conjunctiva/sclera: Conjunctivae normal.   Cardiovascular:      Rate and Rhythm: Normal rate and regular rhythm.      " Heart sounds: Normal heart sounds.   Pulmonary:      Effort: Pulmonary effort is normal.      Breath sounds: Normal breath sounds.   Abdominal:      General: Bowel sounds are normal.      Comments: Exquisitely tender entire right side of his abdomen.  Light percussion on the right side of the abdomen causes pain so he has positive rebound.  After I get done examining him he said the worst place was mid right abdomen.    Skin:     General: Skin is dry.   Neurological:      General: No focal deficit present.      Mental Status: He is alert and oriented to person, place, and time.   Psychiatric:         Mood and Affect: Mood normal.         Behavior: Behavior normal.         ED Course     ED Course as of Mar 19 0121   Thu Mar 18, 2021   1842 Based on history I am concerned for acute appendicitis, however is very tender right upper quadrant as well so this could represent an acute cholecystitis as well.  Certainly there is more in the differential, however these are at the top.  I am going to start an IV give him something for pain and nausea should he need that.  We will also check some basic lab work.  Will obtain CT scan of the abdomen.  Currently it is change of shift and care be turned over to night shift at this time.        Procedures          EKG shows sinus rhythm with some PACs.  Rate of 91 bpm.  Right bundle branch block.  No acute ST segment or T wave changes to my view.         Results for orders placed or performed during the hospital encounter of 03/18/21 (from the past 24 hour(s))   CBC with platelets differential   Result Value Ref Range    WBC 12.5 (H) 4.0 - 11.0 10e9/L    RBC Count 4.26 (L) 4.4 - 5.9 10e12/L    Hemoglobin 11.4 (L) 13.3 - 17.7 g/dL    Hematocrit 35.6 (L) 40.0 - 53.0 %    MCV 84 78 - 100 fl    MCH 26.8 26.5 - 33.0 pg    MCHC 32.0 31.5 - 36.5 g/dL    RDW 15.1 (H) 10.0 - 15.0 %    Platelet Count 438 150 - 450 10e9/L    Diff Method Automated Method     % Neutrophils 72.3 %    % Lymphocytes  10.5 %    % Monocytes 14.4 %    % Eosinophils 1.0 %    % Basophils 0.8 %    % Immature Granulocytes 1.0 %    Absolute Neutrophil 9.1 (H) 1.6 - 8.3 10e9/L    Absolute Lymphocytes 1.3 0.8 - 5.3 10e9/L    Absolute Monocytes 1.8 (H) 0.0 - 1.3 10e9/L    Absolute Eosinophils 0.1 0.0 - 0.7 10e9/L    Absolute Basophils 0.1 0.0 - 0.2 10e9/L    Abs Immature Granulocytes 0.1 0 - 0.4 10e9/L   Comprehensive metabolic panel   Result Value Ref Range    Sodium 131 (L) 134 - 144 mmol/L    Potassium 4.6 3.5 - 5.1 mmol/L    Chloride 97 (L) 98 - 107 mmol/L    Carbon Dioxide 26 21 - 31 mmol/L    Anion Gap 8 3 - 14 mmol/L    Glucose 236 (H) 70 - 105 mg/dL    Urea Nitrogen 17 7 - 25 mg/dL    Creatinine 1.45 (H) 0.70 - 1.30 mg/dL    GFR Estimate 51 (L) >60 mL/min/[1.73_m2]    GFR Estimate If Black 62 >60 mL/min/[1.73_m2]    Calcium 8.7 8.6 - 10.3 mg/dL    Bilirubin Total 0.5 0.3 - 1.0 mg/dL    Albumin 3.6 3.5 - 5.7 g/dL    Protein Total 7.1 6.4 - 8.9 g/dL    Alkaline Phosphatase 84 34 - 104 U/L    ALT 75 (H) 7 - 52 U/L    AST 33 13 - 39 U/L   Lipase   Result Value Ref Range    Lipase 360 (H) 11 - 82 U/L   CT Abdomen Pelvis w Contrast    Narrative    EXAMINATION: CT ABDOMEN PELVIS W CONTRAST, 3/18/2021 7:38 PM    TECHNIQUE:  Helical CT images from the lung bases through the  symphysis pubis were obtained  with IV contrast. Contrast dose: 100 ml  visipaque 320    COMPARISON: none    HISTORY: RLQ abdominal pain, appendicitis suspected (Age >= 14y)    FINDINGS:    There is dependent atelectasis at the lung bases.    There is a  mass involving the left lobe of the liver measuring 7 cm  in diameter. This mass is slightly lower density than the remainder of  the liver. There is an adjacent low density area impinging on the  caudate in the upper portion of the inferior vena cava. This  low-density area appears extrinsic to the liver. There is also some  intermediate density abnormality which tracks along the caudal aspect  of the liver to the  level of the gallbladder fossa. This may be  in  the subcapsular space of the liver.    The main hepatic artery and portal veins appear normal however the  higher density mass  in the left lobe liver could possibly be a  confined hematoma. Hepatic trauma, a confined vessel rupture, or  hepatic changes secondary to pancreatitis could have this appearance.    Calcified gallstones are seen. There is no biliary ductal enlargement.  Spleen  appears normal. The pancreas appears atrophic with a dilated  pancreatic duct.       The adrenal glands are normal.    The right and left kidneys are free of masses or hydronephrosis.    The periaortic lymph nodes are normal in caliber.    No intraperitoneal masses or inflammatory changes are noted.    In the pelvis the bladder and rectum appear normal.    There is spondylolisthesis of L5 on S1. Advanced degenerative changes  are present in the lower thoracic and lumbar spine      Impression    IMPRESSION: Liver is grossly abnormal and this represents a change  from previous examination. There are both high density and low-density  components. The abnormality is predominantly adjacent to the tera  hepatis. There is a low-density component that appears to be extrinsic  and impinges on the caudate lobe and the upper portion of the inferior  vena cava. There is a higher density area that appears intrinsic to  the left lobe of the liver. Liver trauma, confined small vessel  rupture or liver changes of pancreatitis could produce this appearance    JERALD RASMUSSEN MD   UA reflex to Microscopic and Culture    Specimen: Urine clean catch; Midstream Urine   Result Value Ref Range    Color Urine Light Yellow     Appearance Urine Clear     Glucose Urine Negative NEG^Negative mg/dL    Bilirubin Urine Negative NEG^Negative    Ketones Urine Negative NEG^Negative mg/dL    Specific Gravity Urine 1.041 (H) 1.003 - 1.035    Blood Urine Negative NEG^Negative    pH Urine 5.5 5.0 - 7.0 pH    Protein  Albumin Urine 30 (A) NEG^Negative mg/dL    Urobilinogen mg/dL Normal 0.0 - 2.0 mg/dL    Nitrite Urine Negative NEG^Negative    Leukocyte Esterase Urine Negative NEG^Negative    Source Midstream Urine     RBC Urine 1 0 - 2 /HPF    WBC Urine <1 0 - 5 /HPF    Squamous Epithelial /HPF Urine 1 0 - 1 /HPF    Mucous Urine Present (A) NEG^Negative /LPF   Asymptomatic SARS-CoV-2 COVID-19 Virus (Coronavirus) by PCR    Specimen: Nasopharyngeal   Result Value Ref Range    SARS-CoV-2 Virus Specimen Source Nasopharyngeal     SARS-CoV-2 PCR Result NEGATIVE     SARS-CoV-2 PCR Comment       Testing was performed using the Xpert Xpress SARS-CoV-2 Assay on the Cepheid Gene-Xpert   Instrument Systems. Additional information about this Emergency Use Authorization (EUA)   assay can be found via the Lab Guide.         Medications   sodium chloride 0.9% infusion (0 mLs Intravenous Paused 3/18/21 2310)   ondansetron (ZOFRAN) injection 4 mg (4 mg Intravenous Given 3/18/21 1855)   LORazepam (ATIVAN) tablet 1 mg ( Oral Canceled Entry 3/18/21 2231)   melatonin half-tab 10.5 mg (10.5 mg Oral Given 3/19/21 0019)   traZODone (DESYREL) tablet 100 mg (100 mg Oral Given 3/19/21 0020)   HYDROmorphone (PF) (DILAUDID) injection 0.5 mg (0.5 mg Intravenous Given 3/18/21 1851)   iodixanol (VISIPAQUE 320) injection 100 mL (100 mLs Intravenous Given 3/18/21 1938)       Assessments & Plan (with Medical Decision Making)     I have reviewed the nursing notes.    I have reviewed the findings, diagnosis, plan and need for follow up with the patient.      Current Discharge Medication List      START taking these medications    Details   oxyCODONE (ROXICODONE) 5 MG tablet Take 1 tablet (5 mg) by mouth every 6 hours as needed for severe pain  Qty: 20 tablet, Refills: 0             Final diagnoses:   RUQ abdominal pain   Liver mass, left lobe - 7 cm (3/18/20)       3/18/2021   [facility]     Robin Smith MD  03/18/21 4228       Alexandro Trevizo  MD  03/19/21 0132       Alexandro Trevizo MD  03/19/21 6649

## 2021-03-18 NOTE — ED TRIAGE NOTES
ED Nursing Triage Note (General)   ________________________________    Kolton FRANCISCO Margo is a 55 year old Male that presents to triage private car  With history of  Epigastric and chest pain. reported by patient   Pt states he felt pain on Tuesday, went away yesterday and now today pt presents with pain in chest, weakness, slight shortness of breath.   Pt stated he felt dizzy and lightheaded earlier today from  pain    Patient appears alert , in moderate distress., and cooperative behavior.    GCS 15  Airway: intact  Breathing noted as Normal.  Circulation Normal  Skin normal  Action taken:  Triage to critical care immediately      PRE HOSPITAL PRIOR LIVING SITUATION Spouse

## 2021-03-19 ENCOUNTER — HOSPITAL ENCOUNTER (EMERGENCY)
Facility: OTHER | Age: 56
Discharge: ANOTHER HOSPITAL | End: 2021-03-19
Attending: EMERGENCY MEDICINE | Admitting: EMERGENCY MEDICINE
Payer: COMMERCIAL

## 2021-03-19 ENCOUNTER — APPOINTMENT (OUTPATIENT)
Dept: ULTRASOUND IMAGING | Facility: OTHER | Age: 56
End: 2021-03-19
Attending: EMERGENCY MEDICINE
Payer: COMMERCIAL

## 2021-03-19 ENCOUNTER — APPOINTMENT (OUTPATIENT)
Dept: MRI IMAGING | Facility: OTHER | Age: 56
End: 2021-03-19
Attending: EMERGENCY MEDICINE
Payer: COMMERCIAL

## 2021-03-19 VITALS
TEMPERATURE: 100.3 F | BODY MASS INDEX: 29.7 KG/M2 | DIASTOLIC BLOOD PRESSURE: 88 MMHG | SYSTOLIC BLOOD PRESSURE: 133 MMHG | HEIGHT: 68 IN | HEART RATE: 110 BPM | RESPIRATION RATE: 20 BRPM | WEIGHT: 196 LBS | OXYGEN SATURATION: 97 %

## 2021-03-19 VITALS
SYSTOLIC BLOOD PRESSURE: 122 MMHG | OXYGEN SATURATION: 96 % | BODY MASS INDEX: 29.49 KG/M2 | TEMPERATURE: 99.2 F | WEIGHT: 194.6 LBS | HEART RATE: 84 BPM | DIASTOLIC BLOOD PRESSURE: 69 MMHG | RESPIRATION RATE: 16 BRPM | HEIGHT: 68 IN

## 2021-03-19 DIAGNOSIS — R16.0 LIVER MASS, LEFT LOBE: ICD-10-CM

## 2021-03-19 DIAGNOSIS — E11.22 TYPE 2 DIABETES MELLITUS WITH STAGE 2 CHRONIC KIDNEY DISEASE, UNSPECIFIED WHETHER LONG TERM INSULIN USE (H): ICD-10-CM

## 2021-03-19 DIAGNOSIS — R10.13 EPIGASTRIC PAIN: ICD-10-CM

## 2021-03-19 DIAGNOSIS — F17.220 CHEWING TOBACCO NICOTINE DEPENDENCE, UNCOMPLICATED: ICD-10-CM

## 2021-03-19 DIAGNOSIS — I12.9 MALIGNANT HYPERTENSIVE KIDNEY DISEASE WITH CHRONIC KIDNEY DISEASE STAGE I THROUGH STAGE IV, OR UNSPECIFIED(403.00): ICD-10-CM

## 2021-03-19 DIAGNOSIS — Z79.899 ENCOUNTER FOR LONG-TERM (CURRENT) USE OF MEDICATIONS: ICD-10-CM

## 2021-03-19 DIAGNOSIS — N18.2 TYPE 2 DIABETES MELLITUS WITH STAGE 2 CHRONIC KIDNEY DISEASE, UNSPECIFIED WHETHER LONG TERM INSULIN USE (H): ICD-10-CM

## 2021-03-19 DIAGNOSIS — Z11.52 ENCOUNTER FOR SCREENING LABORATORY TESTING FOR COVID-19 VIRUS: ICD-10-CM

## 2021-03-19 DIAGNOSIS — K21.9 GASTROESOPHAGEAL REFLUX DISEASE WITHOUT ESOPHAGITIS: ICD-10-CM

## 2021-03-19 DIAGNOSIS — R16.0 HEPATOMEGALY: ICD-10-CM

## 2021-03-19 LAB
ALBUMIN SERPL-MCNC: 3.5 G/DL (ref 3.5–5.7)
ALP SERPL-CCNC: 74 U/L (ref 34–104)
ALT SERPL W P-5'-P-CCNC: 57 U/L (ref 7–52)
ANION GAP SERPL CALCULATED.3IONS-SCNC: 7 MMOL/L (ref 3–14)
AST SERPL W P-5'-P-CCNC: 20 U/L (ref 13–39)
BASOPHILS # BLD AUTO: 0.1 10E9/L (ref 0–0.2)
BASOPHILS NFR BLD AUTO: 0.8 %
BILIRUB SERPL-MCNC: 0.5 MG/DL (ref 0.3–1)
BUN SERPL-MCNC: 19 MG/DL (ref 7–25)
CALCIUM SERPL-MCNC: 8.5 MG/DL (ref 8.6–10.3)
CHLORIDE SERPL-SCNC: 100 MMOL/L (ref 98–107)
CO2 SERPL-SCNC: 27 MMOL/L (ref 21–31)
CREAT SERPL-MCNC: 1.38 MG/DL (ref 0.7–1.3)
DIFFERENTIAL METHOD BLD: ABNORMAL
EOSINOPHIL # BLD AUTO: 0.2 10E9/L (ref 0–0.7)
EOSINOPHIL NFR BLD AUTO: 2.1 %
ERYTHROCYTE [DISTWIDTH] IN BLOOD BY AUTOMATED COUNT: 15.6 % (ref 10–15)
GFR SERPL CREATININE-BSD FRML MDRD: 54 ML/MIN/{1.73_M2}
GLUCOSE SERPL-MCNC: 209 MG/DL (ref 70–105)
HBA1C MFR BLD: 6.4 % (ref 4–6)
HCT VFR BLD AUTO: 35.1 % (ref 40–53)
HGB BLD-MCNC: 10.9 G/DL (ref 13.3–17.7)
IMM GRANULOCYTES # BLD: 0.1 10E9/L (ref 0–0.4)
IMM GRANULOCYTES NFR BLD: 0.9 %
INTERPRETATION ECG - MUSE: NORMAL
LACTATE BLD-SCNC: 0.6 MMOL/L (ref 0.7–2)
LACTATE BLD-SCNC: 1.3 MMOL/L (ref 0.7–2)
LYMPHOCYTES # BLD AUTO: 1.5 10E9/L (ref 0.8–5.3)
LYMPHOCYTES NFR BLD AUTO: 14.8 %
MCH RBC QN AUTO: 26.5 PG (ref 26.5–33)
MCHC RBC AUTO-ENTMCNC: 31.1 G/DL (ref 31.5–36.5)
MCV RBC AUTO: 85 FL (ref 78–100)
MONOCYTES # BLD AUTO: 1.3 10E9/L (ref 0–1.3)
MONOCYTES NFR BLD AUTO: 12.9 %
NEUTROPHILS # BLD AUTO: 6.8 10E9/L (ref 1.6–8.3)
NEUTROPHILS NFR BLD AUTO: 68.5 %
PLATELET # BLD AUTO: 514 10E9/L (ref 150–450)
POTASSIUM SERPL-SCNC: 4.3 MMOL/L (ref 3.5–5.1)
PROT SERPL-MCNC: 7 G/DL (ref 6.4–8.9)
RBC # BLD AUTO: 4.12 10E12/L (ref 4.4–5.9)
SODIUM SERPL-SCNC: 134 MMOL/L (ref 134–144)
WBC # BLD AUTO: 9.9 10E9/L (ref 4–11)

## 2021-03-19 PROCEDURE — G0378 HOSPITAL OBSERVATION PER HR: HCPCS

## 2021-03-19 PROCEDURE — 80053 COMPREHEN METABOLIC PANEL: CPT | Performed by: EMERGENCY MEDICINE

## 2021-03-19 PROCEDURE — 96375 TX/PRO/DX INJ NEW DRUG ADDON: CPT | Performed by: EMERGENCY MEDICINE

## 2021-03-19 PROCEDURE — 258N000003 HC RX IP 258 OP 636: Performed by: EMERGENCY MEDICINE

## 2021-03-19 PROCEDURE — 83605 ASSAY OF LACTIC ACID: CPT | Performed by: EMERGENCY MEDICINE

## 2021-03-19 PROCEDURE — A9575 INJ GADOTERATE MEGLUMI 0.1ML: HCPCS | Performed by: EMERGENCY MEDICINE

## 2021-03-19 PROCEDURE — 83605 ASSAY OF LACTIC ACID: CPT | Performed by: FAMILY MEDICINE

## 2021-03-19 PROCEDURE — 36415 COLL VENOUS BLD VENIPUNCTURE: CPT | Performed by: FAMILY MEDICINE

## 2021-03-19 PROCEDURE — 99285 EMERGENCY DEPT VISIT HI MDM: CPT | Performed by: EMERGENCY MEDICINE

## 2021-03-19 PROCEDURE — 99234 HOSP IP/OBS SM DT SF/LOW 45: CPT | Performed by: INTERNAL MEDICINE

## 2021-03-19 PROCEDURE — 250N000011 HC RX IP 250 OP 636: Performed by: EMERGENCY MEDICINE

## 2021-03-19 PROCEDURE — 99285 EMERGENCY DEPT VISIT HI MDM: CPT | Mod: 25,27 | Performed by: EMERGENCY MEDICINE

## 2021-03-19 PROCEDURE — 36415 COLL VENOUS BLD VENIPUNCTURE: CPT | Performed by: EMERGENCY MEDICINE

## 2021-03-19 PROCEDURE — 76705 ECHO EXAM OF ABDOMEN: CPT

## 2021-03-19 PROCEDURE — 96374 THER/PROPH/DIAG INJ IV PUSH: CPT | Mod: XU | Performed by: EMERGENCY MEDICINE

## 2021-03-19 PROCEDURE — C9803 HOPD COVID-19 SPEC COLLECT: HCPCS | Performed by: EMERGENCY MEDICINE

## 2021-03-19 PROCEDURE — 85025 COMPLETE CBC W/AUTO DIFF WBC: CPT | Performed by: EMERGENCY MEDICINE

## 2021-03-19 PROCEDURE — 250N000013 HC RX MED GY IP 250 OP 250 PS 637: Performed by: FAMILY MEDICINE

## 2021-03-19 PROCEDURE — 74183 MRI ABD W/O CNTR FLWD CNTR: CPT

## 2021-03-19 PROCEDURE — 255N000002 HC RX 255 OP 636: Performed by: EMERGENCY MEDICINE

## 2021-03-19 PROCEDURE — 93005 ELECTROCARDIOGRAM TRACING: CPT | Performed by: EMERGENCY MEDICINE

## 2021-03-19 RX ORDER — DIPHENHYDRAMINE HCL 50 MG
100 CAPSULE ORAL AT BEDTIME
COMMUNITY

## 2021-03-19 RX ORDER — NAPROXEN SODIUM 220 MG
440 TABLET ORAL EVERY MORNING
COMMUNITY

## 2021-03-19 RX ORDER — LORAZEPAM 1 MG/1
1 TABLET ORAL
Qty: 1 TABLET | Refills: 0 | Status: SHIPPED | OUTPATIENT
Start: 2021-03-19 | End: 2021-09-07

## 2021-03-19 RX ORDER — LANOLIN ALCOHOL/MO/W.PET/CERES
9 CREAM (GRAM) TOPICAL
Status: DISCONTINUED | OUTPATIENT
Start: 2021-03-19 | End: 2021-03-19 | Stop reason: HOSPADM

## 2021-03-19 RX ORDER — LISINOPRIL 20 MG/1
10 TABLET ORAL DAILY
COMMUNITY
End: 2021-05-10 | Stop reason: DRUGHIGH

## 2021-03-19 RX ORDER — FENTANYL CITRATE 50 UG/ML
50 INJECTION, SOLUTION INTRAMUSCULAR; INTRAVENOUS ONCE
Status: COMPLETED | OUTPATIENT
Start: 2021-03-19 | End: 2021-03-19

## 2021-03-19 RX ORDER — TRAMADOL HYDROCHLORIDE 50 MG/1
50 TABLET ORAL 2 TIMES DAILY PRN
Qty: 10 TABLET | Refills: 0 | Status: SHIPPED | OUTPATIENT
Start: 2021-03-19 | End: 2021-03-26

## 2021-03-19 RX ORDER — SENNOSIDES 8.6 MG
650 CAPSULE ORAL EVERY MORNING
COMMUNITY

## 2021-03-19 RX ADMIN — FENTANYL CITRATE 50 MCG: 50 INJECTION, SOLUTION INTRAMUSCULAR; INTRAVENOUS at 16:29

## 2021-03-19 RX ADMIN — TRAZODONE HYDROCHLORIDE 100 MG: 50 TABLET ORAL at 00:20

## 2021-03-19 RX ADMIN — FENTANYL CITRATE 50 MCG: 50 INJECTION, SOLUTION INTRAMUSCULAR; INTRAVENOUS at 15:13

## 2021-03-19 RX ADMIN — GADOTERATE MEGLUMINE 20 ML: 376.9 INJECTION INTRAVENOUS at 19:02

## 2021-03-19 RX ADMIN — SODIUM CHLORIDE: 9 INJECTION, SOLUTION INTRAVENOUS at 08:00

## 2021-03-19 RX ADMIN — MELATONIN TAB 3 MG 10.5 MG: 3 TAB at 00:19

## 2021-03-19 ASSESSMENT — ENCOUNTER SYMPTOMS
DYSURIA: 0
CONSTIPATION: 0
CHILLS: 0
DIARRHEA: 0
NAUSEA: 0
ARTHRALGIAS: 0
AGITATION: 0
ABDOMINAL PAIN: 1
SHORTNESS OF BREATH: 0
DYSURIA: 0
NAUSEA: 0
CHILLS: 0
CONFUSION: 0
VOMITING: 0
CHEST TIGHTNESS: 0
LIGHT-HEADEDNESS: 0
ARTHRALGIAS: 0
SHORTNESS OF BREATH: 0
FEVER: 0
FEVER: 0
LIGHT-HEADEDNESS: 0
VOMITING: 0
ABDOMINAL PAIN: 1
CHEST TIGHTNESS: 0

## 2021-03-19 ASSESSMENT — MIFFLIN-ST. JEOR
SCORE: 1702.2
SCORE: 1708.55

## 2021-03-19 NOTE — PHARMACY-CONSULT NOTE
Writer met with patient today per MD request for a diabetes education session.     Education Provided:  -  What type 2 diabetes is, how blood sugars and insulin work in the body  -  At home glucose monitoring and A1c testing basics, including specific values and goals of therapy. Yearly eye and foot exams.  -  Signs and symptoms of hyper/hypoglycemia  -  Long term complications and risks of uncontrolled diabetes  -  Basics on how diabetes is treated (diet, exercise, medications, social support)  -  How to manage low blood sugars and symptoms using glucose tablets and blood glucose monitoring.     Materials Provided:   Patient was given a book containing more detailed information on diabetes.     Patient Reported History:  Patient states that when he was originally diagnosed with type 2 diabetes mellitus during an admission at [facility] in [facility] he was given education on what diabetes is and how to treat it but was never given formal counseling on how to use insulin or his Dexcom monitor. Since that visit he has not been able to set up his monitor and he was keeping all of his insulin at room temperature so is not even sure it was effective when he was using it. Patient has not been using insulin for ~6 months now but states good adherence to glipizide therapy, always taking 1 tablet by mouth in the morning. He states he was on metformin at one time but resulted in uncontrollable diarrhea so he stopped taking it. He reports no history of hyperglycemic symptoms but also states he drinks easily a gallon of water every day and has recently begun to urinate more frequently at night. He reports one incidence of hypoglycemic symptoms where he felt sweaty, dizzy and shaky and treated it with a bit of candy with symptom resolution. Patient states his biggest problem is remaining abstinent from alcohol and understand how much this negatively affects him. He states he does not crave alcohol but simply drinks when he  becomes depressed.    Through family and his neighbor, he has slowly been gaining knowledge about diabetes and expresses he has a good working knowledge now. He is interested in meeting with diabetes educator but not a dietician as he feels he understands what he should/should not eat. Patient wishes to continue on glipizide therapy and follow-up with Dr. Gonzalez to assess further medication therapy.    NOTE: Please see pharmacy medication reconciliation note from this admission for further information.     Assessment:   Patient has good baseline understanding of type 2 diabetes mellitus as detailed in the Education Provided section above. He seems motivated to self-manage his diet and exercise and remain adherent to prescribed therapies. Current medication regimen includes glipizide 5 mg once daily in the morning. Metformin is not a therapeutic option due to history of uncontrollable diarrhea. Insulin therapy may be an option as long as appropriate education on administration/storage/dosing of insulin is given. Hypoglycemia unawareness is unlikely due to a history of symptoms with low blood sugars. History of CKD stage II and disease progression may limit therapeutic options in the future.     Patient's main barriers to treatment are as follows:  -  Avoiding alcohol  -  Consistent follow-up with full education of prescribed medication therapies.     Follow-up:  -  Office visit with Dr. Gonzalez on 3/30/21   Patient was encouraged to bring in his Dexcom device and associated supplies for troubleshooting at this visit.

## 2021-03-19 NOTE — PHARMACY - DISCHARGE MEDICATION RECONCILIATION AND EDUCATION
[facility]  Part of [facility]  [address] [place] [address]    March 19, 2021    Dear Pharmacist,    Your customer, Kolton Aragon, born on 1965, was recently discharged from [facility].  We have updated his medication list and want to alert you to the following:       Review of your medicines      START taking      Dose / Directions   LORazepam 1 MG tablet  Commonly known as: ATIVAN  Used for: Claustrophobia      Dose: 1 mg  Take 1 tablet (1 mg) by mouth once as needed (15 min before MRI)  Quantity: 1 tablet  Refills: 0     traMADol 50 MG tablet  Commonly known as: ULTRAM  Used for: Liver mass, left lobe      Dose: 50 mg  Take 1 tablet (50 mg) by mouth 2 times daily as needed for severe pain  Quantity: 10 tablet  Refills: 0        CONTINUE these medicines which have NOT CHANGED      Dose / Directions   Aleve 220 MG tablet  Generic drug: naproxen sodium      Dose: 440 mg  Take 440 mg by mouth every morning ALEVE BACK AND MUSCLE PAIN  Refills: 0     B-D SINGLE USE SWABS REGULAR Pads  Used for: Type 2 diabetes mellitus without complication, without long-term current use of insulin (H)      USE TO SWAB AREA OF INJECTION/SOM AS DIRECTED.  Quantity: 100 each  Refills: 3     blood glucose monitoring lancets  Used for: Type 2 diabetes mellitus without complication, without long-term current use of insulin (H)      USE WITH LANCETING DEVICE. TESTING 4 TIMES DAILY.  Quantity: 100 each  Refills: 11     Contour Next Test test strip  Used for: Type 2 diabetes mellitus without complication, without long-term current use of insulin (H)  Generic drug: blood glucose      USE TO TEST BLOOD SUGAR 4 TIMES DAILY OR AS DIRECTED.  Quantity: 100 each  Refills: 6     Dexcom G6  Jessica  Used for: Type 2 diabetes mellitus without complication, unspecified whether long term insulin use (H)      Use to read blood sugars as per 's  instructions.  Quantity: 1 each  Refills: 0     Dexcom G6 Sensor Misc  Used for: Type 2 diabetes mellitus without complication, unspecified whether long term insulin use (H)      Change every 10 days per 's instructions.  Quantity: 9 each  Refills: 3     Dexcom G6 Transmitter Misc  Used for: Type 2 diabetes mellitus without complication, unspecified whether long term insulin use (H)      Change every 3 months per 's instructions.  Quantity: 1 each  Refills: 3     diclofenac 1 % topical gel  Commonly known as: VOLTAREN  Used for: Osteoarthritis, unspecified osteoarthritis type, unspecified site      2 gram topical 4 times per day as needed Place onto the skin 4 times daily 2 gram topical 4 times per day as needed  Quantity: 100 g  Refills: 11     glipiZIDE 5 MG 24 hr tablet  Commonly known as: GLUCOTROL XL  Used for: DM type 2, not at goal (H)      Dose: 5 mg  TAKE 1 TABLET (5 MG) BY MOUTH DAILY  Quantity: 90 tablet  Refills: 11     glucose 4 g chewable tablet  Commonly known as: BD GLUCOSE  Used for: Type 2 diabetes mellitus without complication, without long-term current use of insulin (H)      Dose: 1 tablet  Take 1 tablet by mouth every hour as needed for low blood sugar  Quantity: 30 tablet  Refills: 11     lisinopril 20 MG tablet  Commonly known as: ZESTRIL      Dose: 10 mg  Take 10 mg by mouth daily  Refills: 0     Melatonin 10-10 MG Tbcr      Dose: 10 mg  Take 10 mg by mouth At Bedtime  Refills: 0     naltrexone 50 MG tablet  Commonly known as: DEPADE/REVIA  Used for: Alcoholism (H)      Dose: 50 mg  Take 1 tablet (50 mg) by mouth daily To help with alcohol cravings  Quantity: 30 tablet  Refills: 11     omeprazole 40 MG DR capsule  Commonly known as: priLOSEC  Used for: PUD (peptic ulcer disease)      Dose: 40 mg  TAKE 1 CAPSULE (40 MG) BY MOUTH DAILY  Quantity: 30 capsule  Refills: 11     traZODone 100 MG tablet  Commonly known as: DESYREL  Used for: Insomnia, unspecified type       Dose: 100 mg  Take 1 tablet (100 mg) by mouth At Bedtime  Quantity: 30 tablet  Refills: 11     Tylenol 8 Hour Arthritis Pain 650 MG CR tablet  Generic drug: acetaminophen      Dose: 650 mg  Take 650 mg by mouth every morning  Refills: 0     Unisom Sleepgels 50 MG Caps  Generic drug: diphenhydrAMINE HCl (Sleep)      Dose: 100 mg  Take 100 mg by mouth At Bedtime  Refills: 0           Where to get your medicines      These medications were sent to [id number] - [place], [place] - [address]  [address] [facility] [address]    Phone: [phone]     LORazepam 1 MG tablet    traMADol 50 MG tablet         We also reviewed Kolton Aragon's allergy list and updated it as needed:  Allergies: Dust mite extract    Thank you for continuing to care for Kolton Aragon.  We look forward to working together with you in the future.    Sincerely,  Meir Savage, Pharmacy Intern  [facility]

## 2021-03-19 NOTE — PROGRESS NOTES
1.  Has the patient had a previous reaction to IV contrast? no    2.  Does the patient have kidney disease? no    3.  Is the patient on dialysis? no    If YES to any of these questions, exam will be reviewed with a Radiologist before administering contrast.    1.  Is patient currently taking metformin? yes     If NO: Technologist will give the patient normal post CT instructions.     If YES: Technologist will obtain GFR from Lab.    2.  Is GFR is greater than 60? yes     If YES: Technologist will give the patient normal post CT instructions.     If NO: Technologist will give the patient METFORMIN post CT instructions.

## 2021-03-19 NOTE — ED TRIAGE NOTES
Patient presents to ED for RUQ and RLQ pain.  Patient stated he was discharged from hospital today and was unable to make it home.   Patient states pain is worse than it was yesterday when he presented to ED.   Patient had CT that showed mass on his liver.   Patient has labored breathing, rates pain 10/10.  Stated he took 1 tramadol prior to presenting to ED.

## 2021-03-19 NOTE — PROGRESS NOTES
Gave discharge instructions. Answered questions. Discharged to home at 1355. Wheelchair ride to son's vehicle.

## 2021-03-19 NOTE — PROGRESS NOTES
Accessed pt chart to answer question  Kena Carrillo RN.............................3/18/2021 11:15 PM

## 2021-03-19 NOTE — DISCHARGE SUMMARY
[facility]  H&P and Discharge Summary  Hospitalist    Date of Admission:  3/18/2021  Date of Discharge:  3/19/2021  Discharging Provider: Mayito Weber  Date of Service (when I saw the patient): 03/19/21    Discharge Diagnoses     Liver mass, left lobe     GIB (gastrointestinal bleeding)    RUQ abdominal pain       History of Present Illness   Kolton Araogn is an 55 year old male who presented with abdominal pain. He developed epigastric abdominal pain on Tuesday. Associated with eating. Better but then worsened, so he presented to ER.  New liver mass discovered.    Hospital Course   Remained stable throughout hospital stay. No nausea or vomiting. No pain meds required.  Admitted for MRI, which was bumped twice due to other acute patients. Mr. Aragon requested discharge with outpatient MRI and follow-up which is reasonable.     Mayito Weber MD    Significant Results and Procedures   See below    Pending Results   These results will be followed up by na  Unresulted Labs Ordered in the Past 30 Days of this Admission     No orders found for last 31 day(s).          Code Status   Full Code       Primary Care Physician   Jono Gonzalez    Physical Exam   Temp: 99.2  F (37.3  C) Temp src: Tympanic BP: 122/69 Pulse: 84   Resp: 16 SpO2: 96 % O2 Device: None (Room air)    Vitals:    03/18/21 1838 03/19/21 0607   Weight: 90.8 kg (200 lb 2.8 oz) 88.3 kg (194 lb 9.6 oz)     Vital Signs with Ranges  Temp:  [97.9  F (36.6  C)-99.3  F (37.4  C)] 99.2  F (37.3  C)  Pulse:  [] 84  Resp:  [9-20] 16  BP: (105-122)/(59-82) 122/69  SpO2:  [95 %-99 %] 96 %  I/O last 3 completed shifts:  In: -   Out: 950 [Urine:950]    Gen: Alert, NAD.  HEENT: MMM  Neck: Supple  CV: RRR no m/r/g  Pulm: CTAB, no w/r/r. No increased work of breathing  Msk: No LE edema  Abd: Soft, some distention. +TTP epigastrum to RUQ. +BS  Neuro: Grossly intact  Skin: No concerning lesions.  Psychiatric: Normal affect and  insight. Does not appear anxious or depressed.        Discharge Disposition   Discharged to home  Condition at discharge: Stable    Consultations This Hospital Stay   None    Time Spent on this Encounter   I, Mayito Weber MD, personally saw the patient today and spent less than or equal to 30 minutes discharging this patient.    Discharge Orders      MR Liver wo & w Contrast     GASTROENTEROLOGY ADULT REF CONSULT ONLY      Reason for your hospital stay    Liver mass     Follow-up and recommended labs and tests     1. Liver MRI  2. Dr. Gonzalez in 2 weeks  3. Dr. Leventhal next available     Activity    Your activity upon discharge: activity as tolerated     Full Code     Diet    Follow this diet upon discharge: regular     Discharge Medications   Current Discharge Medication List      START taking these medications    Details   LORazepam (ATIVAN) 1 MG tablet Take 1 tablet (1 mg) by mouth once as needed (15 min before MRI)  Qty: 1 tablet, Refills: 0    Associated Diagnoses: Claustrophobia      traMADol (ULTRAM) 50 MG tablet Take 1 tablet (50 mg) by mouth 2 times daily as needed for severe pain  Qty: 10 tablet, Refills: 0    Associated Diagnoses: Liver mass, left lobe         CONTINUE these medications which have NOT CHANGED    Details   acetaminophen (TYLENOL 8 HOUR ARTHRITIS PAIN) 650 MG CR tablet Take 650 mg by mouth every morning      diphenhydrAMINE HCl, Sleep, (UNISOM SLEEPGELS) 50 MG CAPS Take 100 mg by mouth At Bedtime      glipiZIDE (GLUCOTROL XL) 5 MG 24 hr tablet TAKE 1 TABLET (5 MG) BY MOUTH DAILY  Qty: 90 tablet, Refills: 11    Comments: Patient enrolled in our Rx Med Sync service to improveadherence. We are requesting a refill authorization inadvance to ensure an active prescription is on file.  Associated Diagnoses: DM type 2, not at goal (H)      lisinopril (ZESTRIL) 20 MG tablet Take 10 mg by mouth daily      Melatonin 10-10 MG TBCR Take 10 mg by mouth At Bedtime       naltrexone  (DEPADE/REVIA) 50 MG tablet Take 1 tablet (50 mg) by mouth daily To help with alcohol cravings  Qty: 30 tablet, Refills: 11    Associated Diagnoses: Alcoholism (H)      naproxen sodium (ALEVE) 220 MG tablet Take 440 mg by mouth every morning ALEVE BACK AND MUSCLE PAIN      omeprazole (PRILOSEC) 40 MG DR capsule TAKE 1 CAPSULE (40 MG) BY MOUTH DAILY  Qty: 30 capsule, Refills: 11    Comments: Patient enrolled in our Rx Med Sync service to improveadherence. We are requesting a refill authorization inadvance to ensure an active prescription is on file.  Associated Diagnoses: PUD (peptic ulcer disease)      traZODone (DESYREL) 100 MG tablet Take 1 tablet (100 mg) by mouth At Bedtime  Qty: 30 tablet, Refills: 11    Comments: Dose increase to 100 mg.  Associated Diagnoses: Insomnia, unspecified type      Alcohol Swabs (B-D SINGLE USE SWABS REGULAR) PADS USE TO SWAB AREA OF INJECTION/SOM AS DIRECTED.  Qty: 100 each, Refills: 3    Associated Diagnoses: Type 2 diabetes mellitus without complication, without long-term current use of insulin (H)      blood glucose monitoring (JOEY MICROLET) lancets USE WITH LANCETING DEVICE. TESTING 4 TIMES DAILY.  Qty: 100 each, Refills: 11    Associated Diagnoses: Type 2 diabetes mellitus without complication, without long-term current use of insulin (H)      Continuous Blood Gluc  (DEXCOM G6 ) CHRISTINA Use to read blood sugars as per 's instructions.  Qty: 1 each, Refills: 0    Associated Diagnoses: Type 2 diabetes mellitus without complication, unspecified whether long term insulin use (H)      Continuous Blood Gluc Sensor (DEXCOM G6 SENSOR) MISC Change every 10 days per 's instructions.  Qty: 9 each, Refills: 3    Associated Diagnoses: Type 2 diabetes mellitus without complication, unspecified whether long term insulin use (H)      Continuous Blood Gluc Transmit (DEXCOM G6 TRANSMITTER) MISC Change every 3 months per 's instructions.  Qty:  1 each, Refills: 3    Associated Diagnoses: Type 2 diabetes mellitus without complication, unspecified whether long term insulin use (H)      CONTOUR NEXT TEST test strip USE TO TEST BLOOD SUGAR 4 TIMES DAILY OR AS DIRECTED.  Qty: 100 each, Refills: 6    Associated Diagnoses: Type 2 diabetes mellitus without complication, without long-term current use of insulin (H)      diclofenac (VOLTAREN) 1 % topical gel 2 gram topical 4 times per day as needed Place onto the skin 4 times daily 2 gram topical 4 times per day as needed  Qty: 100 g, Refills: 11    Associated Diagnoses: Osteoarthritis, unspecified osteoarthritis type, unspecified site      glucose (BD GLUCOSE) 4 g chewable tablet Take 1 tablet by mouth every hour as needed for low blood sugar  Qty: 30 tablet, Refills: 11    Associated Diagnoses: Type 2 diabetes mellitus without complication, without long-term current use of insulin (H)           Allergies   Allergies   Allergen Reactions     Dust Mite Extract Other (See Comments)     Sneezing, itchy eyes     Data   Most Recent 3 CBC's:  Recent Labs   Lab Test 03/18/21  1852 10/08/20  1638 09/10/20  1513   WBC 12.5* 9.4 12.3*   HGB 11.4* 12.1* 11.6*   MCV 84 98 97    360 334      Most Recent 3 BMP's:  Recent Labs   Lab Test 03/18/21  1852 10/08/20  1638 09/10/20  1513   * 140 139   POTASSIUM 4.6 3.9 4.8   CHLORIDE 97* 108* 105   CO2 26 26 27   BUN 17 17 13   CR 1.45* 1.22 1.43*   ANIONGAP 8 6 7   CIARA 8.7 9.0 8.9   * 131* 157*     Most Recent 2 LFT's:  Recent Labs   Lab Test 03/18/21  1852 10/08/20  1638   AST 33 21   ALT 75* 30   ALKPHOS 84 57   BILITOTAL 0.5 0.3     Most Recent INR's and Anticoagulation Dosing History:  Anticoagulation Dose History     There is no flowsheet data to display.        Most Recent 3 Troponin's:  Recent Labs   Lab Test 08/11/19  1555 08/11/19  1250 06/30/18  0940   TROPI <0.030 <0.030 <0.030     Most Recent Cholesterol Panel:  Recent Labs   Lab Test 09/10/20  1513    CHOL 100   LDL 63   HDL 19*   TRIG 91     Most Recent 6 Bacteria Isolates From Any Culture (See EPIC Reports for Culture Details):  Recent Labs   Lab Test 04/23/20  1102   CULT Gram negative rods  4 OF 4 BOTTLES POSITIVE.   PLEASE SEE SAMPLE [id number] FOR ID/AST  Klebsiella pneumoniae*  4 OF 4 BOTTLES  Critical Value called to and read back by  RAFFI PRIEST MD, 4/24/20 AT 0120 EP       Most Recent TSH, T4 and A1c Labs:  Recent Labs   Lab Test 03/18/21  1852   A1C 6.4*     Results for orders placed or performed during the hospital encounter of 03/18/21   CT Abdomen Pelvis w Contrast    Narrative    EXAMINATION: CT ABDOMEN PELVIS W CONTRAST, 3/18/2021 7:38 PM    TECHNIQUE:  Helical CT images from the lung bases through the  symphysis pubis were obtained  with IV contrast. Contrast dose: 100 ml  visipaque 320    COMPARISON: none    HISTORY: RLQ abdominal pain, appendicitis suspected (Age >= 14y)    FINDINGS:    There is dependent atelectasis at the lung bases.    There is a  mass involving the left lobe of the liver measuring 7 cm  in diameter. This mass is slightly lower density than the remainder of  the liver. There is an adjacent low density area impinging on the  caudate in the upper portion of the inferior vena cava. This  low-density area appears extrinsic to the liver. There is also some  intermediate density abnormality which tracks along the caudal aspect  of the liver to the level of the gallbladder fossa. This may be  in  the subcapsular space of the liver.    The main hepatic artery and portal veins appear normal however the  higher density mass  in the left lobe liver could possibly be a  confined hematoma. Hepatic trauma, a confined vessel rupture, or  hepatic changes secondary to pancreatitis could have this appearance.    Calcified gallstones are seen. There is no biliary ductal enlargement.  Spleen  appears normal. The pancreas appears atrophic with a dilated  pancreatic duct.       The  adrenal glands are normal.    The right and left kidneys are free of masses or hydronephrosis.    The periaortic lymph nodes are normal in caliber.    No intraperitoneal masses or inflammatory changes are noted.    In the pelvis the bladder and rectum appear normal.    There is spondylolisthesis of L5 on S1. Advanced degenerative changes  are present in the lower thoracic and lumbar spine      Impression    IMPRESSION: Liver is grossly abnormal and this represents a change  from previous examination. There are both high density and low-density  components. The abnormality is predominantly adjacent to the tera  hepatis. There is a low-density component that appears to be extrinsic  and impinges on the caudate lobe and the upper portion of the inferior  vena cava. There is a higher density area that appears intrinsic to  the left lobe of the liver. Liver trauma, confined small vessel  rupture or liver changes of pancreatitis could produce this appearance    JERALD RASMUSSEN MD

## 2021-03-19 NOTE — PROGRESS NOTES
Patient alert and orientated. Reports mild pain on the right side of abdomen. Denies feeling nauseous. Patient feeling anxious about MRI. States he is claustrophobic. 1 time dose of Ativan will be given prior to MRI. Ambulated in the hallway independently.

## 2021-03-19 NOTE — PROGRESS NOTES
Admission Note    Data:  Kolton Aragon admitted to [facility] from emergency room at 2310.      Action:  Dr. Bojorquez has been notified of admission. Pt oriented to unit, call light in reach.     Response:  Patient is alert oriented and independent ing the room. Patient is complaining of mild pain but denies intervention. Patient requested Trazadone, unisom, and melatonin to help with sleep. Will continue to monitor. Myles Whitt RN on 3/19/2021 at 12:38 AM'

## 2021-03-19 NOTE — PLAN OF CARE
Age/Gender: 55/M    Status Observation    Code: None ordered    Provider: [place]    Precautions: NA    Activity: IND    Diet: No order    Tele: NA    Lung Sounds: Clear    Skin: WDL    Line/Drains: IV: R forearm 20G Normal Saline @ 125 ml/hr    Vitals: VSS    Blood Sugars: NA    O2: RA    Pain: 4/10    PRNs Given: Melatonin 10.5mg 0019; Trazadone 100mg 0020    Comes From: Home    Going To: ?    Notes: Patient after coming to the floor requested home sleeping medications. Provider was notified and medications were ordered. Patient waiting for MRI in the morning. Patient has been sleeping mot of the night with no major incidents. Patient has complained of some abdominal pain but has denied intervention. Will continue to monitor. Myles Whitt RN on 3/19/2021 at 4:55 AM

## 2021-03-19 NOTE — TELEPHONE ENCOUNTER
RECORDS RECEIVED FROM: Internal   Appt Date: 03.29.2021   NOTES STATUS DETAILS   OFFICE NOTE from referring provider Internal 03.19.2021 Mayito Weber MD   OFFICE NOTES from other specialists N/A    DISCHARGE SUMMARY from hospital Internal 03.19.2021 Mayito Weber MD   MEDICATION LIST Internal    LIVER BIOSPY (IF APPLICABLE)      PATHOLOGY REPORTS  N/A    IMAGING     ENDOSCOPY (IF AVAILABLE) N/A    COLONOSCOPY (IF AVAILABLE) N/A    ULTRASOUND LIVER Internal 04.23.2020 US ABDOMEN LIMITED PORTABLE    CT OF ABDOMEN Internal 03.18.2021 CT ABDOMEN PELVIS W CONTRAST   MRI OF LIVER Order 03.19.2021  MRI Liver   FIBROSCAN, US ELASTOGRAPHY, FIBROSIS SCAN, MR ELASTOGRAPHY Internal    LABS     HEPATIC PANEL (LIVER PANEL) Care Everywhere 10.13.2017   BASIC METABOLIC PANEL Internal 04.23.2020   COMPLETE METABOLIC PANEL Internal 03.18.2021   COMPLETE BLOOD COUNT (CBC) Internal 03.18.2021   INTERNATIONAL NORMALIZED RATIO (INR) Care Everywhere 06.21.2016   HEPATITIS C ANTIBODY N/A    HEPATITIS C VIRAL LOAD/PCR N/A    HEPATITIS C GENOTYPE N/A    HEPATITIS B SURFACE ANTIGEN N/A    HEPATITIS B SURFACE ANTIBODY N/A    HEPATITIS B DNA QUANT LEVEL N/A    HEPATITIS B CORE ANTIBODY N/A

## 2021-03-19 NOTE — PHARMACY - DISCHARGE MEDICATION RECONCILIATION AND EDUCATION
Pharmacy:  Discharge Counseling and Medication Reconciliation    Kolton Aragon  [address] [address]  [phone] (home)   55 year old male  PCP: Jono Gonzalez    Allergies: Dust mite extract    Discharge Counseling:    Pharmacist met with patient today to review the medication portion of the After Visit Summary (with an emphasis on NEW medications) and to address patient's questions/concerns.    Summary of Education:   -  START lorazepam and tramadol   Education on indications, how to take, and side effects.  -  Diabetes education provided (please see pharmacy consult note)  -  Summary of follow-up appointments    Materials Provided:  MedCounselor sheets printed from Clinical Pharmacology on: lorazepam and tramadol    Discharge Medication Reconciliation:    It has been determined that the patient has an adequate supply of medications available or which can be obtained from the patient's preferred pharmacy, which HE has confirmed as: [id number]    Thank you for the consult.    Meir Savage, Pharmacy Intern........March 19, 2021 2:20 PM

## 2021-03-19 NOTE — PHARMACY-ADMISSION MEDICATION HISTORY
Pharmacy -- Admission Medication Reconciliation    Prior to admission (PTA) medications were reviewed and the patient's PTA medication list was updated.    Sources Consulted: patient, chart review, sure scripts    The reliability of this Medication Reconciliation is: Reliability: Reliable    The following significant changes were made:  Novolog REMOVED  Lantus REMOVED  Unisom dose UPDATED to 100 mg at HS (states he takes 2 of the 50 mg capsules)  Lisinopril dose CHANGED to 10 mg daily - patient states the directions say 20 mg but that Dr. Gonzalez has recommended taking 10 mg daily so he has been splitting his tablets  Tylenol 8-hr ADDED  Aleve back and muscle ADDED    Note: patient states that is has been close to 6 months since he has used any insulin.  Patient states that after his lat A1C (9/10/20 - 5.7), he stopped using insulin because he did not need it any longer.  Patient also states he has a Dexcom monitor with many months worth of supplies at his house that he has never used.  Patient states he did not receive any diabetic education regarding how to use insulin or how to use the Dexcom monitor.  Patient states he attempted an appointment with diabetic education but that they were booked out too far.  Patient also states he kept all of his insulin at room temperature so is not even sure it was effective when he was using it.  Patient states he does take his glipizide every AM but does not check his blood sugar.      In addition, the patient's allergies were reviewed with the patient and updated as follows:   Allergies: Dust mite extract    The pharmacist has reviewed with the patient that all personal medications should be removed from the building or locked in the belongings safe.  Patient shall only take medications ordered by the physician and administered by the nursing staff.       Medication barriers identified: never received any diabetic education, does not currently check blood sugars - patient  will need clarification at discharge on whether he should be checking blood sugars, using insulin, etc   Medication adherence concerns: unsure if patient should be taking insulin - needs follow up   Understanding of emergency medications: always carries a source of sugar with him; states he has glucose tabs at home    Yaseminsa REAGAN Christensen, [facility], 3/19/2021,  11:06 AM

## 2021-03-20 ENCOUNTER — TRANSFERRED RECORDS (OUTPATIENT)
Dept: HEALTH INFORMATION MANAGEMENT | Facility: OTHER | Age: 56
End: 2021-03-20

## 2021-03-20 NOTE — ED PROVIDER NOTES
Transfer of care from previous shift provider. Sign out details: Awaiting MRI results. See separate Emergency Department note below.    Final diagnoses:   Epigastric pain   Liver mass, left lobe       Assessment and Plan:  MRI results show what appears most likely to be fluid collection with neoplastic process felt less likely. A complication of pancreatitis is also on differential. Case discussed with [facility] hepatologist Dr Leventhal and later [facility] GI and acute care surgery Dr Hatchet with decision to transfer to [facility] under acute care service for further evaluation and management. Patient updated on recommendation is in agreement with this proposed plan. Transferred out via EMS in stable condition.    Alexandro Trevizo MD   3/23/2021  [facility]    History     Chief Complaint   Patient presents with     Abdominal Pain     HPI  Kolton Aragon is a 55 year old male who was just seen here in the emergency department yesterday with severe right upper quadrant pain.  He was admitted for observation.  His pain was much improved today.  He was waiting to get his MRI, however this was pushed back till about 6 PM.  As he was not having any pain he did not want to wait and he wished to go home.  He was therefore discharged home.  Prior to leaving however he ate a turkey wrap for lunch.  1 hour after he ate this, just after he left here he had a sudden onset of severe pain in the right upper quadrant again.  He says this is the same kind of pain is yesterday only markedly worse.  He comes in he is pale.  Has grunting respirations.  Little bit diaphoretic.    Allergies:  Allergies   Allergen Reactions     Dust Mite Extract Other (See Comments)     Sneezing, itchy eyes       Problem List:    Patient Active Problem List    Diagnosis Date Noted     GIB (gastrointestinal bleeding) 03/18/2021     Priority: Medium     RUQ abdominal pain 03/18/2021     Priority: Medium     Liver mass, left lobe  03/18/2021     Priority: Medium     Type 2 diabetes mellitus without complication, without long-term current use of insulin (H) 04/17/2019     Priority: Medium     Reactive airway disease 03/01/2018     Priority: Medium     Alcoholism (H) 10/12/2017     Priority: Medium     Altered level of consciousness 10/11/2017     Priority: Medium     Acute alcoholic pancreatitis 06/20/2016     Priority: Medium     Chronic kidney disease, stage II (mild) 03/09/2016     Priority: Medium     Gastroesophageal reflux disease without esophagitis 05/18/2015     Priority: Medium     Dyslipidemia 12/29/2014     Priority: Medium     Hypertension 09/12/2013     Priority: Medium     Spondylolisthesis at L5-S1 level 07/09/2013     Priority: Medium     Overview:   Patient has been referred for PT and has not followed through.  He was sent for MRI scan,and he was not able to do either the closed or open MRI due to claustrophobia.       Insomnia 06/20/2013     Priority: Medium     Chewing tobacco use 05/17/2013     Priority: Medium     DJD (degenerative joint disease), lumbar 04/22/2013     Priority: Medium        Past Medical History:    Past Medical History:   Diagnosis Date     Alcohol-induced acute pancreatitis without infection or necrosis      Essential (primary) hypertension      Gastro-esophageal reflux disease without esophagitis      Spondylolisthesis of lumbosacral region      Spondylosis of lumbar region without myelopathy or radiculopathy      Tobacco use      Uncomplicated asthma        Past Surgical History:    Past Surgical History:   Procedure Laterality Date     ESOPHAGOSCOPY, GASTROSCOPY, DUODENOSCOPY (EGD), COMBINED N/A 6/16/2020    Procedure: ESOPHAGOGASTRODUODENOSCOPY, WITH BIOPSY;  Surgeon: Giovanni Reed MD;  Location: [facility] OR     VASECTOMY      No Comments Provided       Family History:    Family History   Problem Relation Age of Onset     Family History Negative Mother         Good Health     Other - See  Comments Father         Deserted as a child, patient knows father had a history of heart attack at 50     Heart Disease Father         Heart Disease,patient knows father had a history of heart attack at 50     Cancer Other         Cancer,History of cancer       Social History:  Marital Status:   [4]  Social History     Tobacco Use     Smoking status: Never Smoker     Smokeless tobacco: Former User     Types: Chew     Tobacco comment: Quit smoking: trying to quit chew using 1 pack week   Substance Use Topics     Alcohol use: Not Currently     Alcohol/week: 28.0 standard drinks     Drug use: No     Comment: Drug use: No        Medications:    acetaminophen (TYLENOL 8 HOUR ARTHRITIS PAIN) 650 MG CR tablet  Alcohol Swabs (B-D SINGLE USE SWABS REGULAR) PADS  blood glucose monitoring (JOEY MICROLET) lancets  Continuous Blood Gluc  (DEXCOM G6 ) CHRISTINA  Continuous Blood Gluc Sensor (DEXCOM G6 SENSOR) MISC  Continuous Blood Gluc Transmit (DEXCOM G6 TRANSMITTER) MISC  CONTOUR NEXT TEST test strip  diclofenac (VOLTAREN) 1 % topical gel  diphenhydrAMINE HCl, Sleep, (UNISOM SLEEPGELS) 50 MG CAPS  glipiZIDE (GLUCOTROL XL) 5 MG 24 hr tablet  glucose (BD GLUCOSE) 4 g chewable tablet  lisinopril (ZESTRIL) 20 MG tablet  LORazepam (ATIVAN) 1 MG tablet  Melatonin 10-10 MG TBCR  naltrexone (DEPADE/REVIA) 50 MG tablet  naproxen sodium (ALEVE) 220 MG tablet  omeprazole (PRILOSEC) 40 MG DR capsule  traMADol (ULTRAM) 50 MG tablet  traZODone (DESYREL) 100 MG tablet          Review of Systems   Constitutional: Negative for chills and fever.   HENT: Negative for congestion.    Eyes: Negative for visual disturbance.   Respiratory: Negative for chest tightness and shortness of breath.    Cardiovascular: Negative for chest pain.   Gastrointestinal: Positive for abdominal pain. Negative for nausea and vomiting.   Genitourinary: Negative for dysuria.   Musculoskeletal: Negative for arthralgias.   Skin: Negative for rash.  "  Neurological: Negative for light-headedness.   Psychiatric/Behavioral: Negative for confusion.       Physical Exam   BP: 128/73  Pulse: 91  Temp: 97.3  F (36.3  C)  Resp: 28  Height: 172.7 cm (5' 8\")  Weight: 88.9 kg (196 lb)  SpO2: 98 %      Physical Exam  Vitals signs and nursing note reviewed.   Constitutional:       Appearance: He is well-developed.   HENT:      Head: Normocephalic and atraumatic.      Mouth/Throat:      Mouth: Mucous membranes are moist.   Eyes:      Conjunctiva/sclera: Conjunctivae normal.   Cardiovascular:      Rate and Rhythm: Normal rate and regular rhythm.      Heart sounds: Normal heart sounds.   Pulmonary:      Effort: Pulmonary effort is normal.      Breath sounds: Normal breath sounds.   Abdominal:      Palpations: Abdomen is soft.      Tenderness: There is abdominal tenderness in the right upper quadrant. There is guarding.   Skin:     General: Skin is warm and dry.   Neurological:      Mental Status: He is alert and oriented to person, place, and time.   Psychiatric:         Behavior: Behavior normal.         ED Course        Procedures                 Results for orders placed or performed during the hospital encounter of 03/19/21 (from the past 24 hour(s))   CBC with platelets differential   Result Value Ref Range    WBC 9.9 4.0 - 11.0 10e9/L    RBC Count 4.12 (L) 4.4 - 5.9 10e12/L    Hemoglobin 10.9 (L) 13.3 - 17.7 g/dL    Hematocrit 35.1 (L) 40.0 - 53.0 %    MCV 85 78 - 100 fl    MCH 26.5 26.5 - 33.0 pg    MCHC 31.1 (L) 31.5 - 36.5 g/dL    RDW 15.6 (H) 10.0 - 15.0 %    Platelet Count 514 (H) 150 - 450 10e9/L    Diff Method Automated Method     % Neutrophils 68.5 %    % Lymphocytes 14.8 %    % Monocytes 12.9 %    % Eosinophils 2.1 %    % Basophils 0.8 %    % Immature Granulocytes 0.9 %    Absolute Neutrophil 6.8 1.6 - 8.3 10e9/L    Absolute Lymphocytes 1.5 0.8 - 5.3 10e9/L    Absolute Monocytes 1.3 0.0 - 1.3 10e9/L    Absolute Eosinophils 0.2 0.0 - 0.7 10e9/L    Absolute " Basophils 0.1 0.0 - 0.2 10e9/L    Abs Immature Granulocytes 0.1 0 - 0.4 10e9/L   Comprehensive metabolic panel   Result Value Ref Range    Sodium 134 134 - 144 mmol/L    Potassium 4.3 3.5 - 5.1 mmol/L    Chloride 100 98 - 107 mmol/L    Carbon Dioxide 27 21 - 31 mmol/L    Anion Gap 7 3 - 14 mmol/L    Glucose 209 (H) 70 - 105 mg/dL    Urea Nitrogen 19 7 - 25 mg/dL    Creatinine 1.38 (H) 0.70 - 1.30 mg/dL    GFR Estimate 54 (L) >60 mL/min/[1.73_m2]    GFR Estimate If Black 65 >60 mL/min/[1.73_m2]    Calcium 8.5 (L) 8.6 - 10.3 mg/dL    Bilirubin Total 0.5 0.3 - 1.0 mg/dL    Albumin 3.5 3.5 - 5.7 g/dL    Protein Total 7.0 6.4 - 8.9 g/dL    Alkaline Phosphatase 74 34 - 104 U/L    ALT 57 (H) 7 - 52 U/L    AST 20 13 - 39 U/L   US Abdomen Limited    Narrative    PROCEDURES: US ABDOMEN LIMITED    HISTORY: RUQ pain.    TECHNIQUE: Grayscale ultrasound of the upper abdomen was performed.    COMPARISON: CT 3/18/2021     FINDINGS:    LIVER: Fairly extensive areas of abnormal heterogeneous echogenicity  are present within the left and caudate lobes of the liver,  corresponding to areas of heterogeneous attenuation on the recent  prior CT. The liver is diffusely echogenic, with poor acoustic  penetration. Hepatopedal flow is seen in the portal vein.    GALLBLADDER: The gallbladder is contracted. Multiple dependent  shadowing stones are present. The common bile duct measures 4 mm.    ABDOMINAL AORTA AND IVC: Portions of the superior IVC and abdominal  aorta are visualized.    PANCREAS: The pancreas is not seen.    OTHER: Survey imaging of the right kidney demonstrates no  hydronephrosis. No free fluid is seen..      Impression    IMPRESSION:     Heterogeneous changes in the tera hepatis, better seen on the prior  CT. The appearances on both the current and recent prior CT study are  nonspecific and differential considerations remain broad.     In this patient with a history of recurrent pancreatitis, differential  considerations  favor multiple intrahepatic pseudocysts (see Mahendra  et al, Intrahepatic pancreatic pseudocysts, in World J Hepat 2016).    Intraparenchymal hematoma related to prior trauma or infarct is  possible. Neoplasm is certainly possible based on the ultrasound  appearance, but would have to be partially necrotic in the absence of  therapy based on the CT appearance. The lack of air within the liver  on the prior argues against a locally contained perforated viscus such  as a decompressed duodenal ulcer.    Recommend GI consultation.    RONY POWERS MD   Lactic acid   Result Value Ref Range    Lactic Acid 1.3 0.7 - 2.0 mmol/L       Medications   fentaNYL (PF) (SUBLIMAZE) injection 50 mcg (50 mcg Intravenous Given 3/19/21 1513)   fentaNYL (PF) (SUBLIMAZE) injection 50 mcg (50 mcg Intravenous Given 3/19/21 1629)   gadoterate meglumine (DOTAREM) injection 20 mL (20 mLs Intravenous Given 3/19/21 1902)       Assessments & Plan (with Medical Decision Making)     I have reviewed the nursing notes.    I have reviewed the findings, diagnosis, plan and need for follow up with the patient.  Given his pain he was given some more fentanyl for pain control.  I did recheck labs which were slightly improved from yesterday.  We were still able to get him his MRI.  This has been done, however results are not back yet.  Is currently change of shift.  Care patient turned over to night shift at this time.    New Prescriptions    No medications on file       Final diagnoses:   None       3/19/2021   [facility]""     Robin Smith MD  03/19/21 1910       Alexandro Trevizo MD  03/23/21 2175

## 2021-03-22 ENCOUNTER — TRANSFERRED RECORDS (OUTPATIENT)
Dept: HEALTH INFORMATION MANAGEMENT | Facility: OTHER | Age: 56
End: 2021-03-22

## 2021-03-22 ENCOUNTER — PATIENT OUTREACH (OUTPATIENT)
Dept: CARE COORDINATION | Facility: CLINIC | Age: 56
End: 2021-03-22

## 2021-03-22 NOTE — PROGRESS NOTES
Transitional Care Management Phone Call    Patient returned to ED, then was transferred to [facility] where he is still in their care. No TCM.   Jennifer Piper RN on 3/22/2021 at 9:02 AM

## 2021-03-23 ENCOUNTER — TRANSFERRED RECORDS (OUTPATIENT)
Dept: HEALTH INFORMATION MANAGEMENT | Facility: OTHER | Age: 56
End: 2021-03-23

## 2021-03-25 ENCOUNTER — TELEPHONE (OUTPATIENT)
Dept: FAMILY MEDICINE | Facility: OTHER | Age: 56
End: 2021-03-25

## 2021-03-26 ENCOUNTER — TELEPHONE (OUTPATIENT)
Dept: FAMILY MEDICINE | Facility: OTHER | Age: 56
End: 2021-03-26

## 2021-03-26 ENCOUNTER — TELEPHONE (OUTPATIENT)
Dept: FAMILY MEDICINE | Facility: OTHER | Age: 56
End: 2021-03-26
Payer: COMMERCIAL

## 2021-03-26 DIAGNOSIS — E11.9 TYPE 2 DIABETES MELLITUS WITHOUT COMPLICATION, WITHOUT LONG-TERM CURRENT USE OF INSULIN (H): ICD-10-CM

## 2021-03-26 DIAGNOSIS — K27.9 PUD (PEPTIC ULCER DISEASE): ICD-10-CM

## 2021-03-26 DIAGNOSIS — K75.0 ABSCESS, LIVER: Primary | ICD-10-CM

## 2021-03-26 DIAGNOSIS — I10 HYPERTENSION, UNSPECIFIED TYPE: ICD-10-CM

## 2021-03-26 DIAGNOSIS — D64.9 ANEMIA, UNSPECIFIED TYPE: ICD-10-CM

## 2021-03-26 NOTE — TELEPHONE ENCOUNTER
Returned [facility] and relayed Dr. Gonzalez's note from earlier in response to Luna's call;  that he does approve Home Care requests.  Also confirmed medication question that patient does have Glipizide on his medication list.  Liza Umana LPN on 3/26/2021 at 9:45 AM

## 2021-03-26 NOTE — TELEPHONE ENCOUNTER
Please call with verbal orders for nursing services and she has a medication question.           Sabina Esquivel on 3/26/2021 at 9:16 AM

## 2021-03-26 NOTE — TELEPHONE ENCOUNTER
Returned call to Luna at [facility] and relayed Dr. Gonzalez's message in agreement to Home Care requests.  Liza Umana LPN on 3/26/2021 at 8:24 AM

## 2021-03-29 ENCOUNTER — PRE VISIT (OUTPATIENT)
Dept: GASTROENTEROLOGY | Facility: CLINIC | Age: 56
End: 2021-03-29

## 2021-03-30 ENCOUNTER — OFFICE VISIT (OUTPATIENT)
Dept: FAMILY MEDICINE | Facility: OTHER | Age: 56
End: 2021-03-30
Attending: FAMILY MEDICINE
Payer: COMMERCIAL

## 2021-03-30 VITALS
WEIGHT: 195.4 LBS | TEMPERATURE: 97.5 F | HEART RATE: 74 BPM | DIASTOLIC BLOOD PRESSURE: 76 MMHG | SYSTOLIC BLOOD PRESSURE: 130 MMHG | BODY MASS INDEX: 29.71 KG/M2 | RESPIRATION RATE: 16 BRPM | OXYGEN SATURATION: 97 %

## 2021-03-30 DIAGNOSIS — F10.20 ALCOHOLISM (H): ICD-10-CM

## 2021-03-30 DIAGNOSIS — K21.00 GASTROESOPHAGEAL REFLUX DISEASE WITH ESOPHAGITIS WITHOUT HEMORRHAGE: ICD-10-CM

## 2021-03-30 DIAGNOSIS — K27.9 PUD (PEPTIC ULCER DISEASE): ICD-10-CM

## 2021-03-30 DIAGNOSIS — R16.0 LIVER MASS, LEFT LOBE: ICD-10-CM

## 2021-03-30 DIAGNOSIS — E11.9 TYPE 2 DIABETES MELLITUS WITHOUT COMPLICATION, WITHOUT LONG-TERM CURRENT USE OF INSULIN (H): ICD-10-CM

## 2021-03-30 DIAGNOSIS — N18.31 STAGE 3A CHRONIC KIDNEY DISEASE (H): ICD-10-CM

## 2021-03-30 DIAGNOSIS — I10 ESSENTIAL HYPERTENSION: ICD-10-CM

## 2021-03-30 DIAGNOSIS — K86.3 PANCREATIC PSEUDOCYST: Primary | ICD-10-CM

## 2021-03-30 PROBLEM — N18.30 CHRONIC KIDNEY DISEASE, STAGE 3 (H): Status: ACTIVE | Noted: 2021-03-30

## 2021-03-30 PROCEDURE — 99214 OFFICE O/P EST MOD 30 MIN: CPT | Performed by: FAMILY MEDICINE

## 2021-03-30 PROCEDURE — G0463 HOSPITAL OUTPT CLINIC VISIT: HCPCS

## 2021-03-30 RX ORDER — OXYCODONE HYDROCHLORIDE 5 MG/1
TABLET ORAL
COMMUNITY
Start: 2021-03-25 | End: 2021-03-30

## 2021-03-30 RX ORDER — NALTREXONE HYDROCHLORIDE 50 MG/1
50 TABLET, FILM COATED ORAL DAILY
COMMUNITY
Start: 2020-12-17 | End: 2021-03-30

## 2021-03-30 RX ORDER — LISINOPRIL 10 MG/1
10 TABLET ORAL
COMMUNITY
End: 2021-05-24

## 2021-03-30 RX ORDER — INSULIN ASPART 100 [IU]/ML
INJECTION, SOLUTION INTRAVENOUS; SUBCUTANEOUS
COMMUNITY
Start: 2020-06-26 | End: 2021-05-10

## 2021-03-30 RX ORDER — INSULIN GLARGINE 100 [IU]/ML
INJECTION, SOLUTION SUBCUTANEOUS
COMMUNITY
Start: 2020-06-26 | End: 2021-05-10

## 2021-03-30 RX ORDER — ALBUTEROL SULFATE 108 UG/1
AEROSOL, METERED RESPIRATORY (INHALATION)
COMMUNITY
Start: 2020-06-13 | End: 2021-05-10

## 2021-03-30 RX ORDER — OMEPRAZOLE 40 MG/1
40 CAPSULE, DELAYED RELEASE ORAL DAILY
Qty: 30 CAPSULE | Refills: 11 | Status: SHIPPED | OUTPATIENT
Start: 2021-03-30 | End: 2022-03-18

## 2021-03-30 RX ORDER — NALTREXONE HYDROCHLORIDE 50 MG/1
TABLET, FILM COATED ORAL
COMMUNITY
Start: 2020-12-17 | End: 2021-05-10

## 2021-03-30 RX ORDER — OXYCODONE HYDROCHLORIDE 5 MG/1
5 TABLET ORAL
COMMUNITY
Start: 2021-03-25 | End: 2021-05-10

## 2021-03-30 ASSESSMENT — PAIN SCALES - GENERAL: PAINLEVEL: MILD PAIN (3)

## 2021-03-30 NOTE — NURSING NOTE
"Patient presents to clinic for hospital follow up.  Chief Complaint   Patient presents with     Hospital F/U       Initial /76 (BP Location: Right arm, Patient Position: Sitting, Cuff Size: Adult Regular)   Pulse 74   Temp 97.5  F (36.4  C) (Tympanic)   Resp 16   Wt 88.6 kg (195 lb 6.4 oz)   SpO2 97%   BMI 29.71 kg/m   Estimated body mass index is 29.71 kg/m  as calculated from the following:    Height as of 3/19/21: 1.727 m (5' 8\").    Weight as of this encounter: 88.6 kg (195 lb 6.4 oz).  Medication Reconciliation: complete    Rocio Ortega LPN    "

## 2021-03-30 NOTE — PROGRESS NOTES
"Nursing Notes:   Rocio Ortega LPN  3/30/2021  1:23 PM  Signed  Patient presents to clinic for hospital follow up.  Chief Complaint   Patient presents with     Hospital F/U       Initial /76 (BP Location: Right arm, Patient Position: Sitting, Cuff Size: Adult Regular)   Pulse 74   Temp 97.5  F (36.4  C) (Tympanic)   Resp 16   Wt 88.6 kg (195 lb 6.4 oz)   SpO2 97%   BMI 29.71 kg/m   Estimated body mass index is 29.71 kg/m  as calculated from the following:    Height as of 3/19/21: 1.727 m (5' 8\").    Weight as of this encounter: 88.6 kg (195 lb 6.4 oz).  Medication Reconciliation: complete    Rocio Ortega LPN        SUBJECTIVE:  Kolton Aragon  is a 55 year old male who comes in for hospital follow up.  He was admitted on March 20 and discharged on March 25.     \"Hospital Summary: Kolton Aragon is a 55 year old male with a history of ETOH abuse and pancreatitis, type 2 diabetes and hypertension who was transferred from [place] on 3/20/2021 with acute right upper quadrant abdominal pain with acute pancreatitis. He had previously been admitted 3/18-3/19 where work up included CT, US and MRI of abdomen.  After he was discharged 3/19 had recurrence of severe RUQ pain and presented to ED again and was transferred to [facility] for a higher level of care. Was noted to have abnormal changes of liver on imaging and to have mass versus fluid collection in the left lateral segment of his liver. Also noted to have free fluid in upper abdomen on imaging. HIDA without evidence of acute cholecystitis.  UGI without obvious leak.     After discussion with bariatric surgeon Dr. Manzo and hepatobiliary surgeon Dr. Martinez, interventional radiology was consulted and underwent CT-guided drainage catheter placement with Interventional Radiology on 3/21 and another on 3/22/2021. There was concern 3/21 for acute bleeding given bloody drainage and CTA was negative for acute bleeding 3/21. Body fluid aspirate " from both collections was positive for the presence of pancreatic enzymes.     Gastroenterology was consulted and he underwent EGD/ERCP with Dr. Chicas on 3/23/2021 with no evidence of leak, filling of cystic duct and gallbladder noted. Study did note irregular filling of the main PD in the pancreatic head roughly 2 cm upstream from the major papilla consistent with pancreatitis. Stent was placed.     He tolerated the procedure well and diet was advanced. It was advised he avoid NSAIDs and ETOH after discharge. After discussion with GI and hepatobiliary surgeon, decision made for follow up 2 weeks post discharge with repeat labs and imaging prior to clinic visit. When there is scant to no drainage from percutaneous drains, he will need endoscopic removal of stent. GI also recommended repeat EGD with EUS 2 months AFTER stent removal in order to completely exclude neoplasm in the pancreas head.     He is to have follow-up in 2 weeks with the hepatobiliary team with repeat labs and imaging and then they will decide when to remove his pancreatic stent.  That is scheduled for April 7.    Past Medical, Family, and Social History reviewed and updated as noted below.   ROS is negative except as noted above       Allergies   Allergen Reactions     Latex Hives     Dust Mite Extract Other (See Comments)     Sneezing, itchy eyes   ,   Family History   Problem Relation Age of Onset     Family History Negative Mother         Good Health     Other - See Comments Father         Deserted as a child, patient knows father had a history of heart attack at 50     Heart Disease Father         Heart Disease,patient knows father had a history of heart attack at 50     Cancer Other         Cancer,History of cancer   ,   Current Outpatient Medications   Medication     acetaminophen (TYLENOL 8 HOUR ARTHRITIS PAIN) 650 MG CR tablet     Alcohol Swabs (B-D SINGLE USE SWABS REGULAR) PADS     blood glucose monitoring (JOEY MICROLET) lancets      Continuous Blood Gluc  (DEXCOM G6 ) CHRISTINA     Continuous Blood Gluc Sensor (DEXCOM G6 SENSOR) MISC     Continuous Blood Gluc Transmit (DEXCOM G6 TRANSMITTER) MISC     CONTOUR NEXT TEST test strip     diclofenac (VOLTAREN) 1 % topical gel     diphenhydrAMINE HCl, Sleep, (UNISOM SLEEPGELS) 50 MG CAPS     glipiZIDE (GLUCOTROL XL) 5 MG 24 hr tablet     glucose (BD GLUCOSE) 4 g chewable tablet     LANTUS SOLOSTAR 100 UNIT/ML soln     lisinopril (ZESTRIL) 10 MG tablet     lisinopril (ZESTRIL) 20 MG tablet     LORazepam (ATIVAN) 1 MG tablet     Melatonin 10-10 MG TBCR     naltrexone (DEPADE/REVIA) 50 MG tablet     naltrexone (DEPADE/REVIA) 50 MG tablet     naproxen sodium (ALEVE) 220 MG tablet     NOVOLOG FLEXPEN 100 UNIT/ML soln     omeprazole (PRILOSEC) 40 MG DR capsule     oxyCODONE (ROXICODONE) 5 MG tablet     traZODone (DESYREL) 100 MG tablet     ULTICARE 29G X 12.7MM insulin pen needle     No current facility-administered medications for this visit.    ,   Past Medical History:   Diagnosis Date     Alcohol-induced acute pancreatitis without infection or necrosis     06/20/2016     Essential (primary) hypertension     9/12/2013     Gastro-esophageal reflux disease without esophagitis     5/18/2015     Spondylolisthesis of lumbosacral region     7/9/2013,Patient has been referred for PT and has not followed through.  He was sent for MRI scan,and he was not able to do either the closed or open MRI due to claustrophobia.     Spondylosis of lumbar region without myelopathy or radiculopathy     4/22/2013     Tobacco use     5/17/2013     Uncomplicated asthma     No Comments Provided   ,   Patient Active Problem List    Diagnosis Date Noted     Chronic kidney disease, stage 3 03/30/2021     Priority: Medium     GIB (gastrointestinal bleeding) 03/18/2021     Priority: Medium     RUQ abdominal pain 03/18/2021     Priority: Medium     Liver mass, left lobe 03/18/2021     Priority: Medium     Type 2 diabetes  mellitus without complication, without long-term current use of insulin (H) 04/17/2019     Priority: Medium     Reactive airway disease 03/01/2018     Priority: Medium     Alcoholism (H) 10/12/2017     Priority: Medium     Altered level of consciousness 10/11/2017     Priority: Medium     Acute alcoholic pancreatitis 06/20/2016     Priority: Medium     Chronic kidney disease, stage II (mild) 03/09/2016     Priority: Medium     Gastroesophageal reflux disease without esophagitis 05/18/2015     Priority: Medium     Dyslipidemia 12/29/2014     Priority: Medium     Hypertension 09/12/2013     Priority: Medium     Spondylolisthesis at L5-S1 level 07/09/2013     Priority: Medium     Overview:   Patient has been referred for PT and has not followed through.  He was sent for MRI scan,and he was not able to do either the closed or open MRI due to claustrophobia.       Insomnia 06/20/2013     Priority: Medium     Chewing tobacco use 05/17/2013     Priority: Medium     DJD (degenerative joint disease), lumbar 04/22/2013     Priority: Medium   ,   Past Surgical History:   Procedure Laterality Date     ESOPHAGOSCOPY, GASTROSCOPY, DUODENOSCOPY (EGD), COMBINED N/A 6/16/2020    Procedure: ESOPHAGOGASTRODUODENOSCOPY, WITH BIOPSY;  Surgeon: Giovanni Reed MD;  Location: [facility] OR     VASECTOMY      No Comments Provided    and   Social History     Tobacco Use     Smoking status: Never Smoker     Smokeless tobacco: Former User     Types: Chew     Tobacco comment: Quit smoking: trying to quit chew using 1 pack week   Substance Use Topics     Alcohol use: Not Currently     Alcohol/week: 28.0 standard drinks     OBJECTIVE:  /76 (BP Location: Right arm, Patient Position: Sitting, Cuff Size: Adult Regular)   Pulse 74   Temp 97.5  F (36.4  C) (Tympanic)   Resp 16   Wt 88.6 kg (195 lb 6.4 oz)   SpO2 97%   BMI 29.71 kg/m     EXAM:  Alert cooperative, no distress.  Weight is down a few pounds but he has not lost a significant  amount of weight.  Not having any pain and seems to be tolerating food without issue.  Abdomen is soft and nontender.  Drain sites appear clean and dry and dressings are intact.  He has 2 DOTTIE drains both of which appear to be functioning.  The drain tubing is stripped and functioning fine.  He has minimal drainage in his bulbs.  Lungs are clear, no rales rhonchi or wheezes are heard.  Cardiac RRR without murmur.  ASSESSMENT/Plan :    Kolton was seen today for hospital f/u.    Diagnoses and all orders for this visit:    Pancreatic pseudocyst    Stage 3a chronic kidney disease    Alcoholism (H)    Type 2 diabetes mellitus without complication, without long-term current use of insulin (H)    Essential hypertension    Liver mass, left lobe    PUD (peptic ulcer disease)  -     omeprazole (PRILOSEC) 40 MG DR capsule; Take 1 capsule (40 mg) by mouth daily    Gastroesophageal reflux disease with esophagitis without hemorrhage  -     omeprazole (PRILOSEC) 40 MG DR capsule; Take 1 capsule (40 mg) by mouth daily      Renewed his omeprazole.  He will follow up with GI as scheduled and likely will have his drains removed.  Would then need to have his stent removed from his pancreas after that.    Congratulated on his sobriety and recommended that he continue with that.  We did not do labs today as he has labs scheduled for the day prior to seeing GI.    A total of 39 minutes was spent with the patient, reviewing records, tests, ordering medications, tests or procedures and documenting clinical information in the EHR.      Jono Gonzalez MD

## 2021-04-01 ENCOUNTER — IMMUNIZATION (OUTPATIENT)
Dept: FAMILY MEDICINE | Facility: OTHER | Age: 56
End: 2021-04-01
Attending: FAMILY MEDICINE
Payer: COMMERCIAL

## 2021-04-01 PROCEDURE — 91300 PR COVID VAC PFIZER DIL RECON 30 MCG/0.3 ML IM: CPT

## 2021-04-13 ENCOUNTER — MEDICAL CORRESPONDENCE (OUTPATIENT)
Dept: HEALTH INFORMATION MANAGEMENT | Facility: OTHER | Age: 56
End: 2021-04-13

## 2021-04-15 NOTE — TELEPHONE ENCOUNTER
Dr Gonzalez reviewed and completed the following home care or hospice form(s) for Kolton Aragon on 04/19/2021   This covers the certification period effective 03/26/2021 to 05/24/2021.  Radha Junior LPN on 4/15/2021 at 12:00 PM

## 2021-04-22 ENCOUNTER — IMMUNIZATION (OUTPATIENT)
Dept: FAMILY MEDICINE | Facility: OTHER | Age: 56
End: 2021-04-22
Attending: FAMILY MEDICINE
Payer: COMMERCIAL

## 2021-04-22 PROCEDURE — 91300 PR COVID VAC PFIZER DIL RECON 30 MCG/0.3 ML IM: CPT

## 2021-05-01 ENCOUNTER — ALLIED HEALTH/NURSE VISIT (OUTPATIENT)
Dept: FAMILY MEDICINE | Facility: OTHER | Age: 56
End: 2021-05-01
Attending: FAMILY MEDICINE
Payer: COMMERCIAL

## 2021-05-01 DIAGNOSIS — Z20.822 COVID-19 RULED OUT: Primary | ICD-10-CM

## 2021-05-01 LAB
SARS-COV-2 RNA RESP QL NAA+PROBE: NORMAL
SPECIMEN SOURCE: NORMAL

## 2021-05-01 PROCEDURE — U0005 INFEC AGEN DETEC AMPLI PROBE: HCPCS | Mod: ZL | Performed by: FAMILY MEDICINE

## 2021-05-01 PROCEDURE — C9803 HOPD COVID-19 SPEC COLLECT: HCPCS

## 2021-05-01 PROCEDURE — U0003 INFECTIOUS AGENT DETECTION BY NUCLEIC ACID (DNA OR RNA); SEVERE ACUTE RESPIRATORY SYNDROME CORONAVIRUS 2 (SARS-COV-2) (CORONAVIRUS DISEASE [COVID-19]), AMPLIFIED PROBE TECHNIQUE, MAKING USE OF HIGH THROUGHPUT TECHNOLOGIES AS DESCRIBED BY CMS-2020-01-R: HCPCS | Mod: ZL | Performed by: FAMILY MEDICINE

## 2021-05-02 LAB
LABORATORY COMMENT REPORT: NORMAL
SARS-COV-2 RNA RESP QL NAA+PROBE: NEGATIVE
SPECIMEN SOURCE: NORMAL

## 2021-05-06 ENCOUNTER — TRANSFERRED RECORDS (OUTPATIENT)
Dept: HEALTH INFORMATION MANAGEMENT | Facility: OTHER | Age: 56
End: 2021-05-06

## 2021-05-10 ENCOUNTER — OFFICE VISIT (OUTPATIENT)
Dept: FAMILY MEDICINE | Facility: OTHER | Age: 56
End: 2021-05-10
Attending: FAMILY MEDICINE
Payer: COMMERCIAL

## 2021-05-10 VITALS
HEART RATE: 78 BPM | WEIGHT: 200 LBS | TEMPERATURE: 96.9 F | OXYGEN SATURATION: 98 % | SYSTOLIC BLOOD PRESSURE: 126 MMHG | DIASTOLIC BLOOD PRESSURE: 86 MMHG | RESPIRATION RATE: 16 BRPM | BODY MASS INDEX: 30.41 KG/M2

## 2021-05-10 DIAGNOSIS — I10 ESSENTIAL HYPERTENSION: ICD-10-CM

## 2021-05-10 DIAGNOSIS — F10.20 ALCOHOLISM (H): ICD-10-CM

## 2021-05-10 DIAGNOSIS — E11.9 TYPE 2 DIABETES MELLITUS WITHOUT COMPLICATION, WITHOUT LONG-TERM CURRENT USE OF INSULIN (H): ICD-10-CM

## 2021-05-10 DIAGNOSIS — K85.20 ALCOHOL-INDUCED ACUTE PANCREATITIS, UNSPECIFIED COMPLICATION STATUS: ICD-10-CM

## 2021-05-10 DIAGNOSIS — N18.2 CHRONIC KIDNEY DISEASE, STAGE II (MILD): Primary | ICD-10-CM

## 2021-05-10 DIAGNOSIS — K21.9 GASTROESOPHAGEAL REFLUX DISEASE WITHOUT ESOPHAGITIS: ICD-10-CM

## 2021-05-10 LAB
ANION GAP SERPL CALCULATED.3IONS-SCNC: 6 MMOL/L (ref 3–14)
BUN SERPL-MCNC: 18 MG/DL (ref 7–25)
CALCIUM SERPL-MCNC: 9.1 MG/DL (ref 8.6–10.3)
CHLORIDE SERPL-SCNC: 102 MMOL/L (ref 98–107)
CO2 SERPL-SCNC: 25 MMOL/L (ref 21–31)
CREAT SERPL-MCNC: 1.3 MG/DL (ref 0.7–1.3)
GFR SERPL CREATININE-BSD FRML MDRD: 58 ML/MIN/{1.73_M2}
GLUCOSE SERPL-MCNC: 247 MG/DL (ref 70–105)
POTASSIUM SERPL-SCNC: 4.2 MMOL/L (ref 3.5–5.1)
SODIUM SERPL-SCNC: 133 MMOL/L (ref 134–144)

## 2021-05-10 PROCEDURE — 99213 OFFICE O/P EST LOW 20 MIN: CPT | Performed by: FAMILY MEDICINE

## 2021-05-10 PROCEDURE — 36415 COLL VENOUS BLD VENIPUNCTURE: CPT | Mod: ZL | Performed by: FAMILY MEDICINE

## 2021-05-10 PROCEDURE — 80048 BASIC METABOLIC PNL TOTAL CA: CPT | Mod: ZL | Performed by: FAMILY MEDICINE

## 2021-05-10 PROCEDURE — G0463 HOSPITAL OUTPT CLINIC VISIT: HCPCS

## 2021-05-10 ASSESSMENT — PAIN SCALES - GENERAL: PAINLEVEL: MILD PAIN (3)

## 2021-05-10 NOTE — NURSING NOTE
"Chief Complaint   Patient presents with     Recheck Medication     wants to discuss what he can take for arthritis pain     Previous A1C is at goal of <8  Lab Results   Component Value Date    A1C 6.4 03/18/2021    A1C 5.7 09/10/2020    A1C >14.0 05/05/2020    A1C 7.5 08/20/2019    A1C 8.5 04/17/2019     Urine microalbumin:creatine: 09/10/2020  Foot exam 09/10/2020  Eye exam 09/2020    Tobacco User no  Patient is not on a daily aspirin  Patient is not on a Statin.  Blood pressure today of:     BP Readings from Last 1 Encounters:   05/10/21 126/86      is at the goal of <139/89 for diabetics.    Radha Junior LPN on 5/10/2021 at 2:26 PM        Initial /86   Pulse 78   Temp 96.9  F (36.1  C) (Temporal)   Resp 16   Wt 90.7 kg (200 lb)   SpO2 98%   BMI 30.41 kg/m   Estimated body mass index is 30.41 kg/m  as calculated from the following:    Height as of 3/19/21: 1.727 m (5' 8\").    Weight as of this encounter: 90.7 kg (200 lb).  Medication Reconciliation: complete    Radha Junior LPN  "

## 2021-05-10 NOTE — LETTER
May 10, 2021      Kolton Aragon  [address]   [place] [address]        Dear Kolton,     Your blood test looked fine with good kidney function. Your blood sugar was a little elevated.    It was a pleasure seeing you the other day.  If you have any questions, please don't hesitate to call us.       Sincerely,        Jono Gonzalez MD    Results for orders placed or performed in visit on 05/10/21   Basic metabolic panel     Status: Abnormal   Result Value Ref Range    Sodium 133 (L) 134 - 144 mmol/L    Potassium 4.2 3.5 - 5.1 mmol/L    Chloride 102 98 - 107 mmol/L    Carbon Dioxide 25 21 - 31 mmol/L    Anion Gap 6 3 - 14 mmol/L    Glucose 247 (H) 70 - 105 mg/dL    Urea Nitrogen 18 7 - 25 mg/dL    Creatinine 1.30 0.70 - 1.30 mg/dL    GFR Estimate 58 (L) >60 mL/min/[1.73_m2]    GFR Estimate If Black 70 >60 mL/min/[1.73_m2]    Calcium 9.1 8.6 - 10.3 mg/dL

## 2021-05-10 NOTE — PROGRESS NOTES
"Nursing Notes:   Radha Junior LPN  5/10/2021  2:34 PM  Signed  Chief Complaint   Patient presents with     Recheck Medication     wants to discuss what he can take for arthritis pain     Previous A1C is at goal of <8  Lab Results   Component Value Date    A1C 6.4 03/18/2021    A1C 5.7 09/10/2020    A1C >14.0 05/05/2020    A1C 7.5 08/20/2019    A1C 8.5 04/17/2019     Urine microalbumin:creatine: 09/10/2020  Foot exam 09/10/2020  Eye exam 09/2020    Tobacco User no  Patient is not on a daily aspirin  Patient is not on a Statin.  Blood pressure today of:     BP Readings from Last 1 Encounters:   05/10/21 126/86      is at the goal of <139/89 for diabetics.    Radha Junior LPN on 5/10/2021 at 2:26 PM        Initial /86   Pulse 78   Temp 96.9  F (36.1  C) (Temporal)   Resp 16   Wt 90.7 kg (200 lb)   SpO2 98%   BMI 30.41 kg/m   Estimated body mass index is 30.41 kg/m  as calculated from the following:    Height as of 3/19/21: 1.727 m (5' 8\").    Weight as of this encounter: 90.7 kg (200 lb).  Medication Reconciliation: complete    Radha Junior LPN      SUBJECTIVE:  Kolton Aragon  is a 55 year old male who comes in to discuss what he might be able to take for arthritis pain.  He had recent EGD which was normal and had his pancreatic stent removed.  He has a history of reflux as well as alcoholic pancreatitis and gastritis.  That has resolved.  He has chronic kidney disease stage IIIa based on his last labs.  Prior to that he had normal renal function.  He had normal liver function test the last 2 times they were checked.  He has type 2 diabetes and his last hemoglobin A1c was 6.4%.     He is to have upper endoscopic US in 2 months. He is feeling good.  His CT showed improvement.     In the past has been told not to take NSAIDs because of his renal function.  We reviewed that with him in detail today.    Past Medical, Family, and Social History reviewed and updated as noted below.   ROS is " negative except as noted above       Allergies   Allergen Reactions     Latex Hives     Dust Mite Extract Other (See Comments)     Sneezing, itchy eyes   ,   Family History   Problem Relation Age of Onset     Family History Negative Mother         Good Health     Other - See Comments Father         Deserted as a child, patient knows father had a history of heart attack at 50     Heart Disease Father         Heart Disease,patient knows father had a history of heart attack at 50     Cancer Other         Cancer,History of cancer   ,   Current Outpatient Medications   Medication     acetaminophen (TYLENOL 8 HOUR ARTHRITIS PAIN) 650 MG CR tablet     diclofenac (VOLTAREN) 1 % topical gel     diphenhydrAMINE HCl, Sleep, (UNISOM SLEEPGELS) 50 MG CAPS     glipiZIDE (GLUCOTROL XL) 5 MG 24 hr tablet     glucose (BD GLUCOSE) 4 g chewable tablet     lisinopril (ZESTRIL) 10 MG tablet     LORazepam (ATIVAN) 1 MG tablet     Melatonin 10-10 MG TBCR     naltrexone (DEPADE/REVIA) 50 MG tablet     omeprazole (PRILOSEC) 40 MG DR capsule     traZODone (DESYREL) 100 MG tablet     Alcohol Swabs (B-D SINGLE USE SWABS REGULAR) PADS     blood glucose monitoring (JOEY MICROLET) lancets     Continuous Blood Gluc  (DEXCOM G6 ) CHRISTINA     Continuous Blood Gluc Sensor (DEXCOM G6 SENSOR) MISC     Continuous Blood Gluc Transmit (DEXCOM G6 TRANSMITTER) MISC     CONTOUR NEXT TEST test strip     naproxen sodium (ALEVE) 220 MG tablet     No current facility-administered medications for this visit.    ,   Past Medical History:   Diagnosis Date     Alcohol-induced acute pancreatitis without infection or necrosis     06/20/2016     Essential (primary) hypertension     9/12/2013     Gastro-esophageal reflux disease without esophagitis     5/18/2015     Spondylolisthesis of lumbosacral region     7/9/2013,Patient has been referred for PT and has not followed through.  He was sent for MRI scan,and he was not able to do either the closed or open  MRI due to claustrophobia.     Spondylosis of lumbar region without myelopathy or radiculopathy     4/22/2013     Tobacco use     5/17/2013     Uncomplicated asthma     No Comments Provided   ,   Patient Active Problem List    Diagnosis Date Noted     Chronic kidney disease, stage 3 03/30/2021     Priority: Medium     GIB (gastrointestinal bleeding) 03/18/2021     Priority: Medium     RUQ abdominal pain 03/18/2021     Priority: Medium     Liver mass, left lobe 03/18/2021     Priority: Medium     Type 2 diabetes mellitus without complication, without long-term current use of insulin (H) 04/17/2019     Priority: Medium     Reactive airway disease 03/01/2018     Priority: Medium     Alcoholism (H) 10/12/2017     Priority: Medium     Altered level of consciousness 10/11/2017     Priority: Medium     Acute alcoholic pancreatitis 06/20/2016     Priority: Medium     Chronic kidney disease, stage II (mild) 03/09/2016     Priority: Medium     Gastroesophageal reflux disease without esophagitis 05/18/2015     Priority: Medium     Dyslipidemia 12/29/2014     Priority: Medium     Hypertension 09/12/2013     Priority: Medium     Spondylolisthesis at L5-S1 level 07/09/2013     Priority: Medium     Overview:   Patient has been referred for PT and has not followed through.  He was sent for MRI scan,and he was not able to do either the closed or open MRI due to claustrophobia.       Insomnia 06/20/2013     Priority: Medium     Chewing tobacco use 05/17/2013     Priority: Medium     DJD (degenerative joint disease), lumbar 04/22/2013     Priority: Medium   ,   Past Surgical History:   Procedure Laterality Date     ESOPHAGOSCOPY, GASTROSCOPY, DUODENOSCOPY (EGD), COMBINED N/A 6/16/2020    Procedure: ESOPHAGOGASTRODUODENOSCOPY, WITH BIOPSY;  Surgeon: Giovanni Reed MD;  Location: [facility] OR     VASECTOMY      No Comments Provided    and   Social History     Tobacco Use     Smoking status: Never Smoker     Smokeless tobacco: Former  User     Types: Chew     Tobacco comment: Quit smoking: trying to quit chew using 1 pack week   Substance Use Topics     Alcohol use: Not Currently     Alcohol/week: 28.0 standard drinks     OBJECTIVE:  /86   Pulse 78   Temp 96.9  F (36.1  C) (Temporal)   Resp 16   Wt 90.7 kg (200 lb)   SpO2 98%   BMI 30.41 kg/m     EXAM:  Alert and cooperative, no distress.  Lungs are clear, no rales rhonchi or wheezes are heard.  Cardiac RRR without murmur.  Results for orders placed or performed in visit on 05/10/21   Basic metabolic panel     Status: Abnormal   Result Value Ref Range    Sodium 133 (L) 134 - 144 mmol/L    Potassium 4.2 3.5 - 5.1 mmol/L    Chloride 102 98 - 107 mmol/L    Carbon Dioxide 25 21 - 31 mmol/L    Anion Gap 6 3 - 14 mmol/L    Glucose 247 (H) 70 - 105 mg/dL    Urea Nitrogen 18 7 - 25 mg/dL    Creatinine 1.30 0.70 - 1.30 mg/dL    GFR Estimate 58 (L) >60 mL/min/[1.73_m2]    GFR Estimate If Black 70 >60 mL/min/[1.73_m2]    Calcium 9.1 8.6 - 10.3 mg/dL      ASSESSMENT/Plan :    Kolton was seen today for recheck medication.    Diagnoses and all orders for this visit:    Chronic kidney disease, stage II (mild)    Gastroesophageal reflux disease without esophagitis    Alcoholism (H)    Essential hypertension    Type 2 diabetes mellitus without complication, without long-term current use of insulin (H)  -     Basic metabolic panel; Future  -     Basic metabolic panel    Alcohol-induced acute pancreatitis, unspecified complication status      He has relatively normal renal function and I think if he takes an occasional NSAID that is fine.  He had recent upper endoscopy that looked okay.  His baseline renal function is a GFR greater than 60 although when he has been ill with span lower than that.  Encouraged him to be judicious and if he does use an NSAID that he do it with food.  Certainly could use Tylenol as well.  Congratulated on his ongoing sobriety.  To follow-up with gastroenterology for repeat  endoscopic ultrasound in 2 months as they suggested.    Jono Gonzalez MD

## 2021-05-24 ENCOUNTER — OFFICE VISIT (OUTPATIENT)
Dept: FAMILY MEDICINE | Facility: OTHER | Age: 56
End: 2021-05-24
Attending: PHYSICIAN ASSISTANT
Payer: COMMERCIAL

## 2021-05-24 VITALS
HEART RATE: 70 BPM | OXYGEN SATURATION: 98 % | RESPIRATION RATE: 18 BRPM | DIASTOLIC BLOOD PRESSURE: 84 MMHG | BODY MASS INDEX: 31.86 KG/M2 | HEIGHT: 68 IN | TEMPERATURE: 97.9 F | WEIGHT: 210.2 LBS | SYSTOLIC BLOOD PRESSURE: 132 MMHG

## 2021-05-24 DIAGNOSIS — I10 ESSENTIAL HYPERTENSION: ICD-10-CM

## 2021-05-24 DIAGNOSIS — Z01.818 PREOP GENERAL PHYSICAL EXAM: Primary | ICD-10-CM

## 2021-05-24 DIAGNOSIS — K80.20 GALLSTONES: ICD-10-CM

## 2021-05-24 PROBLEM — R40.4 ALTERED LEVEL OF CONSCIOUSNESS: Status: RESOLVED | Noted: 2017-10-11 | Resolved: 2021-05-24

## 2021-05-24 PROBLEM — K86.3 PSEUDOCYST OF PANCREAS: Status: ACTIVE | Noted: 2021-03-24

## 2021-05-24 LAB
INTERPRETATION ECG - MUSE: NORMAL
SARS-COV-2 RNA RESP QL NAA+PROBE: NORMAL
SPECIMEN SOURCE: NORMAL

## 2021-05-24 PROCEDURE — U0003 INFECTIOUS AGENT DETECTION BY NUCLEIC ACID (DNA OR RNA); SEVERE ACUTE RESPIRATORY SYNDROME CORONAVIRUS 2 (SARS-COV-2) (CORONAVIRUS DISEASE [COVID-19]), AMPLIFIED PROBE TECHNIQUE, MAKING USE OF HIGH THROUGHPUT TECHNOLOGIES AS DESCRIBED BY CMS-2020-01-R: HCPCS | Mod: ZL | Performed by: PHYSICIAN ASSISTANT

## 2021-05-24 PROCEDURE — 93005 ELECTROCARDIOGRAM TRACING: CPT

## 2021-05-24 PROCEDURE — U0005 INFEC AGEN DETEC AMPLI PROBE: HCPCS | Mod: ZL | Performed by: PHYSICIAN ASSISTANT

## 2021-05-24 PROCEDURE — 99214 OFFICE O/P EST MOD 30 MIN: CPT | Performed by: PHYSICIAN ASSISTANT

## 2021-05-24 PROCEDURE — G0463 HOSPITAL OUTPT CLINIC VISIT: HCPCS

## 2021-05-24 PROCEDURE — C9803 HOPD COVID-19 SPEC COLLECT: HCPCS

## 2021-05-24 PROCEDURE — 93010 ELECTROCARDIOGRAM REPORT: CPT | Performed by: INTERNAL MEDICINE

## 2021-05-24 RX ORDER — LISINOPRIL 10 MG/1
10 TABLET ORAL DAILY
Qty: 90 TABLET | Refills: 3 | Status: SHIPPED | OUTPATIENT
Start: 2021-05-24 | End: 2022-05-18

## 2021-05-24 ASSESSMENT — MIFFLIN-ST. JEOR: SCORE: 1772.96

## 2021-05-24 NOTE — LETTER
May 25, 2021      Kolton Aragon  [address]   [place] [address]        Dear ,    We are writing to inform you of your test results.    Your COVID-19 test is negative.      Resulted Orders   Asymptomatic COVID-19 Virus (Coronavirus) by PCR   Result Value Ref Range    COVID-19 Virus PCR to [facility] - Source Nasopharyngeal     COVID-19 Virus PCR to [facility] - Result       Test received-See reflex to IDDL test SARS CoV2 (COVID-19) Virus RT-PCR   SARS-CoV-2 COVID-19 Virus (Coronavirus) by PCR   Result Value Ref Range    SARS-CoV-2 Virus Specimen Source Nasopharyngeal     SARS-CoV-2 PCR Result NEGATIVE       Comment:      SARS-CoV2 (COVID-19) RNA not detected, presumed negative.    SARS-CoV-2 PCR Comment       Testing was performed using the rajeev SARS-CoV-2 assay on the rajeev [employer] System.2      Comment:      This test should be ordered for the detection of SARS-CoV-2 in individuals who   meet SARS-CoV-2 clinical and/or epidemiological criteria. Test performance is   unknown in asymptomatic patients.  This test is for in vitro diagnostic use under the FDA EUA for laboratories   certified under CLIA to perform high and/or moderate complexity testing. This   test has not been FDA cleared or approved.  A negative result does not rule out the presence of PCR inhibitors in the   specimen or target RNA in concentration below the limit of detection for the   assay. The possibility of a false negative should be considered if the   patient's recent exposure or clinical presentation suggests COVID-19.  This test was validated by the [facility]   [facility]. This laboratory is certified under the Clinical   Laboratory Improvement Amendments of 1988 (CLIA-88) as qualified to perform   high and/or moderate complexity laboratory testing.         If you have any questions or concerns, please call the clinic at the number listed above.       Sincerely,      Sabina Hunter  DIANA Contreras
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
## 2021-05-24 NOTE — NURSING NOTE
Chief Complaint   Patient presents with     Pre-Op Exam         Medication Reconciliation: complete    Laquita eRne, LPN

## 2021-05-25 ASSESSMENT — ASTHMA QUESTIONNAIRES: ACT_TOTALSCORE: 25

## 2021-05-28 ENCOUNTER — TRANSFERRED RECORDS (OUTPATIENT)
Dept: HEALTH INFORMATION MANAGEMENT | Facility: OTHER | Age: 56
End: 2021-05-28

## 2021-06-07 ENCOUNTER — HOSPITAL ENCOUNTER (EMERGENCY)
Facility: OTHER | Age: 56
Discharge: HOME | End: 2021-06-07
Attending: STUDENT IN AN ORGANIZED HEALTH CARE EDUCATION/TRAINING PROGRAM | Admitting: STUDENT IN AN ORGANIZED HEALTH CARE EDUCATION/TRAINING PROGRAM
Payer: COMMERCIAL

## 2021-06-07 VITALS
HEART RATE: 89 BPM | SYSTOLIC BLOOD PRESSURE: 151 MMHG | OXYGEN SATURATION: 95 % | RESPIRATION RATE: 18 BRPM | TEMPERATURE: 98 F | WEIGHT: 199.5 LBS | BODY MASS INDEX: 30.33 KG/M2 | DIASTOLIC BLOOD PRESSURE: 117 MMHG

## 2021-06-07 DIAGNOSIS — Z09 POSTOP CHECK: ICD-10-CM

## 2021-06-07 LAB
ANION GAP SERPL CALCULATED.3IONS-SCNC: 14 MMOL/L (ref 3–14)
BASOPHILS # BLD AUTO: 0.1 10E9/L (ref 0–0.2)
BASOPHILS NFR BLD AUTO: 0.7 %
BUN SERPL-MCNC: 13 MG/DL (ref 7–25)
CALCIUM SERPL-MCNC: 8.2 MG/DL (ref 8.6–10.3)
CHLORIDE SERPL-SCNC: 103 MMOL/L (ref 98–107)
CO2 SERPL-SCNC: 23 MMOL/L (ref 21–31)
CREAT SERPL-MCNC: 1.2 MG/DL (ref 0.7–1.3)
CRP SERPL-MCNC: 20 MG/L
DIFFERENTIAL METHOD BLD: ABNORMAL
EOSINOPHIL # BLD AUTO: 0.1 10E9/L (ref 0–0.7)
EOSINOPHIL NFR BLD AUTO: 1.1 %
ERYTHROCYTE [DISTWIDTH] IN BLOOD BY AUTOMATED COUNT: 15.4 % (ref 10–15)
ETHANOL SERPL-MCNC: 0.17 %
GFR SERPL CREATININE-BSD FRML MDRD: 63 ML/MIN/{1.73_M2}
GLUCOSE SERPL-MCNC: 245 MG/DL (ref 70–105)
HCT VFR BLD AUTO: 37.1 % (ref 40–53)
HGB BLD-MCNC: 11.7 G/DL (ref 13.3–17.7)
IMM GRANULOCYTES # BLD: 0 10E9/L (ref 0–0.4)
IMM GRANULOCYTES NFR BLD: 0.5 %
LYMPHOCYTES # BLD AUTO: 1.3 10E9/L (ref 0.8–5.3)
LYMPHOCYTES NFR BLD AUTO: 17 %
MCH RBC QN AUTO: 25.4 PG (ref 26.5–33)
MCHC RBC AUTO-ENTMCNC: 31.5 G/DL (ref 31.5–36.5)
MCV RBC AUTO: 81 FL (ref 78–100)
MONOCYTES # BLD AUTO: 1.5 10E9/L (ref 0–1.3)
MONOCYTES NFR BLD AUTO: 20.2 %
NEUTROPHILS # BLD AUTO: 4.6 10E9/L (ref 1.6–8.3)
NEUTROPHILS NFR BLD AUTO: 60.5 %
PLATELET # BLD AUTO: 380 10E9/L (ref 150–450)
POTASSIUM SERPL-SCNC: 4.1 MMOL/L (ref 3.5–5.1)
PROCALCITONIN SERPL-MCNC: <0.5 NG/ML
RBC # BLD AUTO: 4.61 10E12/L (ref 4.4–5.9)
SODIUM SERPL-SCNC: 140 MMOL/L (ref 134–144)
WBC # BLD AUTO: 7.6 10E9/L (ref 4–11)

## 2021-06-07 PROCEDURE — 82077 ASSAY SPEC XCP UR&BREATH IA: CPT | Performed by: STUDENT IN AN ORGANIZED HEALTH CARE EDUCATION/TRAINING PROGRAM

## 2021-06-07 PROCEDURE — 85025 COMPLETE CBC W/AUTO DIFF WBC: CPT | Performed by: STUDENT IN AN ORGANIZED HEALTH CARE EDUCATION/TRAINING PROGRAM

## 2021-06-07 PROCEDURE — 99283 EMERGENCY DEPT VISIT LOW MDM: CPT | Performed by: STUDENT IN AN ORGANIZED HEALTH CARE EDUCATION/TRAINING PROGRAM

## 2021-06-07 PROCEDURE — 36415 COLL VENOUS BLD VENIPUNCTURE: CPT | Performed by: STUDENT IN AN ORGANIZED HEALTH CARE EDUCATION/TRAINING PROGRAM

## 2021-06-07 PROCEDURE — 86140 C-REACTIVE PROTEIN: CPT | Performed by: STUDENT IN AN ORGANIZED HEALTH CARE EDUCATION/TRAINING PROGRAM

## 2021-06-07 PROCEDURE — 80048 BASIC METABOLIC PNL TOTAL CA: CPT | Performed by: STUDENT IN AN ORGANIZED HEALTH CARE EDUCATION/TRAINING PROGRAM

## 2021-06-07 PROCEDURE — 84145 PROCALCITONIN (PCT): CPT | Performed by: STUDENT IN AN ORGANIZED HEALTH CARE EDUCATION/TRAINING PROGRAM

## 2021-06-07 NOTE — DISCHARGE INSTRUCTIONS
Reassuring evaluation with labs. Return for evaluation if you develop fever (100.4 F or higher), severe pain, uncontrollable nausea and vomiting.

## 2021-06-07 NOTE — ED PROVIDER NOTES
History     Chief Complaint   Patient presents with     Post-op Problem       Kolton Aragon is a 55 year old male who presents with concern for postop infection.  He is approximately postop day 10 from a laparoscopic cholecystectomy.  Low midline lumbar back pain started over the past day, currently mild.  He also has some associated mild intermittent nausea and chills.  Denies fever, vomiting, dysuria, diarrhea.  He has a history of alcohol abuse, thought to be contributing to his pancreatitis, with last drink 2 hours ago.    Allergies   Allergen Reactions     Latex Hives     Dust Mite Extract Other (See Comments)     Sneezing, itchy eyes       Patient Active Problem List    Diagnosis Date Noted     Chronic kidney disease, stage 3 03/30/2021     Priority: Medium     Pseudocyst of pancreas 03/24/2021     Priority: Medium     GIB (gastrointestinal bleeding) 03/18/2021     Priority: Medium     RUQ abdominal pain 03/18/2021     Priority: Medium     Liver mass, left lobe 03/18/2021     Priority: Medium     Type 2 diabetes mellitus without complication, without long-term current use of insulin (H) 04/17/2019     Priority: Medium     Reactive airway disease 03/01/2018     Priority: Medium     Alcoholism (H) 10/12/2017     Priority: Medium     Acute alcoholic pancreatitis 06/20/2016     Priority: Medium     Chronic kidney disease, stage II (mild) 03/09/2016     Priority: Medium     Gastroesophageal reflux disease without esophagitis 05/18/2015     Priority: Medium     Dyslipidemia 12/29/2014     Priority: Medium     Hypertension 09/12/2013     Priority: Medium     Spondylolisthesis at L5-S1 level 07/09/2013     Priority: Medium     Overview:   Patient has been referred for PT and has not followed through.  He was sent for MRI scan,and he was not able to do either the closed or open MRI due to claustrophobia.       Insomnia 06/20/2013     Priority: Medium     Chewing tobacco use 05/17/2013     Priority: Medium     DJD  (degenerative joint disease), lumbar 04/22/2013     Priority: Medium       Past Medical History:   Diagnosis Date     Alcohol-induced acute pancreatitis without infection or necrosis      Altered level of consciousness 10/11/2017     Essential (primary) hypertension      Gastro-esophageal reflux disease without esophagitis      Spondylolisthesis of lumbosacral region      Spondylosis of lumbar region without myelopathy or radiculopathy      Tobacco use      Uncomplicated asthma        Past Surgical History:   Procedure Laterality Date     ESOPHAGOSCOPY, GASTROSCOPY, DUODENOSCOPY (EGD), COMBINED N/A 06/16/2020    Procedure: ESOPHAGOGASTRODUODENOSCOPY, WITH BIOPSY;  Surgeon: Giovanni Reed MD;  Location: [facility] OR     pancreatic duct surgery  2021    stent removed from the pancreatic duct     VASECTOMY      No Comments Provided       Family History   Problem Relation Age of Onset     Family History Negative Mother         Good Health     Other - See Comments Father         Deserted as a child, patient knows father had a history of heart attack at 50     Heart Disease Father         Heart Disease,patient knows father had a history of heart attack at 50     Cancer Other         Cancer,History of cancer     Diabetes Brother      Diabetes Brother        Social History     Tobacco Use     Smoking status: Never Smoker     Smokeless tobacco: Former User     Types: Chew     Tobacco comment: Quit smoking: trying to quit chew using 1 pack week   Substance Use Topics     Alcohol use: Yes     Alcohol/week: 28.0 standard drinks     Comment: vodka     Drug use: No       Medications:    acetaminophen (TYLENOL 8 HOUR ARTHRITIS PAIN) 650 MG CR tablet  Alcohol Swabs (B-D SINGLE USE SWABS REGULAR) PADS  blood glucose monitoring (JOEY MICROLET) lancets  Continuous Blood Gluc  (DEXCOM G6 ) CHRISTINA  Continuous Blood Gluc Sensor (DEXCOM G6 SENSOR) MISC  Continuous Blood Gluc Transmit (DEXCOM G6 TRANSMITTER) MISC  CONTOUR  NEXT TEST test strip  diclofenac (VOLTAREN) 1 % topical gel  diphenhydrAMINE HCl, Sleep, (UNISOM SLEEPGELS) 50 MG CAPS  glipiZIDE (GLUCOTROL XL) 5 MG 24 hr tablet  glucose (BD GLUCOSE) 4 g chewable tablet  lisinopril (ZESTRIL) 10 MG tablet  LORazepam (ATIVAN) 1 MG tablet  Melatonin 10-10 MG TBCR  naltrexone (DEPADE/REVIA) 50 MG tablet  naproxen sodium (ALEVE) 220 MG tablet  omeprazole (PRILOSEC) 40 MG DR capsule  traZODone (DESYREL) 100 MG tablet        Review of Systems: See HPI for pertinent negatives and positives. All other systems reviewed and found to be negative.    Physical Exam   BP (!) 151/117   Pulse 89   Temp 98  F (36.7  C) (Tympanic)   Resp 18   Wt 90.5 kg (199 lb 8 oz)   SpO2 95%   BMI 30.33 kg/m       General: awake, comfortable  HEENT: atraumatic, neck supple  Respiratory: normal effort, clear to auscultation bilaterally  Cardiovascular: regular rate and rhythm, no murmurs  Abdomen: soft, nondistended, lower abdominal tenderness  Extremities: no deformities, edema, or tenderness  Skin: warm, dry, no rashes, periumbilical ecchymosis   MSK: lumbar midline mild tenderness - not reproducible when using stethoscope distraction later in exam  Neuro: alert, mildly slurred speech, no no focal deficits  Psych: appropriate mood and affect    ED Course           Results for orders placed or performed during the hospital encounter of 06/07/21 (from the past 24 hour(s))   CBC with platelets differential   Result Value Ref Range    WBC 7.6 4.0 - 11.0 10e9/L    RBC Count 4.61 4.4 - 5.9 10e12/L    Hemoglobin 11.7 (L) 13.3 - 17.7 g/dL    Hematocrit 37.1 (L) 40.0 - 53.0 %    MCV 81 78 - 100 fl    MCH 25.4 (L) 26.5 - 33.0 pg    MCHC 31.5 31.5 - 36.5 g/dL    RDW 15.4 (H) 10.0 - 15.0 %    Platelet Count 380 150 - 450 10e9/L    Diff Method Automated Method     % Neutrophils 60.5 %    % Lymphocytes 17.0 %    % Monocytes 20.2 %    % Eosinophils 1.1 %    % Basophils 0.7 %    % Immature Granulocytes 0.5 %    Absolute  Neutrophil 4.6 1.6 - 8.3 10e9/L    Absolute Lymphocytes 1.3 0.8 - 5.3 10e9/L    Absolute Monocytes 1.5 (H) 0.0 - 1.3 10e9/L    Absolute Eosinophils 0.1 0.0 - 0.7 10e9/L    Absolute Basophils 0.1 0.0 - 0.2 10e9/L    Abs Immature Granulocytes 0.0 0 - 0.4 10e9/L   Basic metabolic panel   Result Value Ref Range    Sodium 140 134 - 144 mmol/L    Potassium 4.1 3.5 - 5.1 mmol/L    Chloride 103 98 - 107 mmol/L    Carbon Dioxide 23 21 - 31 mmol/L    Anion Gap 14 3 - 14 mmol/L    Glucose 245 (H) 70 - 105 mg/dL    Urea Nitrogen 13 7 - 25 mg/dL    Creatinine 1.20 0.70 - 1.30 mg/dL    GFR Estimate 63 >60 mL/min/[1.73_m2]    GFR Estimate If Black 77 >60 mL/min/[1.73_m2]    Calcium 8.2 (L) 8.6 - 10.3 mg/dL   CRP inflammation   Result Value Ref Range    CRP Inflammation 20.0 (H) <10.0 mg/L   Procalcitonin   Result Value Ref Range    Procalcitonin <0.50 ng/ml   Ethanol [facility]   Result Value Ref Range    Ethanol g/dL 0.17 (H) <0.01 %       Medications - No data to display    Assessments & Plan (with Medical Decision Making)     I have reviewed the nursing notes.    Patient evaluated for concern for postop infection approximately 10 days out from laparoscopic cholecystectomy.  Afebrile with overall reassuring exam.  Lab work-up also reassuring for unlikely infectious process.  CRP mildly elevated but at prior baseline and could be contributed to alcohol use, last used 2 hours ago.  Reassurance and ED return precautions provided and discharged home.     I have reviewed the findings, diagnosis, plan, and need for any follow up with the patient.      Discharge Medication List as of 6/7/2021  6:36 AM          Final diagnoses:   Postop check       6/7/2021   [facility]     Alexandro Trevizo MD  06/07/21 0341

## 2021-06-07 NOTE — ED TRIAGE NOTES
"EMS Arrival Note  ________________________________  Kolton NOLAN Aragon is a 55 year old Male that arrives via [facility]  Pre hospital clinical presentation per EMS personnel includes pt had his gallbladder taken out 1 week ago at [facility] in [place].  Pt has not taken any of his medications for the past few days because \"I was being stupid\".  Pt admits to drinking vodka tonight.  Pre hospital personnel report vital signs of:  B/P 168/106; HR 90,;SpO2 90's on RA    Patient arrives with:  GCS Eye Opening = 4=Spontaneous  Airway intact  Breathing Assessment Normal  Circulation Assessment Normal  Patient arrives with a 18 gauge IV at his left hand         Previous living situation Alone    "

## 2021-08-20 DIAGNOSIS — G47.00 INSOMNIA, UNSPECIFIED TYPE: ICD-10-CM

## 2021-08-20 RX ORDER — TRAZODONE HYDROCHLORIDE 100 MG/1
100 TABLET ORAL AT BEDTIME
Qty: 30 TABLET | Refills: 11 | Status: SHIPPED | OUTPATIENT
Start: 2021-08-20 | End: 2022-07-14

## 2021-08-20 NOTE — TELEPHONE ENCOUNTER
traZODone (DESYREL) 100 MG tablet     Sig: Take 1 tablet (100 mg) by mouth At Bedtime           Last Written Prescription Date:  9/10/20  Last Fill Quantity: 30,   # refills: 11  Last Office Visit: 5/24/21  Future Office visit:       Routing refill request to provider for review/approval because:  Drug not on the [facility], [facility] or [facility] refill protocol or controlled substance

## 2021-09-07 ENCOUNTER — OFFICE VISIT (OUTPATIENT)
Dept: FAMILY MEDICINE | Facility: OTHER | Age: 56
End: 2021-09-07
Attending: FAMILY MEDICINE
Payer: COMMERCIAL

## 2021-09-07 VITALS
SYSTOLIC BLOOD PRESSURE: 134 MMHG | OXYGEN SATURATION: 96 % | HEART RATE: 94 BPM | RESPIRATION RATE: 18 BRPM | BODY MASS INDEX: 30.41 KG/M2 | WEIGHT: 200 LBS | TEMPERATURE: 98 F | DIASTOLIC BLOOD PRESSURE: 80 MMHG

## 2021-09-07 DIAGNOSIS — F10.20 ALCOHOLISM (H): ICD-10-CM

## 2021-09-07 DIAGNOSIS — E11.9 DM TYPE 2, NOT AT GOAL (H): ICD-10-CM

## 2021-09-07 DIAGNOSIS — E11.9 TYPE 2 DIABETES MELLITUS WITHOUT COMPLICATION, WITHOUT LONG-TERM CURRENT USE OF INSULIN (H): ICD-10-CM

## 2021-09-07 DIAGNOSIS — Z12.5 SCREENING FOR PROSTATE CANCER: ICD-10-CM

## 2021-09-07 DIAGNOSIS — N18.31 STAGE 3A CHRONIC KIDNEY DISEASE (H): ICD-10-CM

## 2021-09-07 DIAGNOSIS — K21.9 GASTROESOPHAGEAL REFLUX DISEASE WITHOUT ESOPHAGITIS: ICD-10-CM

## 2021-09-07 DIAGNOSIS — I10 ESSENTIAL HYPERTENSION: ICD-10-CM

## 2021-09-07 DIAGNOSIS — F32.1 MAJOR DEPRESSIVE DISORDER, SINGLE EPISODE, MODERATE (H): Primary | ICD-10-CM

## 2021-09-07 DIAGNOSIS — N18.2 CHRONIC KIDNEY DISEASE, STAGE II (MILD): ICD-10-CM

## 2021-09-07 DIAGNOSIS — K86.3 PSEUDOCYST OF PANCREAS: ICD-10-CM

## 2021-09-07 PROBLEM — R93.5 ABDOMINAL ULTRASOUND, ABNORMAL: Status: ACTIVE | Noted: 2021-03-20

## 2021-09-07 PROBLEM — R10.11 ABDOMINAL PAIN, ACUTE, RIGHT UPPER QUADRANT: Status: ACTIVE | Noted: 2021-03-20

## 2021-09-07 PROBLEM — K80.80 BILIARY CALCULUS OF OTHER SITE WITHOUT OBSTRUCTION: Status: ACTIVE | Noted: 2021-05-21

## 2021-09-07 PROBLEM — F17.201 TOBACCO ABUSE, IN REMISSION: Status: ACTIVE | Noted: 2021-03-20

## 2021-09-07 LAB
ALBUMIN SERPL-MCNC: 3.9 G/DL (ref 3.5–5.7)
ALP SERPL-CCNC: 78 U/L (ref 34–104)
ALT SERPL W P-5'-P-CCNC: 71 U/L (ref 7–52)
ANION GAP SERPL CALCULATED.3IONS-SCNC: 6 MMOL/L (ref 3–14)
AST SERPL W P-5'-P-CCNC: 35 U/L (ref 13–39)
BASOPHILS # BLD AUTO: 0.1 10E3/UL (ref 0–0.2)
BASOPHILS NFR BLD AUTO: 1 %
BILIRUB SERPL-MCNC: 0.3 MG/DL (ref 0.3–1)
BUN SERPL-MCNC: 15 MG/DL (ref 7–25)
CALCIUM SERPL-MCNC: 9.2 MG/DL (ref 8.6–10.3)
CHLORIDE BLD-SCNC: 101 MMOL/L (ref 98–107)
CHOLEST SERPL-MCNC: 91 MG/DL
CO2 SERPL-SCNC: 26 MMOL/L (ref 21–31)
CREAT SERPL-MCNC: 1.23 MG/DL (ref 0.7–1.3)
CREAT UR-MCNC: 108 MG/DL
EOSINOPHIL # BLD AUTO: 0.3 10E3/UL (ref 0–0.7)
EOSINOPHIL NFR BLD AUTO: 5 %
ERYTHROCYTE [DISTWIDTH] IN BLOOD BY AUTOMATED COUNT: 17.1 % (ref 10–15)
FASTING STATUS PATIENT QL REPORTED: NORMAL
GFR SERPL CREATININE-BSD FRML MDRD: 67 ML/MIN/1.73M2
GLUCOSE BLD-MCNC: 214 MG/DL (ref 70–105)
HBA1C MFR BLD: 7.6 % (ref 4–6.2)
HCT VFR BLD AUTO: 37.5 % (ref 40–53)
HDLC SERPL-MCNC: 23 MG/DL (ref 23–92)
HGB BLD-MCNC: 11.5 G/DL (ref 13.3–17.7)
IMM GRANULOCYTES # BLD: 0 10E3/UL
IMM GRANULOCYTES NFR BLD: 0 %
LDLC SERPL CALC-MCNC: 53 MG/DL
LYMPHOCYTES # BLD AUTO: 1.4 10E3/UL (ref 0.8–5.3)
LYMPHOCYTES NFR BLD AUTO: 18 %
MCH RBC QN AUTO: 25.6 PG (ref 26.5–33)
MCHC RBC AUTO-ENTMCNC: 30.7 G/DL (ref 31.5–36.5)
MCV RBC AUTO: 84 FL (ref 78–100)
MICROALBUMIN UR-MCNC: 27 MG/L
MICROALBUMIN/CREAT UR: 25 MG/G CR (ref 0–17)
MONOCYTES # BLD AUTO: 1 10E3/UL (ref 0–1.3)
MONOCYTES NFR BLD AUTO: 13 %
NEUTROPHILS # BLD AUTO: 4.8 10E3/UL (ref 1.6–8.3)
NEUTROPHILS NFR BLD AUTO: 63 %
NONHDLC SERPL-MCNC: 68 MG/DL
NRBC # BLD AUTO: 0 10E3/UL
NRBC BLD AUTO-RTO: 0 /100
PLATELET # BLD AUTO: 355 10E3/UL (ref 150–450)
POTASSIUM BLD-SCNC: 4.4 MMOL/L (ref 3.5–5.1)
PROT SERPL-MCNC: 7.3 G/DL (ref 6.4–8.9)
PSA SERPL-MCNC: 0.21 UG/L (ref 0–4)
RBC # BLD AUTO: 4.49 10E6/UL (ref 4.4–5.9)
SODIUM SERPL-SCNC: 133 MMOL/L (ref 134–144)
TRIGL SERPL-MCNC: 76 MG/DL
TSH SERPL DL<=0.005 MIU/L-ACNC: 2.62 MU/L (ref 0.4–4)
WBC # BLD AUTO: 7.6 10E3/UL (ref 4–11)

## 2021-09-07 PROCEDURE — 82043 UR ALBUMIN QUANTITATIVE: CPT | Mod: ZL | Performed by: FAMILY MEDICINE

## 2021-09-07 PROCEDURE — 36415 COLL VENOUS BLD VENIPUNCTURE: CPT | Mod: ZL | Performed by: FAMILY MEDICINE

## 2021-09-07 PROCEDURE — 99214 OFFICE O/P EST MOD 30 MIN: CPT | Performed by: FAMILY MEDICINE

## 2021-09-07 PROCEDURE — G0463 HOSPITAL OUTPT CLINIC VISIT: HCPCS | Mod: 25

## 2021-09-07 PROCEDURE — 83036 HEMOGLOBIN GLYCOSYLATED A1C: CPT | Mod: ZL | Performed by: FAMILY MEDICINE

## 2021-09-07 PROCEDURE — 84153 ASSAY OF PSA TOTAL: CPT | Mod: ZL | Performed by: FAMILY MEDICINE

## 2021-09-07 PROCEDURE — 80053 COMPREHEN METABOLIC PANEL: CPT | Mod: ZL | Performed by: FAMILY MEDICINE

## 2021-09-07 PROCEDURE — 85025 COMPLETE CBC W/AUTO DIFF WBC: CPT | Mod: ZL | Performed by: FAMILY MEDICINE

## 2021-09-07 PROCEDURE — 84443 ASSAY THYROID STIM HORMONE: CPT | Mod: ZL | Performed by: FAMILY MEDICINE

## 2021-09-07 PROCEDURE — 80061 LIPID PANEL: CPT | Mod: ZL | Performed by: FAMILY MEDICINE

## 2021-09-07 RX ORDER — NALTREXONE HYDROCHLORIDE 50 MG/1
50 TABLET, FILM COATED ORAL DAILY
Qty: 30 TABLET | Refills: 11 | Status: SHIPPED | OUTPATIENT
Start: 2021-09-07 | End: 2022-07-14

## 2021-09-07 RX ORDER — GLIPIZIDE 5 MG/1
5 TABLET, FILM COATED, EXTENDED RELEASE ORAL DAILY
Qty: 90 TABLET | Refills: 4 | Status: SHIPPED | OUTPATIENT
Start: 2021-09-07 | End: 2022-07-14

## 2021-09-07 ASSESSMENT — ANXIETY QUESTIONNAIRES
GAD7 TOTAL SCORE: 3
5. BEING SO RESTLESS THAT IT IS HARD TO SIT STILL: SEVERAL DAYS
GAD7 TOTAL SCORE: 3
2. NOT BEING ABLE TO STOP OR CONTROL WORRYING: NOT AT ALL
4. TROUBLE RELAXING: SEVERAL DAYS
1. FEELING NERVOUS, ANXIOUS, OR ON EDGE: NOT AT ALL
8. IF YOU CHECKED OFF ANY PROBLEMS, HOW DIFFICULT HAVE THESE MADE IT FOR YOU TO DO YOUR WORK, TAKE CARE OF THINGS AT HOME, OR GET ALONG WITH OTHER PEOPLE?: SOMEWHAT DIFFICULT
7. FEELING AFRAID AS IF SOMETHING AWFUL MIGHT HAPPEN: NOT AT ALL
6. BECOMING EASILY ANNOYED OR IRRITABLE: SEVERAL DAYS
3. WORRYING TOO MUCH ABOUT DIFFERENT THINGS: NOT AT ALL
GAD7 TOTAL SCORE: 3
7. FEELING AFRAID AS IF SOMETHING AWFUL MIGHT HAPPEN: NOT AT ALL

## 2021-09-07 ASSESSMENT — PATIENT HEALTH QUESTIONNAIRE - PHQ9
SUM OF ALL RESPONSES TO PHQ QUESTIONS 1-9: 6
10. IF YOU CHECKED OFF ANY PROBLEMS, HOW DIFFICULT HAVE THESE PROBLEMS MADE IT FOR YOU TO DO YOUR WORK, TAKE CARE OF THINGS AT HOME, OR GET ALONG WITH OTHER PEOPLE: SOMEWHAT DIFFICULT
SUM OF ALL RESPONSES TO PHQ QUESTIONS 1-9: 6

## 2021-09-07 ASSESSMENT — PAIN SCALES - GENERAL: PAINLEVEL: NO PAIN (0)

## 2021-09-07 NOTE — PROGRESS NOTES
"Nursing Notes:   Elysia Bowden LPN  9/7/2021  1:25 PM  Signed  Patient presents to the clinic for concerns about depression.      FOOD SECURITY SCREENING QUESTIONS  Hunger Vital Signs:  Within the past 12 months we worried whether our food would run out before we got money to buy more. Never  Within the past 12 months the food we bought just didn't last and we didn't have money to get more. Never    Advance Care Directive on file? no  Advance Care Directive provided to patient? yes     Chief Complaint   Patient presents with     Depression       Initial /80 (BP Location: Right arm, Patient Position: Sitting, Cuff Size: Adult Regular)   Pulse 94   Temp 98  F (36.7  C) (Tympanic)   Resp 18   Wt 90.7 kg (200 lb)   SpO2 96%   BMI 30.41 kg/m   Estimated body mass index is 30.41 kg/m  as calculated from the following:    Height as of 5/24/21: 1.727 m (5' 8\").    Weight as of this encounter: 90.7 kg (200 lb).  Medication Reconciliation: complete    Lab Results   Component Value Date    A1C 6.4 03/18/2021    A1C 5.7 09/10/2020    A1C >14.0 05/05/2020    ALBUMIN 3.5 03/19/2021     Previous A1C is at goal of <8  Date of last Foot exam Fall 2020, declined today  Eye exam Yes- Date of last eye exam: Fall 2020,  [facility]: [facility] , scheduled APPT this week.  Patient is not a Tobacco User  Patient is not on a daily aspirin  Patient is not on a Statin.  Blood pressure today of:     BP Readings from Last 1 Encounters:   09/07/21 134/80      is at the goal of <139/89 for diabetics.    Elysia Bowden LPN on 9/7/2021 at 1:07 PM          SUBJECTIVE:  Kolton Aragon  is a 55 year old male who comes in because of depression.  He is recovering alcoholic. He has been doing ok and is still taking Naltrexone to help with cravings.  He has type 2 diabetes.  He is on trazodone at bedtime. He has some mild depressive symptoms from time to time, more often of late. He has poor concentration, low energy, less ambition.  He has " been looking for work. 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     His last hemoglobin A1c was 6.4% 6 months ago.    PHQ 4/25/2017 7/19/2018 9/7/2021   PHQ-9 Total Score 1 0 6   Q9: Thoughts of better off dead/self-harm past 2 weeks Not at all Not at all Not at all     GEMMA-7 SCORE 7/19/2018 9/7/2021   Total Score - 3 (minimal anxiety)   Total Score 0 3       He is up-to-date on immunizations.    Past Medical, Family, and Social History reviewed and updated as noted below.   ROS is negative except as noted above       Allergies   Allergen Reactions     Latex Hives     Dust Mite Extract Other (See Comments)     Sneezing, itchy eyes   ,   Family History   Problem Relation Age of Onset     Family History Negative Mother         Good Health     Other - See Comments Father         Deserted as a child, patient knows father had a history of heart attack at 50     Heart Disease Father         Heart Disease,patient knows father had a history of heart attack at 50     Cancer Other         Cancer,History of cancer     Diabetes Brother      Diabetes Brother    ,   Current Outpatient Medications   Medication     acetaminophen (TYLENOL 8 HOUR ARTHRITIS PAIN) 650 MG CR tablet     Alcohol Swabs (B-D SINGLE USE SWABS REGULAR) PADS     blood glucose monitoring (JOEY MICROLET) lancets     Continuous Blood Gluc  (DEXCOM G6 ) CHRISTINA     Continuous Blood Gluc Sensor (DEXCOM G6 SENSOR) MISC     Continuous Blood Gluc Transmit (DEXCOM G6 TRANSMITTER) MISC     CONTOUR NEXT TEST test strip     diclofenac (VOLTAREN) 1 % topical gel     diphenhydrAMINE HCl, Sleep, (UNISOM SLEEPGELS) 50 MG CAPS     FLUoxetine (PROZAC) 20 MG capsule     glipiZIDE (GLUCOTROL XL) 5 MG 24 hr tablet     glucose (BD GLUCOSE) 4 g chewable tablet     lisinopril (ZESTRIL) 10 MG tablet     Melatonin 10-10 MG TBCR     naltrexone (DEPADE/REVIA) 50 MG tablet     naproxen sodium (ALEVE) 220 MG tablet     omeprazole (PRILOSEC)  40 MG DR capsule     traZODone (DESYREL) 100 MG tablet     No current facility-administered medications for this visit.   ,   Past Medical History:   Diagnosis Date     Alcohol-induced acute pancreatitis without infection or necrosis     06/20/2016     Altered level of consciousness 10/11/2017     Essential (primary) hypertension     9/12/2013     Gastro-esophageal reflux disease without esophagitis     5/18/2015     Spondylolisthesis of lumbosacral region     7/9/2013,Patient has been referred for PT and has not followed through.  He was sent for MRI scan,and he was not able to do either the closed or open MRI due to claustrophobia.     Spondylosis of lumbar region without myelopathy or radiculopathy     4/22/2013     Tobacco use     5/17/2013     Uncomplicated asthma     No Comments Provided   ,   Patient Active Problem List    Diagnosis Date Noted     Biliary calculus of other site without obstruction 05/21/2021     Priority: Medium     Formatting of this note might be different from the original.  Added automatically from request for surgery [id number]       Chronic kidney disease, stage 3 03/30/2021     Priority: Medium     Pseudocyst of pancreas 03/24/2021     Priority: Medium     Abdominal pain, acute, right upper quadrant 03/20/2021     Priority: Medium     Abdominal ultrasound, abnormal 03/20/2021     Priority: Medium     Tobacco abuse, in remission 03/20/2021     Priority: Medium     GIB (gastrointestinal bleeding) 03/18/2021     Priority: Medium     RUQ abdominal pain 03/18/2021     Priority: Medium     Liver mass, left lobe 03/18/2021     Priority: Medium     Type 2 diabetes mellitus without complication, without long-term current use of insulin (H) 04/17/2019     Priority: Medium     Reactive airway disease 03/01/2018     Priority: Medium     Alcoholism (H) 10/12/2017     Priority: Medium     Acute alcoholic pancreatitis 06/20/2016     Priority: Medium     Chronic kidney disease, stage II (mild) 03/09/2016      Priority: Medium     Gastroesophageal reflux disease without esophagitis 05/18/2015     Priority: Medium     Dyslipidemia 12/29/2014     Priority: Medium     Hypertension 09/12/2013     Priority: Medium     Spondylolisthesis at L5-S1 level 07/09/2013     Priority: Medium     Overview:   Patient has been referred for PT and has not followed through.  He was sent for MRI scan,and he was not able to do either the closed or open MRI due to claustrophobia.       Insomnia 06/20/2013     Priority: Medium     Chewing tobacco use 05/17/2013     Priority: Medium     DJD (degenerative joint disease), lumbar 04/22/2013     Priority: Medium   ,   Past Surgical History:   Procedure Laterality Date     ESOPHAGOSCOPY, GASTROSCOPY, DUODENOSCOPY (EGD), COMBINED N/A 06/16/2020    Procedure: ESOPHAGOGASTRODUODENOSCOPY, WITH BIOPSY;  Surgeon: Giovanni Reed MD;  Location: [facility] OR     pancreatic duct surgery  2021    stent removed from the pancreatic duct     VASECTOMY      No Comments Provided    and   Social History     Tobacco Use     Smoking status: Never Smoker     Smokeless tobacco: Former User     Types: Chew     Tobacco comment: Quit smoking: trying to quit chew using 1 pack week   Substance Use Topics     Alcohol use: Not Currently     Alcohol/week: 28.0 standard drinks     OBJECTIVE:  /80 (BP Location: Right arm, Patient Position: Sitting, Cuff Size: Adult Regular)   Pulse 94   Temp 98  F (36.7  C) (Tympanic)   Resp 18   Wt 90.7 kg (200 lb)   SpO2 96%   BMI 30.41 kg/m     EXAM:  Alert and cooperative, no distress.  Affect is appropriate today.  No formal thought disorder.  Depression anxiety disorders as noted above.    Results for orders placed or performed in visit on 09/07/21   PSA tumor marker     Status: Normal   Result Value Ref Range    PSA Tumor Marker 0.21 0.00 - 4.00 ug/L   TSH     Status: Normal   Result Value Ref Range    TSH 2.62 0.40 - 4.00 mU/L   Hemoglobin A1c     Status: Abnormal   Result  Value Ref Range    Hemoglobin A1C 7.6 (H) 4.0 - 6.2 %   Lipid Profile     Status: None   Result Value Ref Range    Cholesterol 91 <200 mg/dL    Triglycerides 76 <150 mg/dL    Direct Measure HDL 23 23 - 92 mg/dL    LDL Cholesterol Calculated 53 <=100 mg/dL    Non HDL Cholesterol 68 <130 mg/dL    Patient Fasting > 8hrs? Unknown    Comprehensive metabolic panel     Status: Abnormal   Result Value Ref Range    Sodium 133 (L) 134 - 144 mmol/L    Potassium 4.4 3.5 - 5.1 mmol/L    Chloride 101 98 - 107 mmol/L    Carbon Dioxide (CO2) 26 21 - 31 mmol/L    Anion Gap 6 3 - 14 mmol/L    Urea Nitrogen 15 7 - 25 mg/dL    Creatinine 1.23 0.70 - 1.30 mg/dL    Calcium 9.2 8.6 - 10.3 mg/dL    Glucose 214 (H) 70 - 105 mg/dL    Alkaline Phosphatase 78 34 - 104 U/L    AST 35 13 - 39 U/L    ALT 71 (H) 7 - 52 U/L    Protein Total 7.3 6.4 - 8.9 g/dL    Albumin 3.9 3.5 - 5.7 g/dL    Bilirubin Total 0.3 0.3 - 1.0 mg/dL    GFR Estimate 67 >60 mL/min/1.73m2   CBC with Platelets & Differential     Status: Abnormal    Narrative    The following orders were created for panel order CBC with Platelets & Differential.  Procedure                               Abnormality         Status                     ---------                               -----------         ------                     CBC with platelets and d...[[id number]]  Abnormal            Final result                 Please view results for these tests on the individual orders.   CBC with platelets and differential     Status: Abnormal   Result Value Ref Range    WBC Count 7.6 4.0 - 11.0 10e3/uL    RBC Count 4.49 4.40 - 5.90 10e6/uL    Hemoglobin 11.5 (L) 13.3 - 17.7 g/dL    Hematocrit 37.5 (L) 40.0 - 53.0 %    MCV 84 78 - 100 fL    MCH 25.6 (L) 26.5 - 33.0 pg    MCHC 30.7 (L) 31.5 - 36.5 g/dL    RDW 17.1 (H) 10.0 - 15.0 %    Platelet Count 355 150 - 450 10e3/uL    % Neutrophils 63 %    % Lymphocytes 18 %    % Monocytes 13 %    % Eosinophils 5 %    % Basophils 1 %    % Immature  Granulocytes 0 %    NRBCs per 100 WBC 0 <1 /100    Absolute Neutrophils 4.8 1.6 - 8.3 10e3/uL    Absolute Lymphocytes 1.4 0.8 - 5.3 10e3/uL    Absolute Monocytes 1.0 0.0 - 1.3 10e3/uL    Absolute Eosinophils 0.3 0.0 - 0.7 10e3/uL    Absolute Basophils 0.1 0.0 - 0.2 10e3/uL    Absolute Immature Granulocytes 0.0 <=0.0 10e3/uL    Absolute NRBCs 0.0 10e3/uL      ASSESSMENT/Plan :    Kloton was seen today for depression.    Diagnoses and all orders for this visit:    Major depressive disorder, single episode, moderate (H)  -     FLUoxetine (PROZAC) 20 MG capsule; Take 1 capsule (20 mg) by mouth daily    Essential hypertension    Gastroesophageal reflux disease without esophagitis  -     CBC with Platelets & Differential; Future    Type 2 diabetes mellitus without complication, without long-term current use of insulin (H)  -     CBC with Platelets & Differential; Future  -     Comprehensive metabolic panel; Future  -     Lipid Profile; Future  -     Hemoglobin A1c; Future  -     TSH; Future  -     Albumin Random Urine Quantitative with Creat Ratio; Future  -     glucose (BD GLUCOSE) 4 g chewable tablet; Take 1 tablet by mouth every hour as needed for low blood sugar    Alcoholism (H)  -     naltrexone (DEPADE/REVIA) 50 MG tablet; Take 1 tablet (50 mg) by mouth daily To help with alcohol cravings    Chronic kidney disease, stage II (mild)    Stage 3a chronic kidney disease  -     Comprehensive metabolic panel; Future    Screening for prostate cancer  -     PSA tumor marker; Future    Pseudocyst of pancreas    DM type 2, not at goal (H)  -     glipiZIDE (GLUCOTROL XL) 5 MG 24 hr tablet; Take 1 tablet (5 mg) by mouth daily      Will notify of lab results when available. Discussed diet, exercise and healthy lifestyle changes.     Lengthy discussion with regard to the pathophysiology of depression.  We will add fluoxetine 20 mg daily to his regimen and continue with trazodone at bedtime.  He is still at times struggling with  cravings.  We discussed possibly increasing naltrexone to 100 mg but since we are starting the SSRI we will hold off on that.  Recommend follow-up in 1 month, sooner if needed.    A total of 30 minutes was spent with the patient, reviewing records, tests, ordering medications, tests or procedures and documenting clinical information in the EHR.     Jono Gonzalez MD

## 2021-09-07 NOTE — LETTER
September 7, 2021      Kolton Aragon  [address]   [place] [address]        Dear Kolton,     Your labs look ok.  Your complete blood count was fine and improved from last time. Your comprehensive metabolic panel (a test that looks at liver and kidney function, blood sugar, electrolytes, and nutritional status) was ok although your blood sugar was high when we tested. Your cholesterol is fine. Your prostate test and thyroid are normal.    Your diabetes control has slipped a bit but is still ok.  We should recheck your labs again in 3 months to make sure that things are staying controlled.    It was a pleasure seeing you the other day.  If you have any questions, please don't hesitate to call us.       Sincerely,        Jono Gonzalez MD                                Results for orders placed or performed in visit on 09/07/21   PSA tumor marker     Status: Normal   Result Value Ref Range    PSA Tumor Marker 0.21 0.00 - 4.00 ug/L   TSH     Status: Normal   Result Value Ref Range    TSH 2.62 0.40 - 4.00 mU/L   Hemoglobin A1c     Status: Abnormal   Result Value Ref Range    Hemoglobin A1C 7.6 (H) 4.0 - 6.2 %   Lipid Profile     Status: None   Result Value Ref Range    Cholesterol 91 <200 mg/dL    Triglycerides 76 <150 mg/dL    Direct Measure HDL 23 23 - 92 mg/dL    LDL Cholesterol Calculated 53 <=100 mg/dL    Non HDL Cholesterol 68 <130 mg/dL    Patient Fasting > 8hrs? Unknown    Comprehensive metabolic panel     Status: Abnormal   Result Value Ref Range    Sodium 133 (L) 134 - 144 mmol/L    Potassium 4.4 3.5 - 5.1 mmol/L    Chloride 101 98 - 107 mmol/L    Carbon Dioxide (CO2) 26 21 - 31 mmol/L    Anion Gap 6 3 - 14 mmol/L    Urea Nitrogen 15 7 - 25 mg/dL    Creatinine 1.23 0.70 - 1.30 mg/dL    Calcium 9.2 8.6 - 10.3 mg/dL    Glucose 214 (H) 70 - 105 mg/dL    Alkaline Phosphatase 78 34 - 104 U/L    AST 35 13 - 39 U/L    ALT 71 (H) 7 - 52 U/L    Protein Total 7.3 6.4 - 8.9 g/dL    Albumin 3.9 3.5 -  5.7 g/dL    Bilirubin Total 0.3 0.3 - 1.0 mg/dL    GFR Estimate 67 >60 mL/min/1.73m2   CBC with Platelets & Differential     Status: Abnormal    Narrative    The following orders were created for panel order CBC with Platelets & Differential.  Procedure                               Abnormality         Status                     ---------                               -----------         ------                     CBC with platelets and d...[[id number]]  Abnormal            Final result                 Please view results for these tests on the individual orders.   CBC with platelets and differential     Status: Abnormal   Result Value Ref Range    WBC Count 7.6 4.0 - 11.0 10e3/uL    RBC Count 4.49 4.40 - 5.90 10e6/uL    Hemoglobin 11.5 (L) 13.3 - 17.7 g/dL    Hematocrit 37.5 (L) 40.0 - 53.0 %    MCV 84 78 - 100 fL    MCH 25.6 (L) 26.5 - 33.0 pg    MCHC 30.7 (L) 31.5 - 36.5 g/dL    RDW 17.1 (H) 10.0 - 15.0 %    Platelet Count 355 150 - 450 10e3/uL    % Neutrophils 63 %    % Lymphocytes 18 %    % Monocytes 13 %    % Eosinophils 5 %    % Basophils 1 %    % Immature Granulocytes 0 %    NRBCs per 100 WBC 0 <1 /100    Absolute Neutrophils 4.8 1.6 - 8.3 10e3/uL    Absolute Lymphocytes 1.4 0.8 - 5.3 10e3/uL    Absolute Monocytes 1.0 0.0 - 1.3 10e3/uL    Absolute Eosinophils 0.3 0.0 - 0.7 10e3/uL    Absolute Basophils 0.1 0.0 - 0.2 10e3/uL    Absolute Immature Granulocytes 0.0 <=0.0 10e3/uL    Absolute NRBCs 0.0 10e3/uL

## 2021-09-07 NOTE — NURSING NOTE
"Patient presents to the clinic for concerns about depression.      FOOD SECURITY SCREENING QUESTIONS  Hunger Vital Signs:  Within the past 12 months we worried whether our food would run out before we got money to buy more. Never  Within the past 12 months the food we bought just didn't last and we didn't have money to get more. Never    Advance Care Directive on file? no  Advance Care Directive provided to patient? yes     Chief Complaint   Patient presents with     Depression       Initial /80 (BP Location: Right arm, Patient Position: Sitting, Cuff Size: Adult Regular)   Pulse 94   Temp 98  F (36.7  C) (Tympanic)   Resp 18   Wt 90.7 kg (200 lb)   SpO2 96%   BMI 30.41 kg/m   Estimated body mass index is 30.41 kg/m  as calculated from the following:    Height as of 5/24/21: 1.727 m (5' 8\").    Weight as of this encounter: 90.7 kg (200 lb).  Medication Reconciliation: complete    Lab Results   Component Value Date    A1C 6.4 03/18/2021    A1C 5.7 09/10/2020    A1C >14.0 05/05/2020    ALBUMIN 3.5 03/19/2021     Previous A1C is at goal of <8  Date of last Foot exam Fall 2020, declined today  Eye exam Yes- Date of last eye exam: Fall 2020,  [facility]: [facility] , scheduled APPT this week.  Patient is not a Tobacco User  Patient is not on a daily aspirin  Patient is not on a Statin.  Blood pressure today of:     BP Readings from Last 1 Encounters:   09/07/21 134/80      is at the goal of <139/89 for diabetics.    Elysia Bowden LPN on 9/7/2021 at 1:07 PM      "

## 2021-09-08 ASSESSMENT — PATIENT HEALTH QUESTIONNAIRE - PHQ9: SUM OF ALL RESPONSES TO PHQ QUESTIONS 1-9: 6

## 2021-09-08 ASSESSMENT — ANXIETY QUESTIONNAIRES: GAD7 TOTAL SCORE: 3

## 2021-09-09 ENCOUNTER — TRANSFERRED RECORDS (OUTPATIENT)
Dept: HEALTH INFORMATION MANAGEMENT | Facility: OTHER | Age: 56
End: 2021-09-09

## 2021-09-09 LAB — RETINOPATHY: NEGATIVE

## 2022-03-04 ENCOUNTER — IMMUNIZATION (OUTPATIENT)
Dept: FAMILY MEDICINE | Facility: OTHER | Age: 57
End: 2022-03-04
Attending: FAMILY MEDICINE
Payer: COMMERCIAL

## 2022-03-04 PROCEDURE — 0054A COVID-19,PF,PFIZER (12+ YRS): CPT

## 2022-03-18 DIAGNOSIS — K27.9 PUD (PEPTIC ULCER DISEASE): ICD-10-CM

## 2022-03-18 DIAGNOSIS — K21.00 GASTROESOPHAGEAL REFLUX DISEASE WITH ESOPHAGITIS WITHOUT HEMORRHAGE: ICD-10-CM

## 2022-03-18 NOTE — TELEPHONE ENCOUNTER
"Requested Prescriptions   Pending Prescriptions Disp Refills     omeprazole (PRILOSEC) 40 MG DR capsule [Pharmacy Med Name: OMEPRAZOLE 40MG DR CAPSULE] 30 capsule 11     Sig: TAKE 1 CAPSULE (40 MG) BY MOUTH DAILY       PPI Protocol Passed - 3/18/2022  1:03 AM        Passed - Not on Clopidogrel (unless Pantoprazole ordered)        Passed - No diagnosis of osteoporosis on record        Passed - Recent (12 mo) or future (30 days) visit within the authorizing provider's specialty     Patient has had an office visit with the authorizing provider or a provider within the authorizing providers department within the previous 12 mos or has a future within next 30 days. See \"Patient Info\" tab in inbasket, or \"Choose Columns\" in Meds & Orders section of the refill encounter.              Passed - Medication is active on med list        Passed - Patient is age 18 or older       Last Written Prescription Date:  3/30/21  Last Fill Quantity: 30,   # refills: 11  Last Office Visit: 9/7/21 Carlos  Future Office visit: none      Routing refill request to provider for review/approval because:  Routed to provider for review and consideration.  Brenda J. Goodell, RN on 3/18/2022 at 9:19 AM      "

## 2022-03-21 RX ORDER — OMEPRAZOLE 40 MG/1
40 CAPSULE, DELAYED RELEASE ORAL DAILY
Qty: 90 CAPSULE | Refills: 1 | Status: SHIPPED | OUTPATIENT
Start: 2022-03-21 | End: 2022-07-14

## 2022-05-17 DIAGNOSIS — I10 ESSENTIAL HYPERTENSION: ICD-10-CM

## 2022-05-18 RX ORDER — LISINOPRIL 10 MG/1
TABLET ORAL
Qty: 90 TABLET | Refills: 0 | Status: SHIPPED | OUTPATIENT
Start: 2022-05-18 | End: 2022-07-14

## 2022-05-18 NOTE — TELEPHONE ENCOUNTER
"Requested Prescriptions   Pending Prescriptions Disp Refills     lisinopril (ZESTRIL) 10 MG tablet [Pharmacy Med Name: LISINOPRIL 10MG TABLET] 90 tablet 3     Sig: TAKE 1 TABLET BY MOUTH ONCE DAILY       ACE Inhibitors (Including Combos) Protocol Passed - 5/17/2022  1:00 AM        Passed - Blood pressure under 140/90 in past 12 months     BP Readings from Last 3 Encounters:   09/07/21 134/80   06/07/21 (!) 151/117   05/24/21 132/84                 Passed - Recent (12 mo) or future (30 days) visit within the authorizing provider's specialty     Patient has had an office visit with the authorizing provider or a provider within the authorizing providers department within the previous 12 mos or has a future within next 30 days. See \"Patient Info\" tab in inbasket, or \"Choose Columns\" in Meds & Orders section of the refill encounter.              Passed - Medication is active on med list        Passed - Patient is age 18 or older        Passed - Normal serum creatinine on file in past 12 months     Recent Labs   Lab Test 09/07/21  1348   CR 1.23       Ok to refill medication if creatinine is low          Passed - Normal serum potassium on file in past 12 months     Recent Labs   Lab Test 09/07/21  1348   POTASSIUM 4.4            Last Written Prescription Date:  5/24/21  Last Fill Quantity: 90,   # refills: 3  Last Office Visit: 9/7/21 Carlos  Future Office visit:  none     Routing refill request to provider for review/approval:  Brenda J. Goodell, RN on 5/18/2022 at 1:45 PM      "

## 2022-05-23 NOTE — PROGRESS NOTES
Patient was brought back to their room, rails were up and call light was within reach.     Handoff procedure information verbally given to RN.       23-May-2022 20:00

## 2022-07-14 ENCOUNTER — OFFICE VISIT (OUTPATIENT)
Dept: FAMILY MEDICINE | Facility: OTHER | Age: 57
End: 2022-07-14
Attending: FAMILY MEDICINE
Payer: COMMERCIAL

## 2022-07-14 VITALS
HEART RATE: 85 BPM | HEIGHT: 68 IN | DIASTOLIC BLOOD PRESSURE: 102 MMHG | WEIGHT: 204 LBS | SYSTOLIC BLOOD PRESSURE: 146 MMHG | RESPIRATION RATE: 16 BRPM | BODY MASS INDEX: 30.92 KG/M2 | TEMPERATURE: 98.6 F | OXYGEN SATURATION: 97 %

## 2022-07-14 DIAGNOSIS — G47.00 INSOMNIA, UNSPECIFIED TYPE: ICD-10-CM

## 2022-07-14 DIAGNOSIS — G62.9 PERIPHERAL POLYNEUROPATHY: Primary | ICD-10-CM

## 2022-07-14 DIAGNOSIS — F32.1 MAJOR DEPRESSIVE DISORDER, SINGLE EPISODE, MODERATE (H): ICD-10-CM

## 2022-07-14 DIAGNOSIS — F10.20 ALCOHOLISM (H): ICD-10-CM

## 2022-07-14 DIAGNOSIS — R25.2 MUSCLE CRAMPS: ICD-10-CM

## 2022-07-14 DIAGNOSIS — E11.9 TYPE 2 DIABETES MELLITUS WITHOUT COMPLICATION, WITHOUT LONG-TERM CURRENT USE OF INSULIN (H): ICD-10-CM

## 2022-07-14 DIAGNOSIS — E83.42 HYPOMAGNESEMIA: ICD-10-CM

## 2022-07-14 DIAGNOSIS — K21.00 GASTROESOPHAGEAL REFLUX DISEASE WITH ESOPHAGITIS WITHOUT HEMORRHAGE: ICD-10-CM

## 2022-07-14 DIAGNOSIS — E11.9 TYPE 2 DIABETES MELLITUS WITHOUT COMPLICATION, UNSPECIFIED WHETHER LONG TERM INSULIN USE (H): ICD-10-CM

## 2022-07-14 DIAGNOSIS — I10 ESSENTIAL HYPERTENSION: ICD-10-CM

## 2022-07-14 DIAGNOSIS — E11.9 DM TYPE 2, NOT AT GOAL (H): ICD-10-CM

## 2022-07-14 DIAGNOSIS — K27.9 PUD (PEPTIC ULCER DISEASE): ICD-10-CM

## 2022-07-14 LAB
ANION GAP SERPL CALCULATED.3IONS-SCNC: 6 MMOL/L (ref 3–14)
BUN SERPL-MCNC: 18 MG/DL (ref 7–25)
CALCIUM SERPL-MCNC: 8.9 MG/DL (ref 8.6–10.3)
CHLORIDE BLD-SCNC: 102 MMOL/L (ref 98–107)
CO2 SERPL-SCNC: 30 MMOL/L (ref 21–31)
CREAT SERPL-MCNC: 1.29 MG/DL (ref 0.7–1.3)
FOLATE SERPL-MCNC: >24.8 NG/ML
GFR SERPL CREATININE-BSD FRML MDRD: 66 ML/MIN/1.73M2
GLUCOSE BLD-MCNC: 128 MG/DL (ref 70–105)
HBA1C MFR BLD: 7.3 % (ref 4–6.2)
MAGNESIUM SERPL-MCNC: 1.5 MG/DL (ref 1.9–2.7)
POTASSIUM BLD-SCNC: 4.1 MMOL/L (ref 3.5–5.1)
SODIUM SERPL-SCNC: 138 MMOL/L (ref 134–144)
VIT B12 SERPL-MCNC: 473 PG/ML (ref 180–914)

## 2022-07-14 PROCEDURE — 83036 HEMOGLOBIN GLYCOSYLATED A1C: CPT | Mod: ZL | Performed by: FAMILY MEDICINE

## 2022-07-14 PROCEDURE — 82607 VITAMIN B-12: CPT | Mod: ZL | Performed by: FAMILY MEDICINE

## 2022-07-14 PROCEDURE — 83735 ASSAY OF MAGNESIUM: CPT | Mod: ZL | Performed by: FAMILY MEDICINE

## 2022-07-14 PROCEDURE — 80048 BASIC METABOLIC PNL TOTAL CA: CPT | Mod: ZL | Performed by: FAMILY MEDICINE

## 2022-07-14 PROCEDURE — 36415 COLL VENOUS BLD VENIPUNCTURE: CPT | Mod: ZL | Performed by: FAMILY MEDICINE

## 2022-07-14 PROCEDURE — G0463 HOSPITAL OUTPT CLINIC VISIT: HCPCS

## 2022-07-14 PROCEDURE — 99215 OFFICE O/P EST HI 40 MIN: CPT | Performed by: FAMILY MEDICINE

## 2022-07-14 PROCEDURE — 82746 ASSAY OF FOLIC ACID SERUM: CPT | Mod: ZL | Performed by: FAMILY MEDICINE

## 2022-07-14 RX ORDER — OMEPRAZOLE 40 MG/1
40 CAPSULE, DELAYED RELEASE ORAL DAILY
Qty: 90 CAPSULE | Refills: 1 | Status: SHIPPED | OUTPATIENT
Start: 2022-07-14 | End: 2023-02-14

## 2022-07-14 RX ORDER — ISOPROPYL ALCOHOL 0.75 G/1
SWAB TOPICAL
Qty: 100 EACH | Refills: 3 | Status: SHIPPED | OUTPATIENT
Start: 2022-07-14

## 2022-07-14 RX ORDER — PROCHLORPERAZINE 25 MG/1
SUPPOSITORY RECTAL
Qty: 1 EACH | Refills: 0 | Status: SHIPPED | OUTPATIENT
Start: 2022-07-14 | End: 2022-09-27

## 2022-07-14 RX ORDER — MAGNESIUM OXIDE 400 MG/1
400 TABLET ORAL DAILY
Qty: 30 TABLET | Refills: 1 | Status: SHIPPED | OUTPATIENT
Start: 2022-07-14

## 2022-07-14 RX ORDER — GLIPIZIDE 5 MG/1
5 TABLET, FILM COATED, EXTENDED RELEASE ORAL DAILY
Qty: 90 TABLET | Refills: 4 | Status: SHIPPED | OUTPATIENT
Start: 2022-07-14 | End: 2023-07-13

## 2022-07-14 RX ORDER — PROCHLORPERAZINE 25 MG/1
SUPPOSITORY RECTAL
Qty: 1 EACH | Refills: 3 | Status: SHIPPED | OUTPATIENT
Start: 2022-07-14 | End: 2023-05-23

## 2022-07-14 RX ORDER — PROCHLORPERAZINE 25 MG/1
SUPPOSITORY RECTAL
Qty: 9 EACH | Refills: 3 | Status: SHIPPED | OUTPATIENT
Start: 2022-07-14 | End: 2023-05-31

## 2022-07-14 RX ORDER — NALTREXONE HYDROCHLORIDE 50 MG/1
100 TABLET, FILM COATED ORAL DAILY
Qty: 30 TABLET | Refills: 11 | COMMUNITY
Start: 2022-07-14 | End: 2022-09-07

## 2022-07-14 RX ORDER — LISINOPRIL 20 MG/1
20 TABLET ORAL DAILY
Qty: 90 TABLET | Refills: 4 | Status: SHIPPED | OUTPATIENT
Start: 2022-07-14 | End: 2023-07-13

## 2022-07-14 RX ORDER — GABAPENTIN 300 MG/1
300 CAPSULE ORAL 3 TIMES DAILY
Qty: 90 CAPSULE | Refills: 11 | Status: SHIPPED | OUTPATIENT
Start: 2022-07-14 | End: 2023-07-13

## 2022-07-14 RX ORDER — TRAZODONE HYDROCHLORIDE 100 MG/1
100 TABLET ORAL AT BEDTIME
Qty: 30 TABLET | Refills: 11 | Status: SHIPPED | OUTPATIENT
Start: 2022-07-14 | End: 2022-10-25

## 2022-07-14 ASSESSMENT — ANXIETY QUESTIONNAIRES
7. FEELING AFRAID AS IF SOMETHING AWFUL MIGHT HAPPEN: NOT AT ALL
4. TROUBLE RELAXING: NOT AT ALL
3. WORRYING TOO MUCH ABOUT DIFFERENT THINGS: NOT AT ALL
8. IF YOU CHECKED OFF ANY PROBLEMS, HOW DIFFICULT HAVE THESE MADE IT FOR YOU TO DO YOUR WORK, TAKE CARE OF THINGS AT HOME, OR GET ALONG WITH OTHER PEOPLE?: NOT DIFFICULT AT ALL
GAD7 TOTAL SCORE: 0
GAD7 TOTAL SCORE: 0
5. BEING SO RESTLESS THAT IT IS HARD TO SIT STILL: NOT AT ALL
GAD7 TOTAL SCORE: 0
7. FEELING AFRAID AS IF SOMETHING AWFUL MIGHT HAPPEN: NOT AT ALL
6. BECOMING EASILY ANNOYED OR IRRITABLE: NOT AT ALL
2. NOT BEING ABLE TO STOP OR CONTROL WORRYING: NOT AT ALL
1. FEELING NERVOUS, ANXIOUS, OR ON EDGE: NOT AT ALL

## 2022-07-14 ASSESSMENT — PATIENT HEALTH QUESTIONNAIRE - PHQ9
10. IF YOU CHECKED OFF ANY PROBLEMS, HOW DIFFICULT HAVE THESE PROBLEMS MADE IT FOR YOU TO DO YOUR WORK, TAKE CARE OF THINGS AT HOME, OR GET ALONG WITH OTHER PEOPLE: NOT DIFFICULT AT ALL
SUM OF ALL RESPONSES TO PHQ QUESTIONS 1-9: 2
SUM OF ALL RESPONSES TO PHQ QUESTIONS 1-9: 2

## 2022-07-14 ASSESSMENT — PAIN SCALES - GENERAL: PAINLEVEL: NO PAIN (0)

## 2022-07-14 NOTE — PATIENT INSTRUCTIONS
Increase the trazodone to 150 mg daily at bedtime.  Let us know if it is not working.     Ok to increase the naltrexone to 100 mg daily.     Start gabapentin 300 mg at bedtime day 1, then a.m and pm day 2 then 3 times daily. This should help with the burning pain from the neuropathy. It may take up to a month to notice a significant.    Will notify of lab results when available.

## 2022-07-14 NOTE — PROGRESS NOTES
"  {PROVIDER CHARTING PREFERENCE:885227}  Answers for HPI/ROS submitted by the patient on 7/14/2022  If you checked off any problems, how difficult have these problems made it for you to do your work, take care of things at home, or get along with other people?: Not difficult at all  PHQ9 TOTAL SCORE: 2  GEMMA 7 TOTAL SCORE: 0      Subjective   Kolton is a 56 year old, presenting for the following health issues:  Recheck Medication (Refill and update all prescriptions)      HPI     {SUPERLIST (Optional):572837}  {additonal problems for provider to add (Optional):444029}    Review of Systems   {ROS COMP (Optional):458156}      Objective    BP (!) 146/102 (BP Location: Right arm, Patient Position: Sitting, Cuff Size: Adult Regular)   Pulse 85   Temp 98.6  F (37  C) (Tympanic)   Resp 16   Ht 1.727 m (5' 8\")   Wt 92.5 kg (204 lb)   SpO2 97%   BMI 31.02 kg/m    Body mass index is 31.02 kg/m .  Physical Exam   {Exam List (Optional):717750}    {Diagnostic Test Results (Optional):472616}    {AMBULATORY ATTESTATION (Optional):378650}            .  ..  Answers for HPI/ROS submitted by the patient on 7/14/2022  If you checked off any problems, how difficult have these problems made it for you to do your work, take care of things at home, or get along with other people?: Not difficult at all  PHQ9 TOTAL SCORE: 2  GEMMA 7 TOTAL SCORE: 0      "

## 2022-07-14 NOTE — PROGRESS NOTES
"      Sabiha Hi is a 56 year old, presenting for the following health issues:  Recheck Medication (Refill and update all prescriptions)      HPI       Medication Recheck and Refills    How many servings of fruits and vegetables do you eat daily?  2-3    On average, how many sweetened beverages do you drink each day (Examples: soda, juice, sweet tea, etc.  Do NOT count diet or artificially sweetened beverages)?   0    How many days per week do you exercise enough to make your heart beat faster? 3 or less    How many minutes a day do you exercise enough to make your heart beat faster? 20 - 29    How many days per week do you miss taking your medication? 0        Review of Systems         Objective    BP (!) 146/102 (BP Location: Right arm, Patient Position: Sitting, Cuff Size: Adult Regular)   Pulse 85   Temp 98.6  F (37  C) (Tympanic)   Resp 16   Ht 1.727 m (5' 8\")   Wt 92.5 kg (204 lb)   SpO2 97%   BMI 31.02 kg/m    Body mass index is 31.02 kg/m .  Physical Exam                       .  ..  Answers for HPI/ROS submitted by the patient on 7/14/2022  If you checked off any problems, how difficult have these problems made it for you to do your work, take care of things at home, or get along with other people?: Not difficult at all  PHQ9 TOTAL SCORE: 2  GEMMA 7 TOTAL SCORE: 0    Nursing Notes:   Eliana Conte LPN  7/14/2022  3:10 PM  Signed  Chief Complaint   Patient presents with     Recheck Medication     Refill and update all prescriptions         Initial BP (!) 162/100 (BP Location: Right arm, Patient Position: Sitting, Cuff Size: Adult Regular)   Pulse 85   Temp 98.6  F (37  C) (Tympanic)   Resp 16   Ht 1.727 m (5' 8\")   Wt 92.5 kg (204 lb)   SpO2 97%   BMI 31.02 kg/m   Estimated body mass index is 31.02 kg/m  as calculated from the following:    Height as of this encounter: 1.727 m (5' 8\").    Weight as of this encounter: 92.5 kg (204 lb).       Medication Reconciliation: " Complete    Eliana Conte LPN .......  7/14/2022  3:03 PM     SUBJECTIVE:  Kolton Aragon  is a 56 year old male who comes in today for follow-up of his multiple medical problems and renewal of medications.  I last saw him in September of last year for depression.  He is a recovering alcoholic and has been taking naltrexone to help with cravings. It doesn't seem to work well. He still has been drinking off and on.      He has type 2 diabetes for which he takes 5 mg of glipizide XL daily.  His last hemoglobin A1c was 7.6% in September.  He is using a Dexcom for monitoring.  He has not had labs for some time.  He had diabetic eye exam in September 2021. His blood sugars have been good.     He has some neuropathy in his hands and feet. They will burn and tingle. He will have a cramp in his toes at night.     He is on 10 mg of lisinopril for blood pressure and renal protection.  He doesn't check blood pressure at home.     He is on 20 mg of Prozac daily and takes trazodone 100 mg at bedtime. He is not sleeping well. He had sleep walking with Ambien.     PHQ 7/19/2018 9/7/2021 7/14/2022   PHQ-9 Total Score 0 6 2   Q9: Thoughts of better off dead/self-harm past 2 weeks Not at all Not at all Not at all     GEMMA-7 SCORE 7/19/2018 9/7/2021 7/14/2022   Total Score - 3 (minimal anxiety) 0 (minimal anxiety)   Total Score 0 3 0           He is on 40 mg of omeprazole daily.    He is up-to-date on immunizations.  He has not had colon cancer screening.    Past Medical, Family, and Social History reviewed and updated as noted below.   ROS is negative except as noted above       Allergies   Allergen Reactions     Latex Hives     Dust Mite Extract Other (See Comments)     Sneezing, itchy eyes   ,   Family History   Problem Relation Age of Onset     Family History Negative Mother         Good Health     Other - See Comments Father         Deserted as a child, patient knows father had a history of heart attack at 50     Heart Disease  Father         Heart Disease,patient knows father had a history of heart attack at 50     Cancer Other         Cancer,History of cancer     Diabetes Brother      Diabetes Brother    ,   Current Outpatient Medications   Medication     acetaminophen (TYLENOL) 650 MG CR tablet     Alcohol Swabs (B-D SINGLE USE SWABS REGULAR) PADS     blood glucose monitoring (JOEY MICROLET) lancets     Continuous Blood Gluc  (DEXCOM G6 ) CHRISTINA     Continuous Blood Gluc Sensor (DEXCOM G6 SENSOR) MISC     Continuous Blood Gluc Transmit (DEXCOM G6 TRANSMITTER) MISC     CONTOUR NEXT TEST test strip     diclofenac (VOLTAREN) 1 % topical gel     diphenhydrAMINE HCl, Sleep, (UNISOM SLEEPGELS) 50 MG CAPS     FLUoxetine (PROZAC) 20 MG capsule     glipiZIDE (GLUCOTROL XL) 5 MG 24 hr tablet     glucose (BD GLUCOSE) 4 g chewable tablet     lisinopril (ZESTRIL) 10 MG tablet     Melatonin 10-10 MG TBCR     naltrexone (DEPADE/REVIA) 50 MG tablet     naproxen sodium (ANAPROX) 220 MG tablet     omeprazole (PRILOSEC) 40 MG DR capsule     traZODone (DESYREL) 100 MG tablet     No current facility-administered medications for this visit.   ,   Past Medical History:   Diagnosis Date     Alcohol-induced acute pancreatitis without infection or necrosis     06/20/2016     Altered level of consciousness 10/11/2017     Essential (primary) hypertension     9/12/2013     Gastro-esophageal reflux disease without esophagitis     5/18/2015     Spondylolisthesis of lumbosacral region     7/9/2013,Patient has been referred for PT and has not followed through.  He was sent for MRI scan,and he was not able to do either the closed or open MRI due to claustrophobia.     Spondylosis of lumbar region without myelopathy or radiculopathy     4/22/2013     Tobacco use     5/17/2013     Uncomplicated asthma     No Comments Provided   ,   Patient Active Problem List    Diagnosis Date Noted     Biliary calculus of other site without obstruction 05/21/2021      Priority: Medium     Formatting of this note might be different from the original.  Added automatically from request for surgery [id number]       Chronic kidney disease, stage 3 (H) 03/30/2021     Priority: Medium     Pseudocyst of pancreas 03/24/2021     Priority: Medium     Abdominal pain, acute, right upper quadrant 03/20/2021     Priority: Medium     Abdominal ultrasound, abnormal 03/20/2021     Priority: Medium     Tobacco abuse, in remission 03/20/2021     Priority: Medium     GIB (gastrointestinal bleeding) 03/18/2021     Priority: Medium     RUQ abdominal pain 03/18/2021     Priority: Medium     Liver mass, left lobe 03/18/2021     Priority: Medium     Type 2 diabetes mellitus without complication, without long-term current use of insulin (H) 04/17/2019     Priority: Medium     Reactive airway disease 03/01/2018     Priority: Medium     Alcoholism (H) 10/12/2017     Priority: Medium     Acute alcoholic pancreatitis 06/20/2016     Priority: Medium     Chronic kidney disease, stage II (mild) 03/09/2016     Priority: Medium     Gastroesophageal reflux disease without esophagitis 05/18/2015     Priority: Medium     Dyslipidemia 12/29/2014     Priority: Medium     Hypertension 09/12/2013     Priority: Medium     Spondylolisthesis at L5-S1 level 07/09/2013     Priority: Medium     Overview:   Patient has been referred for PT and has not followed through.  He was sent for MRI scan,and he was not able to do either the closed or open MRI due to claustrophobia.       Insomnia 06/20/2013     Priority: Medium     Chewing tobacco use 05/17/2013     Priority: Medium     DJD (degenerative joint disease), lumbar 04/22/2013     Priority: Medium   ,   Past Surgical History:   Procedure Laterality Date     ESOPHAGOSCOPY, GASTROSCOPY, DUODENOSCOPY (EGD), COMBINED N/A 06/16/2020    Procedure: ESOPHAGOGASTRODUODENOSCOPY, WITH BIOPSY;  Surgeon: Giovanni Reed MD;  Location: [facility]     pancreatic duct surgery  2021    stent  "removed from the pancreatic duct     VASECTOMY      No Comments Provided    and   Social History     Tobacco Use     Smoking status: Never Smoker     Smokeless tobacco: Former User     Types: Chew     Quit date: 4/14/2020     Tobacco comment: Quit smoking: trying to quit chew using 1 pack week   Substance Use Topics     Alcohol use: Yes     Comment: occ.     OBJECTIVE:  BP (!) 146/102 (BP Location: Right arm, Patient Position: Sitting, Cuff Size: Adult Regular)   Pulse 85   Temp 98.6  F (37  C) (Tympanic)   Resp 16   Ht 1.727 m (5' 8\")   Wt 92.5 kg (204 lb)   SpO2 97%   BMI 31.02 kg/m     EXAM:  General Appearance: Pleasant, alert, appropriate appearance for age. No acute distress  Head Exam: Normal. Normocephalic, atraumatic.  Eye Exam:  Normal external eyes, conjunctivae, lids, cornea. BIJAL. EOMI  Ear Exam: Normal TM's bilaterally. Normal auditory canals and external ears. Non-tender.  Nose Exam: Normal external nose, mucus membranes, and septum.  OroPharynx Exam:  Dental hygiene adequate. Normal buccal mucosa. Normal pharynx.  Neck Exam:  Supple, no masses or nodes. No audible bruits  Thyroid Exam: No nodules or enlargement.  Chest/Respiratory Exam: Normal chest wall and respirations. Clear to auscultation.  Cardiovascular Exam: Regular rate and rhythm. S1, S2, no murmur, click, gallop, or rubs.  Gastrointestinal Exam: Soft, non-tender, no masses or organomegaly.  Lymphatic Exam: Non-palpable nodes in neck, clavicular regions.  Musculoskeletal Exam: Back is straight and non-tender, full ROM of upper and lower extremities.  Foot Exam: Left and right foot: good pedal pulses, no lesions, nail hygiene good.  He has a dense neuropathy of both feet with no sensation with #10 monofilament.  Skin: no rash or abnormalities  Neurologic Exam: Nonfocal, normal gross motor, tone coordination and no tremor.  Psychiatric Exam: Alert and oriented - appropriate affect.     Results for orders placed or performed in visit on " 07/14/22   Folic Acid     Status: Normal   Result Value Ref Range    Folic Acid >24.8 >=5.2 ng/mL   Vitamin B12     Status: Normal   Result Value Ref Range    Vitamin B12 473 180 - 914 pg/mL   Magnesium     Status: Abnormal   Result Value Ref Range    Magnesium 1.5 (L) 1.9 - 2.7 mg/dL   Hemoglobin A1c     Status: Abnormal   Result Value Ref Range    Hemoglobin A1C 7.3 (H) 4.0 - 6.2 %   Basic metabolic panel     Status: Abnormal   Result Value Ref Range    Sodium 138 134 - 144 mmol/L    Potassium 4.1 3.5 - 5.1 mmol/L    Chloride 102 98 - 107 mmol/L    Carbon Dioxide (CO2) 30 21 - 31 mmol/L    Anion Gap 6 3 - 14 mmol/L    Urea Nitrogen 18 7 - 25 mg/dL    Creatinine 1.29 0.70 - 1.30 mg/dL    Calcium 8.9 8.6 - 10.3 mg/dL    Glucose 128 (H) 70 - 105 mg/dL    GFR Estimate 66 >60 mL/min/1.73m2      ASSESSMENT/Plan :    Kolton was seen today for recheck medication.    Diagnoses and all orders for this visit:    Type 2 diabetes mellitus without complication, without long-term current use of insulin (H)    Type 2 diabetes mellitus without complication, unspecified whether long term insulin use (H)    Major depressive disorder, single episode, moderate (H)    DM type 2, not at goal (H)    Essential hypertension    Alcoholism (H)    PUD (peptic ulcer disease)    Gastroesophageal reflux disease with esophagitis without hemorrhage    Insomnia, unspecified type      Diabetes control is fair.  Discussion with regard to that.  He will continue to work on his diet and continue to monitor with his Dexcom.    It is clear that he has a peripheral polyneuropathy.  The etiology for this could be multifactorial.  Certainly diabetes can play a role but his glycemic control is reasonably good so I think a lot of his issues may be from alcohol induced polyneuropathy.  His B12 and folate are normal.  Magnesium is little low which could be contributing to his muscle cramping.  He does drink a lot of water.  We will do some magnesium  supplementation.  Recommend repeat labs in 3 months.  Discussed abstinence from alcohol.    We will increase lisinopril to 20 mg daily.    Trial of increasing trazodone to 150 mg at bedtime to help with sleep.    He can either stop the naltrexone or try and increase it to 100 mg to see if it would help with his cravings.    Will begin gabapentin 300 mg increasing to 3 times daily as tolerated for his neuropathy.  Advised that this may take a month to work and we may need to adjust the dose upwards.  EMG is ordered and will notify of results when available.    Recommend follow-up in 3 months, sooner if needed    A total of 41 minutes was spent with the patient, reviewing records, tests, ordering medications, tests or procedures and documenting clinical information in the EHR.       Jono Gonzalez MD

## 2022-07-14 NOTE — NURSING NOTE
"Chief Complaint   Patient presents with     Recheck Medication     Refill and update all prescriptions         Initial BP (!) 162/100 (BP Location: Right arm, Patient Position: Sitting, Cuff Size: Adult Regular)   Pulse 85   Temp 98.6  F (37  C) (Tympanic)   Resp 16   Ht 1.727 m (5' 8\")   Wt 92.5 kg (204 lb)   SpO2 97%   BMI 31.02 kg/m   Estimated body mass index is 31.02 kg/m  as calculated from the following:    Height as of this encounter: 1.727 m (5' 8\").    Weight as of this encounter: 92.5 kg (204 lb).       Medication Reconciliation: Complete    Eliana Conte LPN .......  7/14/2022  3:03 PM   "

## 2022-07-14 NOTE — LETTER
July 14, 2022      Kolton Aragon  [address]   [place] [address]        Dear Kolton,     Your labs look okay for the most part.  Diabetes control is fair.  You are a little bit low in magnesium which might be contributing to your muscle cramping.  I sent a prescription for magnesium oxide 40 mg daily to try and improve your magnesium level and hopefully cut down on the cramping.    There is no evidence that she have B12 or folate deficiency.  Since your diabetes control is pretty good, I think that your neuropathy may be more related to alcohol intake over the years.  I will let you know the results of the EMG when they are available.  I hope the gabapentin is helpful in lessening her symptoms.    It was a pleasure seeing you the other day.  If you have any questions, please don't hesitate to call us.       Sincerely,        Jono Gonzalez MD                            Results for orders placed or performed in visit on 07/14/22   Folic Acid     Status: Normal   Result Value Ref Range    Folic Acid >24.8 >=5.2 ng/mL   Vitamin B12     Status: Normal   Result Value Ref Range    Vitamin B12 473 180 - 914 pg/mL   Magnesium     Status: Abnormal   Result Value Ref Range    Magnesium 1.5 (L) 1.9 - 2.7 mg/dL   Hemoglobin A1c     Status: Abnormal   Result Value Ref Range    Hemoglobin A1C 7.3 (H) 4.0 - 6.2 %   Basic metabolic panel     Status: Abnormal   Result Value Ref Range    Sodium 138 134 - 144 mmol/L    Potassium 4.1 3.5 - 5.1 mmol/L    Chloride 102 98 - 107 mmol/L    Carbon Dioxide (CO2) 30 21 - 31 mmol/L    Anion Gap 6 3 - 14 mmol/L    Urea Nitrogen 18 7 - 25 mg/dL    Creatinine 1.29 0.70 - 1.30 mg/dL    Calcium 8.9 8.6 - 10.3 mg/dL    Glucose 128 (H) 70 - 105 mg/dL    GFR Estimate 66 >60 mL/min/1.73m2

## 2022-07-25 ENCOUNTER — TELEPHONE (OUTPATIENT)
Dept: FAMILY MEDICINE | Facility: OTHER | Age: 57
End: 2022-07-25

## 2022-07-25 NOTE — TELEPHONE ENCOUNTER
Called and let [facility] neurology know that from Jono Gonzalez MD office visit it was discussed that both upper and lower extremity neuropathy.   Prema Ventura LPN........................7/25/2022  2:42 PM

## 2022-07-25 NOTE — TELEPHONE ENCOUNTER
[facility] called in regards to an EMG order placed for the patient. They would like to know whether this is for the upper or lower bilateral, or both. Please advise, would like a call back to follow up.    Emilie Carias on 7/25/2022 at 2:02 PM

## 2022-08-03 DIAGNOSIS — I10 ESSENTIAL HYPERTENSION: ICD-10-CM

## 2022-08-04 RX ORDER — LISINOPRIL 10 MG/1
TABLET ORAL
Qty: 90 TABLET | Refills: 0 | OUTPATIENT
Start: 2022-08-04

## 2022-08-04 NOTE — TELEPHONE ENCOUNTER
[id number] of [place] sent Rx request for the following:      lisinopril (ZESTRIL) 10 MG tablet (Discontinued) 90 tablet 0 5/18/2022 7/14/2022 No   Sig: TAKE 1 TABLET BY MOUTH ONCE DAILY     Last Prescription Date:     lisinopril (ZESTRIL) 20 MG tablet 90 tablet 4 7/14/2022  No   Sig - Route: Take 1 tablet (20 mg) by mouth daily - Oral     Jennifer Hsu RN .............. 8/4/2022  9:59 AM

## 2022-09-03 DIAGNOSIS — F10.20 ALCOHOLISM (H): ICD-10-CM

## 2022-09-07 RX ORDER — NALTREXONE HYDROCHLORIDE 50 MG/1
TABLET, FILM COATED ORAL
Qty: 90 TABLET | Refills: 11 | Status: SHIPPED | OUTPATIENT
Start: 2022-09-07 | End: 2022-10-25

## 2022-09-07 NOTE — TELEPHONE ENCOUNTER
[id number] of [place] sent Rx request for the following:    Patient enrolled in our Rx Med Sync service to improve adherence. We are requesting a refill authorization in advance to ensure an active prescription is on file.     Requested Prescriptions   Pending Prescriptions Disp Refills     naltrexone (DEPADE/REVIA) 50 MG tablet [Pharmacy Med Name: NALTREXONE 50MG TABLET] 30 tablet 11     Sig: TAKE 1 TABLET (50 MG) BY MOUTH DAILY TO HELP WITH ALCOHOL CRAVINGS   Historically reported as taking 2 tablets (100 mg) by mouth daily to help with alcohol cravings    Last Office Visit:              7/14/22  Future Office visit:           None    Per LOV note:  He can either stop the naltrexone or try and increase it to 100 mg to see if it would help with his cravings.    Jennifer Hsu RN .............. 9/7/2022  11:17 AM

## 2022-09-22 DIAGNOSIS — G47.00 INSOMNIA, UNSPECIFIED TYPE: ICD-10-CM

## 2022-09-22 RX ORDER — TRAZODONE HYDROCHLORIDE 100 MG/1
100 TABLET ORAL AT BEDTIME
Qty: 30 TABLET | Refills: 11 | OUTPATIENT
Start: 2022-09-22

## 2022-09-22 NOTE — TELEPHONE ENCOUNTER
[id number] sent Rx request for the following:      TRAZODONE 100MG TABLET      Last Prescription Date:   7/14/2022  Last Fill Qty/Refills:         30, R-11        Redundant refill request refused: Too soon:  Yobani Banks RN, BSN  ....................  9/22/2022   2:59 PM

## 2022-09-25 DIAGNOSIS — E11.9 TYPE 2 DIABETES MELLITUS WITHOUT COMPLICATION, UNSPECIFIED WHETHER LONG TERM INSULIN USE (H): ICD-10-CM

## 2022-09-27 RX ORDER — PROCHLORPERAZINE 25 MG/1
SUPPOSITORY RECTAL
Qty: 1 EACH | Refills: 0 | Status: SHIPPED | OUTPATIENT
Start: 2022-09-27

## 2022-09-27 NOTE — TELEPHONE ENCOUNTER
Last Prescription Date: 7/14/22  Last Qty/Refills: 1 / 0  Last Office Visit: 7/14/22  Future Office Visit: 10/25/22     Requested Prescriptions   Pending Prescriptions Disp Refills     Continuous Blood Gluc  (DEXCOM G6 ) CHRISTINA [Pharmacy Med Name: DEXCOM G6 RETAIL  KIT]  0     Sig: USE TO READ BLOOD SUGARS ASPER 'S INSTRUCTIONS.       There is no refill protocol information for this order

## 2022-10-05 ENCOUNTER — ALLIED HEALTH/NURSE VISIT (OUTPATIENT)
Dept: EDUCATION SERVICES | Facility: OTHER | Age: 57
End: 2022-10-05
Attending: FAMILY MEDICINE
Payer: COMMERCIAL

## 2022-10-05 DIAGNOSIS — E11.9 TYPE 2 DIABETES MELLITUS WITHOUT COMPLICATION, WITHOUT LONG-TERM CURRENT USE OF INSULIN (H): ICD-10-CM

## 2022-10-05 PROCEDURE — 95249 CONT GLUC MNTR PT PROV EQP: CPT | Performed by: REGISTERED NURSE

## 2022-10-05 NOTE — PROGRESS NOTES
Diabetes Self-Management Education & Support    Presents for: Personal CGM Start    Type of Service: In Person Visit    Assessment Type:   Patient completed homework (online training and/or Getting started with CGM guide)? : Yes  Sensor started:: Started today in clinic  CGM Initial Settings: Dexcom  Dexcom Initial Settings: Low Glucose Alarm: On (fixed at 55 mg/dL and can't be changed), High Glucose Alert, Low Glucose Alert, Out of Range Alert    Sensor was inserted with no resistance or bleeding at insertion site.    CGM-specific education:   Dexcom sensor: insertion technique, sensor site location and rotation, insulin administration in relation to sensor placement, Dexcom : setting alerts/alarms, Calibration, Use of trends and graphs for pattern management and problem solving and Dexcom Clarity software         ASSESSMENT:  Kolton was instructed in features of DEXCOM G6 CGMS.  He was instructed in programming events for meds, CHO, exercise and health such as stress, illness, high/low symptoms.  He will wear the sensor for 10 days and if he would like, can download at home using Dexcom Clarity software found at [email or web address].       Patient demonstrated understanding by inserting his own sensor.  Sensor inserted without difficulty,  receiving data.  Helped patient program his .         Pt verbalized understanding of concepts discussed and recommendations provided today.    Continue education with the following diabetes management concepts: Problem Solving and Reducing Risks    PLAN    Topics to cover at upcoming visits: Monitoring, Problem Solving and Reducing Risks    Follow-up:  In one month, as scheduled.    See Care Plan for co-developed, patient-state behavior change goals.  AVS provided for patient today.    Education Materials Provided:  Dexcom G6 CGM folder with contact information      SUBJECTIVE/OBJECTIVE:  Presents for: Personal CGM Start  Accompanied by: Self  Diabetes  "education in the past 24mo: No  Focus of Visit: CGM  Type of CGM visit: Personal CGM Start  Diabetes type: Type 2  Disease course: Stable  How confident are you filling out medical forms by yourself:: Extremely  Cultural Influences/Ethnic Background:  Not  or     Diabetes Symptoms & Complications:  Fatigue: Sometimes  Neuropathy: No  Polydipsia: Yes  Polyphagia: No  Polyuria: Yes  Visual change: No  Slow healing wounds: No  Autonomic neuropathy: No  CVA: No  Heart disease: No  Nephropathy: No  Peripheral neuropathy: Yes  Peripheral Vascular Disease: No  Retinopathy: No  Sexual dysfunction: Yes    Patient Problem List and Family Medical History reviewed for relevant medical history, current medical status, and diabetes risk factors.    Vitals:  There were no vitals taken for this visit.  Estimated body mass index is 31.02 kg/m  as calculated from the following:    Height as of 7/14/22: 1.727 m (5' 8\").    Weight as of 7/14/22: 92.5 kg (204 lb).   Last 3 BP:   BP Readings from Last 3 Encounters:   07/14/22 (!) 146/102   09/07/21 134/80   06/07/21 (!) 151/117       History   Smoking Status     Never Smoker   Smokeless Tobacco     Former User     Types: Chew     Quit date: 4/14/2020     Comment: Quit smoking: trying to quit chew using 1 pack week       Labs:  Lab Results   Component Value Date    A1C 7.3 07/14/2022    A1C 6.4 03/18/2021     Lab Results   Component Value Date     07/14/2022     06/07/2021     Lab Results   Component Value Date    LDL 53 09/07/2021    LDL 63 09/10/2020     HDL Cholesterol   Date Value Ref Range Status   09/10/2020 19 (L) 23 - 92 mg/dL Final     Direct Measure HDL   Date Value Ref Range Status   09/07/2021 23 23 - 92 mg/dL Final     Comment:     0-19 years:       Greater than or equal to 45 mg/dL   Low: Less than 40 mg/dL   Borderline low: 40-44 mg/dL     20 years and older:   Female: Greater than or equal to 50 mg/dL   Male:   Greater than or equal to 40 mg/dL "        ]  GFR Estimate   Date Value Ref Range Status   07/14/2022 66 >60 mL/min/1.73m2 Final     Comment:     Effective December 21, 2021 eGFRcr in adults is calculated using the 2021 CKD-EPI creatinine equation which includes age and gender (Williams méndez al., NEJM, DOI: 10.1056/OMJKah8120943)   06/07/2021 63 >60 mL/min/[1.73_m2] Final     GFR Estimate If Black   Date Value Ref Range Status   06/07/2021 77 >60 mL/min/[1.73_m2] Final     Lab Results   Component Value Date    CR 1.29 07/14/2022    CR 1.20 06/07/2021     No results found for: MICROALBUMIN    Healthy Eating:  Cultural/Restorationism diet restrictions?: No  Meal planning/habits: Avoiding sweets, Low carb, Low salt, Smaller portions  How many times a week on average do you eat food made away from home (restaurant/take-out)?: 0  Meals include: Breakfast, Dinner  Beverages: Water, Coffee, Milk    Being Active:  Barrier to exercise: Physical limitation    Monitoring:  Blood Glucose Meter: Unknown  Times checking blood sugar at home (number): 4  Times checking blood sugar at home (per): Day  Blood glucose trend: No change        Taking Medications:  Diabetes Medication(s)     Diabetic Other       glucose (BD GLUCOSE) 4 g chewable tablet    Take 1 tablet by mouth every hour as needed for low blood sugar    Sulfonylureas       glipiZIDE (GLUCOTROL XL) 5 MG 24 hr tablet    Take 1 tablet (5 mg) by mouth daily          Current Treatments: Oral Medication (taken by mouth)    Reducing Risks:  CAD Risks: Diabetes Mellitus, Hypertension, Sedentary lifestyle  Has dilated eye exam at least once a year?: Yes  Sees dentist every 6 months?: No  Feet checked by healthcare provider in the last year?: Yes    Healthy Coping:  Informal Support system:: Parent  Stage of change: ACTION (Actively working towards change)  Support resources: Offerings in Clinic Communities  Patient Activation Measure Survey Score:  No flowsheet data found.      Care Plan and Education Provided:  Care Plan:  Diabetes   Updates made by Karlene Fletcher RN since 10/5/2022 12:00 AM      Problem: HbA1C Not In Goal       Goal: Establish Regular Follow-Ups with PCP       Task: Discuss with PCP the recommended timing for patient's next follow up visit(s)    Responsible User: Karlene Fletcher RN      Task: Discuss schedule for PCP visits with patient Completed 10/5/2022   Responsible User: Karlene Fletcher RN      Goal: Get HbA1C Level in Goal       Task: Educate patient on diabetes education self-management topics Completed 10/5/2022   Responsible User: Karlene Fletcher RN      Task: Educate patient on benefits of regular glucose monitoring Completed 10/5/2022   Responsible User: Karlene Fletcher RN      Task: Refer patient to appropriate extended care team member, as needed (Medication Therapy Management, Behavioral Health, Physical Therapy, etc.)    Responsible User: Karlene Fletcher RN      Task: Discuss diabetes treatment plan with patient    Responsible User: Karlene Fletcher RN      Problem: Diabetes Self-Management Education Needed to Optimize Self-Care Behaviors       Goal: Understand diabetes pathophysiology and disease progression       Task: Provide education on diabetes pathophysiology and disease progression specfic to patient's diabetes type    Responsible User: Karlene Fletcher RN      Goal: Healthy Eating - follow a healthy eating pattern for diabetes       Task: Provide education on portion control and consistency in amount, composition and timing of food intake    Responsible User: Karlene Fletcher RN      Task: Provide education on managing carbohydrate intake (carbohydrate counting, plate planning method, etc.)    Responsible User: Karlene Fletcher RN      Task: Provide education on weight management    Responsible User: Karlene Fletcher RN      Task: Provide education on heart healthy eating    Responsible User: Karlene Fletcher RN      Task: Provide education on eating out    Responsible User: Justine  SWEETIE Soler      Task: Develop individualized healthy eating plan with patient    Responsible User: Karlene Fletcher RN      Goal: Being Active - get regular physical activity, working up to at least 150 minutes per week       Task: Provide education on relationship of activity to glucose and precautions to take if at risk for low glucose    Responsible User: Karlene Fletcher RN      Task: Discuss barriers to physical activity with patient    Responsible User: Karlene Fletcher RN      Task: Develop physical activity plan with patient    Responsible User: Karlene Fletcher RN      Task: Explore community resources including walking groups, assistance programs, and home videos    Responsible User: Karlene Fletcher RN      Goal: Monitoring - monitor glucose and ketones as directed       Task: Provide education on blood glucose monitoring (purpose, proper technique, frequency, glucose targets, interpreting results, when to use glucose control solution, sharps disposal)    Responsible User: Karlene Fletcher RN      Task: Provide education on continuous glucose monitoring (sensor placement, use of cha or /reader, understanding glucose trends, alerts and alarms, differences between sensor glucose and blood glucose) Completed 10/5/2022   Responsible User: Karlene Fletcher RN      Task: Provide education on ketone monitoring (when to monitor, frequency, etc.)    Responsible User: Karlene Fletcher RN      Goal: Taking Medication - patient is consistently taking medications as directed       Task: Provide education on action of prescribed medication, including when to take and possible side effects Completed 10/5/2022   Responsible User: Karlene Fletcher RN      Task: Provide education on insulin and injectable diabetes medications, including administration, storage, site selection and rotation for injection sites    Responsible User: Karlene Fletcher RN      Task: Discuss barriers to medication adherence with patient  and provide management technique ideas as appropriate    Responsible User: Karlene Fletcher RN      Task: Provide education on frequency and refill details of medications    Responsible User: Karlene Fletcher RN      Goal: Problem Solving - know how to prevent and manage short-term diabetes complications       Task: Provide education on high blood glucose - causes, signs/symptoms, prevention and treatment    Responsible User: Karlene Fletcher RN      Task: Provide education on low blood glucose - causes, signs/symptoms, prevention, treatment, carrying a carbohydrate source at all times, and medical identification    Responsible User: Karlene Fletcher RN      Task: Provide education on safe travel with diabetes    Responsible User: Karlene Flecther RN      Task: Provide education on how to care for diabetes on sick days    Responsible User: Karlene Fletcher RN      Task: Provide education on when to call a health care provider    Responsible User: Karlene Fletcher RN      Goal: Reducing Risks - know how to prevent and treat long-term diabetes complications       Task: Provide education on major complications of diabetes, prevention, early diagnostic measures and treatment of complications    Responsible User: Karlene Fletcher RN      Task: Provide education on recommended care for dental, eye and foot health    Responsible User: Karlene Fletcher RN      Task: Provide education on Hemoglobin A1c - goals and relationship to blood glucose levels    Responsible User: Karlene Fletcher RN      Task: Provide education on recommendations for heart health - lipid levels and goals, blood pressure and goals, and aspirin therapy, if indicated    Responsible User: Karlene Fletcher RN      Task: Provide education on tobacco cessation    Responsible User: Karlene Fletcher RN      Goal: Healthy Coping - use available resources to cope with the challenges of managing diabetes       Task: Discuss recognizing feelings about having  diabetes    Responsible User: Karlene Fletcher RN      Task: Provide education on the benefits of making appropriate lifestyle changes    Responsible User: Karlene Fletcher RN      Task: Provide education on benefits of utilizing support systems    Responsible User: Karlene Fletcher RN      Task: Discuss methods for coping with stress    Responsible User: Karlene Fletcher RN      Task: Provide education on when to seek professional counseling    Responsible User: Karlene Fletcher RN Suzette Childs, RN, BSN, [facility]  10/5/2022 1:15 PM     Time Spent: 60 minutes  Encounter Type: Individual    Any diabetes medication dose changes were made via the CDE Protocol per the patient's primary care provider. A copy of this encounter was shared with the provider.

## 2022-10-05 NOTE — PATIENT INSTRUCTIONS
Continuous Glucose Monitoring Personal Start      Congratulations! You have decided to utilize the DEXCOM G6 Continuous Glucose Monitoring (CGM) System (G6). We strongly believe in the use of the new technologies to better assist you in managing your diabetes. These technologies give both you and your provider a better understanding of your diabetes and current trends, which will enable you to improve control of your blood glucose levels.      Low and high alerts are set 80 mg/dL and 250 mg/dL. You inserted your sensor today at 11:44 am.  You should begin receiving sensor glucose readings in 2 hours after insertion.  Your sensor lasts 10 days and you will be prompted when the sensor is close to expiration and a new sensor can then be started.    [employer] is a team of trainers and certified  diabetes educators dedicated to helping you with  your new G6. They provide live, interactive support  and webinars to get you started and keep you  informed.     You may download your CGM information from your  at [email or web address].  Choose the Dexcom Clarity software.     Dexcom CLARITY software is an important part of your G6.  Clarity highlights your glucose patterns, trends and statistics. Share  with your clinic and monitor improvements between visits.    To use Dexcom CLARITY:    1. Log in at [employer].  Use your [employer] G6 login or create an account.    2. Get weekly notifications with Dexcom CLARITY Reports Katarzyna.  Notifications available to Dexcom G6 users.       PLAN:      1. No need for calibrations if, at the time of insertion, you enter the sensor code into your katarzyna or .  If you do not have a code or choose not to enter the code, you will need to calibrate the G6 katarzyna/.  Follow calibration instructions on your katarzyna or  for sensor period (10 days).     2. Events - You are encouraged to enter events such as Carbohydrates (grams), Insulin (units), Exercise (intensity  and duration), Health (illness, stress, high/low symptoms, and alcohol).     3. Battery - Charge  with  1 - 3 hours every 3 - 5 days or as  battery depletes.      4. Please follow up if any questions or concerns.    Dexcom Technical Support is available 24 hours a day/7 days a week at:       [employer]/support   [email or web address]   Toll free: [phone]      Karlene Fletcher RN, BSN, [facility]  10/5/2022 12:06 PM

## 2022-10-14 NOTE — PATIENT INSTRUCTIONS
Patient should take the following medications the morning of surgery with a small sip of water: lisinopril.  Patient was instructed to hold the following medications the morning of surgery: hold all other meds.     Hold glipizide the morning of surgery.     Hold naltrexone 48 hours prior to surgery.     Patient was advised to call our office and the surgical services with any change in condition or new symptoms if they were to develop between today and their surgical date.  Especially any cardiopulmonary symptoms or symptoms concerning for an infection.     Discontinue aspirin, aleve, naproxen and ibuprofen 7 days prior to surgery to reduce bleeding risk.  It is fine to take tylenol the week before surgery.  Hold vitamins and herbal remedies for 7 days before surgery.    Preparing for Your Surgery  Getting started  A nurse will call you to review your health history and instructions. They will give you an arrival time based on your scheduled surgery time.  Please be ready to share the following:    Your doctor's clinic name and phone number    Your medical, surgical and anesthesia history    A list of allergies and sensitivities    A list of medicines, including herbal treatments and over-the-counter drugs    Whether the patient has a legal guardian (ask how to send us the papers in advance)  If you have a child who's having surgery, please ask for a copy of Preparing for Your Child's Surgery.    Preparing for surgery    Within 30 days of surgery: Have a pre-op exam (sometimes called an H&P, or History and Physical). This can be done at a clinic or pre-operative center.  ? If you're having a , you may not need this exam. Talk to your care team    At your pre-op exam, talk to your care team about all medicines you take. If you need to stop any medicines before surgery, ask when to start taking them again.  ? We do this for your safety. Many medicines can make you bleed too much during surgery. Some change  how well surgery (anesthesia) drugs work.    Call your insurance company to let them know you're having surgery. (If you don't have insurance, call [phone].)    Call your clinic if there's any change in your health. This includes signs of a cold or flu (sore throat, runny nose, cough, rash, fever). It also includes a scrape or scratch near the surgery site.    If you have questions on the day of surgery, call your hospital or surgery center.  Eating and drinking guidelines  For your safety: Unless your surgeon tells you otherwise, follow the guidelines below.    Eat and drink as usual until 8 hours before surgery. After that, no food or milk.    Drink clear liquids until 2 hours before surgery. These are liquids you can see through, like water, Gatorade and Propel Water. You may also have black coffee and tea (no cream or milk).    Nothing by mouth within 2 hours of surgery. This includes gum, candy and breath mints.    If you drink, stop drinking alcohol the night before surgery.    If your care team tells you to take medicine on the morning of surgery, it's okay to take it with a sip of water.  Preventing infection    Shower or bathe the night before and morning of your surgery. Follow the instructions your clinic gave you. (If no instructions, use regular soap.)    Don't shave or clip hair near your surgery site. We'll remove the hair if needed.    Don't smoke or vape the morning of surgery. You may chew nicotine gum up to 2 hours before surgery. A nicotine patch is okay.  ? Note: Some surgeries require you to completely quit smoking and nicotine. Check with your surgeon.    Your care team will make every effort to keep you safe from infection. We will:  ? Clean our hands often with soap and water (or an alcohol-based hand rub).  ? Clean the skin at your surgery site with a special soap that kills germs.  ? Give you a special gown to keep you warm. (Cold raises the risk of infection.)  ? Wear special hair  covers, masks, gowns and gloves during surgery.  ? Give antibiotic medicine, if prescribed. Not all surgeries need antibiotics.  What to bring on the day of surgery    Photo ID and insurance card    Copy of your health care directive, if you have one    Glasses and hearing aides (bring cases)  ? You can't wear contacts during surgery    Inhaler and eye drops, if you use them (tell us about these when you arrive)    CPAP machine or breathing device, if you use them    A few personal items, if spending the night    If you have . . .  ? A pacemaker or ICD (cardiac defibrillator): Bring the ID card.  ? An implanted stimulator: Bring the remote control.  ? A legal guardian: Bring a copy of the certified (court-stamped) guardianship papers.  Please remove any jewelry, including body piercings. Leave jewelry and other valuables at home.  If you're going home the day of surgery  Important: If you don't follow the rules below, we must cancel your surgery.     Arrange for someone to drive you home after surgery. You may not drive, take a taxi or take public transportation by yourself (unless you'll have local anesthesia only).    Arrange for a responsible adult to stay with you overnight. If you don't, we may keep you in the hospital overnight, and you may need to pay the costs yourself.  Questions?   If you have any questions for your care team, list them here: _________________________________________________________________________________________________________________________________________________________________________________________________________________________________________________________________________________________________________________________  For informational purposes only. Not to replace the advice of your health care provider. Copyright   2003, 2019 [facility]. All rights reserved. Clinically reviewed by Yvonne Wilhelm MD. SMARTworks 417457 - REV 4/20.     [Negative] : Heme/Lymph

## 2022-10-25 ENCOUNTER — OFFICE VISIT (OUTPATIENT)
Dept: FAMILY MEDICINE | Facility: OTHER | Age: 57
End: 2022-10-25
Attending: FAMILY MEDICINE
Payer: COMMERCIAL

## 2022-10-25 VITALS
RESPIRATION RATE: 18 BRPM | HEIGHT: 68 IN | OXYGEN SATURATION: 97 % | SYSTOLIC BLOOD PRESSURE: 122 MMHG | HEART RATE: 91 BPM | TEMPERATURE: 97.2 F | BODY MASS INDEX: 30.62 KG/M2 | DIASTOLIC BLOOD PRESSURE: 82 MMHG | WEIGHT: 202 LBS

## 2022-10-25 DIAGNOSIS — Z12.5 SCREENING FOR PROSTATE CANCER: ICD-10-CM

## 2022-10-25 DIAGNOSIS — Z23 HIGH PRIORITY FOR 2019-NCOV VACCINE: ICD-10-CM

## 2022-10-25 DIAGNOSIS — I10 ESSENTIAL HYPERTENSION: ICD-10-CM

## 2022-10-25 DIAGNOSIS — Z11.4 ENCOUNTER FOR SCREENING FOR HIV: ICD-10-CM

## 2022-10-25 DIAGNOSIS — Z12.11 SPECIAL SCREENING FOR MALIGNANT NEOPLASMS, COLON: ICD-10-CM

## 2022-10-25 DIAGNOSIS — E11.9 TYPE 2 DIABETES MELLITUS WITHOUT COMPLICATION, WITHOUT LONG-TERM CURRENT USE OF INSULIN (H): ICD-10-CM

## 2022-10-25 DIAGNOSIS — G47.00 INSOMNIA, UNSPECIFIED TYPE: ICD-10-CM

## 2022-10-25 DIAGNOSIS — Z00.00 VISIT FOR PREVENTIVE HEALTH EXAMINATION: Primary | ICD-10-CM

## 2022-10-25 DIAGNOSIS — G62.9 PERIPHERAL POLYNEUROPATHY: ICD-10-CM

## 2022-10-25 DIAGNOSIS — E83.42 HYPOMAGNESEMIA: ICD-10-CM

## 2022-10-25 DIAGNOSIS — N18.2 CHRONIC KIDNEY DISEASE, STAGE II (MILD): ICD-10-CM

## 2022-10-25 DIAGNOSIS — Z11.59 NEED FOR HEPATITIS C SCREENING TEST: ICD-10-CM

## 2022-10-25 DIAGNOSIS — J45.20 MILD INTERMITTENT ASTHMA WITHOUT COMPLICATION: ICD-10-CM

## 2022-10-25 DIAGNOSIS — R30.0 DYSURIA: ICD-10-CM

## 2022-10-25 DIAGNOSIS — F32.1 MAJOR DEPRESSIVE DISORDER, SINGLE EPISODE, MODERATE (H): ICD-10-CM

## 2022-10-25 DIAGNOSIS — F10.20 ALCOHOLISM (H): ICD-10-CM

## 2022-10-25 LAB
ALBUMIN SERPL-MCNC: 4.1 G/DL (ref 3.5–5.7)
ALBUMIN UR-MCNC: 10 MG/DL
ALP SERPL-CCNC: 39 U/L (ref 34–104)
ALT SERPL W P-5'-P-CCNC: 36 U/L (ref 7–52)
ANION GAP SERPL CALCULATED.3IONS-SCNC: 8 MMOL/L (ref 3–14)
APPEARANCE UR: CLEAR
AST SERPL W P-5'-P-CCNC: 48 U/L (ref 13–39)
BASOPHILS # BLD AUTO: 0.1 10E3/UL (ref 0–0.2)
BASOPHILS NFR BLD AUTO: 1 %
BILIRUB SERPL-MCNC: 0.4 MG/DL (ref 0.3–1)
BILIRUB UR QL STRIP: NEGATIVE
BUN SERPL-MCNC: 35 MG/DL (ref 7–25)
CALCIUM SERPL-MCNC: 9.1 MG/DL (ref 8.6–10.3)
CHLORIDE BLD-SCNC: 95 MMOL/L (ref 98–107)
CHOLEST SERPL-MCNC: 139 MG/DL
CO2 SERPL-SCNC: 27 MMOL/L (ref 21–31)
COLOR UR AUTO: YELLOW
CREAT SERPL-MCNC: 1.8 MG/DL (ref 0.7–1.3)
EOSINOPHIL # BLD AUTO: 0.2 10E3/UL (ref 0–0.7)
EOSINOPHIL NFR BLD AUTO: 3 %
ERYTHROCYTE [DISTWIDTH] IN BLOOD BY AUTOMATED COUNT: 13.2 % (ref 10–15)
FASTING STATUS PATIENT QL REPORTED: ABNORMAL
GFR SERPL CREATININE-BSD FRML MDRD: 44 ML/MIN/1.73M2
GLUCOSE BLD-MCNC: 245 MG/DL (ref 70–105)
GLUCOSE UR STRIP-MCNC: >1000 MG/DL
HBA1C MFR BLD: 11.4 % (ref 4–6.2)
HCT VFR BLD AUTO: 35.4 % (ref 40–53)
HDLC SERPL-MCNC: 40 MG/DL (ref 23–92)
HGB BLD-MCNC: 11.7 G/DL (ref 13.3–17.7)
HGB UR QL STRIP: NEGATIVE
HYALINE CASTS: 1 /LPF
IMM GRANULOCYTES # BLD: 0 10E3/UL
IMM GRANULOCYTES NFR BLD: 0 %
KETONES UR STRIP-MCNC: NEGATIVE MG/DL
LDLC SERPL CALC-MCNC: 58 MG/DL
LEUKOCYTE ESTERASE UR QL STRIP: NEGATIVE
LYMPHOCYTES # BLD AUTO: 1.5 10E3/UL (ref 0.8–5.3)
LYMPHOCYTES NFR BLD AUTO: 20 %
MAGNESIUM SERPL-MCNC: 1.6 MG/DL (ref 1.9–2.7)
MCH RBC QN AUTO: 29.9 PG (ref 26.5–33)
MCHC RBC AUTO-ENTMCNC: 33.1 G/DL (ref 31.5–36.5)
MCV RBC AUTO: 91 FL (ref 78–100)
MONOCYTES # BLD AUTO: 0.7 10E3/UL (ref 0–1.3)
MONOCYTES NFR BLD AUTO: 10 %
MUCOUS THREADS #/AREA URNS LPF: PRESENT /LPF
NEUTROPHILS # BLD AUTO: 5.1 10E3/UL (ref 1.6–8.3)
NEUTROPHILS NFR BLD AUTO: 66 %
NITRATE UR QL: NEGATIVE
NONHDLC SERPL-MCNC: 99 MG/DL
NRBC # BLD AUTO: 0 10E3/UL
NRBC BLD AUTO-RTO: 0 /100
PH UR STRIP: 5.5 [PH] (ref 5–9)
PLATELET # BLD AUTO: 197 10E3/UL (ref 150–450)
POTASSIUM BLD-SCNC: 3.5 MMOL/L (ref 3.5–5.1)
PROT SERPL-MCNC: 7.3 G/DL (ref 6.4–8.9)
PSA SERPL-MCNC: 0.25 UG/L (ref 0–4)
RBC # BLD AUTO: 3.91 10E6/UL (ref 4.4–5.9)
RBC URINE: <1 /HPF
SODIUM SERPL-SCNC: 130 MMOL/L (ref 134–144)
SP GR UR STRIP: 1.02 (ref 1–1.03)
SQUAMOUS EPITHELIAL: 2 /HPF
TRIGL SERPL-MCNC: 203 MG/DL
TSH SERPL DL<=0.005 MIU/L-ACNC: 0.83 MU/L (ref 0.4–4)
UROBILINOGEN UR STRIP-MCNC: NORMAL MG/DL
WBC # BLD AUTO: 7.6 10E3/UL (ref 4–11)
WBC URINE: <1 /HPF

## 2022-10-25 PROCEDURE — 99396 PREV VISIT EST AGE 40-64: CPT | Performed by: FAMILY MEDICINE

## 2022-10-25 PROCEDURE — 99213 OFFICE O/P EST LOW 20 MIN: CPT | Mod: 25 | Performed by: FAMILY MEDICINE

## 2022-10-25 PROCEDURE — 80061 LIPID PANEL: CPT | Mod: ZL | Performed by: FAMILY MEDICINE

## 2022-10-25 PROCEDURE — 36415 COLL VENOUS BLD VENIPUNCTURE: CPT | Mod: ZL | Performed by: FAMILY MEDICINE

## 2022-10-25 PROCEDURE — 83735 ASSAY OF MAGNESIUM: CPT | Mod: ZL | Performed by: FAMILY MEDICINE

## 2022-10-25 PROCEDURE — G0463 HOSPITAL OUTPT CLINIC VISIT: HCPCS | Mod: 25

## 2022-10-25 PROCEDURE — 86803 HEPATITIS C AB TEST: CPT | Mod: ZL | Performed by: FAMILY MEDICINE

## 2022-10-25 PROCEDURE — 85025 COMPLETE CBC W/AUTO DIFF WBC: CPT | Mod: ZL | Performed by: FAMILY MEDICINE

## 2022-10-25 PROCEDURE — 84443 ASSAY THYROID STIM HORMONE: CPT | Mod: ZL | Performed by: FAMILY MEDICINE

## 2022-10-25 PROCEDURE — 82043 UR ALBUMIN QUANTITATIVE: CPT | Mod: ZL | Performed by: FAMILY MEDICINE

## 2022-10-25 PROCEDURE — 81001 URINALYSIS AUTO W/SCOPE: CPT | Mod: ZL | Performed by: FAMILY MEDICINE

## 2022-10-25 PROCEDURE — 83036 HEMOGLOBIN GLYCOSYLATED A1C: CPT | Mod: ZL | Performed by: FAMILY MEDICINE

## 2022-10-25 PROCEDURE — 91312 COVID-19,PF,PFIZER BOOSTER BIVALENT: CPT

## 2022-10-25 PROCEDURE — 80053 COMPREHEN METABOLIC PANEL: CPT | Mod: ZL | Performed by: FAMILY MEDICINE

## 2022-10-25 PROCEDURE — 87389 HIV-1 AG W/HIV-1&-2 AB AG IA: CPT | Mod: ZL | Performed by: FAMILY MEDICINE

## 2022-10-25 PROCEDURE — 84153 ASSAY OF PSA TOTAL: CPT | Mod: ZL | Performed by: FAMILY MEDICINE

## 2022-10-25 RX ORDER — ALBUTEROL SULFATE 90 UG/1
2 AEROSOL, METERED RESPIRATORY (INHALATION) EVERY 4 HOURS PRN
Qty: 18 G | Refills: 4 | Status: SHIPPED | OUTPATIENT
Start: 2022-10-25 | End: 2023-11-03

## 2022-10-25 RX ORDER — NALTREXONE HYDROCHLORIDE 50 MG/1
100 TABLET, FILM COATED ORAL DAILY
Qty: 180 TABLET | Refills: 11 | Status: SHIPPED | OUTPATIENT
Start: 2022-10-25 | End: 2023-11-14

## 2022-10-25 RX ORDER — DULAGLUTIDE 0.75 MG/.5ML
0.75 INJECTION, SOLUTION SUBCUTANEOUS
Qty: 2 ML | Refills: 11 | Status: SHIPPED | OUTPATIENT
Start: 2022-10-25 | End: 2022-10-27

## 2022-10-25 RX ORDER — TRAZODONE HYDROCHLORIDE 150 MG/1
150 TABLET ORAL AT BEDTIME
Qty: 90 TABLET | Refills: 11 | Status: SHIPPED | OUTPATIENT
Start: 2022-10-25 | End: 2023-11-14

## 2022-10-25 ASSESSMENT — ENCOUNTER SYMPTOMS
ABDOMINAL PAIN: 0
FREQUENCY: 1
HEADACHES: 0
DIARRHEA: 0
CONSTIPATION: 0
FEVER: 0
DIZZINESS: 0
DYSURIA: 0
MYALGIAS: 0
JOINT SWELLING: 1
EYE PAIN: 0
PALPITATIONS: 0
SORE THROAT: 0
WEAKNESS: 0
CHILLS: 0
HEARTBURN: 0
HEMATURIA: 0
PARESTHESIAS: 0
NAUSEA: 0
NERVOUS/ANXIOUS: 0
ARTHRALGIAS: 1
SHORTNESS OF BREATH: 0
HEMATOCHEZIA: 0
COUGH: 0

## 2022-10-25 ASSESSMENT — ASTHMA QUESTIONNAIRES
ACT_TOTALSCORE: 24
QUESTION_1 LAST FOUR WEEKS HOW MUCH OF THE TIME DID YOUR ASTHMA KEEP YOU FROM GETTING AS MUCH DONE AT WORK, SCHOOL OR AT HOME: NONE OF THE TIME
QUESTION_2 LAST FOUR WEEKS HOW OFTEN HAVE YOU HAD SHORTNESS OF BREATH: NOT AT ALL
ACT_TOTALSCORE: 24
QUESTION_3 LAST FOUR WEEKS HOW OFTEN DID YOUR ASTHMA SYMPTOMS (WHEEZING, COUGHING, SHORTNESS OF BREATH, CHEST TIGHTNESS OR PAIN) WAKE YOU UP AT NIGHT OR EARLIER THAN USUAL IN THE MORNING: NOT AT ALL
QUESTION_5 LAST FOUR WEEKS HOW WOULD YOU RATE YOUR ASTHMA CONTROL: WELL CONTROLLED
QUESTION_4 LAST FOUR WEEKS HOW OFTEN HAVE YOU USED YOUR RESCUE INHALER OR NEBULIZER MEDICATION (SUCH AS ALBUTEROL): NOT AT ALL

## 2022-10-25 ASSESSMENT — PATIENT HEALTH QUESTIONNAIRE - PHQ9
SUM OF ALL RESPONSES TO PHQ QUESTIONS 1-9: 1
SUM OF ALL RESPONSES TO PHQ QUESTIONS 1-9: 1
10. IF YOU CHECKED OFF ANY PROBLEMS, HOW DIFFICULT HAVE THESE PROBLEMS MADE IT FOR YOU TO DO YOUR WORK, TAKE CARE OF THINGS AT HOME, OR GET ALONG WITH OTHER PEOPLE: NOT DIFFICULT AT ALL

## 2022-10-25 NOTE — PROGRESS NOTES
"Nursing Notes:   Radha Junior LPN  10/25/2022  2:10 PM  Signed  Chief Complaint   Patient presents with     Physical       Initial /82   Pulse 91   Temp 97.2  F (36.2  C) (Temporal)   Resp 18   Ht 1.715 m (5' 7.5\")   Wt 91.6 kg (202 lb)   SpO2 97%   BMI 31.17 kg/m   Estimated body mass index is 31.17 kg/m  as calculated from the following:    Height as of this encounter: 1.715 m (5' 7.5\").    Weight as of this encounter: 91.6 kg (202 lb).  Medication Reconciliation: complete    FOOD SECURITY SCREENING QUESTIONS  Hunger Vital Signs:  Within the past 12 months we worried whether our food would run out before we got money to buy more. Never  Within the past 12 months the food we bought just didn't last and we didn't have money to get more. Never        Advance care directive on file? no  Advance care directive provided to patient? declined     Radha Junior LPN      SUBJECTIVE:  Kolton Aragon  is a 57 year old male who comes in today for complete evaluation.  I saw him in July.  He is a recovering alcoholic and has been taking naltrexone to help with cravings.  He was still struggling with drinking off and on.  We discussed increasing the naltrexone to 100 mg to see if it would be helpful. He felt that was helpful    He has type 2 diabetes and has been on 5 mg of glipizide XL daily, and he was using Dexcom for monitoring. He is still using that. His highest blood sugar was 180. He has not had any lows.   Hemoglobin A1c in July it was 7.3%.  He has a peripheral neuropathy likely related to diabetes and alcohol.  B12 and folate were normal.  We started gabapentin 300 mg and increased up to 3 times daily.  We ordered EMG but that was apparently not done.    He is on 20 mg of lisinopril for blood pressure and renal protection.  This was just increased in July.    He is on 20 mg of Prozac daily and 150 mg of trazodone at bedtime. He needs a new prescription for that.     He is vaccinated and " boosted for Covid but has not had the new booster.  He has not had a flu shot yet. He has not had pneumovax. He has not had TwinRix.     He has not had colon cancer screening.    Past Medical, Family, and Social History reviewed and updated as noted below.   ROS is negative except as noted above       Allergies   Allergen Reactions     Latex Hives     Metformin Diarrhea     Dust Mite Extract Other (See Comments)     Sneezing, itchy eyes   ,   Family History   Problem Relation Age of Onset     Family History Negative Mother         Good Health     Other - See Comments Father         Deserted as a child, patient knows father had a history of heart attack at 50     Heart Disease Father         Heart Disease,patient knows father had a history of heart attack at 50     Cancer Other         Cancer,History of cancer     Diabetes Brother      Diabetes Brother    ,   Current Outpatient Medications   Medication     acetaminophen (TYLENOL) 650 MG CR tablet     albuterol (PROAIR HFA/PROVENTIL HFA/VENTOLIN HFA) 108 (90 Base) MCG/ACT inhaler     Continuous Blood Gluc  (DEXCOM G6 ) CHRISTINA     Continuous Blood Gluc Sensor (DEXCOM G6 SENSOR) MISC     Continuous Blood Gluc Transmit (DEXCOM G6 TRANSMITTER) MISC     diphenhydrAMINE HCl, Sleep, (UNISOM SLEEPGELS) 50 MG CAPS     dulaglutide (TRULICITY) 0.75 MG/0.5ML pen     FLUoxetine (PROZAC) 20 MG capsule     gabapentin (NEURONTIN) 300 MG capsule     glipiZIDE (GLUCOTROL XL) 5 MG 24 hr tablet     glucose (BD GLUCOSE) 4 g chewable tablet     lisinopril (ZESTRIL) 20 MG tablet     magnesium oxide (MAG-OX) 400 MG tablet     Melatonin 10-10 MG TBCR     naltrexone (DEPADE/REVIA) 50 MG tablet     naproxen sodium (ANAPROX) 220 MG tablet     omeprazole (PRILOSEC) 40 MG DR capsule     traZODone (DESYREL) 150 MG tablet     Alcohol Swabs (B-D SINGLE USE SWABS REGULAR) PADS     blood glucose (CONTOUR NEXT TEST) test strip     blood glucose monitoring (JOEY MICROLET) lancets      diclofenac (VOLTAREN) 1 % topical gel     No current facility-administered medications for this visit.   ,   Past Medical History:   Diagnosis Date     Alcohol-induced acute pancreatitis without infection or necrosis     06/20/2016     Altered level of consciousness 10/11/2017     Essential (primary) hypertension     9/12/2013     Gastro-esophageal reflux disease without esophagitis     5/18/2015     Spondylolisthesis of lumbosacral region     7/9/2013,Patient has been referred for PT and has not followed through.  He was sent for MRI scan,and he was not able to do either the closed or open MRI due to claustrophobia.     Spondylosis of lumbar region without myelopathy or radiculopathy     4/22/2013     Tobacco use     5/17/2013     Uncomplicated asthma     No Comments Provided   ,   Patient Active Problem List    Diagnosis Date Noted     Biliary calculus of other site without obstruction 05/21/2021     Priority: Medium     Formatting of this note might be different from the original.  Added automatically from request for surgery [id number]       Chronic kidney disease, stage 3 (H) 03/30/2021     Priority: Medium     Pseudocyst of pancreas 03/24/2021     Priority: Medium     Abdominal pain, acute, right upper quadrant 03/20/2021     Priority: Medium     Abdominal ultrasound, abnormal 03/20/2021     Priority: Medium     Tobacco abuse, in remission 03/20/2021     Priority: Medium     GIB (gastrointestinal bleeding) 03/18/2021     Priority: Medium     RUQ abdominal pain 03/18/2021     Priority: Medium     Liver mass, left lobe 03/18/2021     Priority: Medium     Type 2 diabetes mellitus without complication, without long-term current use of insulin (H) 04/17/2019     Priority: Medium     Reactive airway disease 03/01/2018     Priority: Medium     Alcoholism (H) 10/12/2017     Priority: Medium     Acute alcoholic pancreatitis 06/20/2016     Priority: Medium     Chronic kidney disease, stage II (mild) 03/09/2016      "Priority: Medium     Gastroesophageal reflux disease without esophagitis 05/18/2015     Priority: Medium     Dyslipidemia 12/29/2014     Priority: Medium     Hypertension 09/12/2013     Priority: Medium     Spondylolisthesis at L5-S1 level 07/09/2013     Priority: Medium     Overview:   Patient has been referred for PT and has not followed through.  He was sent for MRI scan,and he was not able to do either the closed or open MRI due to claustrophobia.       Insomnia 06/20/2013     Priority: Medium     Chewing tobacco use 05/17/2013     Priority: Medium     DJD (degenerative joint disease), lumbar 04/22/2013     Priority: Medium   ,   Past Surgical History:   Procedure Laterality Date     ESOPHAGOSCOPY, GASTROSCOPY, DUODENOSCOPY (EGD), COMBINED N/A 06/16/2020    Procedure: ESOPHAGOGASTRODUODENOSCOPY, WITH BIOPSY;  Surgeon: Giovanni Reed MD;  Location: [facility] OR     pancreatic duct surgery  2021    stent removed from the pancreatic duct     VASECTOMY      No Comments Provided    and   Social History     Tobacco Use     Smoking status: Never     Smokeless tobacco: Former     Types: Chew     Quit date: 4/14/2020     Tobacco comments:     Quit smoking: trying to quit chew using 1 pack week   Substance Use Topics     Alcohol use: Yes     Comment: occ.     OBJECTIVE:  /82   Pulse 91   Temp 97.2  F (36.2  C) (Temporal)   Resp 18   Ht 1.715 m (5' 7.5\")   Wt 91.6 kg (202 lb)   SpO2 97%   BMI 31.17 kg/m     EXAM:  General Appearance: Pleasant, alert, appropriate appearance for age. No acute distress  Head Exam: Normal. Normocephalic, atraumatic.  Eye Exam:  Normal external eyes, conjunctivae, lids, cornea. BIJAL. EOMI  Ear Exam: Normal TM's bilaterally. Normal auditory canals and external ears. Non-tender.  Nose Exam: Normal external nose, mucus membranes, and septum.  OroPharynx Exam:  Dental hygiene adequate. Normal buccal mucosa. Normal pharynx.  Neck Exam:  Supple, no masses or nodes. No audible " bruits  Thyroid Exam: No nodules or enlargement.  Chest/Respiratory Exam: Normal chest wall and respirations. Clear to auscultation.  Cardiovascular Exam: Regular rate and rhythm. S1, S2, no murmur, click, gallop, or rubs.  Gastrointestinal Exam: Soft, non-tender, no masses or organomegaly.  Lymphatic Exam: Non-palpable nodes in neck, clavicular regions.  Musculoskeletal Exam: Back is straight and non-tender, full ROM of upper and lower extremities.  Foot Exam: Left and right foot: good pedal pulses, no lesions, nail hygiene ok.  Dystrophic but not onychomycotic great toenails bilaterally. No response to sensory testing with #10 monofilament.   Skin: no rash or abnormalities  Neurologic Exam: Nonfocal, normal gross motor, tone coordination and no tremor.  Psychiatric Exam: Alert and oriented - appropriate affect.     Results for orders placed or performed in visit on 10/25/22   Magnesium     Status: Abnormal   Result Value Ref Range    Magnesium 1.6 (L) 1.9 - 2.7 mg/dL   TSH with free T4 reflex     Status: Normal   Result Value Ref Range    TSH 0.83 0.40 - 4.00 mU/L   Hemoglobin A1c     Status: Abnormal   Result Value Ref Range    Hemoglobin A1C 11.4 (H) 4.0 - 6.2 %   Lipid Profile     Status: Abnormal   Result Value Ref Range    Cholesterol 139 <200 mg/dL    Triglycerides 203 (H) <150 mg/dL    Direct Measure HDL 40 23 - 92 mg/dL    LDL Cholesterol Calculated 58 <=100 mg/dL    Non HDL Cholesterol 99 <130 mg/dL    Patient Fasting > 8hrs? Unknown     Narrative    Cholesterol  Desirable:  <200 mg/dL    Triglycerides  Normal:  Less than 150 mg/dL  Borderline High:  150-199 mg/dL  High:  200-499 mg/dL  Very High:  Greater than or equal to 500 mg/dL    Direct Measure HDL  Female:  Greater than or equal to 50 mg/dL   Male:  Greater than or equal to 40 mg/dL    LDL Cholesterol  Desirable:  <100mg/dL  Above Desirable:  100-129 mg/dL   Borderline High:  130-159 mg/dL   High:  160-189 mg/dL   Very High:  >= 190 mg/dL    Non  HDL Cholesterol  Desirable:  130 mg/dL  Above Desirable:  130-159 mg/dL  Borderline High:  160-189 mg/dL  High:  190-219 mg/dL  Very High:  Greater than or equal to 220 mg/dL   Comprehensive metabolic panel     Status: Abnormal   Result Value Ref Range    Sodium 130 (L) 134 - 144 mmol/L    Potassium 3.5 3.5 - 5.1 mmol/L    Chloride 95 (L) 98 - 107 mmol/L    Carbon Dioxide (CO2) 27 21 - 31 mmol/L    Anion Gap 8 3 - 14 mmol/L    Urea Nitrogen 35 (H) 7 - 25 mg/dL    Creatinine 1.80 (H) 0.70 - 1.30 mg/dL    Calcium 9.1 8.6 - 10.3 mg/dL    Glucose 245 (H) 70 - 105 mg/dL    Alkaline Phosphatase 39 34 - 104 U/L    AST 48 (H) 13 - 39 U/L    ALT 36 7 - 52 U/L    Protein Total 7.3 6.4 - 8.9 g/dL    Albumin 4.1 3.5 - 5.7 g/dL    Bilirubin Total 0.4 0.3 - 1.0 mg/dL    GFR Estimate 44 (L) >60 mL/min/1.73m2   PSA tumor marker     Status: Normal   Result Value Ref Range    PSA Tumor Marker 0.25 0.00 - 4.00 ug/L    Narrative    The DXI Access PSAS WHO assay is a two site immunoenzymatic   assay. Assay values obtained with different assay methods cannot be used   interchangeably due to differences in assay methods and reagent specificity.   CBC with platelets and differential     Status: Abnormal   Result Value Ref Range    WBC Count 7.6 4.0 - 11.0 10e3/uL    RBC Count 3.91 (L) 4.40 - 5.90 10e6/uL    Hemoglobin 11.7 (L) 13.3 - 17.7 g/dL    Hematocrit 35.4 (L) 40.0 - 53.0 %    MCV 91 78 - 100 fL    MCH 29.9 26.5 - 33.0 pg    MCHC 33.1 31.5 - 36.5 g/dL    RDW 13.2 10.0 - 15.0 %    Platelet Count 197 150 - 450 10e3/uL    % Neutrophils 66 %    % Lymphocytes 20 %    % Monocytes 10 %    % Eosinophils 3 %    % Basophils 1 %    % Immature Granulocytes 0 %    NRBCs per 100 WBC 0 <1 /100    Absolute Neutrophils 5.1 1.6 - 8.3 10e3/uL    Absolute Lymphocytes 1.5 0.8 - 5.3 10e3/uL    Absolute Monocytes 0.7 0.0 - 1.3 10e3/uL    Absolute Eosinophils 0.2 0.0 - 0.7 10e3/uL    Absolute Basophils 0.1 0.0 - 0.2 10e3/uL    Absolute Immature Granulocytes  0.0 <=0.4 10e3/uL    Absolute NRBCs 0.0 10e3/uL   UA reflex to Microscopic and Culture     Status: Abnormal    Specimen: Urine, Midstream   Result Value Ref Range    Color Urine Yellow Colorless, Straw, Light Yellow, Yellow    Appearance Urine Clear Clear    Glucose Urine >1000 (A) Negative mg/dL    Bilirubin Urine Negative Negative    Ketones Urine Negative Negative mg/dL    Specific Gravity Urine 1.022 1.000 - 1.030    Blood Urine Negative Negative    pH Urine 5.5 5.0 - 9.0    Protein Albumin Urine 10 (A) Negative mg/dL    Urobilinogen Urine Normal Normal, 2.0 mg/dL    Nitrite Urine Negative Negative    Leukocyte Esterase Urine Negative Negative    Mucus Urine Present (A) None Seen /LPF    RBC Urine <1 <=2 /HPF    WBC Urine <1 <=5 /HPF    Squamous Epithelials Urine 2 (H) <=1 /HPF    Hyaline Casts Urine 1 <=2 /LPF    Narrative    Urine Culture not indicated   CBC with Platelets & Differential     Status: Abnormal    Narrative    The following orders were created for panel order CBC with Platelets & Differential.  Procedure                               Abnormality         Status                     ---------                               -----------         ------                     CBC with platelets and d...[[id number]]  Abnormal            Final result                 Please view results for these tests on the individual orders.      ASSESSMENT/Plan :    Kolton was seen today for physical and imm/inj.    Diagnoses and all orders for this visit:    Visit for preventive health examination    Type 2 diabetes mellitus without complication, without long-term current use of insulin (H)  -     CBC with Platelets & Differential; Future  -     Comprehensive metabolic panel; Future  -     Lipid Profile; Future  -     Hemoglobin A1c; Future  -     Albumin Random Urine Quantitative with Creat Ratio; Future  -     TSH with free T4 reflex; Future  -     TSH with free T4 reflex  -     Hemoglobin A1c  -     Lipid Profile  -      Comprehensive metabolic panel  -     CBC with Platelets & Differential  -     Albumin Random Urine Quantitative with Creat Ratio  -     dulaglutide (TRULICITY) 0.75 MG/0.5ML pen; Inject 0.75 mg Subcutaneous every 7 days    Insomnia, unspecified type  -     traZODone (DESYREL) 150 MG tablet; Take 1 tablet (150 mg) by mouth At Bedtime    Alcoholism (H)  -     Comprehensive metabolic panel; Future  -     Comprehensive metabolic panel  -     naltrexone (DEPADE/REVIA) 50 MG tablet; Take 2 tablets (100 mg) by mouth daily Dose increase to 100 mg at bedtime.    Essential hypertension  -     Comprehensive metabolic panel; Future  -     Comprehensive metabolic panel    Major depressive disorder, single episode, moderate (H)    Peripheral polyneuropathy    Hypomagnesemia  -     Magnesium; Future  -     Magnesium    Screening for prostate cancer  -     PSA tumor marker; Future  -     PSA tumor marker    Chronic kidney disease, stage II (mild)  -     CBC with Platelets & Differential; Future  -     Comprehensive metabolic panel; Future  -     Comprehensive metabolic panel  -     CBC with Platelets & Differential    Dysuria  -     UA reflex to Microscopic and Culture; Future  -     UA reflex to Microscopic and Culture    Mild intermittent asthma without complication  -     albuterol (PROAIR HFA/PROVENTIL HFA/VENTOLIN HFA) 108 (90 Base) MCG/ACT inhaler; Inhale 2 puffs into the lungs every 4 hours as needed for shortness of breath / dyspnea or wheezing    Special screening for malignant neoplasms, colon  -     Colonoscopy Screening  Referral; Future    High priority for 2019-nCoV vaccine  -     COVID-19,PF,PFIZER BOOSTER BIVALENT 12+Yrs    Need for hepatitis C screening test  -     Hepatitis C Screen Reflex to HCV RNA Quant and Genotype; Future  -     Hepatitis C Screen Reflex to HCV RNA Quant and Genotype    Encounter for screening for HIV  -     HIV Antigen Antibody Combo; Future  -     HIV Antigen Antibody  Combo      Reviewed laboratory tests with him.  Lengthy discussion regarding his poor glycemic control.  He has not been able to tolerate metformin and has only been on glipizide.  We will add Trulicity 0.75 mg weekly.  Continue using his CGM.    Colonoscopy referral sent.    Renewed his inhaler and other meds.    Screen for hepatitis C and HIV done today.    Increase naltrexone to 100 mg daily as that seemed to work better for him.  Increase trazodone to 150 mg at at bedtime to help with sleep.    Underscored the importance for ongoing sobriety.    Recommend follow-up with me in 3 months, sooner if needed.    A total of 25 minutes was spent with the patient, reviewing records, tests, ordering medications, tests or procedures and documenting clinical information in the EHR in addition to Wellness.     Jono Gonzalez MD    Answers for HPI/ROS submitted by the patient on 10/25/2022  If you checked off any problems, how difficult have these problems made it for you to do your work, take care of things at home, or get along with other people?: Not difficult at all  PHQ9 TOTAL SCORE: 1  Frequency of exercise:: 2-3 days/week  Getting at least 3 servings of Calcium per day:: Yes  Diet:: Diabetic  Taking medications regularly:: Yes  Medication side effects:: None  Bi-annual eye exam:: Yes  Dental care twice a year:: NO  Sleep apnea or symptoms of sleep apnea:: Excessive snoring  abdominal pain: No  Blood in stool: No  Blood in urine: No  chest pain: No  chills: No  congestion: No  constipation: No  cough: No  diarrhea: No  dizziness: No  ear pain: No  eye pain: No  nervous/anxious: No  fever: No  frequency: Yes  genital sores: No  headaches: No  hearing loss: No  heartburn: No  arthralgias: Yes  joint swelling: Yes  peripheral edema: No  mood changes: No  myalgias: No  nausea: No  dysuria: No  palpitations: No  Skin sensation changes: No  sore throat: No  urgency: No  rash: No  shortness of breath: No  visual disturbance:  No  weakness: No  impotence: Yes  penile discharge: No  Additional concerns today:: No  Duration of exercise:: 15-30 minutes

## 2022-10-25 NOTE — NURSING NOTE
"Chief Complaint   Patient presents with     Physical       Initial /82   Pulse 91   Temp 97.2  F (36.2  C) (Temporal)   Resp 18   Ht 1.715 m (5' 7.5\")   Wt 91.6 kg (202 lb)   SpO2 97%   BMI 31.17 kg/m   Estimated body mass index is 31.17 kg/m  as calculated from the following:    Height as of this encounter: 1.715 m (5' 7.5\").    Weight as of this encounter: 91.6 kg (202 lb).  Medication Reconciliation: complete    FOOD SECURITY SCREENING QUESTIONS  Hunger Vital Signs:  Within the past 12 months we worried whether our food would run out before we got money to buy more. Never  Within the past 12 months the food we bought just didn't last and we didn't have money to get more. Never        Advance care directive on file? no  Advance care directive provided to patient? declined     Radha Junior, GREYSON  "

## 2022-10-25 NOTE — LETTER
My Asthma Action Plan    Name: Kolton Aragon   YOB: 1965  Date: 10/25/2022   My doctor: Jono Gonzalez MD   My clinic: [facility]        My Rescue Medicine:   Albuterol inhaler (Proair/Ventolin/Proventil HFA)  2-4 puffs EVERY 4 HOURS as needed. Use a spacer if recommended by your provider.   My Asthma Severity:   Intermittent / Exercise Induced  Know your asthma triggers: pollens, animal dander, humidity and cold air             GREEN ZONE   Good Control    I feel good    No cough or wheeze    Can work, sleep and play without asthma symptoms       Take your asthma control medicine every day.     1. If exercise triggers your asthma, take your rescue medication    15 minutes before exercise or sports, and    During exercise if you have asthma symptoms  2. Spacer to use with inhaler: If you have a spacer, make sure to use it with your inhaler             YELLOW ZONE Getting Worse  I have ANY of these:    I do not feel good    Cough or wheeze    Chest feels tight    Wake up at night   1. Keep taking your Green Zone medications  2. Start taking your rescue medicine:    every 20 minutes for up to 1 hour. Then every 4 hours for 24-48 hours.  3. If you stay in the Yellow Zone for more than 12-24 hours, contact your doctor.  4. If you do not return to the Green Zone in 12-24 hours or you get worse, start taking your oral steroid medicine if prescribed by your provider.           RED ZONE Medical Alert - Get Help  I have ANY of these:    I feel awful    Medicine is not helping    Breathing getting harder    Trouble walking or talking    Nose opens wide to breathe       1. Take your rescue medicine NOW  2. If your provider has prescribed an oral steroid medicine, start taking it NOW  3. Call your doctor NOW  4. If you are still in the Red Zone after 20 minutes and you have not reached your doctor:    Take your rescue medicine again and    Call 911 or go to the emergency room right  away    See your regular doctor within 2 weeks of an Emergency Room or Urgent Care visit for follow-up treatment.          Annual Reminders:  Meet with Asthma Educator,  Flu Shot in the Fall, consider Pneumonia Vaccination for patients with asthma (aged 19 and older).    Pharmacy: [id number] - [place], [place] - [address]    Electronically signed by Jono Gonzalez MD   Date: 10/25/22                    Asthma Triggers  How To Control Things That Make Your Asthma Worse    Triggers are things that make your asthma worse.  Look at the list below to help you find your triggers and   what you can do about them. You can help prevent asthma flare-ups by staying away from your triggers.      Trigger                                                          What you can do   Cigarette Smoke  Tobacco smoke can make asthma worse. Do not allow smoking in your home, car or around you.  Be sure no one smokes at a child s day care or school.  If you smoke, ask your health care provider for ways to help you quit.  Ask family members to quit too.  Ask your health care provider for a referral to Quit Plan to help you quit smoking, or call 3-803-365-PLAN.     Colds, Flu, Bronchitis  These are common triggers of asthma. Wash your hands often.  Don t touch your eyes, nose or mouth.  Get a flu shot every year.     Dust Mites  These are tiny bugs that live in cloth or carpet. They are too small to see. Wash sheets and blankets in hot water every week.   Encase pillows and mattress in dust mite proof covers.  Avoid having carpet if you can. If you have carpet, vacuum weekly.   Use a dust mask and HEPA vacuum.   Pollen and Outdoor Mold  Some people are allergic to trees, grass, or weed pollen, or molds. Try to keep your windows closed.  Limit time out doors when pollen count is high.   Ask you health care provider about taking medicine during allergy season.     Animal Dander  Some people are allergic to skin  flakes, urine or saliva from pets with fur or feathers. Keep pets with fur or feathers out of your home.    If you can t keep the pet outdoors, then keep the pet out of your bedroom.  Keep the bedroom door closed.  Keep pets off cloth furniture and away from stuffed toys.     Mice, Rats, and Cockroaches  Some people are allergic to the waste from these pests.   Cover food and garbage.  Clean up spills and food crumbs.  Store grease in the refrigerator.   Keep food out of the bedroom.   Indoor Mold  This can be a trigger if your home has high moisture. Fix leaking faucets, pipes, or other sources of water.   Clean moldy surfaces.  Dehumidify basement if it is damp and smelly.   Smoke, Strong Odors, and Sprays  These can reduce air quality. Stay away from strong odors and sprays, such as perfume, powder, hair spray, paints, smoke incense, paint, cleaning products, candles and new carpet.   Exercise or Sports  Some people with asthma have this trigger. Be active!  Ask your doctor about taking medicine before sports or exercise to prevent symptoms.    Warm up for 5-10 minutes before and after sports or exercise.     Other Triggers of Asthma  Cold air:  Cover your nose and mouth with a scarf.  Sometimes laughing or crying can be a trigger.  Some medicines and food can trigger asthma.

## 2022-10-25 NOTE — PROGRESS NOTES
Answers for HPI/ROS submitted by the patient on 10/25/2022  If you checked off any problems, how difficult have these problems made it for you to do your work, take care of things at home, or get along with other people?: Not difficult at all  PHQ9 TOTAL SCORE: 1    SUBJECTIVE:   CC: Kolton is an 57 year old who presents for preventative health visit.       Patient has been advised of split billing requirements and indicates understanding: Yes  Healthy Habits:     Getting at least 3 servings of Calcium per day:  Yes    Bi-annual eye exam:  Yes    Dental care twice a year:  NO    Sleep apnea or symptoms of sleep apnea:  Excessive snoring    Diet:  Diabetic    Frequency of exercise:  2-3 days/week    Duration of exercise:  15-30 minutes    Taking medications regularly:  Yes    Medication side effects:  None    PHQ-2 Total Score: 0    Additional concerns today:  No              Today's PHQ-2 Score:   PHQ-2 ( 1999 Pfizer) 10/25/2022   Q1: Little interest or pleasure in doing things 0   Q2: Feeling down, depressed or hopeless 0   PHQ-2 Score 0   PHQ-2 Total Score (12-17 Years)- Positive if 3 or more points; Administer PHQ-A if positive -   Q1: Little interest or pleasure in doing things Not at all   Q2: Feeling down, depressed or hopeless Not at all   PHQ-2 Score 0       Abuse: Current or Past(Physical, Sexual or Emotional)- No  Do you feel safe in your environment? Yes    Have you ever done Advance Care Planning? (For example, a Health Directive, POLST, or a discussion with a medical provider or your loved ones about your wishes): No, advance care planning information given to patient to review.  Patient declined advance care planning discussion at this time.    Social History     Tobacco Use     Smoking status: Never     Smokeless tobacco: Former     Types: Chew     Quit date: 4/14/2020     Tobacco comments:     Quit smoking: trying to quit chew using 1 pack week   Substance Use Topics     Alcohol use: Yes      "Comment: occ.     If you drink alcohol do you typically have >3 drinks per day or >7 drinks per week? No    Alcohol Use 10/25/2022   Prescreen: >3 drinks/day or >7 drinks/week? No       Last PSA:   PSA Tumor Marker   Date Value Ref Range Status   09/07/2021 0.21 0.00 - 4.00 ug/L Final       Reviewed orders with patient. Reviewed health maintenance and updated orders accordingly - Yes  Labs reviewed in EPIC    Reviewed and updated as needed this visit by clinical staff   Tobacco  Allergies    Med Hx  Surg Hx  Fam Hx  Soc Hx        Reviewed and updated as needed this visit by Provider                     Review of Systems      OBJECTIVE:   /82   Pulse 91   Temp 97.2  F (36.2  C) (Temporal)   Resp 18   Ht 1.715 m (5' 7.5\")   Wt 91.6 kg (202 lb)   SpO2 97%   BMI 31.17 kg/m      Physical Exam          ASSESSMENT/PLAN:   Kolton was seen today for physical and imm/inj.    Diagnoses and all orders for this visit:    Visit for preventive health examination    Type 2 diabetes mellitus without complication, without long-term current use of insulin (H)  -     CBC with Platelets & Differential; Future  -     Comprehensive metabolic panel; Future  -     Lipid Profile; Future  -     Hemoglobin A1c; Future  -     Albumin Random Urine Quantitative with Creat Ratio; Future  -     TSH with free T4 reflex; Future  -     TSH with free T4 reflex  -     Hemoglobin A1c  -     Lipid Profile  -     Comprehensive metabolic panel  -     CBC with Platelets & Differential  -     Albumin Random Urine Quantitative with Creat Ratio  -     dulaglutide (TRULICITY) 0.75 MG/0.5ML pen; Inject 0.75 mg Subcutaneous every 7 days    Insomnia, unspecified type  -     traZODone (DESYREL) 150 MG tablet; Take 1 tablet (150 mg) by mouth At Bedtime    Alcoholism (H)  -     Comprehensive metabolic panel; Future  -     Comprehensive metabolic panel  -     naltrexone (DEPADE/REVIA) 50 MG tablet; Take 2 tablets (100 mg) by mouth daily Dose increase " to 100 mg at bedtime.    Essential hypertension  -     Comprehensive metabolic panel; Future  -     Comprehensive metabolic panel    Major depressive disorder, single episode, moderate (H)    Peripheral polyneuropathy    Hypomagnesemia  -     Magnesium; Future  -     Magnesium    Screening for prostate cancer  -     PSA tumor marker; Future  -     PSA tumor marker    Chronic kidney disease, stage II (mild)  -     CBC with Platelets & Differential; Future  -     Comprehensive metabolic panel; Future  -     Comprehensive metabolic panel  -     CBC with Platelets & Differential    Dysuria  -     UA reflex to Microscopic and Culture; Future  -     UA reflex to Microscopic and Culture    Mild intermittent asthma without complication  -     albuterol (PROAIR HFA/PROVENTIL HFA/VENTOLIN HFA) 108 (90 Base) MCG/ACT inhaler; Inhale 2 puffs into the lungs every 4 hours as needed for shortness of breath / dyspnea or wheezing    Special screening for malignant neoplasms, colon  -     Colonoscopy Screening  Referral; Future    High priority for 2019-nCoV vaccine  -     COVID-19,PF,PFIZER BOOSTER BIVALENT 12+Yrs    Need for hepatitis C screening test  -     Hepatitis C Screen Reflex to HCV RNA Quant and Genotype; Future  -     Hepatitis C Screen Reflex to HCV RNA Quant and Genotype    Encounter for screening for HIV  -     HIV Antigen Antibody Combo; Future  -     HIV Antigen Antibody Combo        Patient has been advised of split billing requirements and indicates understanding: Yes      COUNSELING:   Reviewed preventive health counseling, as reflected in patient instructions       Regular exercise       Healthy diet/nutrition       Aspirin prophylaxis        Alcohol Use        Consider Hep C screening for all patients one time for ages 18-79 years       Colorectal cancer screening       Prostate cancer screening    Estimated body mass index is 31.17 kg/m  as calculated from the following:    Height as of this encounter:  "1.715 m (5' 7.5\").    Weight as of this encounter: 91.6 kg (202 lb).     Weight management plan: Discussed healthy diet and exercise guidelines    He reports that he has never smoked. He quit smokeless tobacco use about 2 years ago.  His smokeless tobacco use included chew.      Counseling Resources:  ATP IV Guidelines  Pooled Cohorts Equation Calculator  FRAX Risk Assessment  ICSI Preventive Guidelines  Dietary Guidelines for Americans, 2010  USDA's MyPlate  ASA Prophylaxis  Lung CA Screening    Joon Gonzalez MD  [facility]  "

## 2022-10-26 LAB
CREAT UR-MCNC: 111 MG/DL
HCV AB SERPL QL IA: NONREACTIVE
HIV 1+2 AB+HIV1 P24 AG SERPL QL IA: NONREACTIVE
MICROALBUMIN UR-MCNC: 31.8 MG/L
MICROALBUMIN/CREAT UR: 28.65 MG/G CR (ref 0–17)

## 2022-10-27 ENCOUNTER — TELEPHONE (OUTPATIENT)
Dept: FAMILY MEDICINE | Facility: OTHER | Age: 57
End: 2022-10-27

## 2022-10-27 DIAGNOSIS — E11.9 TYPE 2 DIABETES MELLITUS WITHOUT COMPLICATION, WITHOUT LONG-TERM CURRENT USE OF INSULIN (H): ICD-10-CM

## 2022-10-27 RX ORDER — DULAGLUTIDE 0.75 MG/.5ML
0.75 INJECTION, SOLUTION SUBCUTANEOUS
Qty: 2 ML | Refills: 11 | Status: SHIPPED | OUTPATIENT
Start: 2022-10-27 | End: 2022-10-28

## 2022-10-27 NOTE — TELEPHONE ENCOUNTER
Please call the patient.  He went to  new medications at his pharmacy, [email or web address],  and nothing was sent.  He does not know the names of them.        Sabina Esquivel on 10/27/2022 at 11:09 AM

## 2022-10-27 NOTE — TELEPHONE ENCOUNTER
I talked to the patient and [facility]. They stated they did not receive  the Rx for Trulicity.Can you send it again?  Radha Junior LPN..................10/27/2022   12:09 PM

## 2022-10-28 DIAGNOSIS — E11.9 TYPE 2 DIABETES MELLITUS WITHOUT COMPLICATION, WITHOUT LONG-TERM CURRENT USE OF INSULIN (H): ICD-10-CM

## 2022-10-28 NOTE — TELEPHONE ENCOUNTER
Note from pharmacy;  Can we get this Rx for #4 syringes instead of 2? We don't break the boxes. Thank you!    dulaglutide (TRULICITY) 0.75 MG/0.5ML pen 2 mL 11 10/27/2022  No   Sig - Route: Inject 0.75 mg Subcutaneous every 7 days - Subcutaneous     Carlos'd up as request. Jennifer Hsu RN .............. 10/28/2022  9:03 AM

## 2022-10-31 RX ORDER — DULAGLUTIDE 0.75 MG/.5ML
0.75 INJECTION, SOLUTION SUBCUTANEOUS
Qty: 4 ML | Refills: 5 | Status: SHIPPED | OUTPATIENT
Start: 2022-10-31 | End: 2023-11-03

## 2023-02-11 DIAGNOSIS — K27.9 PUD (PEPTIC ULCER DISEASE): ICD-10-CM

## 2023-02-11 DIAGNOSIS — K21.00 GASTROESOPHAGEAL REFLUX DISEASE WITH ESOPHAGITIS WITHOUT HEMORRHAGE: ICD-10-CM

## 2023-02-14 RX ORDER — OMEPRAZOLE 40 MG/1
40 CAPSULE, DELAYED RELEASE ORAL DAILY
Qty: 90 CAPSULE | Refills: 4 | Status: SHIPPED | OUTPATIENT
Start: 2023-02-14 | End: 2024-01-09

## 2023-02-14 NOTE — TELEPHONE ENCOUNTER
Last Prescription Date: 7/14/22  Last Qty/Refills: 90 / 1  Last Office Visit: 10/25/22  Future Office Visit: none     Corinne R Thayer, RN on 2/14/2023 at 4:25 PM    Requested Prescriptions   Pending Prescriptions Disp Refills     omeprazole (PRILOSEC) 40 MG DR capsule [Pharmacy Med Name: OMEPRAZOLE 40MG DR CAPSULE] 90 capsule 1     Sig: TAKE 1 CAPSULE (40 MG) BY MOUTH DAILY

## 2023-05-23 DIAGNOSIS — E11.9 TYPE 2 DIABETES MELLITUS WITHOUT COMPLICATION, UNSPECIFIED WHETHER LONG TERM INSULIN USE (H): ICD-10-CM

## 2023-05-23 RX ORDER — PROCHLORPERAZINE 25 MG/1
SUPPOSITORY RECTAL
Qty: 1 EACH | Refills: 0 | Status: SHIPPED | OUTPATIENT
Start: 2023-05-23 | End: 2023-08-07

## 2023-05-23 NOTE — TELEPHONE ENCOUNTER
[id number] sent Rx request for the following:    Patient enrolled in our Rx Med Sync service to improve adherence. We are requesting a refill authorization in advance to ensure an active prescription is on file.     Requested Prescriptions   Pending Prescriptions Disp Refills     Continuous Blood Gluc Transmit (DEXCOM G6 TRANSMITTER) MISC [Pharmacy Med Name: DEXCOM G6 RETAIL TRANSMITTER]  3     Sig: CHANGE EVERY 3 MONTHS PER 'S INSTRUCTIONS.       Diabetic Supplies Protocol Failed - 5/23/2023  3:38 PM        Failed - Recent (6 mo) or future (30 days) visit within the authorizing provider's specialty     Last Prescription Date:   7/14/22  Last Fill Qty/Refills:         1, R-3    Last Office Visit:              10/25/22   Future Office visit:           None    Per LOV note: Return in about 3 months (around 1/25/2023) for with me, Follow up.    Pt due for follow up appointment. Routing to provider for refill consideration. Routing to Unit scheduling pool, to assist Pt in scheduling appointment.     Jennifer Hsu RN .............. 5/23/2023  3:46 PM

## 2023-05-28 DIAGNOSIS — E11.9 TYPE 2 DIABETES MELLITUS WITHOUT COMPLICATION, UNSPECIFIED WHETHER LONG TERM INSULIN USE (H): ICD-10-CM

## 2023-05-31 RX ORDER — PROCHLORPERAZINE 25 MG/1
SUPPOSITORY RECTAL
Qty: 3 EACH | Refills: 3 | Status: SHIPPED | OUTPATIENT
Start: 2023-05-31 | End: 2024-01-09

## 2023-05-31 NOTE — TELEPHONE ENCOUNTER
[facility] sent Rx request for the following:    DEXCOM G6 RETAIL SENSOR KIT  Last Prescription Date:   9/27/22  Last Fill Qty/Refills:         1, R-0    Last Office Visit:              10/25/22   Future Office visit:           6/26/23    Amber Gutierrez RN on 5/31/2023 at 1:59 PM

## 2023-07-10 NOTE — PROGRESS NOTES
Patient Information     Patient Name  Kolton Aragon MRN  [id number] Sex  Male   1965      Letter by Jono Gonzalez MD at      Author:  Jono Gonzalez MD Service:  (none) Author Type:  (none)    Filed:   Encounter Date:  2017 Status:  (Other)           Kolton Aragon  [address]  [address] [facility] [place] [address]          2017    Dear Ray:    Your lab tests have actually nearly normalized.  You still have a mild elevation of one pancreas test, but everything else looks good.  I still think it would be good to do the MRI to check things out.  I think with you taking the pre-medication and the new scanner, you will do fine with the scan.  I'll let you know the results when I get them.    It was a pleasure seeing you the other day.  If you have any questions, please don't hesitate to call us.     Sincerely,          Jono Gonzalez MD             10-Jul-2023 08:30

## 2023-07-11 DIAGNOSIS — G62.9 PERIPHERAL POLYNEUROPATHY: ICD-10-CM

## 2023-07-11 DIAGNOSIS — I10 ESSENTIAL HYPERTENSION: ICD-10-CM

## 2023-07-11 DIAGNOSIS — F32.1 MAJOR DEPRESSIVE DISORDER, SINGLE EPISODE, MODERATE (H): ICD-10-CM

## 2023-07-11 DIAGNOSIS — E11.9 DM TYPE 2, NOT AT GOAL (H): ICD-10-CM

## 2023-07-13 RX ORDER — GABAPENTIN 300 MG/1
300 CAPSULE ORAL 3 TIMES DAILY
Qty: 90 CAPSULE | Refills: 11 | Status: SHIPPED | OUTPATIENT
Start: 2023-07-13 | End: 2024-07-10

## 2023-07-13 RX ORDER — LISINOPRIL 20 MG/1
20 TABLET ORAL DAILY
Qty: 90 TABLET | Refills: 11 | Status: SHIPPED | OUTPATIENT
Start: 2023-07-13 | End: 2024-07-10

## 2023-07-13 RX ORDER — GLIPIZIDE 5 MG/1
5 TABLET, FILM COATED, EXTENDED RELEASE ORAL DAILY
Qty: 90 TABLET | Refills: 11 | Status: SHIPPED | OUTPATIENT
Start: 2023-07-13 | End: 2024-07-10

## 2023-07-13 NOTE — TELEPHONE ENCOUNTER
Routing refill request to provider for review/approval because:    LOV: 10/25/22    Anna Montana RN on 7/13/2023 at 10:33 AM

## 2023-08-05 DIAGNOSIS — E11.9 TYPE 2 DIABETES MELLITUS WITHOUT COMPLICATION, UNSPECIFIED WHETHER LONG TERM INSULIN USE (H): ICD-10-CM

## 2023-08-07 RX ORDER — PROCHLORPERAZINE 25 MG/1
SUPPOSITORY RECTAL
Qty: 1 EACH | Refills: 0 | Status: SHIPPED | OUTPATIENT
Start: 2023-08-07 | End: 2023-11-05

## 2023-10-24 PROCEDURE — 99499 UNLISTED E&M SERVICE: CPT | Performed by: NURSE PRACTITIONER

## 2023-11-02 ENCOUNTER — NURSE TRIAGE (OUTPATIENT)
Dept: FAMILY MEDICINE | Facility: OTHER | Age: 58
End: 2023-11-02
Payer: COMMERCIAL
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
## 2023-11-02 NOTE — PROGRESS NOTES
Assessment & Plan     1. Dizziness  2. Fall, initial encounter  Differential includes hypotension, dehydration, hypo or hyperglycemia, hypertension, vertigo, infection, thyroid disorder, anemia, vitamin, iron electrolyte abnormality, renal dysfunction, underlying cardiac cause, underlying neurologic cause such as stroke, TIA, etc. Vitals revealing hypotension and slight tachycardia, otherwise stable. Exam unremarkable. EKG showing normal sinus rhythm with possible prior inferior infarct. Patient denies any associated cardiac symptoms, added troponin level which is pending given abnormal EKG with hypotension and borderline tachycardia. Patient elected to leave clinic after lab draw and understands he may need to return to ED if troponin returns elevated. Additional labs pending as below. Hypotension likely playing a large part in dizziness, treatment adjustments as below. Will await further evaluation prior to making medication changes to ensure no other underlying cause contributing.   - CBC and Differential; Future  - Comprehensive Metabolic Panel; Future  - Iron & Iron Binding Capacity; Future  - TSH Reflex [facility]; Future  - Magnesium; Future  - EKG 12-lead, tracing only  - Hemoglobin A1c; Future  - Lipid Panel; Future  - Albumin Random Urine Quantitative with Creat Ratio; Future  - UA Macroscopic with reflex to Microscopic and Culture; Future    3. Primary hypertension  Blood pressure is quite low. 88/57 and 90/60 on recheck. Likely will need to decrease lisinopril dose to 10 mg once daily and recheck in 2 weeks. Will wait for medication change until all additional evaluation as returned to ensure no other underlying associated cause.     4. Type 2 diabetes mellitus without complication, without long-term current use of insulin (H)  Chronic, labs pending. Will notify with results and adjust treatment if indicated. Follow up for recheck in three months.   - Hemoglobin A1c; Future  - Lipid Panel; Future  - Albumin  Random Urine Quantitative with Creat Ratio; Future    5. Stage 3a chronic kidney disease (H)  Results pending.   - Comprehensive Metabolic Panel; Future      No follow-ups on file.    Sondra Davies PA-C  [facility]    Sabiha Hi is a 58 year old, presenting for the following health issues:  Dizziness and Fall      History of Present Illness       Reason for visit:  Falls and dizzy for 6 weeks  Symptom onset:  1-3 days ago  Symptom intensity:  Moderate  Symptom progression:  Staying the same  Had these symptoms before:  Yes  Has tried/received treatment for these symptoms:  Yes  Previous treatment was successful:  Yes  Prior treatment description:  Meds  What makes it worse:  No  What makes it better:  Some meds omeperazole    He eats 2-3 servings of fruits and vegetables daily.He consumes 0 sweetened beverage(s) daily.He exercises with enough effort to increase his heart rate 10 to 19 minutes per day.  He exercises with enough effort to increase his heart rate 3 or less days per week.   He is taking medications regularly.     Here for evaluation of dizziness and frequent falls with a known history of GERD, gastrointestinal bleed, liver mass, dyslipidemia, type 2 diabetes, hypertension, CKD, tobacco and alcohol use.Struggling with dizziness worse in the morning.  Feels like his legs go weak and he loses his balance at times of dizziness.  No syncopal episodes.  No chest pain or pressure, palpitations, headaches, vision changes, shortness of breath, numbness or tingling.  No recent illness, cough or cold symptoms.  Continues on lisinopril 20 mg for management of hypertension.  Blood pressure is quite low in the clinic at 88/57, 90/60 on recheck.  Also has history of type 2 diabetes for which she continues on glipizide.  Had been on Trulicity but reports he stopped this medication on his own.  Overdue for lab work.  Does not monitor blood sugars at home.  Known history of CKD that  has been stable.  Eating and drinking well.    PAST MEDICAL HISTORY:   Past Medical History:   Diagnosis Date    Alcohol-induced acute pancreatitis without infection or necrosis     06/20/2016    Altered level of consciousness 10/11/2017    Essential (primary) hypertension     9/12/2013    Gastro-esophageal reflux disease without esophagitis     5/18/2015    Spondylolisthesis of lumbosacral region     7/9/2013,Patient has been referred for PT and has not followed through.  He was sent for MRI scan,and he was not able to do either the closed or open MRI due to claustrophobia.    Spondylosis of lumbar region without myelopathy or radiculopathy     4/22/2013    Tobacco use     5/17/2013    Uncomplicated asthma     No Comments Provided       PAST SURGICAL HISTORY:   Past Surgical History:   Procedure Laterality Date    ESOPHAGOSCOPY, GASTROSCOPY, DUODENOSCOPY (EGD), COMBINED N/A 06/16/2020    Procedure: ESOPHAGOGASTRODUODENOSCOPY, WITH BIOPSY;  Surgeon: Giovanni Reed MD;  Location: [facility] OR    pancreatic duct surgery  2021    stent removed from the pancreatic duct    VASECTOMY      No Comments Provided       FAMILY HISTORY:   Family History   Problem Relation Age of Onset    Family History Negative Mother         Good Health    Other - See Comments Father         Deserted as a child, patient knows father had a history of heart attack at 50    Heart Disease Father         Heart Disease,patient knows father had a history of heart attack at 50    Cancer Other         Cancer,History of cancer    Diabetes Brother     Diabetes Brother        SOCIAL HISTORY:   Social History     Tobacco Use    Smoking status: Never    Smokeless tobacco: Former     Types: Chew     Quit date: 4/14/2020    Tobacco comments:     Quit smoking: trying to quit chew using 1 pack week   Substance Use Topics    Alcohol use: Yes     Comment: occ.        Allergies   Allergen Reactions    Latex Hives    Metformin Diarrhea    Dust Mite Extract Other  "(See Comments)     Sneezing, itchy eyes     Current Outpatient Medications   Medication    acetaminophen (TYLENOL) 650 MG CR tablet    Alcohol Swabs (B-D SINGLE USE SWABS REGULAR) PADS    blood glucose (CONTOUR NEXT TEST) test strip    blood glucose monitoring (JOEY MICROLET) lancets    Continuous Blood Gluc  (DEXCOM G6 ) CHRISTINA    Continuous Blood Gluc Sensor (DEXCOM G6 SENSOR) MISC    Continuous Blood Gluc Transmit (DEXCOM G6 TRANSMITTER) MISC    diclofenac (VOLTAREN) 1 % topical gel    diphenhydrAMINE HCl, Sleep, (UNISOM SLEEPGELS) 50 MG CAPS    FLUoxetine (PROZAC) 20 MG capsule    gabapentin (NEURONTIN) 300 MG capsule    glipiZIDE (GLUCOTROL XL) 5 MG 24 hr tablet    glucose (BD GLUCOSE) 4 g chewable tablet    lisinopril (ZESTRIL) 20 MG tablet    magnesium oxide (MAG-OX) 400 MG tablet    Melatonin 10-10 MG TBCR    naltrexone (DEPADE/REVIA) 50 MG tablet    naproxen sodium (ANAPROX) 220 MG tablet    omeprazole (PRILOSEC) 40 MG DR capsule    traZODone (DESYREL) 150 MG tablet     No current facility-administered medications for this visit.       Review of Systems   Per HPI        Objective    BP 90/60   Pulse 110   Temp 97.5  F (36.4  C) (Temporal)   Resp 18   Ht 1.727 m (5' 8\")   Wt 89.2 kg (196 lb 9.6 oz)   SpO2 97%   BMI 29.89 kg/m    Body mass index is 29.89 kg/m .  Physical Exam   General: Pleasant, in no apparent distress.  Eyes: Sclera are white and conjunctiva are clear bilaterally. Lacrimal apparatus free of erythema, edema, and discharge bilaterally. PERRLA, EOM intact bilaterally.   Ears: External ears without erythema or edema. Tympanic membranes are pearly white and without erythema, scarring or perforations bilaterally. External auditory canals are free of foreign bodies, erythema, ulcers, and masses.  Nose: External nose is symmetrical and free of lesions and deformities.   Oropharynx: Oral mucosa is pink and without ulcers, nodules, and white patches. Tongue is symmetrical, pink, " and without masses or lesions. Pharynx is pink, symmetrical, and without lesions. Uvula is midline. Tonsils are pink, symmetrical, and without edema, ulcers, or exudates, and 1+ bilaterally.  Neck: No cervical lymphadenopathy on inspection and palpation.  Cardiovascular: Regular rate and rhythm with S1 equal to S2. No murmurs, friction rubs, or gallops.   Respiratory: Lungs are resonant and clear to auscultation bilaterally. No wheezes, crackles, or rhonchi.  Abdomen: Abdomen is non-distended. Active bowel sounds heard in all four quadrants. No tenderness to palpation in all four quadrants. No rebound tenderness or guarding. No palpable masses noted. No hepatosplenomegaly.  Musculoskeletal: Back is straight, no tenderness to palpation of paraspinal and paravertebral muscles. Full ROM of back, neck, upper and lower extremities.  NEUROLOGICAL:  Magnolia score of 15.  Cranial nerves grossly tested and intact without deficit.  No gross muscle wasting and can ambulate and get onto exam table unassisted. Sensation to light touch intact.  No deficit with nose to finger rapid alternating movement. Patient able to walk heel to toe.  No pronator drift. Speech clear.  Answers questions appropriately.    Skin: No jaundice, pallor, rashes, or lesions on exposed skin.   Psych: Appropriate mood and affect.

## 2023-11-03 ENCOUNTER — OFFICE VISIT (OUTPATIENT)
Dept: FAMILY MEDICINE | Facility: OTHER | Age: 58
End: 2023-11-03
Attending: PHYSICIAN ASSISTANT
Payer: COMMERCIAL

## 2023-11-03 VITALS
HEART RATE: 110 BPM | OXYGEN SATURATION: 97 % | SYSTOLIC BLOOD PRESSURE: 90 MMHG | RESPIRATION RATE: 18 BRPM | WEIGHT: 196.6 LBS | DIASTOLIC BLOOD PRESSURE: 60 MMHG | BODY MASS INDEX: 29.8 KG/M2 | TEMPERATURE: 97.5 F | HEIGHT: 68 IN
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
## 2023-11-03 DIAGNOSIS — I10 PRIMARY HYPERTENSION: ICD-10-CM

## 2023-11-03 DIAGNOSIS — E11.9 TYPE 2 DIABETES MELLITUS WITHOUT COMPLICATION, UNSPECIFIED WHETHER LONG TERM INSULIN USE (H): ICD-10-CM

## 2023-11-03 DIAGNOSIS — E11.9 TYPE 2 DIABETES MELLITUS WITHOUT COMPLICATION, WITHOUT LONG-TERM CURRENT USE OF INSULIN (H): ICD-10-CM

## 2023-11-03 DIAGNOSIS — N18.31 STAGE 3A CHRONIC KIDNEY DISEASE (H): ICD-10-CM

## 2023-11-03 DIAGNOSIS — R42 DIZZINESS: Primary | ICD-10-CM

## 2023-11-03 LAB
ALBUMIN SERPL BCG-MCNC: 4.3 G/DL (ref 3.5–5.2)
ALBUMIN UR-MCNC: 50 MG/DL
ALP SERPL-CCNC: 64 U/L (ref 40–129)
ALT SERPL W P-5'-P-CCNC: 26 U/L (ref 0–70)
AMORPH CRY #/AREA URNS HPF: ABNORMAL /HPF
ANION GAP SERPL CALCULATED.3IONS-SCNC: 16 MMOL/L (ref 7–15)
APPEARANCE UR: CLEAR
AST SERPL W P-5'-P-CCNC: 24 U/L (ref 0–45)
BACTERIA #/AREA URNS HPF: ABNORMAL /HPF
BASOPHILS # BLD AUTO: 0 10E3/UL (ref 0–0.2)
BASOPHILS NFR BLD AUTO: 1 %
BILIRUB SERPL-MCNC: 0.4 MG/DL
BILIRUB UR QL STRIP: NEGATIVE
BUN SERPL-MCNC: 47.3 MG/DL (ref 6–20)
CALCIUM SERPL-MCNC: 9.5 MG/DL (ref 8.6–10)
CHLORIDE SERPL-SCNC: 91 MMOL/L (ref 98–107)
CHOLEST SERPL-MCNC: 150 MG/DL
COLOR UR AUTO: YELLOW
CREAT SERPL-MCNC: 5.55 MG/DL (ref 0.67–1.17)
CREAT UR-MCNC: 150.5 MG/DL
DEPRECATED HCO3 PLAS-SCNC: 23 MMOL/L (ref 22–29)
EGFRCR SERPLBLD CKD-EPI 2021: 11 ML/MIN/1.73M2
EOSINOPHIL # BLD AUTO: 0.3 10E3/UL (ref 0–0.7)
EOSINOPHIL NFR BLD AUTO: 5 %
ERYTHROCYTE [DISTWIDTH] IN BLOOD BY AUTOMATED COUNT: 11.8 % (ref 10–15)
GLUCOSE SERPL-MCNC: 261 MG/DL (ref 70–99)
GLUCOSE UR STRIP-MCNC: 70 MG/DL
HBA1C MFR BLD: 11.6 % (ref 4–6.2)
HCT VFR BLD AUTO: 32.8 % (ref 40–53)
HDLC SERPL-MCNC: 39 MG/DL
HGB BLD-MCNC: 11.2 G/DL (ref 13.3–17.7)
HGB UR QL STRIP: ABNORMAL
HOLD SPECIMEN: NORMAL
IMM GRANULOCYTES # BLD: 0 10E3/UL
IMM GRANULOCYTES NFR BLD: 0 %
IRON BINDING CAPACITY (ROCHE): 250 UG/DL (ref 240–430)
IRON SATN MFR SERPL: 24 % (ref 15–46)
IRON SERPL-MCNC: 59 UG/DL (ref 61–157)
KETONES UR STRIP-MCNC: NEGATIVE MG/DL
LDLC SERPL CALC-MCNC: 63 MG/DL
LEUKOCYTE ESTERASE UR QL STRIP: NEGATIVE
LYMPHOCYTES # BLD AUTO: 0.7 10E3/UL (ref 0.8–5.3)
LYMPHOCYTES NFR BLD AUTO: 10 %
MAGNESIUM SERPL-MCNC: 1.9 MG/DL (ref 1.7–2.3)
MCH RBC QN AUTO: 32.1 PG (ref 26.5–33)
MCHC RBC AUTO-ENTMCNC: 34.1 G/DL (ref 31.5–36.5)
MCV RBC AUTO: 94 FL (ref 78–100)
MICROALBUMIN UR-MCNC: 68.3 MG/L
MICROALBUMIN/CREAT UR: 45.38 MG/G CR (ref 0–17)
MONOCYTES # BLD AUTO: 0.3 10E3/UL (ref 0–1.3)
MONOCYTES NFR BLD AUTO: 4 %
MUCOUS THREADS #/AREA URNS LPF: PRESENT /LPF
NEUTROPHILS # BLD AUTO: 5.6 10E3/UL (ref 1.6–8.3)
NEUTROPHILS NFR BLD AUTO: 80 %
NITRATE UR QL: NEGATIVE
NONHDLC SERPL-MCNC: 111 MG/DL
NRBC # BLD AUTO: 0 10E3/UL
NRBC BLD AUTO-RTO: 0 /100
PH UR STRIP: 5.5 [PH] (ref 5–9)
PLATELET # BLD AUTO: 126 10E3/UL (ref 150–450)
POTASSIUM SERPL-SCNC: 3.9 MMOL/L (ref 3.4–5.3)
PROT SERPL-MCNC: 7.7 G/DL (ref 6.4–8.3)
RBC # BLD AUTO: 3.49 10E6/UL (ref 4.4–5.9)
RBC URINE: 1 /HPF
SODIUM SERPL-SCNC: 130 MMOL/L (ref 135–145)
SP GR UR STRIP: 1.01 (ref 1–1.03)
SQUAMOUS EPITHELIAL: 6 /HPF
TRIGL SERPL-MCNC: 240 MG/DL
TROPONIN T SERPL HS-MCNC: 25 NG/L
TSH SERPL DL<=0.005 MIU/L-ACNC: 1.11 UIU/ML (ref 0.3–4.2)
UROBILINOGEN UR STRIP-MCNC: NORMAL MG/DL
WBC # BLD AUTO: 7 10E3/UL (ref 4–11)
WBC URINE: 3 /HPF

## 2023-11-03 PROCEDURE — 84484 ASSAY OF TROPONIN QUANT: CPT | Mod: ZL | Performed by: PHYSICIAN ASSISTANT

## 2023-11-03 PROCEDURE — 85004 AUTOMATED DIFF WBC COUNT: CPT | Mod: ZL | Performed by: PHYSICIAN ASSISTANT

## 2023-11-03 PROCEDURE — 84443 ASSAY THYROID STIM HORMONE: CPT | Mod: ZL | Performed by: PHYSICIAN ASSISTANT

## 2023-11-03 PROCEDURE — 36415 COLL VENOUS BLD VENIPUNCTURE: CPT | Mod: ZL | Performed by: PHYSICIAN ASSISTANT

## 2023-11-03 PROCEDURE — 83550 IRON BINDING TEST: CPT | Mod: ZL | Performed by: PHYSICIAN ASSISTANT

## 2023-11-03 PROCEDURE — 93010 ELECTROCARDIOGRAM REPORT: CPT | Performed by: INTERNAL MEDICINE

## 2023-11-03 PROCEDURE — 80061 LIPID PANEL: CPT | Mod: ZL | Performed by: PHYSICIAN ASSISTANT

## 2023-11-03 PROCEDURE — 83735 ASSAY OF MAGNESIUM: CPT | Mod: ZL | Performed by: PHYSICIAN ASSISTANT

## 2023-11-03 PROCEDURE — 99214 OFFICE O/P EST MOD 30 MIN: CPT | Performed by: PHYSICIAN ASSISTANT

## 2023-11-03 PROCEDURE — 83036 HEMOGLOBIN GLYCOSYLATED A1C: CPT | Mod: ZL | Performed by: PHYSICIAN ASSISTANT

## 2023-11-03 PROCEDURE — 93005 ELECTROCARDIOGRAM TRACING: CPT

## 2023-11-03 PROCEDURE — G0463 HOSPITAL OUTPT CLINIC VISIT: HCPCS

## 2023-11-03 PROCEDURE — 82570 ASSAY OF URINE CREATININE: CPT | Mod: ZL | Performed by: PHYSICIAN ASSISTANT

## 2023-11-03 PROCEDURE — 81003 URINALYSIS AUTO W/O SCOPE: CPT | Mod: ZL | Performed by: PHYSICIAN ASSISTANT

## 2023-11-03 PROCEDURE — 80053 COMPREHEN METABOLIC PANEL: CPT | Mod: ZL | Performed by: PHYSICIAN ASSISTANT

## 2023-11-03 ASSESSMENT — ANXIETY QUESTIONNAIRES
1. FEELING NERVOUS, ANXIOUS, OR ON EDGE: NOT AT ALL
3. WORRYING TOO MUCH ABOUT DIFFERENT THINGS: NOT AT ALL
4. TROUBLE RELAXING: NOT AT ALL
5. BEING SO RESTLESS THAT IT IS HARD TO SIT STILL: NOT AT ALL
IF YOU CHECKED OFF ANY PROBLEMS ON THIS QUESTIONNAIRE, HOW DIFFICULT HAVE THESE PROBLEMS MADE IT FOR YOU TO DO YOUR WORK, TAKE CARE OF THINGS AT HOME, OR GET ALONG WITH OTHER PEOPLE: SOMEWHAT DIFFICULT
2. NOT BEING ABLE TO STOP OR CONTROL WORRYING: NOT AT ALL
GAD7 TOTAL SCORE: 0
6. BECOMING EASILY ANNOYED OR IRRITABLE: NOT AT ALL
GAD7 TOTAL SCORE: 0
7. FEELING AFRAID AS IF SOMETHING AWFUL MIGHT HAPPEN: NOT AT ALL

## 2023-11-03 ASSESSMENT — PATIENT HEALTH QUESTIONNAIRE - PHQ9
10. IF YOU CHECKED OFF ANY PROBLEMS, HOW DIFFICULT HAVE THESE PROBLEMS MADE IT FOR YOU TO DO YOUR WORK, TAKE CARE OF THINGS AT HOME, OR GET ALONG WITH OTHER PEOPLE: NOT DIFFICULT AT ALL
SUM OF ALL RESPONSES TO PHQ QUESTIONS 1-9: 5
SUM OF ALL RESPONSES TO PHQ QUESTIONS 1-9: 5

## 2023-11-03 ASSESSMENT — PAIN SCALES - GENERAL: PAINLEVEL: MODERATE PAIN (5)

## 2023-11-03 NOTE — NURSING NOTE
"Chief Complaint   Patient presents with    Dizziness    Fall     Patient reports that he has been falling 5-6 times a week and has felt light headed before falling. Patient reports that it will come out of no where.    Initial BP (!) 88/57 (BP Location: Right arm, Patient Position: Sitting, Cuff Size: Adult Regular)   Pulse 110   Temp 97.5  F (36.4  C) (Temporal)   Resp 18   Ht 1.727 m (5' 8\")   Wt 89.2 kg (196 lb 9.6 oz)   SpO2 97%   BMI 29.89 kg/m   Estimated body mass index is 29.89 kg/m  as calculated from the following:    Height as of this encounter: 1.727 m (5' 8\").    Weight as of this encounter: 89.2 kg (196 lb 9.6 oz).       FOOD SECURITY SCREENING QUESTIONS:    The next two questions are to help us understand your food security.  If you are feeling you need any assistance in this area, we have resources available to support you today.    Hunger Vital Signs:  Within the past 12 months we worried whether our food would run out before we got money to buy more. Never  Within the past 12 months the food we bought just didn't last and we didn't have money to get more. Never  Romina Nolan LPN on 11/3/2023 at 2:29 PM     Romina Nolan   "

## 2023-11-05 RX ORDER — PROCHLORPERAZINE 25 MG/1
SUPPOSITORY RECTAL
Qty: 1 EACH | Refills: 11 | Status: SHIPPED | OUTPATIENT
Start: 2023-11-05 | End: 2023-11-06

## 2023-11-06 DIAGNOSIS — E11.9 TYPE 2 DIABETES MELLITUS WITHOUT COMPLICATION, UNSPECIFIED WHETHER LONG TERM INSULIN USE (H): ICD-10-CM

## 2023-11-06 LAB
ATRIAL RATE - MUSE: 92 BPM
DIASTOLIC BLOOD PRESSURE - MUSE: NORMAL MMHG
INTERPRETATION ECG - MUSE: NORMAL
P AXIS - MUSE: 45 DEGREES
PR INTERVAL - MUSE: 150 MS
QRS DURATION - MUSE: 124 MS
QT - MUSE: 358 MS
QTC - MUSE: 442 MS
R AXIS - MUSE: -6 DEGREES
SYSTOLIC BLOOD PRESSURE - MUSE: NORMAL MMHG
T AXIS - MUSE: 45 DEGREES
VENTRICULAR RATE- MUSE: 92 BPM

## 2023-11-06 RX ORDER — PROCHLORPERAZINE 25 MG/1
SUPPOSITORY RECTAL
Qty: 1 EACH | Refills: 11 | Status: SHIPPED | OUTPATIENT
Start: 2023-11-06 | End: 2024-01-09

## 2023-11-08 ENCOUNTER — TELEPHONE (OUTPATIENT)
Dept: FAMILY MEDICINE | Facility: OTHER | Age: 58
End: 2023-11-08
Payer: COMMERCIAL

## 2023-11-08 DIAGNOSIS — F10.20 ALCOHOLISM (H): ICD-10-CM

## 2023-11-08 DIAGNOSIS — G47.00 INSOMNIA, UNSPECIFIED TYPE: ICD-10-CM

## 2023-11-08 NOTE — TELEPHONE ENCOUNTER
As requested by [facility], called patient as he no showed his follow up appt today. No answer.  Lenore Buck LPN ....................  11/8/2023   9:30 AM  EXT [id number]

## 2023-11-14 RX ORDER — TRAZODONE HYDROCHLORIDE 150 MG/1
150 TABLET ORAL AT BEDTIME
Qty: 90 TABLET | Refills: 11 | Status: SHIPPED | OUTPATIENT
Start: 2023-11-14

## 2023-11-14 RX ORDER — NALTREXONE HYDROCHLORIDE 50 MG/1
100 TABLET, FILM COATED ORAL DAILY
Qty: 180 TABLET | Refills: 11 | Status: SHIPPED | OUTPATIENT
Start: 2023-11-14

## 2023-11-14 NOTE — TELEPHONE ENCOUNTER
TW sent Rx request for the following:      Requested Prescriptions   Pending Prescriptions Disp Refills    naltrexone (DEPADE/REVIA) 50 MG tablet [Pharmacy Med Name: NALTREXONE 50MG TABLET] 180 tablet 11     Sig: TAKE 2 TABLETS (100 MG) BY MOUTH DAILY DOSE INCREASE  MG AT BEDTIME.       There is no refill protocol information for this order     Last Prescription Date:   10/25/22  Last Fill Qty/Refills:         180, R-11        traZODone (DESYREL) 150 MG tablet [Pharmacy Med Name: TRAZODONE 150MG TABLET] 90 tablet 11     Sig: TAKE 1 TABLET BY MOUTH AT BEDTIME     Passed RN Refill protocol    Last Prescription Date:   10/25/22  Last Fill Qty/Refills:         90, R-11      Last Office Visit:              11/3/23   Future Office visit:           11/24/23    Radha Jimenez RN on 11/14/2023 at 9:51 AM

## 2023-12-20 ENCOUNTER — TELEPHONE (OUTPATIENT)
Dept: FAMILY MEDICINE | Facility: OTHER | Age: 58
End: 2023-12-20
Payer: COMMERCIAL

## 2023-12-20 NOTE — TELEPHONE ENCOUNTER
Patient called asking about paperwork for disability. He says be had brought it in (does not remember when) but had never heard back. Do you remember seeing anything like this?

## 2023-12-20 NOTE — TELEPHONE ENCOUNTER
I told the patient that Dr Gonzalez has the paperwork but has not had time to fill it out yet. The patient states he has an appointment on January 9 th and he will talk to Dr Gonzalez about it then.  Radha Junior LPN..................12/20/2023   4:36 PM

## 2024-01-09 ENCOUNTER — LAB (OUTPATIENT)
Dept: FAMILY MEDICINE | Facility: OTHER | Age: 59
End: 2024-01-09

## 2024-01-09 ENCOUNTER — OFFICE VISIT (OUTPATIENT)
Dept: FAMILY MEDICINE | Facility: OTHER | Age: 59
End: 2024-01-09
Attending: FAMILY MEDICINE
Payer: COMMERCIAL

## 2024-01-09 VITALS
SYSTOLIC BLOOD PRESSURE: 138 MMHG | TEMPERATURE: 97 F | HEART RATE: 102 BPM | RESPIRATION RATE: 20 BRPM | DIASTOLIC BLOOD PRESSURE: 88 MMHG | BODY MASS INDEX: 32.96 KG/M2 | WEIGHT: 210 LBS | OXYGEN SATURATION: 96 % | HEIGHT: 67 IN

## 2024-01-09 DIAGNOSIS — F10.20 ALCOHOLISM (H): ICD-10-CM

## 2024-01-09 DIAGNOSIS — E11.9 TYPE 2 DIABETES MELLITUS WITHOUT COMPLICATION, WITHOUT LONG-TERM CURRENT USE OF INSULIN (H): Primary | ICD-10-CM

## 2024-01-09 DIAGNOSIS — E11.9 TYPE 2 DIABETES MELLITUS WITHOUT COMPLICATION, UNSPECIFIED WHETHER LONG TERM INSULIN USE (H): ICD-10-CM

## 2024-01-09 DIAGNOSIS — K21.00 GASTROESOPHAGEAL REFLUX DISEASE WITH ESOPHAGITIS WITHOUT HEMORRHAGE: ICD-10-CM

## 2024-01-09 DIAGNOSIS — Z12.11 SPECIAL SCREENING FOR MALIGNANT NEOPLASMS, COLON: ICD-10-CM

## 2024-01-09 DIAGNOSIS — N18.31 STAGE 3A CHRONIC KIDNEY DISEASE (H): ICD-10-CM

## 2024-01-09 DIAGNOSIS — K27.9 PUD (PEPTIC ULCER DISEASE): ICD-10-CM

## 2024-01-09 DIAGNOSIS — I10 PRIMARY HYPERTENSION: ICD-10-CM

## 2024-01-09 DIAGNOSIS — G62.9 PERIPHERAL POLYNEUROPATHY: ICD-10-CM

## 2024-01-09 PROCEDURE — G0463 HOSPITAL OUTPT CLINIC VISIT: HCPCS

## 2024-01-09 PROCEDURE — 99214 OFFICE O/P EST MOD 30 MIN: CPT | Performed by: FAMILY MEDICINE

## 2024-01-09 RX ORDER — LANCETS
EACH MISCELLANEOUS
Qty: 100 EACH | Refills: 6 | Status: SHIPPED | OUTPATIENT
Start: 2024-01-09

## 2024-01-09 RX ORDER — OMEPRAZOLE 40 MG/1
40 CAPSULE, DELAYED RELEASE ORAL DAILY
Qty: 90 CAPSULE | Refills: 4 | Status: SHIPPED | OUTPATIENT
Start: 2024-01-09

## 2024-01-09 RX ORDER — PROCHLORPERAZINE 25 MG/1
SUPPOSITORY RECTAL
Qty: 3 EACH | Refills: 3 | Status: SHIPPED | OUTPATIENT
Start: 2024-01-09

## 2024-01-09 RX ORDER — PROCHLORPERAZINE 25 MG/1
SUPPOSITORY RECTAL
Qty: 1 EACH | Refills: 11 | Status: SHIPPED | OUTPATIENT
Start: 2024-01-09

## 2024-01-09 RX ORDER — DULAGLUTIDE 0.75 MG/.5ML
0.75 INJECTION, SOLUTION SUBCUTANEOUS
Qty: 2 ML | Refills: 11 | Status: SHIPPED | OUTPATIENT
Start: 2024-01-09 | End: 2024-01-25

## 2024-01-09 ASSESSMENT — PATIENT HEALTH QUESTIONNAIRE - PHQ9
10. IF YOU CHECKED OFF ANY PROBLEMS, HOW DIFFICULT HAVE THESE PROBLEMS MADE IT FOR YOU TO DO YOUR WORK, TAKE CARE OF THINGS AT HOME, OR GET ALONG WITH OTHER PEOPLE: NOT DIFFICULT AT ALL
SUM OF ALL RESPONSES TO PHQ QUESTIONS 1-9: 1
SUM OF ALL RESPONSES TO PHQ QUESTIONS 1-9: 1

## 2024-01-09 ASSESSMENT — ASTHMA QUESTIONNAIRES
QUESTION_2 LAST FOUR WEEKS HOW OFTEN HAVE YOU HAD SHORTNESS OF BREATH: NOT AT ALL
QUESTION_3 LAST FOUR WEEKS HOW OFTEN DID YOUR ASTHMA SYMPTOMS (WHEEZING, COUGHING, SHORTNESS OF BREATH, CHEST TIGHTNESS OR PAIN) WAKE YOU UP AT NIGHT OR EARLIER THAN USUAL IN THE MORNING: NOT AT ALL
ACT_TOTALSCORE: 21
QUESTION_5 LAST FOUR WEEKS HOW WOULD YOU RATE YOUR ASTHMA CONTROL: NOT CONTROLLED AT ALL
QUESTION_4 LAST FOUR WEEKS HOW OFTEN HAVE YOU USED YOUR RESCUE INHALER OR NEBULIZER MEDICATION (SUCH AS ALBUTEROL): NOT AT ALL
ACT_TOTALSCORE: 21
QUESTION_1 LAST FOUR WEEKS HOW MUCH OF THE TIME DID YOUR ASTHMA KEEP YOU FROM GETTING AS MUCH DONE AT WORK, SCHOOL OR AT HOME: NONE OF THE TIME

## 2024-01-09 ASSESSMENT — PAIN SCALES - GENERAL: PAINLEVEL: MODERATE PAIN (5)

## 2024-01-09 NOTE — PATIENT INSTRUCTIONS
I sent a prescription for a meter, strips and lancets so you can check blood sugars. I also sent for supplies for your Dexcom G6 for when you meet with the diabetes educator to learn how to use it again.    We will start Trulicity shots once a week at 0.75 mg for the first month, then increase to 1.5 mg weekly thereafter.     They will call you for an appointment with the diabetes educator.

## 2024-01-09 NOTE — NURSING NOTE
"Chief Complaint   Patient presents with    Diabetes     Follow up   He has not been checking his blood sugar because he does not know how to use the dexom and he does not have any other diabetic supplies to check it.  Radha Junior LPN..................1/9/2024   11:35 AM      Initial /88   Pulse 102   Temp 97  F (36.1  C) (Temporal)   Resp 20   Ht 1.702 m (5' 7\")   Wt 95.3 kg (210 lb)   SpO2 96%   BMI 32.89 kg/m   Estimated body mass index is 32.89 kg/m  as calculated from the following:    Height as of this encounter: 1.702 m (5' 7\").    Weight as of this encounter: 95.3 kg (210 lb).  Medication Review: complete    The next two questions are to help us understand your food security.  If you are feeling you need any assistance in this area, we have resources available to support you today.          1/9/2024   SDOH- Food Insecurity   Within the past 12 months, did you worry that your food would run out before you got money to buy more? N   Within the past 12 months, did the food you bought just not last and you didn t have money to get more? N         Health Care Directive:  Patient does not have a Health Care Directive or Living Will: Discussed advance care planning with patient; however, patient declined at this time.    Radha Junior LPN      "

## 2024-01-09 NOTE — PROGRESS NOTES
"    Sabiha Hi is a 58 year old, presenting for the following health issues:  Diabetes (Follow up)      1/9/2024    11:23 AM   Additional Questions   Roomed by Radha Junior LPN       History of Present Illness       Reason for visit:  Just a follew up    He eats 2-3 servings of fruits and vegetables daily.He consumes 0 sweetened beverage(s) daily.He exercises with enough effort to increase his heart rate 9 or less minutes per day.  He exercises with enough effort to increase his heart rate 3 or less days per week.   He is taking medications regularly.         Diabetes Follow-up    How often are you checking your blood sugar? Not at all  What concerns do you have today about your diabetes? None   Do you have any of these symptoms? (Select all that apply)  Numbness in feet, Burning in feet, Excessive thirst, and Weight gain  Have you had a diabetic eye exam in the last 12 months? No        BP Readings from Last 2 Encounters:   01/09/24 96/72   11/03/23 90/60     Hemoglobin A1C (%)   Date Value   11/03/2023 11.6 (H)   10/25/2022 11.4 (H)   03/18/2021 6.4 (H)   09/10/2020 5.7     LDL Cholesterol Calculated (mg/dL)   Date Value   11/03/2023 63   10/25/2022 58   09/10/2020 63   04/17/2019 85               Review of Systems         Objective    BP 96/72   Pulse 102   Temp 97  F (36.1  C) (Temporal)   Resp 20   Ht 1.702 m (5' 7\")   Wt 95.3 kg (210 lb)   SpO2 96%   BMI 32.89 kg/m    Body mass index is 32.89 kg/m .  Physical Exam                         "

## 2024-01-09 NOTE — PROGRESS NOTES
"Nursing Notes:   Radha Junior LPN  1/9/2024 11:35 AM  Sign at exiting of workspace  Chief Complaint   Patient presents with    Diabetes     Follow up   He has not been checking his blood sugar because he does not know how to use the dexom and he does not have any other diabetic supplies to check it.  Radha Junior LPN..................1/9/2024   11:35 AM      Initial /88   Pulse 102   Temp 97  F (36.1  C) (Temporal)   Resp 20   Ht 1.702 m (5' 7\")   Wt 95.3 kg (210 lb)   SpO2 96%   BMI 32.89 kg/m   Estimated body mass index is 32.89 kg/m  as calculated from the following:    Height as of this encounter: 1.702 m (5' 7\").    Weight as of this encounter: 95.3 kg (210 lb).  Medication Review: complete    The next two questions are to help us understand your food security.  If you are feeling you need any assistance in this area, we have resources available to support you today.          1/9/2024   SDOH- Food Insecurity   Within the past 12 months, did you worry that your food would run out before you got money to buy more? N   Within the past 12 months, did the food you bought just not last and you didn t have money to get more? N         Health Care Directive:  Patient does not have a Health Care Directive or Living Will: Discussed advance care planning with patient; however, patient declined at this time.    Radha Junior LPN      SUBJECTIVE:  Kolton Aragon  is a 58 year old male who comes in today for follow-up.  I last saw him for a physical a little over a year ago.  He is recovering alcoholic and had been on naltrexone to help with cravings.    He has type 2 diabetes.  He has been on glipizide 5 mg daily and had been using a Dexcom G6 for blood sugar monitoring.  He had been on Trulicity but stopped that on his own.  His last hemoglobin A1c a year ago was 11.4% and when repeated 2 months ago was 11.6%.  In 2021 his control was good at 6.4%.  He has a chronic anemia of around 11.2.  His " iron was slightly low.  Binding capacity and saturation was normal.    He has peripheral neuropathy likely related to diabetes and alcohol as B12 and folate were normal.  We started him on gabapentin 300 mg 3 times daily.  We did order an EMG but that was never done.    He was on on lisinopril 20 mg daily for blood pressure and renal protection. He saw WELLINGTON Lockhart on 11/3/2023 and was having hypotension.  She adjusted his lisinopril to 10 mg daily and asked him to follow-up in 2 weeks but he did not.  She did do his annual labs at that time.  Did not check a PSA but it was 0.25 the year before.    He has been on 20 mg of Prozac daily and 150 mg of trazodone at bedtime.    He has not had colon cancer screening.  He has been referred for colonoscopy but never scheduled it.    He is vaccinated and boosted for COVID-19 but has not had the new booster this fall.  He has not had a flu shot.  He is up-to-date on Boostrix but has not had Shingrix.  He has not had Pneumovax.  He has had 1 of 3 hepatitis B vaccines.    He is still drinking a couple of times per week but has been limiting it.  He is still taking Naltrexone.     Past Medical, Family, and Social History reviewed and updated as noted below.   ROS is negative except as noted above       Allergies   Allergen Reactions    Latex Hives    Metformin Diarrhea    Dust Mite Extract Other (See Comments)     Sneezing, itchy eyes   ,   Family History   Problem Relation Age of Onset    Family History Negative Mother         Good Health    Other - See Comments Father         Deserted as a child, patient knows father had a history of heart attack at 50    Heart Disease Father         Heart Disease,patient knows father had a history of heart attack at 50    Cancer Other         Cancer,History of cancer    Diabetes Brother     Diabetes Brother    ,   Current Outpatient Medications   Medication    acetaminophen (TYLENOL) 650 MG CR tablet    blood glucose (NO BRAND  SPECIFIED) test strip    blood glucose monitoring (NO BRAND SPECIFIED) meter device kit    Continuous Blood Gluc Sensor (DEXCOM G6 SENSOR) MISC    Continuous Blood Gluc Transmit (DEXCOM G6 TRANSMITTER) MISC    diclofenac (VOLTAREN) 1 % topical gel    diphenhydrAMINE HCl, Sleep, (UNISOM SLEEPGELS) 50 MG CAPS    dulaglutide (TRULICITY) 0.75 MG/0.5ML pen    dulaglutide (TRULICITY) 1.5 MG/0.5ML pen    FLUoxetine (PROZAC) 20 MG capsule    gabapentin (NEURONTIN) 300 MG capsule    glipiZIDE (GLUCOTROL XL) 5 MG 24 hr tablet    glucose (BD GLUCOSE) 4 g chewable tablet    lisinopril (ZESTRIL) 20 MG tablet    magnesium oxide (MAG-OX) 400 MG tablet    Melatonin 10-10 MG TBCR    naltrexone (DEPADE/REVIA) 50 MG tablet    naproxen sodium (ANAPROX) 220 MG tablet    omeprazole (PRILOSEC) 40 MG DR capsule    thin (NO BRAND SPECIFIED) lancets    traZODone (DESYREL) 150 MG tablet    Alcohol Swabs (B-D SINGLE USE SWABS REGULAR) PADS    blood glucose (CONTOUR NEXT TEST) test strip    blood glucose monitoring (Happy Days [employer] A New MusicalET) lancets    Continuous Blood Gluc  (DEXCOM G6 ) CHRISTINA     No current facility-administered medications for this visit.   ,   Past Medical History:   Diagnosis Date    Alcohol-induced acute pancreatitis without infection or necrosis     06/20/2016    Altered level of consciousness 10/11/2017    Essential (primary) hypertension     9/12/2013    Gastro-esophageal reflux disease without esophagitis     5/18/2015    Spondylolisthesis of lumbosacral region     7/9/2013,Patient has been referred for PT and has not followed through.  He was sent for MRI scan,and he was not able to do either the closed or open MRI due to claustrophobia.    Spondylosis of lumbar region without myelopathy or radiculopathy     4/22/2013    Tobacco use     5/17/2013    Uncomplicated asthma     No Comments Provided   ,   Patient Active Problem List    Diagnosis Date Noted    Biliary calculus of other site without obstruction  05/21/2021     Priority: Medium     Formatting of this note might be different from the original.  Added automatically from request for surgery [id number]      Chronic kidney disease, stage 3 (H) 03/30/2021     Priority: Medium    Pseudocyst of pancreas 03/24/2021     Priority: Medium    Abdominal pain, acute, right upper quadrant 03/20/2021     Priority: Medium    Abdominal ultrasound, abnormal 03/20/2021     Priority: Medium    Tobacco abuse, in remission 03/20/2021     Priority: Medium    GIB (gastrointestinal bleeding) 03/18/2021     Priority: Medium    RUQ abdominal pain 03/18/2021     Priority: Medium    Liver mass, left lobe 03/18/2021     Priority: Medium    Type 2 diabetes mellitus without complication, without long-term current use of insulin (H) 04/17/2019     Priority: Medium    Reactive airway disease 03/01/2018     Priority: Medium    Alcoholism (H) 10/12/2017     Priority: Medium    Acute alcoholic pancreatitis 06/20/2016     Priority: Medium    Chronic kidney disease, stage II (mild) 03/09/2016     Priority: Medium    Gastroesophageal reflux disease without esophagitis 05/18/2015     Priority: Medium    Dyslipidemia 12/29/2014     Priority: Medium    Hypertension 09/12/2013     Priority: Medium    Spondylolisthesis at L5-S1 level 07/09/2013     Priority: Medium     Overview:   Patient has been referred for PT and has not followed through.  He was sent for MRI scan,and he was not able to do either the closed or open MRI due to claustrophobia.      Insomnia 06/20/2013     Priority: Medium    Chewing tobacco use 05/17/2013     Priority: Medium    DJD (degenerative joint disease), lumbar 04/22/2013     Priority: Medium   ,   Past Surgical History:   Procedure Laterality Date    ESOPHAGOSCOPY, GASTROSCOPY, DUODENOSCOPY (EGD), COMBINED N/A 06/16/2020    Procedure: ESOPHAGOGASTRODUODENOSCOPY, WITH BIOPSY;  Surgeon: Giovanni Reed MD;  Location: [facility] OR    pancreatic duct surgery  2021    stent removed  "from the pancreatic duct    VASECTOMY      No Comments Provided    and   Social History     Tobacco Use    Smoking status: Never     Passive exposure: Past    Smokeless tobacco: Former     Types: Chew     Quit date: 4/14/2020    Tobacco comments:     Quit smoking: trying to quit chew using 1 pack week   Substance Use Topics    Alcohol use: Yes     Comment: occ.     OBJECTIVE:  /88   Pulse 102   Temp 97  F (36.1  C) (Temporal)   Resp 20   Ht 1.702 m (5' 7\")   Wt 95.3 kg (210 lb)   SpO2 96%   BMI 32.89 kg/m     EXAM:  Alert and cooperative, no distress.  Very talkative today.  Reviewed previous labs showing poor control of his diabetes.  Lungs are clear, no rales rhonchi or wheezes are heard.  Cardiac RRR without murmur.  No pedal edema.  ASSESSMENT/Plan :    Kolton was seen today for diabetes.    Diagnoses and all orders for this visit:    Type 2 diabetes mellitus without complication, without long-term current use of insulin (H)  -     blood glucose monitoring (NO BRAND SPECIFIED) meter device kit; Use to test blood sugar 3 times daily or as directed. Preferred blood glucose meter OR supplies to accompany: Blood Glucose Monitor Brands: per insurance.  -     blood glucose (NO BRAND SPECIFIED) test strip; Use to test blood sugar 3 times daily or as directed. To accompany: Blood Glucose Monitor Brands: per insurance.  -     thin (NO BRAND SPECIFIED) lancets; Use with lanceting device. To accompany: Blood Glucose Monitor Brands: per insurance.  -     Adult Diabetes Education  Referral; Future  -     dulaglutide (TRULICITY) 0.75 MG/0.5ML pen; Inject 0.75 mg Subcutaneous every 7 days  -     dulaglutide (TRULICITY) 1.5 MG/0.5ML pen; Inject 1.5 mg Subcutaneous every 7 days    Primary hypertension    Stage 3a chronic kidney disease (H)    Alcoholism (H)    Peripheral polyneuropathy    Gastroesophageal reflux disease with esophagitis without hemorrhage  -     omeprazole (PRILOSEC) 40 MG DR capsule; Take " 1 capsule (40 mg) by mouth daily    Type 2 diabetes mellitus without complication, unspecified whether long term insulin use (H)  -     Continuous Blood Gluc Sensor (DEXCOM G6 SENSOR) MISC; Change every 10 days per 's instructions.  -     Continuous Blood Gluc Transmit (DEXCOM G6 TRANSMITTER) MISC; Change every 3 months per 's instructions.    PUD (peptic ulcer disease)  -     omeprazole (PRILOSEC) 40 MG DR capsule; Take 1 capsule (40 mg) by mouth daily    Special screening for malignant neoplasms, colon  -     COLOGUARD([employer]); Future      Sent a prescription for diabetic testing supplies along with what he needs for his Dexcom G6.  He should make sure he charges it and we sent a referral to again be reeducated on this with the diabetic educator.  Will resume Trulicity at 0.75 mg weekly for the first month increasing 1.5 mg weekly thereafter.  Continue with his glipizide.  He did not tolerate metformin.    Renewed his omeprazole.    Sent order for Cologuard test.  Recommend that he follow-up with me in 2 months, sooner if needed.    Encouraged ongoing sobriety with abstinence.  Form completed for the county.    A total of 38 minutes was spent with the patient, reviewing records, tests, ordering medications, tests or procedures and documenting clinical information in the EHR.     Jono Gonzalez MD        Answers submitted by the patient for this visit:  Patient Health Questionnaire (Submitted on 1/9/2024)  If you checked off any problems, how difficult have these problems made it for you to do your work, take care of things at home, or get along with other people?: Not difficult at all  PHQ9 TOTAL SCORE: 1  General Questionnaire (Submitted on 1/9/2024)  Chief Complaint: Chronic problems general questions HPI Form  What is the reason for your visit today? : just a follew up  How many servings of fruits and vegetables do you eat daily?: 2-3  On average, how many sweetened beverages  do you drink each day (Examples: soda, juice, sweet tea, etc.  Do NOT count diet or artificially sweetened beverages)?: 0  How many minutes a day do you exercise enough to make your heart beat faster?: 9 or less  How many days a week do you exercise enough to make your heart beat faster?: 3 or less  How many days per week do you miss taking your medication?: 0

## 2024-01-24 ENCOUNTER — TELEPHONE (OUTPATIENT)
Dept: FAMILY MEDICINE | Facility: OTHER | Age: 59
End: 2024-01-24
Payer: COMMERCIAL

## 2024-01-24 NOTE — TELEPHONE ENCOUNTER
What are we supposed to try first?  He is poorly controlled on glipizide, didn't tolerate metformin.  Next step would be a GLP-1. One of them should be covered I would assume if Trulicity is not.  Jono Gonzalez MD on 1/24/2024 at 1:13 PM

## 2024-01-25 DIAGNOSIS — E11.9 TYPE 2 DIABETES MELLITUS WITHOUT COMPLICATION, WITHOUT LONG-TERM CURRENT USE OF INSULIN (H): Primary | ICD-10-CM

## 2024-01-25 RX ORDER — LIRAGLUTIDE 6 MG/ML
INJECTION SUBCUTANEOUS
Qty: 9 ML | Refills: 11 | Status: SHIPPED | OUTPATIENT
Start: 2024-01-25

## 2024-01-26 NOTE — PROGRESS NOTES
Trulicity is not covered by his insurance.  Prescription for Victoza 0.6 mg daily x 1 month increasing to 1.2 mg thereafter.  Jono Gonzalez MD on 1/25/2024 at 6:29 PM

## 2024-02-07 ENCOUNTER — ALLIED HEALTH/NURSE VISIT (OUTPATIENT)
Dept: EDUCATION SERVICES | Facility: OTHER | Age: 59
End: 2024-02-07
Attending: FAMILY MEDICINE
Payer: COMMERCIAL

## 2024-02-07 DIAGNOSIS — E11.9 TYPE 2 DIABETES MELLITUS WITHOUT COMPLICATION, WITHOUT LONG-TERM CURRENT USE OF INSULIN (H): ICD-10-CM

## 2024-02-07 PROCEDURE — G0108 DIAB MANAGE TRN  PER INDIV: HCPCS | Performed by: REGISTERED NURSE

## 2024-02-07 NOTE — PATIENT INSTRUCTIONS
Diabetes Goals and Reminders    Your A1c test should be done every 3 months.  Your goal is less than 7%.   Your last result is:  Lab Results   Component Value Date    A1C 11.6 11/03/2023    A1C 6.4 03/18/2021       Your LDL cholesterol test should be done at least once a year.  Take a statin, if prescribed by your doctor, based on age and risk factors.  Your last result is:  LDL Cholesterol Calculated   Date Value Ref Range Status   11/03/2023 63 <=100 mg/dL Final   09/10/2020 63 <100 mg/dL Final     Comment:     Desirable:       <100 mg/dl       Have blood pressure and weight checked every three months.  Your blood pressure goal is 130/80 or less.  Your last blood pressure reading was:   BP Readings from Last 1 Encounters:   01/09/24 138/88       Use your CGM to check sensor glucose readings a minimum of 4 times per day.  Your home glucose target ranges are:  Before meals:    2 hours after a meal:  Less than 180  Bedtime:  100-140 mg/dL      Avoid all tobacco.  Follow your healthy diet and exercise plan.  See the eye doctor every year.  See the dentist every six months.  Have kidney function tested yearly.    Take all medicine as prescribed.  Please let me know if you are having symptoms you don t expect or if you wish to stop any medicine.    Follow Up Plan  Please call or visit Diabetes Education if blood sugars are consistently out of target.  Your future lab plan is:  HgA1c at anytime.    If you need your cholesterol checked at your next appointment, you should fast 8 to 10 hours before your appointment.  Do not eat or drink anything besides water.  Drink plenty of water and take all your usual medicine.    SUMMARY FOR TODAY'S VISIT    1.  Discussed use of Dexcom G6 CGM for testing blood sugar daily.  Begin using today.      2.  Discussed carbohydrate counting using the Plate Method for portion control.  Reviewed balanced diet, food groups and incorporating variety, including fruits/vegetables/whole  grains/lean protein/nuts and seeds into meals.  Recommend 30 grams or 2 servings of carb at each meal.    A key strategy in this plan is, along with medication need, to participate in low to moderate physical activity, such as walking, working up to a minimum of 30 minutes most days, as tolerated.         3.  Discussed D5 Health Goals.  You have met 4 of 5 goals at this time.  (BP less than 140/90, Statin therapy as recommended, A1c less than 8%, tobacco free, Aspirin therapy as recommended).      Achieving the five treatment goals that make up the D5 not only reduces your risk for serious cardiovascular problems like heart attack and stroke, it puts you on a path to better health!  See www.theD5.org for more information.      4.  Treatment options:  Continue current Glipizide at this time and begin using your CGM.      5.  Please follow-up in two weeks for sensor glucose assessment and medication adjustment.         Karlene Fletcher RN, BSN, [facility]  2/7/2024 12:06 PM

## 2024-02-08 NOTE — PROGRESS NOTES
Diabetes Self-Management Education & Support    Presents for:      Type of Service: In Person Visit      ASSESSMENT:  Patient visits for annual diabetes self-management education.  Discussed pathophysiology with interpretation and meaning of HgA1c.  Stressed importance of testing BG daily to help keep tight control of DM2, helping to minimize risk for possible complications of uncontrolled diabetes.  Discussed benefits of keeping A1c under 7% and encouraged patient to continue testing BG daily.     BG report:  Patient does not SMBG.  He shares he forgot to bring CGM system in, but would like instructions on how to get it restarted.  Reviewed steps and take home information with diagrams given to help patient restart his Dexcom G6 CGM system.    Discussed carbohydrate counting using the Plate Method for portion control.  Reviewed balanced diet, food groups and incorporating variety, including fruits/vegetables/whole grains/lean protein/nuts and seeds into meals.  Patient states he has decreased carbohydrate intake and symptoms of hyperglycemia have decreased.      Begin carbohydrate counting, lower carb plan:  30 grams with breakfast, 30 grams with lunch and 30 grams with supper.       A key strategy in this plan is, along with medication need, to participate in low to moderate physical activity, such as walking, working up to a minimum of 30 minutes most days, as tolerated.      Discussed D5 Health Goals and patient has met 4 of 5 goals at this time.  (BP less than 140/90, ASA therapy as recommended, statin therapy as recommended, A1c less than 8%, tobacco free).      PLAN  Treatment recommendations:    Due to lack of glucose data and HbA1c lab outdated, recommend continuing current Glipizide treatment at this time.  Recommend restarting CGM and follow up if glucose not to target range. (BG target:  FBG/Pre-meal 70 - 130 and 2-hr PP less than 180)     Self-Directed Behavior Goal/s set by patient:  1)   I will  "increase physical activity and begin again to count carbohydrate to help improve diabetes care.      Patient's most recent   Lab Results   Component Value Date    A1C 11.6 11/03/2023    A1C 6.4 03/18/2021     is not meeting goal of <7.0    Diabetes knowledge and skills assessment:   Patient is knowledgeable in diabetes management concepts related to: Taking Medication    Continue education with the following diabetes management concepts: Healthy Eating, Being Active, Taking Medication, Problem Solving, Reducing Risks, and Healthy Coping    Based on learning assessment above, most appropriate setting for further diabetes education would be: Individual setting.        Follow-up: TBD per patient schedule.     See Care Plan for co-developed, patient-state behavior change goals.  AVS provided for patient today.    Education Materials Provided:  Planning Healthy Meals, Activity Pyramid, DSMS  sheet, Diabetes Success Plan and D5 Health Goals.        SUBJECTIVE/OBJECTIVE:  Diabetes type: Type 2  Cultural Influences/Ethnic Background:  Not  or     Diabetes Symptoms & Complications:  Diabetes Related Symptoms: Polydipsia (increased thirst)       Patient Problem List and Family Medical History reviewed for relevant medical history, current medical status, and diabetes risk factors.    Vitals:  There were no vitals taken for this visit.  Estimated body mass index is 32.89 kg/m  as calculated from the following:    Height as of 1/9/24: 1.702 m (5' 7\").    Weight as of 1/9/24: 95.3 kg (210 lb).   Last 3 BP:   BP Readings from Last 3 Encounters:   01/09/24 138/88   11/03/23 90/60   10/25/22 122/82       History   Smoking Status    Never   Smokeless Tobacco    Former    Types: Chew    Quit date: 4/14/2020       Labs:  Lab Results   Component Value Date    A1C 11.6 11/03/2023    A1C 6.4 03/18/2021     Lab Results   Component Value Date     11/03/2023     10/25/2022     06/07/2021     Lab Results " "  Component Value Date    LDL 63 11/03/2023    LDL 63 09/10/2020     HDL Cholesterol   Date Value Ref Range Status   09/10/2020 19 (L) 23 - 92 mg/dL Final     Direct Measure HDL   Date Value Ref Range Status   11/03/2023 39 (L) >=40 mg/dL Final   ]  GFR Estimate   Date Value Ref Range Status   11/03/2023 11 (L) >60 mL/min/1.73m2 Final   06/07/2021 63 >60 mL/min/[1.73_m2] Final     GFR Estimate If Black   Date Value Ref Range Status   06/07/2021 77 >60 mL/min/[1.73_m2] Final     Lab Results   Component Value Date    CR 5.55 11/03/2023    CR 1.20 06/07/2021     No results found for: \"MICROALBUMIN\"    Healthy Eating:  Meal planning/habits: Avoiding sweets, Low carb, Low salt, Smaller portions    Being Active:       Monitoring:  Times checking blood sugar at home (number): Never    Taking Medications:  Diabetes Medication(s)       Diabetic Other       glucose (BD GLUCOSE) 4 g chewable tablet Take 1 tablet by mouth every hour as needed for low blood sugar       Sulfonylureas       glipiZIDE (GLUCOTROL XL) 5 MG 24 hr tablet TAKE 1 TABLET (5 MG) BY MOUTH DAILY       Incretin Mimetic Agents       liraglutide (VICTOZA) 18 MG/3ML solution 0.6 mg daily x 1 month increasing to 1.2 mg thereafter.     Patient not taking: Reported on 2/7/2024            Current Treatments: Oral Medication (taken by mouth)    Problem Solving:       Hypoglycemia Symptoms  Hypoglycemia: None      Healthy Coping:     Patient Activation Measure Survey Score:       No data to display                  Care Plan and Education Provided:  Care Plan: Diabetes   Updates made by Karlene Fletcher RN since 2/8/2024 12:00 AM        Problem: HbA1C Not In Goal         Goal: Get HbA1C Level in Goal         Task: Discuss diabetes treatment plan with patient Completed 2/8/2024   Responsible User: Karlene Fletcher RN        Problem: Diabetes Self-Management Education Needed to Optimize Self-Care Behaviors         Goal: Understand diabetes pathophysiology and disease " progression         Task: Provide education on diabetes pathophysiology and disease progression specfic to patient's diabetes type Completed 2/8/2024   Responsible User: Karlene Fletcher RN        Goal: Healthy Eating - follow a healthy eating pattern for diabetes         Task: Provide education on managing carbohydrate intake (carbohydrate counting, plate planning method, etc.) Completed 2/8/2024   Responsible User: Karlene Fletcher RN        Goal: Being Active - get regular physical activity, working up to at least 150 minutes per week         Task: Provide education on relationship of activity to glucose and precautions to take if at risk for low glucose Completed 2/8/2024   Responsible User: Karlene Fletcher RN        Goal: Problem Solving - know how to prevent and manage short-term diabetes complications         Task: Provide education on high blood glucose - causes, signs/symptoms, prevention and treatment Completed 2/8/2024   Responsible User: Karlene Fletcher RN        Goal: Reducing Risks - know how to prevent and treat long-term diabetes complications         Task: Provide education on major complications of diabetes, prevention, early diagnostic measures and treatment of complications Completed 2/8/2024   Responsible User: Karlene Fletcher RN        Task: Provide education on Hemoglobin A1c - goals and relationship to blood glucose levels Completed 2/8/2024   Responsible User: Karlene Fletcher RN        Task: Provide education on recommendations for heart health - lipid levels and goals, blood pressure and goals, and aspirin therapy, if indicated Completed 2/8/2024   Responsible User: Karlene Fletcher RN        Goal: Healthy Coping - use available resources to cope with the challenges of managing diabetes         Task: Provide education on the benefits of making appropriate lifestyle changes Completed 2/8/2024   Responsible User: Karlene Fletcher RN Suzette Childs, RN, BSN, [facility]  2/7/2024 12:27  PM     Time Spent: 60 minutes  Encounter Type: Individual    Any diabetes medication dose changes were made via the CDE Protocol per the patient's primary care provider. A copy of this encounter was shared with the provider.

## 2024-07-06 DIAGNOSIS — I10 ESSENTIAL HYPERTENSION: ICD-10-CM

## 2024-07-06 DIAGNOSIS — F32.1 MAJOR DEPRESSIVE DISORDER, SINGLE EPISODE, MODERATE (H): ICD-10-CM

## 2024-07-06 DIAGNOSIS — E11.9 DM TYPE 2, NOT AT GOAL (H): ICD-10-CM

## 2024-07-06 DIAGNOSIS — G62.9 PERIPHERAL POLYNEUROPATHY: ICD-10-CM

## 2024-07-10 RX ORDER — GLIPIZIDE 5 MG/1
5 TABLET, FILM COATED, EXTENDED RELEASE ORAL DAILY
Qty: 90 TABLET | Refills: 0 | Status: SHIPPED | OUTPATIENT
Start: 2024-07-10

## 2024-07-10 RX ORDER — LISINOPRIL 20 MG/1
20 TABLET ORAL DAILY
Qty: 90 TABLET | Refills: 0 | Status: SHIPPED | OUTPATIENT
Start: 2024-07-10

## 2024-07-10 RX ORDER — GABAPENTIN 300 MG/1
300 CAPSULE ORAL 3 TIMES DAILY
Qty: 90 CAPSULE | Refills: 0 | Status: SHIPPED | OUTPATIENT
Start: 2024-07-10 | End: 2024-08-12

## 2024-07-10 NOTE — TELEPHONE ENCOUNTER
[facility] sent Rx request for the following:      Requested Prescriptions   Pending Prescriptions Disp Refills    FLUoxetine (PROZAC) 20 MG capsule [Pharmacy Med Name: FLUOXETINE 20MG CAPSULE] 90 capsule 11     Sig: TAKE 1 CAPSULE (20 MG) BY MOUTH DAILY       SSRIs Protocol Failed - 7/10/2024 11:07 AM        Failed - PHQ-9 score less than 5 in past 6 months     Please review last PHQ-9 score.      Last Prescription Date:   7/13/23  Last Fill Qty/Refills:         90, R-11          glipiZIDE (GLUCOTROL XL) 5 MG 24 hr tablet [Pharmacy Med Name: GLIPIZIDE 5MG ER TABLET] 90 tablet 11     Sig: TAKE 1 TABLET (5 MG) BY MOUTH DAILY       Sulfonylurea Agents Failed - 7/10/2024 11:07 AM        Failed - Patient has documented A1c within the specified period of time.     If HgbA1C is 8 or greater, it needs to be on file within the past 3 months.  If less than 8, must be on file within the past 6 months.     Recent Labs   Lab Test 11/03/23  1502   A1C 11.6*             Failed - Has GFR on file in past 12 months and most recent value is normal        Failed - Recent (6 mo) or future (90 days) visit within the authorizing provider's specialty     The patient must have completed an in-person or virtual visit within the past 6 months or has a future visit scheduled within the next 90 days with the authorizing provider s specialty.  Urgent care and e-visits do not quality as an office visit for this protocol.          Failed - Patient has a recent creatinine (normal) within the past 12 mos.     Recent Labs   Lab Test 11/03/23  1502   CR 5.55*            Last Prescription Date:   7/13/23  Last Fill Qty/Refills:         90, R-11            gabapentin (NEURONTIN) 300 MG capsule [Pharmacy Med Name: GABAPENTIN 300MG CAPSULE] 90 capsule 11     Sig: TAKE 1 CAPSULE (300 MG) BY MOUTH 3 TIMES DAILY       There is no refill protocol information for this order   Last Prescription Date:   7/13/23  Last Fill Qty/Refills:         90, R-11             lisinopril (ZESTRIL) 20 MG tablet [Pharmacy Med Name: LISINOPRIL 20MG TABLET] 90 tablet 11     Sig: TAKE 1 TABLET (20 MG) BY MOUTH DAILY       ACE Inhibitors (Including Combos) Protocol Failed - 7/10/2024 11:07 AM        Failed - Has GFR on file in past 12 months and most recent value is normal       Last Prescription Date:   7/13/23  Last Fill Qty/Refills:         90, R-11    Last Office Visit:              1/9/24   Future Office visit:                Routing refill request to provider for review/approval because:  Drug not on the [facility] refill protocol   Patient needs to be seen because:  needs annual review.    Will route to unit 4 scheduling to call patient and help assist in scheduling appointment.  Anna Montana RN on 7/10/2024 at 11:13 AM

## 2024-08-09 DIAGNOSIS — G62.9 PERIPHERAL POLYNEUROPATHY: ICD-10-CM

## 2024-08-12 RX ORDER — GABAPENTIN 300 MG/1
300 CAPSULE ORAL 3 TIMES DAILY
Qty: 90 CAPSULE | Refills: 0 | Status: SHIPPED | OUTPATIENT
Start: 2024-08-12

## 2024-08-12 NOTE — TELEPHONE ENCOUNTER
[facility] sent Rx request for the following:      Requested Prescriptions   Pending Prescriptions Disp Refills    gabapentin (NEURONTIN) 300 MG capsule [Pharmacy Med Name: GABAPENTIN 300MG CAPSULE] 90 capsule 0     Sig: TAKE 1 CAPSULE (300 MG) BY MOUTH 3 TIMES DAILY       There is no refill protocol information for this order          Last Prescription Date:   7/10/24  Last Fill Qty/Refills:         90, R-0    Last Office Visit:              1/9/24   Future Office visit:         none on file     Unable to complete prescription refill per RN Medication Refill Policy. No protocol for this medication.   Amira Fish RN on 8/12/2024 at 4:05 PM

## 2024-09-01 ENCOUNTER — APPOINTMENT (OUTPATIENT)
Dept: GENERAL RADIOLOGY | Facility: OTHER | Age: 59
End: 2024-09-01
Attending: EMERGENCY MEDICINE
Payer: COMMERCIAL

## 2024-09-01 ENCOUNTER — HOSPITAL ENCOUNTER (EMERGENCY)
Facility: OTHER | Age: 59
Discharge: HOME | End: 2024-09-01
Attending: EMERGENCY MEDICINE | Admitting: EMERGENCY MEDICINE
Payer: COMMERCIAL

## 2024-09-01 VITALS
BODY MASS INDEX: 32.89 KG/M2 | HEART RATE: 99 BPM | DIASTOLIC BLOOD PRESSURE: 91 MMHG | SYSTOLIC BLOOD PRESSURE: 144 MMHG | WEIGHT: 210 LBS | OXYGEN SATURATION: 98 % | RESPIRATION RATE: 20 BRPM | TEMPERATURE: 99.8 F

## 2024-09-01 DIAGNOSIS — E11.65 HYPERGLYCEMIA DUE TO DIABETES MELLITUS (H): ICD-10-CM

## 2024-09-01 DIAGNOSIS — R50.9 FEVER IN ADULT: ICD-10-CM

## 2024-09-01 DIAGNOSIS — J18.9 PNEUMONIA OF LEFT UPPER LOBE DUE TO INFECTIOUS ORGANISM: ICD-10-CM

## 2024-09-01 LAB
ALBUMIN SERPL BCG-MCNC: 3.6 G/DL (ref 3.5–5.2)
ALBUMIN UR-MCNC: 20 MG/DL
ALP SERPL-CCNC: 116 U/L (ref 40–150)
ALT SERPL W P-5'-P-CCNC: 13 U/L (ref 0–70)
AMPHETAMINES UR QL SCN: NORMAL
ANION GAP SERPL CALCULATED.3IONS-SCNC: 18 MMOL/L (ref 7–15)
ANION GAP SERPL CALCULATED.3IONS-SCNC: 21 MMOL/L (ref 7–15)
APPEARANCE UR: CLEAR
AST SERPL W P-5'-P-CCNC: 20 U/L (ref 0–45)
B-OH-BUTYR SERPL-SCNC: <0.18 MMOL/L
BARBITURATES UR QL SCN: NORMAL
BASOPHILS # BLD AUTO: 0.1 10E3/UL (ref 0–0.2)
BASOPHILS NFR BLD AUTO: 1 %
BENZODIAZ UR QL SCN: NORMAL
BILIRUB SERPL-MCNC: 0.2 MG/DL
BILIRUB UR QL STRIP: NEGATIVE
BUN SERPL-MCNC: 12.8 MG/DL (ref 6–20)
BUN SERPL-MCNC: 14 MG/DL (ref 6–20)
BZE UR QL SCN: NORMAL
CALCIUM SERPL-MCNC: 8.9 MG/DL (ref 8.8–10.4)
CALCIUM SERPL-MCNC: 8.9 MG/DL (ref 8.8–10.4)
CANNABINOIDS UR QL SCN: NORMAL
CHLORIDE SERPL-SCNC: 91 MMOL/L (ref 98–107)
CHLORIDE SERPL-SCNC: 96 MMOL/L (ref 98–107)
COLOR UR AUTO: ABNORMAL
CREAT SERPL-MCNC: 1.43 MG/DL (ref 0.67–1.17)
CREAT SERPL-MCNC: 1.48 MG/DL (ref 0.67–1.17)
EGFRCR SERPLBLD CKD-EPI 2021: 55 ML/MIN/1.73M2
EGFRCR SERPLBLD CKD-EPI 2021: 58 ML/MIN/1.73M2
EOSINOPHIL # BLD AUTO: 0.4 10E3/UL (ref 0–0.7)
EOSINOPHIL NFR BLD AUTO: 2 %
ERYTHROCYTE [DISTWIDTH] IN BLOOD BY AUTOMATED COUNT: 13 % (ref 10–15)
ETHANOL SERPL-MCNC: 0.18 G/DL
FENTANYL UR QL: NORMAL
FLUAV RNA SPEC QL NAA+PROBE: NEGATIVE
FLUBV RNA RESP QL NAA+PROBE: NEGATIVE
GLUCOSE BLDC GLUCOMTR-MCNC: 162 MG/DL (ref 70–99)
GLUCOSE BLDC GLUCOMTR-MCNC: 188 MG/DL (ref 70–99)
GLUCOSE BLDC GLUCOMTR-MCNC: 246 MG/DL (ref 70–99)
GLUCOSE BLDC GLUCOMTR-MCNC: 353 MG/DL (ref 70–99)
GLUCOSE SERPL-MCNC: 199 MG/DL (ref 70–99)
GLUCOSE SERPL-MCNC: 412 MG/DL (ref 70–99)
GLUCOSE UR STRIP-MCNC: >1000 MG/DL
HCO3 SERPL-SCNC: 16 MMOL/L (ref 22–29)
HCO3 SERPL-SCNC: 18 MMOL/L (ref 22–29)
HCT VFR BLD AUTO: 35.4 % (ref 40–53)
HGB BLD-MCNC: 11.6 G/DL (ref 13.3–17.7)
HGB UR QL STRIP: ABNORMAL
HOLD SPECIMEN: NORMAL
IMM GRANULOCYTES # BLD: 0.2 10E3/UL
IMM GRANULOCYTES NFR BLD: 1 %
KETONES UR STRIP-MCNC: NEGATIVE MG/DL
LACTATE SERPL-SCNC: 2 MMOL/L (ref 0.7–2)
LACTATE SERPL-SCNC: 2.5 MMOL/L (ref 0.7–2)
LEUKOCYTE ESTERASE UR QL STRIP: NEGATIVE
LYMPHOCYTES # BLD AUTO: 1.8 10E3/UL (ref 0.8–5.3)
LYMPHOCYTES NFR BLD AUTO: 11 %
MCH RBC QN AUTO: 30.1 PG (ref 26.5–33)
MCHC RBC AUTO-ENTMCNC: 32.8 G/DL (ref 31.5–36.5)
MCV RBC AUTO: 92 FL (ref 78–100)
MONOCYTES # BLD AUTO: 2.2 10E3/UL (ref 0–1.3)
MONOCYTES NFR BLD AUTO: 14 %
MUCOUS THREADS #/AREA URNS LPF: PRESENT /LPF
NEUTROPHILS # BLD AUTO: 11.8 10E3/UL (ref 1.6–8.3)
NEUTROPHILS NFR BLD AUTO: 72 %
NITRATE UR QL: NEGATIVE
NRBC # BLD AUTO: 0 10E3/UL
NRBC BLD AUTO-RTO: 0 /100
NT-PROBNP SERPL-MCNC: 367 PG/ML (ref 0–900)
OPIATES UR QL SCN: NORMAL
PCP QUAL URINE (ROCHE): NORMAL
PH UR STRIP: 5.5 [PH] (ref 5–9)
PLATELET # BLD AUTO: 458 10E3/UL (ref 150–450)
POTASSIUM SERPL-SCNC: 3.7 MMOL/L (ref 3.4–5.3)
POTASSIUM SERPL-SCNC: 4.5 MMOL/L (ref 3.4–5.3)
PROCALCITONIN SERPL IA-MCNC: 0.2 NG/ML
PROT SERPL-MCNC: 8.5 G/DL (ref 6.4–8.3)
RBC # BLD AUTO: 3.86 10E6/UL (ref 4.4–5.9)
RBC URINE: 0 /HPF
RSV RNA SPEC NAA+PROBE: NEGATIVE
SARS-COV-2 RNA RESP QL NAA+PROBE: NEGATIVE
SODIUM SERPL-SCNC: 127 MMOL/L (ref 135–145)
SODIUM SERPL-SCNC: 133 MMOL/L (ref 135–145)
SP GR UR STRIP: 1.01 (ref 1–1.03)
TROPONIN T SERPL HS-MCNC: 23 NG/L
TROPONIN T SERPL HS-MCNC: 23 NG/L
UROBILINOGEN UR STRIP-MCNC: NORMAL MG/DL
WBC # BLD AUTO: 16.5 10E3/UL (ref 4–11)
WBC URINE: 0 /HPF

## 2024-09-01 PROCEDURE — 96360 HYDRATION IV INFUSION INIT: CPT | Performed by: EMERGENCY MEDICINE

## 2024-09-01 PROCEDURE — 250N000012 HC RX MED GY IP 250 OP 636 PS 637: Performed by: EMERGENCY MEDICINE

## 2024-09-01 PROCEDURE — 87637 SARSCOV2&INF A&B&RSV AMP PRB: CPT | Performed by: EMERGENCY MEDICINE

## 2024-09-01 PROCEDURE — 258N000003 HC RX IP 258 OP 636: Performed by: EMERGENCY MEDICINE

## 2024-09-01 PROCEDURE — 82010 KETONE BODYS QUAN: CPT | Performed by: EMERGENCY MEDICINE

## 2024-09-01 PROCEDURE — 96361 HYDRATE IV INFUSION ADD-ON: CPT | Performed by: EMERGENCY MEDICINE

## 2024-09-01 PROCEDURE — 83880 ASSAY OF NATRIURETIC PEPTIDE: CPT | Performed by: EMERGENCY MEDICINE

## 2024-09-01 PROCEDURE — 81001 URINALYSIS AUTO W/SCOPE: CPT | Mod: XU | Performed by: EMERGENCY MEDICINE

## 2024-09-01 PROCEDURE — 82077 ASSAY SPEC XCP UR&BREATH IA: CPT | Performed by: EMERGENCY MEDICINE

## 2024-09-01 PROCEDURE — 80053 COMPREHEN METABOLIC PANEL: CPT | Performed by: EMERGENCY MEDICINE

## 2024-09-01 PROCEDURE — 93010 ELECTROCARDIOGRAM REPORT: CPT | Performed by: STUDENT IN AN ORGANIZED HEALTH CARE EDUCATION/TRAINING PROGRAM

## 2024-09-01 PROCEDURE — 250N000013 HC RX MED GY IP 250 OP 250 PS 637: Performed by: EMERGENCY MEDICINE

## 2024-09-01 PROCEDURE — 99284 EMERGENCY DEPT VISIT MOD MDM: CPT | Performed by: EMERGENCY MEDICINE

## 2024-09-01 PROCEDURE — 36415 COLL VENOUS BLD VENIPUNCTURE: CPT | Performed by: EMERGENCY MEDICINE

## 2024-09-01 PROCEDURE — 80307 DRUG TEST PRSMV CHEM ANLYZR: CPT | Performed by: EMERGENCY MEDICINE

## 2024-09-01 PROCEDURE — 83605 ASSAY OF LACTIC ACID: CPT | Performed by: EMERGENCY MEDICINE

## 2024-09-01 PROCEDURE — 93005 ELECTROCARDIOGRAM TRACING: CPT | Performed by: EMERGENCY MEDICINE

## 2024-09-01 PROCEDURE — 85025 COMPLETE CBC W/AUTO DIFF WBC: CPT | Performed by: EMERGENCY MEDICINE

## 2024-09-01 PROCEDURE — 71046 X-RAY EXAM CHEST 2 VIEWS: CPT | Mod: TC

## 2024-09-01 PROCEDURE — 84484 ASSAY OF TROPONIN QUANT: CPT | Performed by: EMERGENCY MEDICINE

## 2024-09-01 PROCEDURE — 84145 PROCALCITONIN (PCT): CPT | Performed by: EMERGENCY MEDICINE

## 2024-09-01 PROCEDURE — 99285 EMERGENCY DEPT VISIT HI MDM: CPT | Mod: 25 | Performed by: EMERGENCY MEDICINE

## 2024-09-01 PROCEDURE — 82962 GLUCOSE BLOOD TEST: CPT

## 2024-09-01 RX ORDER — AZITHROMYCIN 250 MG/1
250 TABLET, FILM COATED ORAL DAILY
Qty: 6 TABLET | Refills: 0 | Status: SHIPPED | OUTPATIENT
Start: 2024-09-02 | End: 2024-09-08

## 2024-09-01 RX ORDER — BENZONATATE 100 MG/1
100 CAPSULE ORAL 3 TIMES DAILY PRN
Qty: 15 CAPSULE | Refills: 0 | Status: SHIPPED | OUTPATIENT
Start: 2024-09-01

## 2024-09-01 RX ORDER — OXYCODONE HYDROCHLORIDE 5 MG/1
5 TABLET ORAL ONCE
Status: DISCONTINUED | OUTPATIENT
Start: 2024-09-01 | End: 2024-09-01

## 2024-09-01 RX ORDER — AZITHROMYCIN 250 MG/1
500 TABLET, FILM COATED ORAL ONCE
Status: COMPLETED | OUTPATIENT
Start: 2024-09-01 | End: 2024-09-01

## 2024-09-01 RX ORDER — ACETAMINOPHEN 325 MG/1
975 TABLET ORAL ONCE
Status: COMPLETED | OUTPATIENT
Start: 2024-09-01 | End: 2024-09-01

## 2024-09-01 RX ORDER — BENZONATATE 100 MG/1
200 CAPSULE ORAL ONCE
Status: COMPLETED | OUTPATIENT
Start: 2024-09-01 | End: 2024-09-01

## 2024-09-01 RX ORDER — CODEINE PHOSPHATE AND GUAIFENESIN 10; 100 MG/5ML; MG/5ML
5 SOLUTION ORAL ONCE
Status: COMPLETED | OUTPATIENT
Start: 2024-09-01 | End: 2024-09-01

## 2024-09-01 RX ADMIN — ACETAMINOPHEN 975 MG: 325 TABLET ORAL at 07:37

## 2024-09-01 RX ADMIN — GUAIFENESIN AND CODEINE PHOSPHATE 5 ML: 100; 10 SOLUTION ORAL at 04:12

## 2024-09-01 RX ADMIN — INSULIN HUMAN 10 UNITS: 100 INJECTION, SOLUTION PARENTERAL at 03:52

## 2024-09-01 RX ADMIN — SODIUM CHLORIDE 250 ML: 9 INJECTION, SOLUTION INTRAVENOUS at 03:22

## 2024-09-01 RX ADMIN — AMOXICILLIN AND CLAVULANATE POTASSIUM 1 TABLET: 875; 125 TABLET, FILM COATED ORAL at 07:22

## 2024-09-01 RX ADMIN — AZITHROMYCIN DIHYDRATE 500 MG: 250 TABLET, FILM COATED ORAL at 07:22

## 2024-09-01 RX ADMIN — BENZONATATE 200 MG: 100 CAPSULE ORAL at 07:58

## 2024-09-01 ASSESSMENT — ENCOUNTER SYMPTOMS
PSYCHIATRIC NEGATIVE: 1
GASTROINTESTINAL NEGATIVE: 1
NEUROLOGICAL NEGATIVE: 1
FEVER: 0
EYES NEGATIVE: 1
COUGH: 1
PHOTOPHOBIA: 0
SINUS PRESSURE: 0
ENDOCRINE NEGATIVE: 1
HEMATOLOGIC/LYMPHATIC NEGATIVE: 1
CHILLS: 0
CONSTITUTIONAL NEGATIVE: 1
SINUS PAIN: 0
NECK PAIN: 0
MYALGIAS: 0
NECK STIFFNESS: 0
MUSCULOSKELETAL NEGATIVE: 1
ALLERGIC/IMMUNOLOGIC NEGATIVE: 1
SORE THROAT: 0

## 2024-09-01 ASSESSMENT — ACTIVITIES OF DAILY LIVING (ADL)
ADLS_ACUITY_SCORE: 35

## 2024-09-01 ASSESSMENT — COLUMBIA-SUICIDE SEVERITY RATING SCALE - C-SSRS
2. HAVE YOU ACTUALLY HAD ANY THOUGHTS OF KILLING YOURSELF IN THE PAST MONTH?: NO
1. IN THE PAST MONTH, HAVE YOU WISHED YOU WERE DEAD OR WISHED YOU COULD GO TO SLEEP AND NOT WAKE UP?: NO
6. HAVE YOU EVER DONE ANYTHING, STARTED TO DO ANYTHING, OR PREPARED TO DO ANYTHING TO END YOUR LIFE?: NO

## 2024-09-01 NOTE — ED PROVIDER NOTES
History     Chief Complaint   Patient presents with    Cough     HPI  Kolton Aragon is a 58 year old male who presents today with complaints of a cough.  Cough present for several days and has been unrelenting.  Patient here for care.  Denies chest pain or shortness of breath.  Denies any additional complaints.    Allergies:  Allergies   Allergen Reactions    Latex Hives    Metformin Diarrhea    Dust Mite Extract Other (See Comments)     Sneezing, itchy eyes       Problem List:    Patient Active Problem List    Diagnosis Date Noted    Biliary calculus of other site without obstruction 05/21/2021     Priority: Medium     Formatting of this note might be different from the original.  Added automatically from request for surgery [id number]      Chronic kidney disease, stage 3 (H) 03/30/2021     Priority: Medium    Pseudocyst of pancreas 03/24/2021     Priority: Medium    Abdominal pain, acute, right upper quadrant 03/20/2021     Priority: Medium    Abdominal ultrasound, abnormal 03/20/2021     Priority: Medium    Tobacco abuse, in remission 03/20/2021     Priority: Medium    GIB (gastrointestinal bleeding) 03/18/2021     Priority: Medium    RUQ abdominal pain 03/18/2021     Priority: Medium    Liver mass, left lobe 03/18/2021     Priority: Medium    Type 2 diabetes mellitus without complication, without long-term current use of insulin (H) 04/17/2019     Priority: Medium    Reactive airway disease 03/01/2018     Priority: Medium    Alcoholism (H) 10/12/2017     Priority: Medium    Acute alcoholic pancreatitis 06/20/2016     Priority: Medium    Chronic kidney disease, stage II (mild) 03/09/2016     Priority: Medium    Gastroesophageal reflux disease without esophagitis 05/18/2015     Priority: Medium    Dyslipidemia 12/29/2014     Priority: Medium    Hypertension 09/12/2013     Priority: Medium    Spondylolisthesis at L5-S1 level 07/09/2013     Priority: Medium     Overview:   Patient has been referred for PT and has  not followed through.  He was sent for MRI scan,and he was not able to do either the closed or open MRI due to claustrophobia.      Insomnia 06/20/2013     Priority: Medium    Chewing tobacco use 05/17/2013     Priority: Medium    DJD (degenerative joint disease), lumbar 04/22/2013     Priority: Medium        Past Medical History:    Past Medical History:   Diagnosis Date    Alcohol-induced acute pancreatitis without infection or necrosis     Altered level of consciousness 10/11/2017    Essential (primary) hypertension     Gastro-esophageal reflux disease without esophagitis     Spondylolisthesis of lumbosacral region     Spondylosis of lumbar region without myelopathy or radiculopathy     Tobacco use     Uncomplicated asthma        Past Surgical History:    Past Surgical History:   Procedure Laterality Date    ESOPHAGOSCOPY, GASTROSCOPY, DUODENOSCOPY (EGD), COMBINED N/A 06/16/2020    Procedure: ESOPHAGOGASTRODUODENOSCOPY, WITH BIOPSY;  Surgeon: Giovanni Reed MD;  Location: [facility] OR    pancreatic duct surgery  2021    stent removed from the pancreatic duct    VASECTOMY      No Comments Provided       Family History:    Family History   Problem Relation Age of Onset    Family History Negative Mother         Good Health    Other - See Comments Father         Deserted as a child, patient knows father had a history of heart attack at 50    Heart Disease Father         Heart Disease,patient knows father had a history of heart attack at 50    Cancer Other         Cancer,History of cancer    Diabetes Brother     Diabetes Brother        Social History:  Marital Status:   [4]  Social History     Tobacco Use    Smoking status: Never     Passive exposure: Past    Smokeless tobacco: Former     Types: Chew     Quit date: 4/14/2020    Tobacco comments:     Quit smoking: trying to quit chew using 1 pack week   Vaping Use    Vaping status: Never Used   Substance Use Topics    Alcohol use: Yes     Comment: occ.     Drug use: No        Medications:    acetaminophen (TYLENOL) 650 MG CR tablet  Alcohol Swabs (B-D SINGLE USE SWABS REGULAR) PADS  blood glucose (CONTOUR NEXT TEST) test strip  blood glucose (NO BRAND SPECIFIED) test strip  blood glucose monitoring (JOEY MICROLET) lancets  blood glucose monitoring (NO BRAND SPECIFIED) meter device kit  Continuous Blood Gluc  (DEXCOM G6 ) CHRISTINA  Continuous Blood Gluc Sensor (DEXCOM G6 SENSOR) MISC  Continuous Blood Gluc Transmit (DEXCOM G6 TRANSMITTER) MISC  diclofenac (VOLTAREN) 1 % topical gel  diphenhydrAMINE HCl, Sleep, (UNISOM SLEEPGELS) 50 MG CAPS  FLUoxetine (PROZAC) 20 MG capsule  gabapentin (NEURONTIN) 300 MG capsule  glipiZIDE (GLUCOTROL XL) 5 MG 24 hr tablet  glucose (BD GLUCOSE) 4 g chewable tablet  liraglutide (VICTOZA) 18 MG/3ML solution  lisinopril (ZESTRIL) 20 MG tablet  magnesium oxide (MAG-OX) 400 MG tablet  Melatonin 10-10 MG TBCR  naltrexone (DEPADE/REVIA) 50 MG tablet  naproxen sodium (ANAPROX) 220 MG tablet  omeprazole (PRILOSEC) 40 MG DR capsule  thin (NO BRAND SPECIFIED) lancets  traZODone (DESYREL) 150 MG tablet          Review of Systems   Constitutional: Negative.  Negative for chills and fever.   HENT:  Positive for congestion. Negative for sinus pressure, sinus pain and sore throat.    Eyes: Negative.  Negative for photophobia.   Respiratory:  Positive for cough.    Gastrointestinal: Negative.    Endocrine: Negative.    Genitourinary: Negative.    Musculoskeletal: Negative.  Negative for myalgias, neck pain and neck stiffness.   Allergic/Immunologic: Negative.    Neurological: Negative.    Hematological: Negative.    Psychiatric/Behavioral: Negative.         Physical Exam   BP: (!) 145/93  Pulse: (!) 124  Temp: 100  F (37.8  C)  Resp: 18  Weight: 95.3 kg (210 lb)  SpO2: 93 %      Physical Exam  Constitutional:       General: He is not in acute distress.     Appearance: Normal appearance. He is normal weight. He is not ill-appearing or  toxic-appearing.   HENT:      Head: Normocephalic.   Cardiovascular:      Rate and Rhythm: Normal rate.   Pulmonary:      Effort: Pulmonary effort is normal.   Abdominal:      General: Abdomen is flat.      Palpations: Abdomen is soft.   Musculoskeletal:         General: Normal range of motion.      Cervical back: Normal range of motion and neck supple.   Skin:     General: Skin is warm.      Capillary Refill: Capillary refill takes less than 2 seconds.   Neurological:      General: No focal deficit present.      Mental Status: He is alert.   Psychiatric:         Mood and Affect: Mood normal.         Behavior: Behavior normal.         ED Course     ED Course as of 09/04/24 0322   Sun Sep 01, 2024   0636 Pneumonia noted.  Last heart rate 106.   0754 Past heart rate 99.  Patient ambulated without difficulty.  Sats 98% on room air after ambulation.  To be treated with antibiotics in the form of azithromycin and Augmentin.  Follow-up with primary care doctor next week   0811 58M with CAP discharging and may return. Pneumonia, PO antibiotics.     Procedures         EKG -sinus tachycardia with incomplete right bundle branch block         Results for orders placed or performed during the hospital encounter of 09/01/24 (from the past 24 hour(s))   Symptomatic Influenza A/B, RSV, & SARS-CoV2 PCR (COVID-19) Nose    Specimen: Nose; Swab   Result Value Ref Range    Influenza A PCR Negative Negative    Influenza B PCR Negative Negative    RSV PCR Negative Negative    SARS CoV2 PCR Negative Negative    Narrative    Testing was performed using the Xpert Xpress CoV2/Flu/RSV Assay on the [employer] GeneXpert Instrument. This test should be ordered for the detection of SARS-CoV2, influenza, and RSV viruses in individuals with signs and symptoms of respiratory tract infection. This test is for in vitro diagnostic use under the [place] FDA for laboratories certified under CLIA to perform high or moderate complexity testing. This test has been  [place] FDA cleared. A negative result does not rule out the presence of PCR inhibitors in the specimen or target RNA in concentration below the limit of detection for the assay. If only one viral target is positive but coinfection with multiple targets is suspected, the sample should be re-tested with another FDA cleared, approved, or authorized test, if coninfection would change clinical management. This test was validated by the [facility]. These laboratories are certified under the Clinical Laboratory Improvement Amendments of 1988 (CLIA-88) as qualified to perfom high complexity laboratory testing.   CBC with platelets differential    Narrative    The following orders were created for panel order CBC with platelets differential.  Procedure                               Abnormality         Status                     ---------                               -----------         ------                     CBC with platelets and d...[[id number]]  Abnormal            Final result               Manual Differential[[id number]]                                                           Please view results for these tests on the individual orders.   Comprehensive metabolic panel   Result Value Ref Range    Sodium 127 (L) 135 - 145 mmol/L    Potassium 4.5 3.4 - 5.3 mmol/L    Carbon Dioxide (CO2) 18 (L) 22 - 29 mmol/L    Anion Gap 18 (H) 7 - 15 mmol/L    Urea Nitrogen 14.0 6.0 - 20.0 mg/dL    Creatinine 1.48 (H) 0.67 - 1.17 mg/dL    GFR Estimate 55 (L) >60 mL/min/1.73m2    Calcium 8.9 8.8 - 10.4 mg/dL    Chloride 91 (L) 98 - 107 mmol/L    Glucose 412 (H) 70 - 99 mg/dL    Alkaline Phosphatase 116 40 - 150 U/L    AST 20 0 - 45 U/L    ALT 13 0 - 70 U/L    Protein Total 8.5 (H) 6.4 - 8.3 g/dL    Albumin 3.6 3.5 - 5.2 g/dL    Bilirubin Total 0.2 <=1.2 mg/dL   Ethanol [facility]   Result Value Ref Range    Alcohol ethyl 0.18 (H) <=0.01 g/dL   Nt probnp inpatient (BNP)   Result Value Ref Range    N terminal Pro BNP Inpatient  367 0 - 900 pg/mL   Troponin T, High Sensitivity   Result Value Ref Range    Troponin T, High Sensitivity 23 (H) <=22 ng/L   Procalcitonin   Result Value Ref Range    Procalcitonin 0.20 <0.50 ng/mL   Lactic acid whole blood with 1x repeat in 2 hr when >2   Result Value Ref Range    Lactic Acid, Initial 2.5 (H) 0.7 - 2.0 mmol/L   CBC with platelets and differential   Result Value Ref Range    WBC Count 16.5 (H) 4.0 - 11.0 10e3/uL    RBC Count 3.86 (L) 4.40 - 5.90 10e6/uL    Hemoglobin 11.6 (L) 13.3 - 17.7 g/dL    Hematocrit 35.4 (L) 40.0 - 53.0 %    MCV 92 78 - 100 fL    MCH 30.1 26.5 - 33.0 pg    MCHC 32.8 31.5 - 36.5 g/dL    RDW 13.0 10.0 - 15.0 %    Platelet Count 458 (H) 150 - 450 10e3/uL    % Neutrophils 72 %    % Lymphocytes 11 %    % Monocytes 14 %    % Eosinophils 2 %    % Basophils 1 %    % Immature Granulocytes 1 %    NRBCs per 100 WBC 0 <1 /100    Absolute Neutrophils 11.8 (H) 1.6 - 8.3 10e3/uL    Absolute Lymphocytes 1.8 0.8 - 5.3 10e3/uL    Absolute Monocytes 2.2 (H) 0.0 - 1.3 10e3/uL    Absolute Eosinophils 0.4 0.0 - 0.7 10e3/uL    Absolute Basophils 0.1 0.0 - 0.2 10e3/uL    Absolute Immature Granulocytes 0.2 <=0.4 10e3/uL    Absolute NRBCs 0.0 10e3/uL   Extra Tube    Narrative    The following orders were created for panel order Extra Tube.  Procedure                               Abnormality         Status                     ---------                               -----------         ------                     Extra Blue Top Tube[[id number]]                              Final result                 Please view results for these tests on the individual orders.   Extra Blue Top Tube   Result Value Ref Range    Hold Specimen [facility]    XR Chest 2 Views    Narrative    PROCEDURE INFORMATION:   Exam: XR Chest   Exam date and time: 9/1/2024 2:38 AM   Age: 58 years old   Clinical indication: Cough and fever; Additional info: Cough, fever     TECHNIQUE:   Imaging protocol: Radiologic exam of the chest.   Views: 2  views.     COMPARISON:   CR XR CHEST 2 VW 4/23/2020 10:30 AM     FINDINGS:   Lungs: Geographic area of consolidation in the lower anterior aspect of the   left upper lobe.   Pleural spaces: Unremarkable. No pleural effusion. No pneumothorax.   Heart/Mediastinum: Unremarkable. No cardiomegaly.   Bones/joints: Unremarkable.       Impression    IMPRESSION:   1.   Focal bronchopneumonia in the left upper lobe.  2.   Radiographic follow-up is recommended to document clearing and exclude   underlying mass.     THIS DOCUMENT HAS BEEN ELECTRONICALLY SIGNED BY AWAIS RAMÍREZ MD   Glucose by meter   Result Value Ref Range    GLUCOSE BY METER POCT 353 (H) 70 - 99 mg/dL   UA with Microscopic reflex to Culture    Specimen: Urine, Clean Catch   Result Value Ref Range    Color Urine Light Yellow Colorless, Straw, Light Yellow, Yellow    Appearance Urine Clear Clear    Glucose Urine >1000 (A) Negative mg/dL    Bilirubin Urine Negative Negative    Ketones Urine Negative Negative mg/dL    Specific Gravity Urine 1.007 1.000 - 1.030    Blood Urine Trace (A) Negative    pH Urine 5.5 5.0 - 9.0    Protein Albumin Urine 20 (A) Negative mg/dL    Urobilinogen Urine Normal Normal, 2.0 mg/dL    Nitrite Urine Negative Negative    Leukocyte Esterase Urine Negative Negative    Mucus Urine Present (A) None Seen /LPF    RBC Urine 0 <=2 /HPF    WBC Urine 0 <=5 /HPF    Narrative    Urine Culture not indicated   Urine Drug Screen    Narrative    The following orders were created for panel order Urine Drug Screen.  Procedure                               Abnormality         Status                     ---------                               -----------         ------                     Urine Drug Screen Panel[[id number]]      Normal              Final result                 Please view results for these tests on the individual orders.   Urine Drug Screen Panel   Result Value Ref Range    Amphetamines Urine Screen Negative Screen Negative    Barbituates  Urine Screen Negative Screen Negative    Benzodiazepine Urine Screen Negative Screen Negative    Cannabinoids Urine Screen Negative Screen Negative    Cocaine Urine Screen Negative Screen Negative    Fentanyl Qual Urine Screen Negative Screen Negative    Opiates Urine Screen Negative Screen Negative    PCP Urine Screen Negative Screen Negative   Glucose by meter   Result Value Ref Range    GLUCOSE BY METER POCT 246 (H) 70 - 99 mg/dL   Glucose by meter   Result Value Ref Range    GLUCOSE BY METER POCT 188 (H) 70 - 99 mg/dL   Lactic acid whole blood   Result Value Ref Range    Lactic Acid 2.0 0.7 - 2.0 mmol/L   Troponin T, High Sensitivity   Result Value Ref Range    Troponin T, High Sensitivity 23 (H) <=22 ng/L   Ketone Beta-Hydroxybutyrate Quantitative   Result Value Ref Range    Ketone (Beta-Hydroxybutyrate) Quantitative <0.18 <=0.30 mmol/L   Basic metabolic panel   Result Value Ref Range    Sodium 133 (L) 135 - 145 mmol/L    Potassium 3.7 3.4 - 5.3 mmol/L    Chloride 96 (L) 98 - 107 mmol/L    Carbon Dioxide (CO2) 16 (L) 22 - 29 mmol/L    Anion Gap 21 (H) 7 - 15 mmol/L    Urea Nitrogen 12.8 6.0 - 20.0 mg/dL    Creatinine 1.43 (H) 0.67 - 1.17 mg/dL    GFR Estimate 58 (L) >60 mL/min/1.73m2    Calcium 8.9 8.8 - 10.4 mg/dL    Glucose 199 (H) 70 - 99 mg/dL   Glucose by meter   Result Value Ref Range    GLUCOSE BY METER POCT 162 (H) 70 - 99 mg/dL       Medications   sodium chloride 0.9% BOLUS 250 mL (has no administration in time range)       Assessments & Plan (with Medical Decision Making)     58-year-old male who presents today with complaints of a cough.  Patient had a cough for several days.  Also complained of a mild temperature.  Workup here revealed the patient was tachycardic on arrival but not hypoxic.      Underwent labs as above and notable findings included an elevated blood sugar of over 400 elevated white blood cell count of 16, elevated BUN and creatinine of 58 and 1.4.  Bicarb 16.  Troponin was 23  repeated x 1 also remained a 23 and initial lactate level of 2.5 and on recheck 2.0.     Patient treated with insulin and blood sugar gradually trended downward.  The last blood sugar as low as 162 and eventually back up to 199.  No serum ketones noted.  Chest x-ray showed a left upper lobe pneumonia.  Patient was treated with IV fluids and heart rate eventually drop below 100.  Patient ambulatory and not hypoxic.      Patient is going to be treated for community-acquired pneumonia with a combination of azithromycin and Augmentin.  Patient understands to follow-up with primary care physician next week but come back if symptoms worsen.  Tessalon Perles given for symptomatic care.      At this point in time patient understands to continue with his medications for diabetes avoid drinking alcohol and continue antibiotics.  Understands to return if symptoms worsen in any way.        New Prescriptions    No medications on file       Final diagnoses:   Pneumonia of left upper lobe due to infectious organism   Hyperglycemia due to diabetes mellitus (H)   Fever in adult       9/1/2024   [facility] AND [facility]       Kellee Poole MD  09/04/24 [id number]

## 2024-09-01 NOTE — ED TRIAGE NOTES
Patient reports cough for 3 days nonproductive. No fevers at home. Denies pain.     Triage Assessment (Adult)       Row Name 09/01/24 0142          Triage Assessment    Airway WDL WDL        Respiratory WDL    Respiratory WDL X;cough        Skin Circulation/Temperature WDL    Skin Circulation/Temperature WDL WDL        Cardiac WDL    Cardiac WDL X;rhythm     Pulse Rate & Regularity tachycardic        Peripheral/Neurovascular WDL    Peripheral Neurovascular WDL WDL        Cognitive/Neuro/Behavioral WDL    Cognitive/Neuro/Behavioral WDL WDL

## 2024-09-01 NOTE — DISCHARGE INSTRUCTIONS
1) Follow the aftercare instructions provided.  2) You have been given a prescription for a medication that can cause an allergic reactions. Return to the ER if you develop any itching, tongue swelling, wheezing or shortness of breath.  3) Take all prescribed medications.  4) Your blood pressure today was elevated. You must have it rechecked by your primary care doctor within one week.   5) Take the azithromycin for only 4 days beginning September 2nd.  6) If your symptoms worsen in any way you develop chest pain, shortness of breath, stomach pain or headache, return to the ER immediately

## 2024-09-04 LAB
ATRIAL RATE - MUSE: 117 BPM
DIASTOLIC BLOOD PRESSURE - MUSE: NORMAL MMHG
INTERPRETATION ECG - MUSE: NORMAL
P AXIS - MUSE: 49 DEGREES
PR INTERVAL - MUSE: 146 MS
QRS DURATION - MUSE: 112 MS
QT - MUSE: 326 MS
QTC - MUSE: 454 MS
R AXIS - MUSE: -13 DEGREES
SYSTOLIC BLOOD PRESSURE - MUSE: NORMAL MMHG
T AXIS - MUSE: 19 DEGREES
VENTRICULAR RATE- MUSE: 117 BPM

## 2024-10-04 DIAGNOSIS — I10 ESSENTIAL HYPERTENSION: ICD-10-CM

## 2024-10-04 DIAGNOSIS — F32.1 MAJOR DEPRESSIVE DISORDER, SINGLE EPISODE, MODERATE (H): ICD-10-CM

## 2024-10-04 DIAGNOSIS — E11.9 DM TYPE 2, NOT AT GOAL (H): ICD-10-CM

## 2024-10-08 RX ORDER — GLIPIZIDE 5 MG/1
5 TABLET, FILM COATED, EXTENDED RELEASE ORAL DAILY
Qty: 90 TABLET | Refills: 0 | Status: SHIPPED | OUTPATIENT
Start: 2024-10-08

## 2024-10-08 RX ORDER — LISINOPRIL 20 MG/1
20 TABLET ORAL DAILY
Qty: 90 TABLET | Refills: 0 | Status: SHIPPED | OUTPATIENT
Start: 2024-10-08

## 2024-10-08 NOTE — TELEPHONE ENCOUNTER
[id number] sent Rx request for the following:      Requested Prescriptions   Pending Prescriptions Disp Refills    FLUoxetine (PROZAC) 20 MG capsule [Pharmacy Med Name: FLUOXETINE 20MG CAPSULE] 90 capsule 0     Sig: TAKE 1 CAPSULE (20 MG) BY MOUTH DAILY   Last Prescription Date:   7/10/24  Last Fill Qty/Refills:         90, R-0      SSRIs Protocol Failed - 10/8/2024  3:44 PM        Failed - PHQ-9 score less than 5 in past 6 months     Please review last PHQ-9 score.        glipiZIDE (GLUCOTROL XL) 5 MG 24 hr tablet [Pharmacy Med Name: GLIPIZIDE 5MG ER TABLET] 90 tablet 0     Sig: TAKE 1 TABLET (5 MG) BY MOUTH DAILY   Last Prescription Date:   7/10/24  Last Fill Qty/Refills:         90, R-0      Sulfonylurea Agents Failed - 10/8/2024  3:44 PM        Failed - Patient has documented A1c within the specified period of time.     If HgbA1C is 8 or greater, it needs to be on file within the past 3 months.  If less than 8, must be on file within the past 6 months.   Recent Labs   Lab Test 11/03/23  1502   A1C 11.6*           Failed - Has GFR on file in past 12 months and most recent value is normal        Failed - Recent (6 mo) or future (90 days) visit within the authorizing provider's specialty        Failed - Patient has a recent creatinine (normal) within the past 12 mos.     Recent Labs   Lab Test 09/01/24  0445   CR 1.43*          lisinopril (ZESTRIL) 20 MG tablet [Pharmacy Med Name: LISINOPRIL 20MG TABLET] 90 tablet 0     Sig: TAKE 1 TABLET (20 MG) BY MOUTH DAILY   Last Prescription Date:   7/10/24  Last Fill Qty/Refills:         90, R-0      ACE Inhibitors (Including Combos) Protocol Failed - 10/8/2024  3:44 PM        Failed - Blood pressure under 140/90 in past 12 months- Clinicial or Patient Reported     BP Readings from Last 3 Encounters:   09/01/24 (!) 144/91   01/09/24 138/88   11/03/23 90/60   No data recorded     Last Office Visit:              1/9/24 (Carlos)   Future  Office visit:           None    Per LOV note:  Return in about 2 months (around 3/9/2024).    Unable to complete prescription refill per RN Medication Refill Policy. Pt due for annual exam. Routing to provider for refill consideration. Routing to Unit scheduling pool, to assist Pt in scheduling appointment. Unable to complete prescription refill per RN Medication Refill Policy. Routing to covering provider for refill consideration, as PCP/provider is out of clinic >48 hours or Pt is completely out of medication and provider is out of the clinic today. Jennifer Hsu RN .............. 10/8/2024  3:47 PM

## 2024-12-03 DIAGNOSIS — F10.20 ALCOHOLISM (H): ICD-10-CM

## 2024-12-03 DIAGNOSIS — G47.00 INSOMNIA, UNSPECIFIED TYPE: ICD-10-CM

## 2024-12-05 RX ORDER — TRAZODONE HYDROCHLORIDE 150 MG/1
150 TABLET ORAL AT BEDTIME
Qty: 90 TABLET | Refills: 11 | Status: SHIPPED | OUTPATIENT
Start: 2024-12-05

## 2024-12-05 RX ORDER — NALTREXONE HYDROCHLORIDE 50 MG/1
100 TABLET, FILM COATED ORAL DAILY
Qty: 180 TABLET | Refills: 11 | Status: SHIPPED | OUTPATIENT
Start: 2024-12-05

## 2024-12-05 NOTE — TELEPHONE ENCOUNTER
[facility]  sent Rx request for the following:      Requested Prescriptions   Pending Prescriptions Disp Refills    traZODone (DESYREL) 150 MG tablet [Pharmacy Med Name: TRAZODONE 150MG TABLET] 90 tablet 11     Sig: TAKE 1 TABLET BY MOUTH AT BEDTIME     Last Prescription Date:   11/14/2023  Last Fill Qty/Refills:         90, R-11    Last Office Visit:              01/09/2024       naltrexone (DEPADE/REVIA) 50 MG tablet [Pharmacy Med Name: NALTREXONE 50MG TABLET] 180 tablet 11     Sig: TAKE 2 TABLETS (100 MG) BY MOUTH DAILY DOSE INCREASE  MG AT BEDTIME.      Last Prescription Date:   11/14/2023  Last Fill Qty/Refills:         180, R-11    Last Office Visit:              1/09/2024   Future Office visit:            NONE  Unable to complete prescription refill per RN Medication Refill Policy.    Radha Ernandez RN on 12/5/2024 at 10:41 AM

## 2025-01-06 DIAGNOSIS — F32.1 MAJOR DEPRESSIVE DISORDER, SINGLE EPISODE, MODERATE (H): ICD-10-CM

## 2025-01-07 NOTE — TELEPHONE ENCOUNTER
[id number] sent Rx request for the following:      Requested Prescriptions   Pending Prescriptions Disp Refills    FLUoxetine (PROZAC) 20 MG capsule [Pharmacy Med Name: FLUOXETINE 20MG CAPSULE] 90 capsule 0     Sig: TAKE 1 CAPSULE (20 MG) BY MOUTH DAILY       SSRIs Protocol Failed - 1/7/2025  4:23 PM        Failed - PHQ-9 score less than 5 in past 6 months     Please review last PHQ-9 score.      Last Prescription Date:   10/8/24  Last Fill Qty/Refills:         90, R-0    Last Office Visit:              1/9/24   Future Office visit:           None    Pt due for annual exam. Routing to provider for refill consideration. Routing to Unit scheduling pool, to assist Pt in scheduling appointment. Unable to complete prescription refill per RN Medication Refill Policy. Jennifer Hsu RN .............. 1/7/2025  4:23 PM

## 2025-02-26 DIAGNOSIS — E11.9 DM TYPE 2, NOT AT GOAL (H): ICD-10-CM

## 2025-03-03 RX ORDER — GLIPIZIDE 5 MG/1
5 TABLET, FILM COATED, EXTENDED RELEASE ORAL DAILY
Qty: 7 TABLET | Refills: 0 | Status: SHIPPED | OUTPATIENT
Start: 2025-03-03

## 2025-03-03 NOTE — TELEPHONE ENCOUNTER
[id number] sent Rx request for the following:      Requested Prescriptions   Pending Prescriptions Disp Refills    glipiZIDE (GLUCOTROL XL) 5 MG 24 hr tablet [Pharmacy Med Name: GLIPIZIDE 5MG ER TABLET] 7 tablet 0     Sig: TAKE 1 TABLET BY MOUTH DAILY       Sulfonylurea Agents Failed - 3/3/2025 12:20 PM        Failed - Patient has documented A1c within the specified period of time.     If HgbA1C is 8 or greater, it needs to be on file within the past 3 months.  If less than 8, must be on file within the past 6 months.     Recent Labs   Lab Test 11/03/23  1502   A1C 11.6*             Failed - Has GFR on file in past 12 months and most recent value is normal        Failed - Recent (6 mo) or future (90 days) visit within the authorizing provider's specialty     The patient must have completed an in-person or virtual visit within the past 6 months or has a future visit scheduled within the next 90 days with the authorizing provider s specialty.  Urgent care and e-visits do not quality as an office visit for this protocol.          Failed - Patient has a recent creatinine (normal) within the past 12 mos.     Recent Labs   Lab Test 09/01/24  0445   CR 1.43*                Last Prescription Date:   2/7/25  Last Fill Qty/Refills:         7, R-0    Last Office Visit:              1/9/24   Future Office visit:           none    Routing refill request to provider for review/approval because:  Deb given x1 and patient did not follow up, please advise    Kelly Johnson RN on 3/3/2025 at 12:21 PM

## 2025-04-02 DIAGNOSIS — F32.1 MAJOR DEPRESSIVE DISORDER, SINGLE EPISODE, MODERATE (H): ICD-10-CM

## 2025-04-04 NOTE — TELEPHONE ENCOUNTER
Encounter also routed to Scheduling. Needs a physical exam and establish care appointment. Melissa Hernandez RN on 4/4/2025 at 4:30 PM     Last Office Visit:              1/9/24 Carlos   Future Office visit:           none    Requested Prescriptions   Pending Prescriptions Disp Refills    FLUoxetine (PROZAC) 20 MG capsule [Pharmacy Med Name: FLUOXETINE 20MG CAPSULE] 90 capsule 0     Sig: TAKE 1 CAPSULE (20 MG) BY MOUTH DAILY       SSRIs Protocol Failed - 4/4/2025  4:28 PM        Failed - PHQ-9 score less than 5 in past 6 months     Please review last PHQ-9 score.       10/4/2023     9:34 AM 11/3/2023     2:15 PM 1/9/2024    11:15 AM   PHQ-9 SCORE   PHQ-9 Total Score MyChart 4 (Minimal depression) 5 (Mild depression) 1 (Minimal depression)   PHQ-9 Total Score 4 5 1             Failed - Recent (12 mo) or future (90 days) visit within the authorizing provider's specialty     The patient must have completed an in-person or virtual visit within the past 12 months or has a future visit scheduled within the next 90 days with the authorizing provider s specialty.  Urgent care and e-visits do not qualify as an office visit for this protocol.       Last Prescription Date:   1/7/25  Last Fill Qty/Refills:         90  /  0

## 2025-04-11 ENCOUNTER — HOSPITAL ENCOUNTER (INPATIENT)
Facility: OTHER | Age: 60
LOS: 2 days | Discharge: HOME | End: 2025-04-13
Attending: STUDENT IN AN ORGANIZED HEALTH CARE EDUCATION/TRAINING PROGRAM | Admitting: STUDENT IN AN ORGANIZED HEALTH CARE EDUCATION/TRAINING PROGRAM
Payer: COMMERCIAL

## 2025-04-11 DIAGNOSIS — E11.9 TYPE 2 DIABETES MELLITUS WITHOUT COMPLICATION, WITHOUT LONG-TERM CURRENT USE OF INSULIN (H): ICD-10-CM

## 2025-04-11 DIAGNOSIS — E11.65 UNCONTROLLED TYPE 2 DIABETES MELLITUS WITH HYPERGLYCEMIA (H): ICD-10-CM

## 2025-04-11 DIAGNOSIS — E11.22 TYPE 2 DIABETES MELLITUS WITH STAGE 3A CHRONIC KIDNEY DISEASE, WITH LONG-TERM CURRENT USE OF INSULIN (H): Primary | ICD-10-CM

## 2025-04-11 DIAGNOSIS — N18.31 TYPE 2 DIABETES MELLITUS WITH STAGE 3A CHRONIC KIDNEY DISEASE, WITH LONG-TERM CURRENT USE OF INSULIN (H): Primary | ICD-10-CM

## 2025-04-11 DIAGNOSIS — Z79.4 TYPE 2 DIABETES MELLITUS WITH STAGE 3A CHRONIC KIDNEY DISEASE, WITH LONG-TERM CURRENT USE OF INSULIN (H): Primary | ICD-10-CM

## 2025-04-11 PROBLEM — F10.20 ALCOHOLISM (H): Status: ACTIVE | Noted: 2017-10-12

## 2025-04-11 LAB
ATRIAL RATE - MUSE: 72 BPM
B-OH-BUTYR SERPL-SCNC: <0.18 MMOL/L
BASE EXCESS BLDV CALC-SCNC: 0 MMOL/L (ref -3–3)
DIASTOLIC BLOOD PRESSURE - MUSE: NORMAL MMHG
GLUCOSE BLDC GLUCOMTR-MCNC: 178 MG/DL (ref 70–99)
GLUCOSE BLDC GLUCOMTR-MCNC: 179 MG/DL (ref 70–99)
GLUCOSE BLDC GLUCOMTR-MCNC: 182 MG/DL (ref 70–99)
GLUCOSE BLDC GLUCOMTR-MCNC: 200 MG/DL (ref 70–99)
GLUCOSE BLDC GLUCOMTR-MCNC: 205 MG/DL (ref 70–99)
GLUCOSE BLDC GLUCOMTR-MCNC: 214 MG/DL (ref 70–99)
GLUCOSE BLDC GLUCOMTR-MCNC: 225 MG/DL (ref 70–99)
GLUCOSE BLDC GLUCOMTR-MCNC: 235 MG/DL (ref 70–99)
GLUCOSE BLDC GLUCOMTR-MCNC: 441 MG/DL (ref 70–99)
GLUCOSE BLDC GLUCOMTR-MCNC: 504 MG/DL (ref 70–99)
GLUCOSE BLDC GLUCOMTR-MCNC: 597 MG/DL (ref 70–99)
GLUCOSE BLDC GLUCOMTR-MCNC: >600 MG/DL (ref 70–99)
HCO3 BLDV-SCNC: 27 MMOL/L (ref 21–28)
INTERPRETATION ECG - MUSE: NORMAL
O2/TOTAL GAS SETTING VFR VENT: 21 %
OXYHGB MFR BLDV: 27 % (ref 70–75)
P AXIS - MUSE: 65 DEGREES
PCO2 BLDV: 53 MM HG (ref 40–50)
PH BLDV: 7.32 [PH] (ref 7.32–7.43)
PO2 BLDV: 21 MM HG (ref 25–47)
PR INTERVAL - MUSE: 154 MS
QRS DURATION - MUSE: 114 MS
QT - MUSE: 400 MS
QTC - MUSE: 438 MS
R AXIS - MUSE: -15 DEGREES
SAO2 % BLDV: 27.7 % (ref 70–75)
SYSTOLIC BLOOD PRESSURE - MUSE: NORMAL MMHG
T AXIS - MUSE: 9 DEGREES
VENTRICULAR RATE- MUSE: 72 BPM

## 2025-04-11 PROCEDURE — 99285 EMERGENCY DEPT VISIT HI MDM: CPT | Performed by: STUDENT IN AN ORGANIZED HEALTH CARE EDUCATION/TRAINING PROGRAM

## 2025-04-11 PROCEDURE — 83930 ASSAY OF BLOOD OSMOLALITY: CPT | Performed by: STUDENT IN AN ORGANIZED HEALTH CARE EDUCATION/TRAINING PROGRAM

## 2025-04-11 PROCEDURE — 96374 THER/PROPH/DIAG INJ IV PUSH: CPT | Performed by: STUDENT IN AN ORGANIZED HEALTH CARE EDUCATION/TRAINING PROGRAM

## 2025-04-11 PROCEDURE — 82962 GLUCOSE BLOOD TEST: CPT

## 2025-04-11 PROCEDURE — 99223 1ST HOSP IP/OBS HIGH 75: CPT | Performed by: STUDENT IN AN ORGANIZED HEALTH CARE EDUCATION/TRAINING PROGRAM

## 2025-04-11 PROCEDURE — 36415 COLL VENOUS BLD VENIPUNCTURE: CPT | Performed by: STUDENT IN AN ORGANIZED HEALTH CARE EDUCATION/TRAINING PROGRAM

## 2025-04-11 PROCEDURE — 258N000003 HC RX IP 258 OP 636: Performed by: STUDENT IN AN ORGANIZED HEALTH CARE EDUCATION/TRAINING PROGRAM

## 2025-04-11 PROCEDURE — 250N000009 HC RX 250: Performed by: STUDENT IN AN ORGANIZED HEALTH CARE EDUCATION/TRAINING PROGRAM

## 2025-04-11 PROCEDURE — 99291 CRITICAL CARE FIRST HOUR: CPT | Mod: 25 | Performed by: STUDENT IN AN ORGANIZED HEALTH CARE EDUCATION/TRAINING PROGRAM

## 2025-04-11 PROCEDURE — 250N000013 HC RX MED GY IP 250 OP 250 PS 637: Performed by: STUDENT IN AN ORGANIZED HEALTH CARE EDUCATION/TRAINING PROGRAM

## 2025-04-11 PROCEDURE — 200N000001 HC R&B ICU

## 2025-04-11 PROCEDURE — 82805 BLOOD GASES W/O2 SATURATION: CPT | Performed by: STUDENT IN AN ORGANIZED HEALTH CARE EDUCATION/TRAINING PROGRAM

## 2025-04-11 PROCEDURE — 82010 KETONE BODYS QUAN: CPT | Performed by: STUDENT IN AN ORGANIZED HEALTH CARE EDUCATION/TRAINING PROGRAM

## 2025-04-11 PROCEDURE — 83525 ASSAY OF INSULIN: CPT | Performed by: STUDENT IN AN ORGANIZED HEALTH CARE EDUCATION/TRAINING PROGRAM

## 2025-04-11 PROCEDURE — 93005 ELECTROCARDIOGRAM TRACING: CPT | Performed by: STUDENT IN AN ORGANIZED HEALTH CARE EDUCATION/TRAINING PROGRAM

## 2025-04-11 PROCEDURE — 93010 ELECTROCARDIOGRAM REPORT: CPT | Performed by: INTERNAL MEDICINE

## 2025-04-11 RX ORDER — CALCIUM CARBONATE 500 MG/1
1000 TABLET, CHEWABLE ORAL 4 TIMES DAILY PRN
Status: DISCONTINUED | OUTPATIENT
Start: 2025-04-11 | End: 2025-04-13 | Stop reason: HOSPADM

## 2025-04-11 RX ORDER — NICOTINE POLACRILEX 4 MG
15-30 LOZENGE BUCCAL
Status: DISCONTINUED | OUTPATIENT
Start: 2025-04-11 | End: 2025-04-13 | Stop reason: HOSPADM

## 2025-04-11 RX ORDER — DEXTROSE MONOHYDRATE 25 G/50ML
25-50 INJECTION, SOLUTION INTRAVENOUS
Status: DISCONTINUED | OUTPATIENT
Start: 2025-04-11 | End: 2025-04-13 | Stop reason: HOSPADM

## 2025-04-11 RX ORDER — BISACODYL 10 MG
10 SUPPOSITORY, RECTAL RECTAL DAILY PRN
Status: DISCONTINUED | OUTPATIENT
Start: 2025-04-11 | End: 2025-04-13 | Stop reason: HOSPADM

## 2025-04-11 RX ORDER — PANTOPRAZOLE SODIUM 40 MG/1
40 TABLET, DELAYED RELEASE ORAL DAILY PRN
Status: DISCONTINUED | OUTPATIENT
Start: 2025-04-11 | End: 2025-04-13 | Stop reason: HOSPADM

## 2025-04-11 RX ORDER — SODIUM CHLORIDE 9 MG/ML
INJECTION, SOLUTION INTRAVENOUS CONTINUOUS
Status: DISCONTINUED | OUTPATIENT
Start: 2025-04-11 | End: 2025-04-12

## 2025-04-11 RX ORDER — TRAZODONE HYDROCHLORIDE 50 MG/1
150 TABLET ORAL AT BEDTIME
Status: DISCONTINUED | OUTPATIENT
Start: 2025-04-11 | End: 2025-04-13 | Stop reason: HOSPADM

## 2025-04-11 RX ORDER — LIDOCAINE 40 MG/G
CREAM TOPICAL
Status: DISCONTINUED | OUTPATIENT
Start: 2025-04-11 | End: 2025-04-13 | Stop reason: HOSPADM

## 2025-04-11 RX ORDER — ACETAMINOPHEN 325 MG/1
650 TABLET ORAL EVERY 4 HOURS PRN
Status: DISCONTINUED | OUTPATIENT
Start: 2025-04-11 | End: 2025-04-13 | Stop reason: HOSPADM

## 2025-04-11 RX ORDER — POLYETHYLENE GLYCOL 3350 17 G/17G
17 POWDER, FOR SOLUTION ORAL 2 TIMES DAILY PRN
Status: DISCONTINUED | OUTPATIENT
Start: 2025-04-11 | End: 2025-04-13 | Stop reason: HOSPADM

## 2025-04-11 RX ORDER — ACETAMINOPHEN 650 MG/1
650 SUPPOSITORY RECTAL EVERY 4 HOURS PRN
Status: DISCONTINUED | OUTPATIENT
Start: 2025-04-11 | End: 2025-04-13 | Stop reason: HOSPADM

## 2025-04-11 RX ORDER — LISINOPRIL 20 MG/1
20 TABLET ORAL DAILY
Status: DISCONTINUED | OUTPATIENT
Start: 2025-04-12 | End: 2025-04-13 | Stop reason: HOSPADM

## 2025-04-11 RX ORDER — AMOXICILLIN 250 MG
1 CAPSULE ORAL 2 TIMES DAILY PRN
Status: DISCONTINUED | OUTPATIENT
Start: 2025-04-11 | End: 2025-04-13 | Stop reason: HOSPADM

## 2025-04-11 RX ORDER — ATORVASTATIN CALCIUM 20 MG/1
20 TABLET, FILM COATED ORAL EVERY EVENING
Status: DISCONTINUED | OUTPATIENT
Start: 2025-04-11 | End: 2025-04-13 | Stop reason: HOSPADM

## 2025-04-11 RX ORDER — OMEPRAZOLE 20 MG/1
20 TABLET, DELAYED RELEASE ORAL DAILY PRN
COMMUNITY

## 2025-04-11 RX ORDER — AMOXICILLIN 250 MG
2 CAPSULE ORAL 2 TIMES DAILY PRN
Status: DISCONTINUED | OUTPATIENT
Start: 2025-04-11 | End: 2025-04-13 | Stop reason: HOSPADM

## 2025-04-11 RX ORDER — DEXTROSE MONOHYDRATE AND SODIUM CHLORIDE 5; .45 G/100ML; G/100ML
1000 INJECTION, SOLUTION INTRAVENOUS CONTINUOUS PRN
Status: DISCONTINUED | OUTPATIENT
Start: 2025-04-11 | End: 2025-04-11

## 2025-04-11 RX ORDER — ATORVASTATIN CALCIUM 40 MG/1
40 TABLET, FILM COATED ORAL EVERY EVENING
Status: DISCONTINUED | OUTPATIENT
Start: 2025-04-11 | End: 2025-04-11

## 2025-04-11 RX ADMIN — ATORVASTATIN CALCIUM 20 MG: 20 TABLET, FILM COATED ORAL at 22:19

## 2025-04-11 RX ADMIN — SODIUM CHLORIDE: 9 INJECTION, SOLUTION INTRAVENOUS at 18:11

## 2025-04-11 RX ADMIN — SODIUM CHLORIDE 1000 ML: 9 INJECTION, SOLUTION INTRAVENOUS at 12:29

## 2025-04-11 RX ADMIN — SODIUM CHLORIDE 125 ML/HR: 9 INJECTION, SOLUTION INTRAVENOUS at 19:40

## 2025-04-11 RX ADMIN — INSULIN HUMAN 5.5 UNITS/HR: 1 INJECTION, SOLUTION INTRAVENOUS at 12:29

## 2025-04-11 RX ADMIN — TRAZODONE HYDROCHLORIDE 150 MG: 50 TABLET ORAL at 22:19

## 2025-04-11 ASSESSMENT — ACTIVITIES OF DAILY LIVING (ADL)
ADLS_ACUITY_SCORE: 37
ADLS_ACUITY_SCORE: 50
ADLS_ACUITY_SCORE: 31
ADLS_ACUITY_SCORE: 50
ADLS_ACUITY_SCORE: 39
ADLS_ACUITY_SCORE: 50
ADLS_ACUITY_SCORE: 31
ADLS_ACUITY_SCORE: 50
ADLS_ACUITY_SCORE: 31
ADLS_ACUITY_SCORE: 39

## 2025-04-11 ASSESSMENT — COLUMBIA-SUICIDE SEVERITY RATING SCALE - C-SSRS
1. IN THE PAST MONTH, HAVE YOU WISHED YOU WERE DEAD OR WISHED YOU COULD GO TO SLEEP AND NOT WAKE UP?: NO
6. HAVE YOU EVER DONE ANYTHING, STARTED TO DO ANYTHING, OR PREPARED TO DO ANYTHING TO END YOUR LIFE?: NO
2. HAVE YOU ACTUALLY HAD ANY THOUGHTS OF KILLING YOURSELF IN THE PAST MONTH?: NO

## 2025-04-11 NOTE — ED TRIAGE NOTES
Pt arrives from clinic apt with complaints of hyperglycemia. Pt went in for routine physical and blood glucose was 868. Pt denies having any symptoms. Takes glipizide. Hasn't checked his blood sugar since previous physical one year ago.     Triage Assessment (Adult)       Row Name 04/11/25 1200          Triage Assessment    Airway WDL WDL        Respiratory WDL    Respiratory WDL WDL        Skin Circulation/Temperature WDL    Skin Circulation/Temperature WDL WDL        Cardiac WDL    Cardiac WDL WDL        Peripheral/Neurovascular WDL    Peripheral Neurovascular WDL WDL        Cognitive/Neuro/Behavioral WDL    Cognitive/Neuro/Behavioral WDL WDL

## 2025-04-11 NOTE — PLAN OF CARE
Goal Outcome Evaluation:      Plan of Care Reviewed With: patient    Overall Patient Progress: improvingOverall Patient Progress: improving  Insulin gtt at 1.5 units/hr. NS bolus given. Lon a reg diet.

## 2025-04-11 NOTE — H&P
[facility]    History and Physical - Hospitalist Service       Date of Admission:  4/11/2025    Assessment & Plan      Kolton Aragon is a 59 year old man with a past medical history of hypertension, CKD stage IIIa, prior alcoholism with alcoholic pancreatitis and uncontrolled type 2 diabetes who was admitted for hyperglycemia from clinic.    Principal Problem:    Uncontrolled type 2 diabetes mellitus with hyperglycemia (H)    Assessment: Hemoglobin A1c undetectably high on admission surprisingly he has good mental status could have early HHS but has been drinking more than he is urinating out and surprisingly maintaining his hydration.  Has had relatively poor A1c control since at least 2022 and was previously told to start insulin but had not continued as his prescription ran out.    Plan:   -Admit to ICU  - Serum osmolality  - Insulin drip  - Bolus second liter of fluids   -normal saline at 125 an hour  - Hold home glipizide  - Increase home Jardiance to 25 mg daily tomorrow        Dyslipidemia    Assessment: Not on statin, has uncontrolled diabetes but relatively well-controlled lipid    Plan:   - Start atorvastatin 20 mg daily      Hypertension    Chronic kidney disease, stage 3 (H)    Assessment: Baseline GFR approximately 58, creatinine actually slightly better than baseline.    Plan:   - Continue home lisinopril 20 mg daily      Alcoholism (H)    Assessment: Struve alcoholism and alcoholic induced pancreatitis, reports sobriety, congratulated him on this.  This history may have contributed to his worsening diabetes control.          Diet:  regular diet  DVT Prophylaxis: Low Risk/Ambulatory with no VTE prophylaxis indicated  Sharp Catheter: Not present  Lines: None     Cardiac Monitoring: None  Code Status:  Full code    Clinically Significant Risk Factors Present on Admission         # Hyponatremia: Lowest Na = 129 mmol/L in last 2 days, will monitor as appropriate  # Hypochloremia:  "Lowest Cl = 94 mmol/L in last 2 days, will monitor as appropriate          # Hypertension: Noted on problem list          # DMII: A1C = >14.0 % (Ref range: <5.7 %) within past 6 months    # Overweight: Estimated body mass index is 25.8 kg/m  as calculated from the following:    Height as of an earlier encounter on 4/11/25: 1.74 m (5' 8.5\").    Weight as of an earlier encounter on 4/11/25: 78.1 kg (172 lb 3.2 oz).       # Asthma: noted on problem list        Disposition Plan     Medically Ready for Discharge: Anticipated in 2-4 Days           Juvenal Lemon MD  Hospitalist Service  [facility]  Securely message with [email or web address] (more info)  Text page via [employer] Paging/Directory     ______________________________________________________________________    Chief Complaint   Abnormal labs    History is obtained from the patient, ED physician and patient ex-spouse    History of Present Illness   Kolton Aragon is a 59 year old man with past medical history of uncontrolled type 2 diabetes, hypertension, hyperlipidemia, CKD stage IIIa, and alcoholism with prior alcoholic pancreatitis who was admitted to the hospital for hyperglycemia.     The patient reports that he used to be a heavy drinker but quit a few months ago.  He has not had anything to drink since.  He has previous episodes of alcohol induced pancreatitis for which he was actually started on insulin at one point but states he did not continue taking it as the order ran out.  He was seen in primary care clinic a day of admission due to an annual physical and was found to have undetectably high hemoglobin A1c and a blood sugar of 868.  Normal mentation.  He states he has been urinating nearly all the time and has been drinking more than a gallon of water a day to maintain his hydration.    No chest pain, no fever or chills, no rashes or sores.  No neuropathy, no wounds.      Past Medical History    Past Medical History:   Diagnosis Date    " Alcohol-induced acute pancreatitis without infection or necrosis     06/20/2016    Altered level of consciousness 10/11/2017    Essential (primary) hypertension     9/12/2013    Gastro-esophageal reflux disease without esophagitis     5/18/2015    Spondylolisthesis of lumbosacral region     7/9/2013,Patient has been referred for PT and has not followed through.  He was sent for MRI scan,and he was not able to do either the closed or open MRI due to claustrophobia.    Spondylosis of lumbar region without myelopathy or radiculopathy     4/22/2013    Tobacco use     5/17/2013    Uncomplicated asthma     No Comments Provided       Past Surgical History   Past Surgical History:   Procedure Laterality Date    ESOPHAGOSCOPY, GASTROSCOPY, DUODENOSCOPY (EGD), COMBINED N/A 06/16/2020    Procedure: ESOPHAGOGASTRODUODENOSCOPY, WITH BIOPSY;  Surgeon: Giovanni Reed MD;  Location: [facility] OR    pancreatic duct surgery  2021    stent removed from the pancreatic duct    VASECTOMY      No Comments Provided       Prior to Admission Medications   Prior to Admission Medications   Prescriptions Last Dose Informant Patient Reported? Taking?   FLUoxetine (PROZAC) 20 MG capsule 4/11/2025 Morning Self No Yes   Sig: TAKE 1 CAPSULE (20 MG) BY MOUTH DAILY   Melatonin 10-10 MG TBCR Past Week Self Yes Yes   Sig: Take 10 mg by mouth nightly as needed.   acetaminophen (TYLENOL) 650 MG CR tablet More than a month Self Yes Yes   Sig: Take 650 mg by mouth every 8 hours as needed.   blood glucose (NO BRAND SPECIFIED) test strip  Self No No   Sig: Use to test blood sugar 4 times daily or as directed.   blood glucose monitoring (NO BRAND SPECIFIED) meter device kit  Self No No   Sig: Use to test blood sugar 4 times daily or as directed.   empagliflozin (JARDIANCE) 10 MG TABS tablet  Self No Yes   Sig: Take 1 tablet (10 mg) by mouth daily.   glipiZIDE (GLUCOTROL XL) 5 MG 24 hr tablet 4/11/2025 Morning Self No Yes   Sig: TAKE 1 TABLET BY MOUTH DAILY    lisinopril (ZESTRIL) 20 MG tablet 4/11/2025 Morning Self No Yes   Sig: TAKE 1 TABLET BY MOUTH DAILY   naltrexone (DEPADE/REVIA) 50 MG tablet More than a month Self No Yes   Sig: TAKE 2 TABLETS (100 MG) BY MOUTH DAILY DOSE INCREASE  MG AT BEDTIME.   omeprazole (PRILOSEC OTC) 20 MG EC tablet Past Week Self Yes Yes   Sig: Take 20 mg by mouth daily as needed.   thin (NO BRAND SPECIFIED) lancets  Self No No   Sig: Use with lanceting device. To accompany: Blood Glucose Monitor Brands: per insurance.   thin (NO BRAND SPECIFIED) lancets  Self No No   Sig: Use with lanceting device.   traZODone (DESYREL) 150 MG tablet Past Week Self No Yes   Sig: TAKE 1 TABLET BY MOUTH AT BEDTIME      Facility-Administered Medications: None           Physical Exam   Vital Signs: Temp: 98  F (36.7  C) Temp src: Tympanic BP: 107/74 Pulse: 74   Resp: 20 SpO2: 97 % O2 Device: None (Room air)    Weight: 0 lbs 0 oz  General: Pleasant 59-year-old man lying in bed in no acute distress  CV: Regular rate and rhythm no peripheral edema appreciated  Pulmonary: Clear to auscultation  GI: Soft nontender abdomen  Psych: Normal mood and affect endorses no substance use    Medical Decision Making             Data     I have personally reviewed the following data over the past 24 hrs:    5.0  \   13.2 (L)   / 248     129 (L) 94 (L) 20.6 (H) /  441 (H)   5.1 23 1.29 (H) \     ALT: 25 AST: 33 AP: 106 TBILI: 0.3   ALB: 3.9 TOT PROTEIN: 7.6 LIPASE: N/A     TSH: N/A T4: N/A A1C: >14.0 (H)       Imaging results reviewed over the past 24 hrs:   No results found for this or any previous visit (from the past 24 hours).

## 2025-04-11 NOTE — PHARMACY-ADMISSION MEDICATION HISTORY
"Pharmacist Admission Medication History    Admission medication history is complete. The information provided in this note is only as accurate as the sources available at the time of the update.    Information Source(s): Patient and CareEverywhere/SureScripts via in-person    Pertinent Information: Patient is able to recall most medication information with name prompting. Attests to good adherence to medications despite glipizide being ~ 3 weeks out of fill. Has not being using naltrexone stating \"I don't think it was helping\" requests it be left on list \"Maybe ill start taking it again\". Never  started or picked up 4/11/25 jardiance Rx. Patient cannot recall trazodone Rx think he has been taking PRN but unsure -> wife attest to having a dose this week.      Per 4/11/25 [facility] note patient has note been taking lisinopril and it can be held going forward -> Patient attests to taking everyday verbally confirmed x2. Provider note states that Rx Januvia (sitaglipitin) is to be started but Rx sent today was Jardiance (empagliflozin).     Patient denies any questions at this time.     Changes made to PTA medication list:  Added: None  Deleted: naproxen   Changed:  APAP -> PRN, melatonin -> PRN, prilosec 40mg -> 20mg OTC    Allergies reviewed with patient: yes    Medication History Completed By: Marc Saab [facility] 4/11/2025 2:33 PM    PTA Med List   Medication Sig Last Dose/Taking    empagliflozin (JARDIANCE) 10 MG TABS tablet Take 1 tablet (10 mg) by mouth daily. Taking    FLUoxetine (PROZAC) 20 MG capsule TAKE 1 CAPSULE (20 MG) BY MOUTH DAILY 4/11/2025 Morning    glipiZIDE (GLUCOTROL XL) 5 MG 24 hr tablet TAKE 1 TABLET BY MOUTH DAILY 4/11/2025 Morning    lisinopril (ZESTRIL) 20 MG tablet TAKE 1 TABLET BY MOUTH DAILY 4/11/2025 Morning    Melatonin 10-10 MG TBCR Take 10 mg by mouth nightly as needed. Past Week    naltrexone (DEPADE/REVIA) 50 MG tablet TAKE 2 TABLETS (100 MG) BY MOUTH DAILY DOSE INCREASE  MG AT " BEDTIME. More than a month    omeprazole (PRILOSEC OTC) 20 MG EC tablet Take 20 mg by mouth daily as needed. Past Week    traZODone (DESYREL) 150 MG tablet TAKE 1 TABLET BY MOUTH AT BEDTIME Past Week

## 2025-04-11 NOTE — ED PROVIDER NOTES
History     Chief Complaint   Patient presents with    Hyperglycemia       Kolton Aragon is a 59 year old male who presents with severe hyperglycemia 868.  Hyperglycemia discovered at PCP appointment today.  Endorses 6 months of worsening thirst and urinary frequency.  Formally on insulin remotely but has not been on this for some time.  Denies any recent medication changes.  Denies any other symptoms including fever, nausea, vomiting, chest or abdominal pain, pain with urination, peripheral swelling.      Allergies   Allergen Reactions    Latex Hives    Metformin Diarrhea    Dust Mite Extract Other (See Comments)     Sneezing, itchy eyes       Patient Active Problem List    Diagnosis Date Noted    Uncontrolled type 2 diabetes mellitus with hyperglycemia (H) 04/11/2025     Priority: Medium    Biliary calculus of other site without obstruction 05/21/2021     Priority: Medium     Formatting of this note might be different from the original.  Added automatically from request for surgery [id number]      Chronic kidney disease, stage 3 (H) 03/30/2021     Priority: Medium    Pseudocyst of pancreas 03/24/2021     Priority: Medium    Abdominal pain, acute, right upper quadrant 03/20/2021     Priority: Medium    Abdominal ultrasound, abnormal 03/20/2021     Priority: Medium    Tobacco abuse, in remission 03/20/2021     Priority: Medium    GIB (gastrointestinal bleeding) 03/18/2021     Priority: Medium    RUQ abdominal pain 03/18/2021     Priority: Medium    Liver mass, left lobe 03/18/2021     Priority: Medium    Type 2 diabetes mellitus without complication, without long-term current use of insulin (H) 04/17/2019     Priority: Medium    Reactive airway disease 03/01/2018     Priority: Medium    Alcoholism (H) 10/12/2017     Priority: Medium    Acute alcoholic pancreatitis 06/20/2016     Priority: Medium    Chronic kidney disease, stage II (mild) 03/09/2016     Priority: Medium    Gastroesophageal reflux disease without  esophagitis 05/18/2015     Priority: Medium    Dyslipidemia 12/29/2014     Priority: Medium    Hypertension 09/12/2013     Priority: Medium    Spondylolisthesis at L5-S1 level 07/09/2013     Priority: Medium     Overview:   Patient has been referred for PT and has not followed through.  He was sent for MRI scan,and he was not able to do either the closed or open MRI due to claustrophobia.      Insomnia 06/20/2013     Priority: Medium    Chewing tobacco use 05/17/2013     Priority: Medium    DJD (degenerative joint disease), lumbar 04/22/2013     Priority: Medium       Past Medical History:   Diagnosis Date    Alcohol-induced acute pancreatitis without infection or necrosis     Altered level of consciousness 10/11/2017    Essential (primary) hypertension     Gastro-esophageal reflux disease without esophagitis     Spondylolisthesis of lumbosacral region     Spondylosis of lumbar region without myelopathy or radiculopathy     Tobacco use     Uncomplicated asthma        Past Surgical History:   Procedure Laterality Date    ESOPHAGOSCOPY, GASTROSCOPY, DUODENOSCOPY (EGD), COMBINED N/A 06/16/2020    Procedure: ESOPHAGOGASTRODUODENOSCOPY, WITH BIOPSY;  Surgeon: Giovanni Reed MD;  Location: [facility] OR    pancreatic duct surgery  2021    stent removed from the pancreatic duct    VASECTOMY      No Comments Provided       Family History   Problem Relation Age of Onset    Family History Negative Mother         Good Health    Other - See Comments Father         Deserted as a child, patient knows father had a history of heart attack at 50    Heart Disease Father         Heart Disease,patient knows father had a history of heart attack at 50    Cancer Other         Cancer,History of cancer    Diabetes Brother     Diabetes Brother        Social History     Tobacco Use    Smoking status: Never     Passive exposure: Past    Smokeless tobacco: Former     Types: Chew     Quit date: 4/14/2020    Tobacco comments:     Quit smoking:  trying to quit chew using 1 pack week   Vaping Use    Vaping status: Never Used   Substance Use Topics    Alcohol use: Yes     Comment: occ.    Drug use: No       Medications:    acetaminophen (TYLENOL) 650 MG CR tablet  blood glucose (NO BRAND SPECIFIED) test strip  blood glucose monitoring (NO BRAND SPECIFIED) meter device kit  empagliflozin (JARDIANCE) 10 MG TABS tablet  FLUoxetine (PROZAC) 20 MG capsule  glipiZIDE (GLUCOTROL XL) 5 MG 24 hr tablet  lisinopril (ZESTRIL) 20 MG tablet  Melatonin 10-10 MG TBCR  naltrexone (DEPADE/REVIA) 50 MG tablet  naproxen sodium (ANAPROX) 220 MG tablet  omeprazole (PRILOSEC) 40 MG DR capsule  thin (NO BRAND SPECIFIED) lancets  thin (NO BRAND SPECIFIED) lancets  traZODone (DESYREL) 150 MG tablet        Review of Systems: See HPI for pertinent negatives and positives. All other systems reviewed and found to be negative.    Physical Exam   /84   Pulse 62   Temp 98  F (36.7  C) (Tympanic)   Resp 20   SpO2 98%      General: awake, comfortable  HEENT: atraumatic  Respiratory: normal effort, clear to auscultation bilaterally  Cardiovascular: regular rate and rhythm, no murmurs  Abdomen: soft, nondistended, nontender  Extremities: no deformities, edema, or tenderness  Skin: warm, dry, no rashes  Neuro: alert, no focal deficits  Psych: appropriate mood and affect    ED Course           Results for orders placed or performed during the hospital encounter of 04/11/25 (from the past 24 hours)   Glucose by meter   Result Value Ref Range    GLUCOSE BY METER POCT >600 (HH) 70 - 99 mg/dL   Ketone Beta-Hydroxybutyrate Quantitative   Result Value Ref Range    Ketone (Beta-Hydroxybutyrate) Quantitative <0.18 <=0.30 mmol/L   Blood gas venous   Result Value Ref Range    pH Venous 7.32 7.32 - 7.43    pCO2 Venous 53 (H) 40 - 50 mm Hg    pO2 Venous 21 (L) 25 - 47 mm Hg    Bicarbonate Venous 27 21 - 28 mmol/L    Base Excess/Deficit Venous 0.0 -3.0 - 3.0 mmol/L    FIO2 21     Oxyhemoglobin  Venous 27 (L) 70 - 75 %    O2 Sat, Venous 27.7 (L) 70.0 - 75.0 %    Narrative    In healthy individuals, oxyhemoglobin (O2Hb) and oxygen saturation (SO2) are approximately equal. In the presence of dyshemoglobins, oxyhemoglobin can be considerably lower than oxygen saturation.   Glucose by meter   Result Value Ref Range    GLUCOSE BY METER POCT >600 (HH) 70 - 99 mg/dL   Glucose by meter   Result Value Ref Range    GLUCOSE BY METER POCT 597 (HH) 70 - 99 mg/dL       Medications   dextrose 5% and 0.45% NaCl infusion (has no administration in time range)   dextrose 50 % injection 25-50 mL (has no administration in time range)   insulin regular (MYXREDLIN) 1 unit/mL infusion (5.5 Units/hr Intravenous $New Bag 4/11/25 3466)   sodium chloride 0.9% BOLUS 1,000 mL (1,000 mLs Intravenous $New Bag 4/11/25 1220)       Assessments & Plan (with Medical Decision Making)     I have reviewed the nursing notes.    59 year old male evaluated for severe hyperglycemia 868 polydipsia and polyuria with history of type 2 diabetes previously on insulin several years ago.  Remaining labs reassuring without signs of DKA.  Nonischemic.  Normal mental status.  Fluids and insulin drip started.  Feel patient would benefit from short inpatient stay to get stabilized and restarted on appropriate medication regimen for his diabetes.  Dr Lemon accepts admission to ICU.      I have reviewed the findings, diagnosis, and plan with the patient.    New Prescriptions    No medications on file       Final diagnoses:   Uncontrolled type 2 diabetes mellitus with hyperglycemia (H) -        4/11/2025   [facility]     Alexandro Trevizo MD  04/11/25 3878

## 2025-04-11 NOTE — PROVIDER NOTIFICATION
[facility] will make every effort per our policy to help keep your items safe while in the hospital.  If you choose to keep any items at the bedside, we cannot be held responsible for any items that are lost or broken.    Wallet, cell phone, glasses, money, coins, clothes  List items sent to safe: none    I have reviewed my belongings list on admission and verify that it is correct.     Patient signature_______________________________  Date/Time_____________________    2nd Staff person if patient unable to sign __________________________  Date/Time ______________________      I have received all my belongings noted above at discharge.    Patient signature________________________________  Date/Time  __________________________

## 2025-04-12 LAB
ALBUMIN SERPL BCG-MCNC: 3.1 G/DL (ref 3.5–5.2)
ALP SERPL-CCNC: 78 U/L (ref 40–150)
ALT SERPL W P-5'-P-CCNC: 19 U/L (ref 0–70)
ANION GAP SERPL CALCULATED.3IONS-SCNC: 9 MMOL/L (ref 7–15)
AST SERPL W P-5'-P-CCNC: 30 U/L (ref 0–45)
BILIRUB SERPL-MCNC: 0.3 MG/DL
BUN SERPL-MCNC: 17.1 MG/DL (ref 6–20)
CALCIUM SERPL-MCNC: 8.9 MG/DL (ref 8.8–10.4)
CHLORIDE SERPL-SCNC: 104 MMOL/L (ref 98–107)
CREAT SERPL-MCNC: 1.11 MG/DL (ref 0.67–1.17)
EGFRCR SERPLBLD CKD-EPI 2021: 77 ML/MIN/1.73M2
ERYTHROCYTE [DISTWIDTH] IN BLOOD BY AUTOMATED COUNT: 12.4 % (ref 10–15)
GLUCOSE BLDC GLUCOMTR-MCNC: 132 MG/DL (ref 70–99)
GLUCOSE BLDC GLUCOMTR-MCNC: 136 MG/DL (ref 70–99)
GLUCOSE BLDC GLUCOMTR-MCNC: 149 MG/DL (ref 70–99)
GLUCOSE BLDC GLUCOMTR-MCNC: 154 MG/DL (ref 70–99)
GLUCOSE BLDC GLUCOMTR-MCNC: 159 MG/DL (ref 70–99)
GLUCOSE BLDC GLUCOMTR-MCNC: 161 MG/DL (ref 70–99)
GLUCOSE BLDC GLUCOMTR-MCNC: 162 MG/DL (ref 70–99)
GLUCOSE BLDC GLUCOMTR-MCNC: 162 MG/DL (ref 70–99)
GLUCOSE BLDC GLUCOMTR-MCNC: 215 MG/DL (ref 70–99)
GLUCOSE BLDC GLUCOMTR-MCNC: 279 MG/DL (ref 70–99)
GLUCOSE BLDC GLUCOMTR-MCNC: 328 MG/DL (ref 70–99)
GLUCOSE BLDC GLUCOMTR-MCNC: 355 MG/DL (ref 70–99)
GLUCOSE SERPL-MCNC: 166 MG/DL (ref 70–99)
HCO3 SERPL-SCNC: 22 MMOL/L (ref 22–29)
HCT VFR BLD AUTO: 32.8 % (ref 40–53)
HGB BLD-MCNC: 11 G/DL (ref 13.3–17.7)
INSULIN SERPL-ACNC: 61.8 UU/ML (ref 2.6–24.9)
MAGNESIUM SERPL-MCNC: 1.7 MG/DL (ref 1.7–2.3)
MCH RBC QN AUTO: 29.2 PG (ref 26.5–33)
MCHC RBC AUTO-ENTMCNC: 33.5 G/DL (ref 31.5–36.5)
MCV RBC AUTO: 87 FL (ref 78–100)
OSMOLALITY SERPL: 315 MMOL/KG (ref 275–295)
PLATELET # BLD AUTO: 200 10E3/UL (ref 150–450)
POTASSIUM SERPL-SCNC: 3.4 MMOL/L (ref 3.4–5.3)
PROT SERPL-MCNC: 5.9 G/DL (ref 6.4–8.3)
RBC # BLD AUTO: 3.77 10E6/UL (ref 4.4–5.9)
SODIUM SERPL-SCNC: 135 MMOL/L (ref 135–145)
WBC # BLD AUTO: 5.8 10E3/UL (ref 4–11)

## 2025-04-12 PROCEDURE — 86341 ISLET CELL ANTIBODY: CPT | Performed by: STUDENT IN AN ORGANIZED HEALTH CARE EDUCATION/TRAINING PROGRAM

## 2025-04-12 PROCEDURE — 99233 SBSQ HOSP IP/OBS HIGH 50: CPT | Performed by: STUDENT IN AN ORGANIZED HEALTH CARE EDUCATION/TRAINING PROGRAM

## 2025-04-12 PROCEDURE — 250N000013 HC RX MED GY IP 250 OP 250 PS 637: Performed by: STUDENT IN AN ORGANIZED HEALTH CARE EDUCATION/TRAINING PROGRAM

## 2025-04-12 PROCEDURE — 80053 COMPREHEN METABOLIC PANEL: CPT | Performed by: STUDENT IN AN ORGANIZED HEALTH CARE EDUCATION/TRAINING PROGRAM

## 2025-04-12 PROCEDURE — 83735 ASSAY OF MAGNESIUM: CPT | Performed by: STUDENT IN AN ORGANIZED HEALTH CARE EDUCATION/TRAINING PROGRAM

## 2025-04-12 PROCEDURE — 36415 COLL VENOUS BLD VENIPUNCTURE: CPT | Performed by: STUDENT IN AN ORGANIZED HEALTH CARE EDUCATION/TRAINING PROGRAM

## 2025-04-12 PROCEDURE — 120N000001 HC R&B MED SURG/OB

## 2025-04-12 PROCEDURE — 85018 HEMOGLOBIN: CPT | Performed by: STUDENT IN AN ORGANIZED HEALTH CARE EDUCATION/TRAINING PROGRAM

## 2025-04-12 PROCEDURE — 250N000012 HC RX MED GY IP 250 OP 636 PS 637: Performed by: STUDENT IN AN ORGANIZED HEALTH CARE EDUCATION/TRAINING PROGRAM

## 2025-04-12 RX ORDER — NICOTINE POLACRILEX 4 MG
15-30 LOZENGE BUCCAL
Status: DISCONTINUED | OUTPATIENT
Start: 2025-04-12 | End: 2025-04-13 | Stop reason: HOSPADM

## 2025-04-12 RX ORDER — DEXTROSE MONOHYDRATE 25 G/50ML
25-50 INJECTION, SOLUTION INTRAVENOUS
Status: DISCONTINUED | OUTPATIENT
Start: 2025-04-12 | End: 2025-04-13 | Stop reason: HOSPADM

## 2025-04-12 RX ORDER — POTASSIUM CHLORIDE 1500 MG/1
40 TABLET, EXTENDED RELEASE ORAL ONCE
Status: COMPLETED | OUTPATIENT
Start: 2025-04-12 | End: 2025-04-12

## 2025-04-12 RX ORDER — METFORMIN HYDROCHLORIDE 500 MG/1
1000 TABLET, EXTENDED RELEASE ORAL 2 TIMES DAILY WITH MEALS
Status: DISCONTINUED | OUTPATIENT
Start: 2025-04-12 | End: 2025-04-12

## 2025-04-12 RX ADMIN — SODIUM CHLORIDE 125 ML/HR: 9 INJECTION, SOLUTION INTRAVENOUS at 03:30

## 2025-04-12 RX ADMIN — FLUOXETINE HYDROCHLORIDE 20 MG: 20 CAPSULE ORAL at 10:24

## 2025-04-12 RX ADMIN — INSULIN GLARGINE 12 UNITS: 100 INJECTION, SOLUTION SUBCUTANEOUS at 08:57

## 2025-04-12 RX ADMIN — EMPAGLIFLOZIN 25 MG: 25 TABLET, FILM COATED ORAL at 10:24

## 2025-04-12 RX ADMIN — ATORVASTATIN CALCIUM 20 MG: 20 TABLET, FILM COATED ORAL at 21:54

## 2025-04-12 RX ADMIN — INSULIN ASPART 2 UNITS: 100 INJECTION, SOLUTION INTRAVENOUS; SUBCUTANEOUS at 08:54

## 2025-04-12 RX ADMIN — INSULIN ASPART 4 UNITS: 100 INJECTION, SOLUTION INTRAVENOUS; SUBCUTANEOUS at 17:12

## 2025-04-12 RX ADMIN — LISINOPRIL 20 MG: 20 TABLET ORAL at 10:24

## 2025-04-12 RX ADMIN — TRAZODONE HYDROCHLORIDE 150 MG: 50 TABLET ORAL at 21:54

## 2025-04-12 RX ADMIN — POTASSIUM CHLORIDE 40 MEQ: 1500 TABLET, EXTENDED RELEASE ORAL at 09:00

## 2025-04-12 RX ADMIN — INSULIN ASPART 3 UNITS: 100 INJECTION, SOLUTION INTRAVENOUS; SUBCUTANEOUS at 12:19

## 2025-04-12 ASSESSMENT — ACTIVITIES OF DAILY LIVING (ADL)
ADLS_ACUITY_SCORE: 39
ADLS_ACUITY_SCORE: 41
ADLS_ACUITY_SCORE: 39
ADLS_ACUITY_SCORE: 41
ADLS_ACUITY_SCORE: 39
ADLS_ACUITY_SCORE: 41
ADLS_ACUITY_SCORE: 41
ADLS_ACUITY_SCORE: 39
ADLS_ACUITY_SCORE: 41
ADLS_ACUITY_SCORE: 39
ADLS_ACUITY_SCORE: 41
ADLS_ACUITY_SCORE: 41

## 2025-04-12 NOTE — PLAN OF CARE
Goal Outcome Evaluation:  VSS. Ambulates with walker. Patient self administered insulin with instruction. Blanchable redness to sacrum/coccyx mepilex in place.       Plan of Care Reviewed With: patient    Overall Patient Progress: improvingOverall Patient Progress: improving    Outcome Evaluation: VSS    Blood pressure 118/86, pulse 71, temperature 98.3  F (36.8  C), temperature source Temporal, resp. rate 23, weight 82.2 kg (181 lb 3.5 oz), SpO2 98%.    Raisa Rodriguez RN on 4/12/2025 at 4:52 PM

## 2025-04-12 NOTE — PHARMACY
Pharmacy - Transfer Medication Reconciliation     The patient's transfer medication orders have been compared to the medication administration record and to the Prior to Admissions Medications list - any noted discrepancies were resolved with the MD.     Thank you. Pharmacy will continue to monitor.     Marc Saab [facility] ....................  4/12/2025   3:49 PM

## 2025-04-12 NOTE — PROGRESS NOTES
2 RN skin assessment completed. Yes / Juana ROMERO RN and Batool EVANS RN  Significant findings include: bruising bilateral knees and left dorsal foot, blanchable redness sacrum  [facility] Nurse Consult Ordered? No

## 2025-04-12 NOTE — PROGRESS NOTES
Test to see what DM medications are covered on plan.     Juvenal Lemon MD on 4/12/2025 at 7:26 AM

## 2025-04-12 NOTE — PROGRESS NOTES
[facility]    Medicine Progress Note - Hospitalist Service    Date of Admission:  4/11/2025    Assessment & Plan      Kolton Aragon is a 59 year old man with a past medical history of hypertension, CKD stage IIIa, prior alcoholism with alcoholic pancreatitis and uncontrolled type 2 diabetes who was admitted for hyperglycemia from clinic.    The patient has been on insulin in the past but stopped taking due to prescription running out.  Has a history of alcohol induced pancreatitis which likely is the source of his type 2 diabetes.  He had initial blood sugar of 868 in clinic with an undetectable hemoglobin A1c.  He was admitted to the hospital on an IV insulin infusion    Principal Problem:    Uncontrolled type 2 diabetes mellitus with hyperglycemia (H)    Assessment: Hemoglobin A1c undetectably high on admission surprisingly he has good mental status could have early HHS but has been drinking more than he is urinating out and surprisingly maintaining his hydration.  Still awaiting his serum osmolality.  Has had relatively poor A1c control since at least 2022 and was previously told to start insulin but had not continued as his prescription ran out.  Basal insulin requirements are between 0.5 and 1 unit/h.    Plan:   - Serum osmolality pending  -Stop insulin drip  -insulin glargine 12 units daily  -1/ unit aspart/ meal plus sliding scale  -metformin 1000mg BID with meals  - Hold home glipizide, likely stop on discharge  - Increase home Jardiance to 25 mg daily         Dyslipidemia    Assessment: Not on statin, has uncontrolled diabetes but relatively well-controlled lipid    Plan:   - Start atorvastatin 20 mg daily      Hypertension    Chronic kidney disease, stage 3 (H)    Assessment: Baseline GFR approximately 58, creatinine actually slightly better than baseline.    Plan:   - Continue home lisinopril 20 mg daily      Alcoholism (H)    Assessment: History of alcoholism and alcoholic  "induced pancreatitis, reports sobriety, congratulated him on this.  This history may have contributed to his worsening diabetes control.          Diet: Combination Diet Regular Diet Adult    DVT Prophylaxis: Pneumatic Compression Devices  Sharp Catheter: Not present  Lines: None     Cardiac Monitoring: ACTIVE order. Indication: ICU  Code Status: Full Code      Clinically Significant Risk Factors Present on Admission         # Hyponatremia: Lowest Na = 129 mmol/L in last 2 days, will monitor as appropriate  # Hypochloremia: Lowest Cl = 94 mmol/L in last 2 days, will monitor as appropriate      # Hypoalbuminemia: Lowest albumin = 3.1 g/dL at 4/12/2025  5:34 AM, will monitor as appropriate     # Hypertension: Noted on problem list          # DMII: A1C = >14.0 % (Ref range: <5.7 %) within past 6 months    # Overweight: Estimated body mass index is 27.15 kg/m  as calculated from the following:    Height as of an earlier encounter on 4/11/25: 1.74 m (5' 8.5\").    Weight as of this encounter: 82.2 kg (181 lb 3.5 oz).       # Asthma: noted on problem list        Social Drivers of Health    Tobacco Use: Medium Risk (4/11/2025)    Patient History     Smoking Tobacco Use: Never     Smokeless Tobacco Use: Former     Passive Exposure: Past   Physical Activity: Unknown (4/11/2025)    Exercise Vital Sign     Days of Exercise per Week: 2 days   Social Connections: Unknown (4/11/2025)    Social Connection and Isolation Panel [NHANES]     Frequency of Social Gatherings with Friends and Family: Never          Disposition Plan     Medically Ready for Discharge: Anticipated Tomorrow             Juvenal Lemon MD  Hospitalist Service  [facility]  Securely message with Jocelynn (more info)  Text page via [facility] Paging/Directory   ______________________________________________________________________    Interval History   Patient slept relatively well last night.  Insulin needs have greatly reduced since his admission  He " is comfortable with insulin injections going home but has not done them before so we will train him with nursing today.      Physical Exam   Vital Signs: Temp: 98.3  F (36.8  C) Temp src: Temporal BP: 110/78 Pulse: 79   Resp: 15 SpO2: 94 % O2 Device: None (Room air)    Weight: 181 lbs 3.49 oz  General: Pleasant 59-year-old man lying in bed no acute distress  CV: Regular rate and rhythm  Pulmonary: Clear to auscultation  GI: Soft nontender abdomen    Medical Decision Making             Data     I have personally reviewed the following data over the past 24 hrs:    5.8  \   11.0 (L)   / 200     135 104 17.1 /  215 (H)   3.4 22 1.11 \     ALT: 19 AST: 30 AP: 78 TBILI: 0.3   ALB: 3.1 (L) TOT PROTEIN: 5.9 (L) LIPASE: N/A     TSH: N/A T4: N/A A1C: N/A       Imaging results reviewed over the past 24 hrs:   No results found for this or any previous visit (from the past 24 hours).

## 2025-04-12 NOTE — PLAN OF CARE
"Goal Outcome Evaluation:      Plan of Care Reviewed With: patient    Overall Patient Progress: improvingOverall Patient Progress: improving  Problem: Adult Inpatient Plan of Care  Goal: Plan of Care Review  Description: The Plan of Care Review/Shift note should be completed every shift.  The Outcome Evaluation is a brief statement about your assessment that the patient is improving, declining, or no change.  This information will be displayed automatically on your shiftnote.  Outcome: Progressing  Flowsheets (Taken 4/12/2025 1314)  Plan of Care Reviewed With: patient  Overall Patient Progress: improving  Goal: Patient-Specific Goal (Individualized)  Description: You can add care plan individualizations to a care plan. Examples of Individualization might be:  \"Parent requests to be called daily at 9am for status\", \"I have a hard time hearing out of my right ear\", or \"Do not touch me to wake me up as it startlesme\".  Outcome: Progressing  Goal: Absence of Hospital-Acquired Illness or Injury  Outcome: Progressing  Intervention: Identify and Manage Fall Risk  Recent Flowsheet Documentation  Taken 4/12/2025 0726 by Galilea Argueta RN  Safety Promotion/Fall Prevention:   activity supervised   clutter free environment maintained   lighting adjusted   nonskid shoes/slippers when out of bed   patient and family education  Intervention: Prevent Infection  Recent Flowsheet Documentation  Taken 4/12/2025 0726 by Galilea Argueta RN  Infection Prevention: hand hygiene promoted  Goal: Optimal Comfort and Wellbeing  Outcome: Progressing  Intervention: Provide Person-Centered Care  Recent Flowsheet Documentation  Taken 4/12/2025 0726 by Galilea Argueta RN  Trust Relationship/Rapport:   care explained   choices provided   emotional support provided   empathic listening provided   questions answered   reassurance provided   thoughts/feelings acknowledged  Goal: Readiness for Transition of Care  Outcome: " Progressing  Pt has significant neuropathy. He does well with standby assist, gait belt and walker. He lives alone at home and does not drive. His mom and ex-wife are very supportive. Denies pain. He has taken insulin in the past. VSS. Report given to medical-surgical.

## 2025-04-12 NOTE — PLAN OF CARE
Temp: 97.7  F (36.5  C) Temp src: Temporal BP: (!) 88/59 Pulse: 56   Resp: 15 SpO2: 96 %   Weight: 82.2 kg (181 lb 3.5 oz)  O2 Device: None (Room air)     Problem: Adult Inpatient Plan of Care  Goal: Plan of Care Review  Outcome: Progressing    Goal Outcome Evaluation:    Pt resting comfortably throughout shift. Hypotensive while sleeping with adequate MAPs. Blood glucose trending down, insulin currently running at 1 unit/hr, Alg 1 with NS infusing at 125 ml/hr. Adequate UO, tolerating diet, SBA with use of urinal at bedside and ambulation in hallway. Denies pain, maintains adequate oxygenation on RA, uses call light appropriately. SCDs in place, protective sacral foam dressing applied to coccyx.

## 2025-04-13 VITALS
SYSTOLIC BLOOD PRESSURE: 120 MMHG | TEMPERATURE: 97.7 F | BODY MASS INDEX: 25.7 KG/M2 | DIASTOLIC BLOOD PRESSURE: 82 MMHG | OXYGEN SATURATION: 99 % | WEIGHT: 171.5 LBS | RESPIRATION RATE: 16 BRPM | HEART RATE: 64 BPM

## 2025-04-13 PROBLEM — E11.42 TYPE 2 DIABETES MELLITUS WITH DIABETIC POLYNEUROPATHY (H): Status: ACTIVE | Noted: 2025-04-13

## 2025-04-13 PROBLEM — E11.22 TYPE 2 DIABETES MELLITUS WITH STAGE 3A CHRONIC KIDNEY DISEASE, WITH LONG-TERM CURRENT USE OF INSULIN (H): Status: ACTIVE | Noted: 2019-04-17

## 2025-04-13 PROBLEM — N18.31 TYPE 2 DIABETES MELLITUS WITH STAGE 3A CHRONIC KIDNEY DISEASE, WITH LONG-TERM CURRENT USE OF INSULIN (H): Status: ACTIVE | Noted: 2019-04-17

## 2025-04-13 PROBLEM — Z79.4 TYPE 2 DIABETES MELLITUS WITH STAGE 3A CHRONIC KIDNEY DISEASE, WITH LONG-TERM CURRENT USE OF INSULIN (H): Status: ACTIVE | Noted: 2019-04-17

## 2025-04-13 LAB
ANION GAP SERPL CALCULATED.3IONS-SCNC: 13 MMOL/L (ref 7–15)
BUN SERPL-MCNC: 22.4 MG/DL (ref 6–20)
CALCIUM SERPL-MCNC: 9.5 MG/DL (ref 8.8–10.4)
CHLORIDE SERPL-SCNC: 102 MMOL/L (ref 98–107)
CREAT SERPL-MCNC: 1.17 MG/DL (ref 0.67–1.17)
EGFRCR SERPLBLD CKD-EPI 2021: 73 ML/MIN/1.73M2
GLUCOSE BLDC GLUCOMTR-MCNC: 237 MG/DL (ref 70–99)
GLUCOSE BLDC GLUCOMTR-MCNC: 257 MG/DL (ref 70–99)
GLUCOSE BLDC GLUCOMTR-MCNC: 304 MG/DL (ref 70–99)
GLUCOSE SERPL-MCNC: 291 MG/DL (ref 70–99)
HCO3 SERPL-SCNC: 20 MMOL/L (ref 22–29)
HOLD SPECIMEN: NORMAL
MAGNESIUM SERPL-MCNC: 1.8 MG/DL (ref 1.7–2.3)
POTASSIUM SERPL-SCNC: 4.2 MMOL/L (ref 3.4–5.3)
SODIUM SERPL-SCNC: 135 MMOL/L (ref 135–145)

## 2025-04-13 PROCEDURE — 83735 ASSAY OF MAGNESIUM: CPT | Performed by: STUDENT IN AN ORGANIZED HEALTH CARE EDUCATION/TRAINING PROGRAM

## 2025-04-13 PROCEDURE — 80048 BASIC METABOLIC PNL TOTAL CA: CPT | Performed by: STUDENT IN AN ORGANIZED HEALTH CARE EDUCATION/TRAINING PROGRAM

## 2025-04-13 PROCEDURE — 99239 HOSP IP/OBS DSCHRG MGMT >30: CPT | Performed by: STUDENT IN AN ORGANIZED HEALTH CARE EDUCATION/TRAINING PROGRAM

## 2025-04-13 PROCEDURE — 250N000013 HC RX MED GY IP 250 OP 250 PS 637: Performed by: STUDENT IN AN ORGANIZED HEALTH CARE EDUCATION/TRAINING PROGRAM

## 2025-04-13 PROCEDURE — 36415 COLL VENOUS BLD VENIPUNCTURE: CPT | Performed by: STUDENT IN AN ORGANIZED HEALTH CARE EDUCATION/TRAINING PROGRAM

## 2025-04-13 RX ORDER — ACYCLOVIR 400 MG/1
1 TABLET ORAL
Qty: 3 EACH | Refills: 11 | Status: SHIPPED | OUTPATIENT
Start: 2025-04-13

## 2025-04-13 RX ORDER — ATORVASTATIN CALCIUM 20 MG/1
20 TABLET, FILM COATED ORAL EVERY EVENING
Qty: 90 TABLET | Refills: 4 | Status: SHIPPED | OUTPATIENT
Start: 2025-04-13

## 2025-04-13 RX ORDER — INSULIN ASPART INJECTION 100 [IU]/ML
3 INJECTION, SOLUTION SUBCUTANEOUS
Qty: 15 ML | Refills: 1 | Status: SHIPPED | OUTPATIENT
Start: 2025-04-13

## 2025-04-13 RX ORDER — PROCHLORPERAZINE 25 MG/1
SUPPOSITORY RECTAL
Qty: 1 EACH | Refills: 1 | Status: SHIPPED | OUTPATIENT
Start: 2025-04-13

## 2025-04-13 RX ORDER — ACYCLOVIR 400 MG/1
1 TABLET ORAL DAILY
Qty: 1 EACH | Refills: 0 | Status: SHIPPED | OUTPATIENT
Start: 2025-04-13

## 2025-04-13 RX ADMIN — INSULIN ASPART 4 UNITS: 100 INJECTION, SOLUTION INTRAVENOUS; SUBCUTANEOUS at 11:31

## 2025-04-13 RX ADMIN — FLUOXETINE HYDROCHLORIDE 20 MG: 20 CAPSULE ORAL at 09:18

## 2025-04-13 RX ADMIN — INSULIN ASPART 2 UNITS: 100 INJECTION, SOLUTION INTRAVENOUS; SUBCUTANEOUS at 08:32

## 2025-04-13 RX ADMIN — EMPAGLIFLOZIN 25 MG: 25 TABLET, FILM COATED ORAL at 09:18

## 2025-04-13 RX ADMIN — LISINOPRIL 20 MG: 20 TABLET ORAL at 09:18

## 2025-04-13 ASSESSMENT — ACTIVITIES OF DAILY LIVING (ADL)
ADLS_ACUITY_SCORE: 41

## 2025-04-13 NOTE — DISCHARGE SUMMARY
[facility]    Discharge Summary  Hospitalist    Date of Admission:  4/11/2025  Date of Discharge:  4/13/2025  Discharging Provider: Juvenal Lemon MD  Date of Service (when I saw the patient): 04/13/25    Discharge Diagnoses   Principal Problem:    Uncontrolled type 2 diabetes mellitus with hyperglycemia (H)    Date Noted: 4/11/2025  Active Problems:    Alcoholism (H)    Date Noted: 10/12/2017    Dyslipidemia    Date Noted: 12/29/2014    Hypertension    Date Noted: 9/12/2013    Type 2 diabetes mellitus with stage 3a chronic kidney disease, with long-term current use of insulin (H)    Date Noted: 4/17/2019    Chronic kidney disease, stage 3 (H)    Date Noted: 3/30/2021    Type 2 diabetes mellitus with diabetic polyneuropathy (H)    Date Noted: 4/13/2025      History of Present Illness   Kolton Aragon is a 59 year old man with a past medical history of hypertension, CKD stage IIIa, prior alcoholism with alcoholic pancreatitis and uncontrolled type 2 diabetes who was admitted for hyperglycemia from clinic.     The patient has been on insulin in the past but stopped taking due to prescription running out.  Has a history of alcohol induced pancreatitis which likely is the source of his type 2 diabetes.  He had initial blood sugar of 868 in clinic with an undetectable hemoglobin A1c.  He was admitted to the hospital on an IV insulin infusion.  He did not meet criteria for diabetic ketoacidosis or HHS.  He was converted to subcutaneous insulin and will discharge home on insulin glargine 20 units each a.m., insulin aspart 3 units 3 times daily with meals, increased dose of Jardiance to 25 mg daily.  He will stop his glipizide with the initiation of insulin.  May benefit from a DPP 4 versus a GLP-1 in the future as well.  He was also given diabetes testing supplies, Dexcom ordered and should follow-up with PCP within the week for further review.  Diabetes education was also referred on  discharge.    Hospital Course   Kolton Aragon was admitted on 4/11/2025.  The following problems were addressed during his hospitalization:     Uncontrolled type 2 diabetes mellitus with hyperglycemia (H)    Assessment: Hemoglobin A1c undetectably high on admission surprisingly he has good mental status could have early HHS but has been drinking more than he is urinating out and surprisingly maintaining his hydration.  Still awaiting his serum osmolality.  Has had relatively poor A1c control since at least 2022 and was previously told to start insulin but had not continued as his prescription ran out.  Basal insulin requirements on the drip were between 0.5 and 1 units/h but did have glipizide in his system so this is likely why he is underdosed.  Unable to set up Dexcom on his smart phone, was prescribed on discharge from the hospital in addition to a home glucometer.    Plan:   -4 times daily glucose checks  - Serum osmolality pending  -insulin glargine 20 units daily  -3 unit aspart per meal  - Increase home Jardiance to 25 mg daily   -Stop glipizide on discharge diabetes education on discharge   -prescribed Dexcom G7, glucometer on discharge          Dyslipidemia    Assessment: Not on statin, has uncontrolled diabetes but relatively well-controlled lipid    Plan:   - Start atorvastatin 20 mg daily       Hypertension    Chronic kidney disease, stage 3 (H)    Assessment: Baseline GFR approximately 58, creatinine actually slightly better than baseline.    Plan:   - Continue home lisinopril 20 mg daily       Alcoholism (H)    Assessment: History of alcoholism and alcoholic induced pancreatitis, reports sobriety, congratulated him on this.  This history may have contributed to his worsening diabetes control.    Juvenal Lemon MD    Significant Results and Procedures       Pending Results   These results will be followed up by hospitalist  Unresulted Labs Ordered in the Past 30 Days of this Admission       Date and  Time Order Name Status Description    4/12/2025 12:00 AM Glutamic acid decarboxylase antibody In process             Code Status   Full Code       Primary Care Physician   Physician No Ref-Primary    Physical Exam   Temp: 97.7  F (36.5  C) Temp src: Tympanic BP: 120/82 Pulse: 64   Resp: 16 SpO2: 99 % O2 Device: None (Room air)    Vitals:    04/12/25 0600 04/13/25 0236   Weight: 82.2 kg (181 lb 3.5 oz) 77.8 kg (171 lb 8 oz)     Vital Signs with Ranges  Temp:  [97.7  F (36.5  C)-98.8  F (37.1  C)] 97.7  F (36.5  C)  Pulse:  [56-71] 64  Resp:  [16-23] 16  BP: (118-146)/(70-86) 120/82  SpO2:  [97 %-99 %] 99 %  I/O last 3 completed shifts:  In: 1700 [P.O.:1700]  Out: 700 [Urine:700]    General: Pleasant 59-year-old man lying in bed no acute distress  CV: Regular rate and rhythm  Pulmonary: Clear to auscultation  GI: Soft nontender abdomen    Discharge Disposition   Discharged to home  Condition at discharge: Stable    Consultations This Hospital Stay   None    Time Spent on this Encounter   I, Juvenal Lemon MD, personally saw the patient today and spent greater than 30 minutes discharging this patient.    Discharge Orders      Adult Diabetes Education  Referral      Reason for your hospital stay    You are in the hospital for very elevated blood sugars.  We have started you on long-acting insulin also known as insulin glargine or Lantus.  This is a once a day medication and I would like you to take it in the morning.  Please take this medication even if you are not eating.  I have also started you on mealtime insulin called insulin aspart.  This is short acting insulin and should be taking with each meal.  We will start out with 3 units for each meal.  Please check your blood sugars first thing in the morning when you get up, and before each meal.  Please keep a log of this for review with your primary care doctor.  I have also put in a prescription for an increased dose of Jardiance.  You may use 2 of your 10  mg tablets if you already have them at home to use them up otherwise please fill the 25 mg tablet.  I have sent in a prescription for a Dexcom which is a continuous glucose monitor.  Please place it on the back of your arm with the instructions otherwise bring it to your appointment with Dr. Carrillo and he can help set this up.     Follow-up and recommended labs and tests     Follow up with primary care provider, Dr. Carrillo for hospital follow-up.  Please continue working with him on diabetes control.  Ensure he has his insulin, needles, diabetes supplies.  Please review a Dexcom with him.  I have placed a referral for diabetes education as well.     Activity    Your activity upon discharge: activity as tolerated     Diet    Follow this diet upon discharge: Current Diet:Orders Placed This Encounter      Combination Diet Regular Diet Adult     Discharge Medications   Current Discharge Medication List        START taking these medications    Details   atorvastatin (LIPITOR) 20 MG tablet Take 1 tablet (20 mg) by mouth every evening.  Qty: 90 tablet, Refills: 4    Associated Diagnoses: Type 2 diabetes mellitus with stage 3a chronic kidney disease, with long-term current use of insulin (H)      blood glucose (NO BRAND SPECIFIED) lancets standard Use to test blood sugar 4 times daily  Qty: 500 Lancet, Refills: 11    Associated Diagnoses: Type 2 diabetes mellitus with stage 3a chronic kidney disease, with long-term current use of insulin (H)      !! blood glucose (NO BRAND SPECIFIED) test strip Use to test blood sugar 4 times daily  Qty: 500 strip, Refills: 11    Associated Diagnoses: Type 2 diabetes mellitus with stage 3a chronic kidney disease, with long-term current use of insulin (H)      !! blood glucose monitoring (NO BRAND SPECIFIED) meter device kit Use to test blood sugar 4 times daily  Qty: 1 kit, Refills: 0    Associated Diagnoses: Type 2 diabetes mellitus with stage 3a chronic kidney disease, with long-term  current use of insulin (H)      Continuous Glucose  (DEXCOM G7 ) CHRISTINA 1 each daily. - use as directed with sensors  Qty: 1 each, Refills: 0    Associated Diagnoses: Type 2 diabetes mellitus with stage 3a chronic kidney disease, with long-term current use of insulin (H)      Continuous Glucose Sensor (DEXCOM G7 SENSOR) MISC 1 each every 10 days. Replace / Apply as directed  Qty: 3 each, Refills: 11    Associated Diagnoses: Type 2 diabetes mellitus with stage 3a chronic kidney disease, with long-term current use of insulin (H)      Continuous Glucose Transmitter (DEXCOM G6 TRANSMITTER) MISC Change every 3 months.  Qty: 1 each, Refills: 1    Associated Diagnoses: Type 2 diabetes mellitus with stage 3a chronic kidney disease, with long-term current use of insulin (H)      insulin aspart (FIASP FLEXTOUCH) 100 UNIT/ML pen-injector Inject 3 Units subcutaneously 3 times daily (with meals).  Qty: 15 mL, Refills: 1    Associated Diagnoses: Type 2 diabetes mellitus with stage 3a chronic kidney disease, with long-term current use of insulin (H)      insulin glargine (LANTUS PEN) 100 UNIT/ML pen Inject 20 Units subcutaneously every morning (before breakfast).  Qty: 15 mL, Refills: 4    Comments: If Lantus is not covered by insurance, may substitute Basaglar or Semglee or other insulin glargine product per insurance preference at same dose and frequency.    Associated Diagnoses: Type 2 diabetes mellitus with stage 3a chronic kidney disease, with long-term current use of insulin (H)      insulin pen needle (32G X 4 MM) 32G X 4 MM miscellaneous Use 4 pen needles daily  Qty: 100 each, Refills: 11    Associated Diagnoses: Type 2 diabetes mellitus with stage 3a chronic kidney disease, with long-term current use of insulin (H)       !! - Potential duplicate medications found. Please discuss with provider.        CONTINUE these medications which have CHANGED    Details   empagliflozin (JARDIANCE) 25 MG TABS tablet Take 1  tablet (25 mg) by mouth daily.  Qty: 90 tablet, Refills: 4    Associated Diagnoses: Type 2 diabetes mellitus without complication, without long-term current use of insulin (H)           CONTINUE these medications which have NOT CHANGED    Details   acetaminophen (TYLENOL) 650 MG CR tablet Take 650 mg by mouth every 8 hours as needed.      FLUoxetine (PROZAC) 20 MG capsule TAKE 1 CAPSULE (20 MG) BY MOUTH DAILY  Qty: 90 capsule, Refills: 0    Comments: Patient enrolled in our Rx Med Sync service to improve adherence. We are requesting a refill authorization in advance to ensure an active prescription is on file.  Associated Diagnoses: Major depressive disorder, single episode, moderate (H)      lisinopril (ZESTRIL) 20 MG tablet TAKE 1 TABLET BY MOUTH DAILY  Qty: 7 tablet, Refills: 0    Comments: Previously told that he needs an appointment  Associated Diagnoses: Essential hypertension      Melatonin 10-10 MG TBCR Take 10 mg by mouth nightly as needed.      naltrexone (DEPADE/REVIA) 50 MG tablet TAKE 2 TABLETS (100 MG) BY MOUTH DAILY DOSE INCREASE  MG AT BEDTIME.  Qty: 180 tablet, Refills: 11    Comments: Patient enrolled in our Rx Med Sync service to improve adherence. We are requesting a refill authorization in advance to ensure an active prescription is on file.  Associated Diagnoses: Alcoholism (H)      omeprazole (PRILOSEC OTC) 20 MG EC tablet Take 20 mg by mouth daily as needed.      traZODone (DESYREL) 150 MG tablet TAKE 1 TABLET BY MOUTH AT BEDTIME  Qty: 90 tablet, Refills: 11    Comments: Patient enrolled in our Rx Med Sync service to improve adherence. We are requesting a refill authorization in advance to ensure an active prescription is on file.  Associated Diagnoses: Insomnia, unspecified type      !! blood glucose (NO BRAND SPECIFIED) test strip Use to test blood sugar 4 times daily or as directed.  Qty: 100 strip, Refills: 6    Comments: To accompany: glucometer per insurance.  Associated  Diagnoses: Type 2 diabetes mellitus without complication, without long-term current use of insulin (H)      !! blood glucose monitoring (NO BRAND SPECIFIED) meter device kit Use to test blood sugar 4 times daily or as directed.  Qty: 1 kit, Refills: 0    Comments: Preferred blood glucose meter OR supplies to accompany: glucometer per insurance  Associated Diagnoses: Type 2 diabetes mellitus without complication, without long-term current use of insulin (H)      !! thin (NO BRAND SPECIFIED) lancets Use with lanceting device.  Qty: 100 each, Refills: 6    Comments: To accompany: per insurance.  Associated Diagnoses: Type 2 diabetes mellitus without complication, without long-term current use of insulin (H)      !! thin (NO BRAND SPECIFIED) lancets Use with lanceting device. To accompany: Blood Glucose Monitor Brands: per insurance.  Qty: 100 each, Refills: 6    Associated Diagnoses: Type 2 diabetes mellitus without complication, without long-term current use of insulin (H)       !! - Potential duplicate medications found. Please discuss with provider.        STOP taking these medications       glipiZIDE (GLUCOTROL XL) 5 MG 24 hr tablet Comments:   Reason for Stopping:             Allergies   Allergies   Allergen Reactions    Latex Hives    Metformin Diarrhea     Patient attests SEVERE diarrhea     Dust Mite Extract Other (See Comments)     Sneezing, itchy eyes     Data   Most Recent 3 CBC's:  Recent Labs   Lab Test 04/12/25  0534 04/11/25  0937 09/01/24  0228   WBC 5.8 5.0 16.5*   HGB 11.0* 13.2* 11.6*   MCV 87 89 92    248 458*      Most Recent 3 BMP's:  Recent Labs   Lab Test 04/13/25  0825 04/13/25  0610 04/13/25  0233 04/12/25  0535 04/12/25  0534 04/11/25  1228 04/11/25  0937   NA  --  135  --   --  135  --  129*   POTASSIUM  --  4.2  --   --  3.4  --  5.1   CHLORIDE  --  102  --   --  104  --  94*   CO2  --  20*  --   --  22  --  23   BUN  --  22.4*  --   --  17.1  --  20.6*   CR  --  1.17  --   --  1.11   --  1.29*   ANIONGAP  --  13  --   --  9  --  12   CIARA  --  9.5  --   --  8.9  --  9.6   * 291* 257*   < > 166*   < > 868*    < > = values in this interval not displayed.     Most Recent 2 LFT's:  Recent Labs   Lab Test 04/12/25  0534 04/11/25  0937   AST 30 33   ALT 19 25   ALKPHOS 78 106   BILITOTAL 0.3 0.3     Most Recent INR's and Anticoagulation Dosing History:  Anticoagulation Dose History          Latest Ref Rng & Units 6/21/2016   Recent Dosing and Labs   INR <1.3 1.1      Most Recent 3 Troponin's:  Recent Labs   Lab Test 08/11/19  1555 08/11/19  1250 06/30/18  0940   TROPI <0.030 <0.030 <0.030     Most Recent Cholesterol Panel:  Recent Labs   Lab Test 04/11/25  0937   CHOL 167   LDL 65   HDL 43   TRIG 293*     Most Recent 6 Bacteria Isolates From Any Culture (See EPIC Reports for Culture Details):  Recent Labs   Lab Test 04/23/20  1102   CULT Gram negative rods  4 OF 4 BOTTLES POSITIVE.  PLEASE SEE SAMPLE [id number] FOR ID/AST  Klebsiella pneumoniae*  4 OF 4 BOTTLES  Critical Value called to and read back by  RAFFI PRIEST MD, 4/24/20 AT 0120 EP       Most Recent TSH, T4 and A1c Labs:  Recent Labs   Lab Test 04/11/25  0937 11/03/23  1502   TSH  --  1.11   A1C >14.0* 11.6*     Results for orders placed or performed during the hospital encounter of 09/01/24   XR Chest 2 Views    Narrative    PROCEDURE INFORMATION:   Exam: XR Chest   Exam date and time: 9/1/2024 2:38 AM   Age: 58 years old   Clinical indication: Cough and fever; Additional info: Cough, fever     TECHNIQUE:   Imaging protocol: Radiologic exam of the chest.   Views: 2 views.     COMPARISON:   CR XR CHEST 2 VW 4/23/2020 10:30 AM     FINDINGS:   Lungs: Geographic area of consolidation in the lower anterior aspect of the   left upper lobe.   Pleural spaces: Unremarkable. No pleural effusion. No pneumothorax.   Heart/Mediastinum: Unremarkable. No cardiomegaly.   Bones/joints: Unremarkable.       Impression    IMPRESSION:   1.   Focal  bronchopneumonia in the left upper lobe.  2.   Radiographic follow-up is recommended to document clearing and exclude   underlying mass.     THIS DOCUMENT HAS BEEN ELECTRONICALLY SIGNED BY AWAIS RAMÍREZ MD

## 2025-04-13 NOTE — PROGRESS NOTES
[facility] DISCHARGE NOTE    Patient discharged to home at 12:10 PM via wheel chair. Accompanied by partner and staff. Discharge instructions reviewed with patient, opportunity offered to ask questions. Prescriptions sent to patients preferred pharmacy. All belongings sent with patient.    Raisa Rodriguez RN

## 2025-04-13 NOTE — PROGRESS NOTES
SAFETY CHECKLIST  ID Bands and Risk clasps correct and in place (DNR, Fall risk, Allergy, Latex, Limb):  Yes  All Lines Reconciled and labeled correctly: Yes  Whiteboard updated:Yes  Environmental interventions: Yes  Verify Tele #:  [id number]

## 2025-04-13 NOTE — PLAN OF CARE
Goal Outcome Evaluation:    A/O, VSS, afebrile, on room air.  Blanchable redness to sacrum/coccyx, mepilex in place.  SBA with FWW.        Plan of Care Reviewed With: patient    Overall Patient Progress: improvingOverall Patient Progress: improving    Outcome Evaluation: VSS;

## 2025-04-14 ENCOUNTER — PATIENT OUTREACH (OUTPATIENT)
Dept: FAMILY MEDICINE | Facility: OTHER | Age: 60
End: 2025-04-14
Payer: COMMERCIAL

## 2025-04-14 NOTE — PHARMACY - DISCHARGE MEDICATION RECONCILIATION AND EDUCATION
Pharmacy:  Discharge Counseling and Medication Reconciliation    Kolton Aragon  [address]  [facility] [address]  [phone] (home)   59 year old male  PCP: No Ref-Primary, Physician    Allergies: Latex, Metformin, and Dust mite extract    Discharge Counseling:    Pharmacist met with patient (and/or family) today to review the medication portion of the After Visit Summary (with an emphasis on NEW medications) and to address patient's questions/concerns.    Summary of Education: Writer spent 20 mins+ discussing new medication starts and changes with patient.     Educated on different between novolog and Lantus and why its important to not mix them up. Discussed risks of Hypoglycemia and signs and symptoms of low blood sugars and methods to reverse lows. Dicussed s/s of High blood sugars as well and to be checking his blood sugars regularly x4 per day until CGM is setup. Patient was educated on how to use insulin pens by nurse and did practice runs with IP Insulin pen. Provided paitent with resources on each new med and guides on how to administer.     Patient knows to stop glipizide until further notice. And of increase to jaridance. Taught new start of lipitor and to watch for side effects such as intense muscle pain.     Patient attests to understanding and states his questions have been asnwered. Knows to call if any issues arise. Patient has appointment with diabetes Ed on the 17th.      Materials Provided:  MedCounselor sheets printed from Clinical Pharmacology on: Insulin(s) lipitor, insulin pen use, new start insulin instructions.     Discharge Medication Reconciliation:    It has been determined that the patient has an adequate supply of medications available or which can be obtained from the patient's preferred pharmacy, which HE/SHE has confirmed as:       Thank you for the consult.    Marc Saab [facility]........April 13, 2025 7:24 PM

## 2025-04-14 NOTE — TELEPHONE ENCOUNTER
Transitions of Care Outreach  Chief Complaint   Patient presents with    Hospital F/U       Most Recent Admission Date: 4/11/2025   Most Recent Admission Diagnosis: Uncontrolled type 2 diabetes mellitus with hyperglycemia (H) - E11.65     Most Recent Discharge Date: 4/13/2025   Most Recent Discharge Diagnosis: Uncontrolled type 2 diabetes mellitus with hyperglycemia (H) - E11.65  Type 2 diabetes mellitus without complication, without long-term current use of insulin (H) - E11.9  Type 2 diabetes mellitus with stage 3a chronic kidney disease, with long-term current use of insulin (H) - E11.22, N18.31, Z79.4     Transitions of Care Assessment    Discharge Assessment  How are you doing now that you are home?: fine  How are your symptoms? (Red Flag symptoms escalate to triage hotline per guidelines): Improved  Do you know how to contact your clinic care team if you have future questions or changes to your health status? : Yes  Does the patient have their discharge instructions? : No - Review discharge instructions (patient left in his hospital room)  Does the patient have questions regarding their discharge instructions? : No  Were you started on any new medications or were there changes to any of your previous medications? : Yes  Does the patient have all of their medications?: No (see comment) (patient has new insulin's but will  other medications this morning.)  Do you have questions regarding any of your medications? : No  Do you have all of your needed medical supplies or equipment (DME)?  (i.e. oxygen tank, CPAP, cane, etc.): No - What equipment or supplies are needed? (will be picking up diabetic supplies this morning)    Follow up Plan     Discharge Follow-Up  Discharge follow up appointment scheduled in alignment with recommended follow up timeframe or Transitions of Risk Category? (Low = within 30 days; Moderate= within 14 days; High= within 7 days): Yes  Discharge Follow Up Appointment Date: 04/17/25  (and with diabetic educator)  Discharge Follow Up Appointment Scheduled with?: Primary Care Provider    Future Appointments   Date Time Provider Department Center   4/17/2025 10:00 AM Zeb Carrillo MD [facility]   4/17/2025 11:00 AM  DIABETES EDUCATION [facility]       Outpatient Plan as outlined on AVS reviewed with patient.    For any urgent concerns, please contact our 24 hour nurse triage line: [phone] ([phone])       Anna Montana RN

## 2025-04-16 LAB — GAD65 AB SER IA-ACNC: <5 IU/ML

## 2025-04-17 ENCOUNTER — VIRTUAL VISIT (OUTPATIENT)
Dept: EDUCATION SERVICES | Facility: OTHER | Age: 60
End: 2025-04-17
Attending: FAMILY MEDICINE
Payer: COMMERCIAL

## 2025-04-17 ENCOUNTER — OFFICE VISIT (OUTPATIENT)
Dept: FAMILY MEDICINE | Facility: OTHER | Age: 60
End: 2025-04-17
Attending: FAMILY MEDICINE
Payer: COMMERCIAL

## 2025-04-17 VITALS
SYSTOLIC BLOOD PRESSURE: 122 MMHG | TEMPERATURE: 98.5 F | BODY MASS INDEX: 26.22 KG/M2 | OXYGEN SATURATION: 95 % | DIASTOLIC BLOOD PRESSURE: 62 MMHG | HEART RATE: 107 BPM | RESPIRATION RATE: 16 BRPM | WEIGHT: 175 LBS

## 2025-04-17 DIAGNOSIS — Z79.4 TYPE 2 DIABETES MELLITUS WITHOUT COMPLICATION, WITH LONG-TERM CURRENT USE OF INSULIN (H): Primary | ICD-10-CM

## 2025-04-17 DIAGNOSIS — E11.9 TYPE 2 DIABETES MELLITUS WITHOUT COMPLICATION, WITHOUT LONG-TERM CURRENT USE OF INSULIN (H): ICD-10-CM

## 2025-04-17 DIAGNOSIS — E11.9 TYPE 2 DIABETES MELLITUS WITHOUT COMPLICATION, WITH LONG-TERM CURRENT USE OF INSULIN (H): Primary | ICD-10-CM

## 2025-04-17 DIAGNOSIS — I10 PRIMARY HYPERTENSION: ICD-10-CM

## 2025-04-17 LAB
ANION GAP SERPL CALCULATED.3IONS-SCNC: 12 MMOL/L (ref 7–15)
BUN SERPL-MCNC: 22.2 MG/DL (ref 6–20)
CALCIUM SERPL-MCNC: 10 MG/DL (ref 8.8–10.4)
CHLORIDE SERPL-SCNC: 97 MMOL/L (ref 98–107)
CREAT SERPL-MCNC: 1.25 MG/DL (ref 0.67–1.17)
EGFRCR SERPLBLD CKD-EPI 2021: 67 ML/MIN/1.73M2
GLUCOSE SERPL-MCNC: 289 MG/DL (ref 70–99)
HCO3 SERPL-SCNC: 24 MMOL/L (ref 22–29)
HOLD SPECIMEN: NORMAL
POTASSIUM SERPL-SCNC: 4.4 MMOL/L (ref 3.4–5.3)
SODIUM SERPL-SCNC: 133 MMOL/L (ref 135–145)

## 2025-04-17 PROCEDURE — 80048 BASIC METABOLIC PNL TOTAL CA: CPT | Mod: ZL | Performed by: FAMILY MEDICINE

## 2025-04-17 PROCEDURE — G0463 HOSPITAL OUTPT CLINIC VISIT: HCPCS | Performed by: FAMILY MEDICINE

## 2025-04-17 PROCEDURE — 36415 COLL VENOUS BLD VENIPUNCTURE: CPT | Mod: ZL | Performed by: FAMILY MEDICINE

## 2025-04-17 ASSESSMENT — PAIN SCALES - GENERAL: PAINLEVEL_OUTOF10: NO PAIN (0)

## 2025-04-17 NOTE — NURSING NOTE
"Chief Complaint   Patient presents with    Hospital F/U     Follow up from hospital stay: Admitted 4/11/25, discharged 4/13/25. Dx severe Hyperglycemia, Uncontrolled diabetes mellitus        Initial /62 (BP Location: Right arm, Patient Position: Sitting, Cuff Size: Adult Regular)   Pulse 107   Temp 98.5  F (36.9  C) (Tympanic)   Resp 16   Wt 79.4 kg (175 lb)   SpO2 95%   BMI 26.22 kg/m   Estimated body mass index is 26.22 kg/m  as calculated from the following:    Height as of 4/11/25: 1.74 m (5' 8.5\").    Weight as of this encounter: 79.4 kg (175 lb).  Medication Reconciliation: complete    Prema Steinberg LPN    Advance Care Directive reviewed    "

## 2025-04-17 NOTE — PROGRESS NOTES
Patient states he does not need DBED today.  He would like to meet in-person instead of virtual and will call to set this up at a later date.    Karlene Fletcher RN, BSN, [facility]  4/17/2025 11:15 AM

## 2025-04-17 NOTE — PATIENT INSTRUCTIONS
See me back in two months to recheck your diabetes.  Come in sooner with any issues or if your blood sugars are problematic (<80 or >200)

## 2025-04-17 NOTE — PROGRESS NOTES
"  Assessment & Plan     (E11.9,  Z79.4) Type 2 diabetes mellitus without complication, with long-term current use of insulin (H)  (primary encounter diagnosis)  Comment: ***  Plan: Basic Metabolic Panel    (I10) Primary hypertension  Comment: ***          MED REC REQUIRED{TIP  Click the link below to document or use med rec list, use list to pull in response :507751}  Post Medication Reconciliation Status: {MED REC LIST:005116}  BMI  Estimated body mass index is 26.22 kg/m  as calculated from the following:    Height as of 4/11/25: 1.74 m (5' 8.5\").    Weight as of this encounter: 79.4 kg (175 lb).   {Weight Management Plan needed for ACO:921108}      {Follow-up (Optional):157743}    Sabiha Hi is a 59 year old, presenting for the following health issues:  Hospital F/U (Follow up from hospital stay: Admitted 4/11/25, discharged 4/13/25. Dx severe Hyperglycemia, Uncontrolled diabetes mellitus )      4/17/2025     9:43 AM   Additional Questions   Roomed by Prema   Accompanied by self     History of Present Illness       Diabetes:   He presents for follow up of diabetes.  He is checking home blood glucose three times daily.   He checks blood glucose before meals.  Blood glucose is sometimes over 200 and never under 70.  When his blood glucose is low, the patient is asymptomatic for confusion, blurred vision, lethargy and reports not feeling dizzy, shaky, or weak.   He has no concerns regarding his diabetes at this time.   He is not experiencing numbness or burning in feet, excessive thirst, blurry vision, weight changes or redness, sores or blisters on feet. The patient has not had a diabetic eye exam in the last 12 months.          He eats 0-1 servings of fruits and vegetables daily.He consumes 0 sweetened beverage(s) daily.He exercises with enough effort to increase his heart rate 10 to 19 minutes per day.  He exercises with enough effort to increase his heart rate 5 days per week.   He is taking " medications regularly.            4/14/2025   Post Discharge Outreach   How are you doing now that you are home? fine   How are your symptoms? (Red Flag symptoms escalate to triage hotline per guidelines) Improved   Does the patient have their discharge instructions?  No - Review discharge instructions   Does the patient have questions regarding their discharge instructions?  No   Were you started on any new medications or were there changes to any of your previous medications?  Yes   Does the patient have all of their medications? No (see comment)   Do you have questions regarding any of your medications?  No   Do you have all of your needed medical supplies or equipment (DME)?  (i.e. oxygen tank, CPAP, cane, etc.) No - What equipment or supplies are needed?   Discharge Follow Up Appointment Date 4/17/2025   Discharge Follow Up Appointment Scheduled with? Primary Care Provider       Hospital Follow-up Visit:    Hospital/Nursing Home/ Rehab Facility: [facility]  Most Recent Admission Date: 4/11/2025   Most Recent Admission Diagnosis: Uncontrolled type 2 diabetes mellitus with hyperglycemia (H) - E11.65     Most Recent Discharge Date: 4/13/2025   Most Recent Discharge Diagnosis: Uncontrolled type 2 diabetes mellitus with hyperglycemia (H) - E11.65  Type 2 diabetes mellitus without complication, without long-term current use of insulin (H) - E11.9  Type 2 diabetes mellitus with stage 3a chronic kidney disease, with long-term current use of insulin (H) - E11.22, N18.31, Z79.4   Was the patient in the ICU or did the patient experience delirium during hospitalization?  No  Do you have any other stressors you would like to discuss with your provider? No    Problems taking medications regularly:  None  Medication changes since discharge: {:632243}  Problems adhering to non-medication therapy:  None    Summary of hospitalization:  [facility] discharge summary reviewed  Diagnostic  "Tests/Treatments reviewed.  Follow up needed: {:846837:}  Other Healthcare Providers Involved in Patient s Care:         {those currently involved after discharge:036069::\"None\"}  Update since discharge: {:916993} {TIP  Include information from family/caregivers, SNF, Care Coordination :113844}        Plan of care communicated with {:746791}     {Reference  Coding guidelines- Moderate Complexity F2F/Video within 7 - 14 days of discharge 4160490, High Complexity F2F/Video within 7 days [id number] or ejhndb12 days 9329416 :327041}      {additonal problems for provider to add (Optional):505269}    {ROS Picklists (Optional):726446}      Objective    /62 (BP Location: Right arm, Patient Position: Sitting, Cuff Size: Adult Regular)   Pulse 107   Temp 98.5  F (36.9  C) (Tympanic)   Resp 16   Wt 79.4 kg (175 lb)   SpO2 95%   BMI 26.22 kg/m    Body mass index is 26.22 kg/m .  Physical Exam   {Exam List (Optional):155622}    {Diagnostic Test Results (Optional):663531}        Signed Electronically by: Zeb Carrillo MD  {Email feedback regarding this note to [email or web address]   :862519}  " None    Summary of hospitalization:  [facility] discharge summary reviewed  Diagnostic Tests/Treatments reviewed.  Follow up needed: none  Other Healthcare Providers Involved in Patient s Care:         Diabetic educator  Update since discharge: improved.         Plan of care communicated with patient                     Objective    /62 (BP Location: Right arm, Patient Position: Sitting, Cuff Size: Adult Regular)   Pulse 107   Temp 98.5  F (36.9  C) (Tympanic)   Resp 16   Wt 79.4 kg (175 lb)   SpO2 95%   BMI 26.22 kg/m    Body mass index is 26.22 kg/m .  Physical Exam               Signed Electronically by: Zeb Carrillo MD

## 2025-05-20 ENCOUNTER — OFFICE VISIT (OUTPATIENT)
Dept: FAMILY MEDICINE | Facility: OTHER | Age: 60
End: 2025-05-20
Attending: FAMILY MEDICINE
Payer: COMMERCIAL

## 2025-05-20 VITALS
OXYGEN SATURATION: 97 % | BODY MASS INDEX: 26.31 KG/M2 | WEIGHT: 175.6 LBS | SYSTOLIC BLOOD PRESSURE: 122 MMHG | RESPIRATION RATE: 16 BRPM | DIASTOLIC BLOOD PRESSURE: 66 MMHG | TEMPERATURE: 97.3 F | HEART RATE: 76 BPM

## 2025-05-20 DIAGNOSIS — G62.9 PERIPHERAL POLYNEUROPATHY: Primary | ICD-10-CM

## 2025-05-20 DIAGNOSIS — N18.31 TYPE 2 DIABETES MELLITUS WITH STAGE 3A CHRONIC KIDNEY DISEASE, WITH LONG-TERM CURRENT USE OF INSULIN (H): ICD-10-CM

## 2025-05-20 DIAGNOSIS — E11.22 TYPE 2 DIABETES MELLITUS WITH STAGE 3A CHRONIC KIDNEY DISEASE, WITH LONG-TERM CURRENT USE OF INSULIN (H): ICD-10-CM

## 2025-05-20 DIAGNOSIS — Z79.4 TYPE 2 DIABETES MELLITUS WITH STAGE 3A CHRONIC KIDNEY DISEASE, WITH LONG-TERM CURRENT USE OF INSULIN (H): ICD-10-CM

## 2025-05-20 LAB
ALBUMIN UR-MCNC: NEGATIVE MG/DL
APPEARANCE UR: CLEAR
BILIRUB UR QL STRIP: NEGATIVE
COLOR UR AUTO: YELLOW
GLUCOSE UR STRIP-MCNC: >1000 MG/DL
HGB UR QL STRIP: NEGATIVE
KETONES UR STRIP-MCNC: NEGATIVE MG/DL
LEUKOCYTE ESTERASE UR QL STRIP: NEGATIVE
NITRATE UR QL: NEGATIVE
PH UR STRIP: 6 [PH] (ref 5–9)
SP GR UR STRIP: 1.02 (ref 1–1.03)
UROBILINOGEN UR STRIP-MCNC: NORMAL MG/DL

## 2025-05-20 PROCEDURE — G0463 HOSPITAL OUTPT CLINIC VISIT: HCPCS

## 2025-05-20 PROCEDURE — 81003 URINALYSIS AUTO W/O SCOPE: CPT | Mod: ZL | Performed by: FAMILY MEDICINE

## 2025-05-20 RX ORDER — OMEPRAZOLE 20 MG/1
20 TABLET, DELAYED RELEASE ORAL DAILY
Qty: 90 TABLET | Refills: 3 | Status: SHIPPED | OUTPATIENT
Start: 2025-05-20

## 2025-05-20 RX ORDER — ATORVASTATIN CALCIUM 20 MG/1
20 TABLET, FILM COATED ORAL EVERY EVENING
Qty: 90 TABLET | Refills: 3 | Status: SHIPPED | OUTPATIENT
Start: 2025-05-20

## 2025-05-20 RX ORDER — GABAPENTIN 300 MG/1
300 CAPSULE ORAL 3 TIMES DAILY
Qty: 90 CAPSULE | Refills: 2 | Status: SHIPPED | OUTPATIENT
Start: 2025-05-20

## 2025-05-20 ASSESSMENT — PAIN SCALES - GENERAL: PAINLEVEL_OUTOF10: NO PAIN (0)

## 2025-05-20 NOTE — NURSING NOTE
"Chief Complaint   Patient presents with    Diabetes     diabetes mellitus check up       Initial /66 (BP Location: Right arm, Patient Position: Sitting, Cuff Size: Adult Regular)   Pulse 76   Temp 97.3  F (36.3  C) (Tympanic)   Resp 16   Wt 79.7 kg (175 lb 9.6 oz)   SpO2 97%   BMI 26.31 kg/m   Estimated body mass index is 26.31 kg/m  as calculated from the following:    Height as of 4/11/25: 1.74 m (5' 8.5\").    Weight as of this encounter: 79.7 kg (175 lb 9.6 oz).  Medication Reconciliation: complete    Prema Steinberg LPN    Advance Care Directive reviewed  Previous A1C is not at goal of <8  Lab Results   Component Value Date    A1C >14.0 04/11/2025    A1C 11.6 11/03/2023    A1C 11.4 10/25/2022    A1C 7.3 07/14/2022    A1C 7.6 09/07/2021    A1C 6.4 03/18/2021    A1C 5.7 09/10/2020    A1C >14.0 05/05/2020    A1C 7.5 08/20/2019    A1C 8.5 04/17/2019     Urine microalbumin:creatine: 11/3/23  Foot exam due  Eye exam 9/9/22    Tobacco User no  Patient is not on a daily aspirin  Patient is on a Statin.  Blood pressure today of:     BP Readings from Last 1 Encounters:   05/20/25 122/66      is at the goal of <139/89 for diabetics.    Prema Steinberg LPN on 5/20/2025 at 10:47 AM        "

## 2025-05-20 NOTE — PROGRESS NOTES
"  Assessment & Plan     (G62.9) Peripheral polyneuropathy  (primary encounter diagnosis)  Comment: Patient with persistent peripheral neuropathy likely due to longstanding type 2 diabetes mellitus with poor control.  He has successfully been utilizing Neurontin which has helped with neuropathic pain.  He tells me he has been taking it recently after going off it for quite some time when he was still drinking.  He still had prescription left but it is now almost out.  He has not had any drowsiness or falls.  Plan: gabapentin (NEURONTIN) 300 MG capsule    (E11.22,  N18.31,  Z79.4) Type 2 diabetes mellitus with stage 3a chronic kidney disease, with long-term current use of insulin (H)  Comment: Patient reports blood sugars have been 100-160.  He does tell me he is taking the Jardiance every day as well as the Lantus once a day.  He is taking the short acting insulin 15 minutes prior to eating 3 times per day.  He does not have specific blood sugars with him.  We will obtain labs.    Patient not due for repeat A1c yet however his spot glucose is significantly elevated beyond what he reports has been his normal range.  I am concerned that his control is not as ideal as what he reports, however he has not had any signs or symptoms of DKA and we will have him continue to monitor blood sugars and recheck an A1c in about 8 weeks.    Plan: FOOT EXAM, omeprazole (PRILOSEC OTC) 20 MG EC         tablet, UA Macroscopic with reflex to         Microscopic and Culture, atorvastatin (LIPITOR)        20 MG tablet            BMI  Estimated body mass index is 26.31 kg/m  as calculated from the following:    Height as of 4/11/25: 1.74 m (5' 8.5\").    Weight as of this encounter: 79.7 kg (175 lb 9.6 oz).             Sabiha Hi is a 59 year old, presenting for the following health issues:  Diabetes (diabetes mellitus check up)      5/20/2025    10:44 AM   Additional Questions   Roomed by Prema   Accompanied by self     History " of Present Illness       Diabetes:   He presents for follow up of diabetes.  He is checking home blood glucose three times daily.   He checks blood glucose before meals.  Blood glucose is never over 200 and never under 70.  When his blood glucose is low, the patient is asymptomatic for confusion, blurred vision, lethargy and reports not feeling dizzy, shaky, or weak.   He has no concerns regarding his diabetes at this time.  He is having excessive thirst.  The patient has not had a diabetic eye exam in the last 12 months.             Patient tells me he has been checking his blood sugar regularly and readings have ranged 100-160.  He overall tells me he is feeling much better.  He is getting stronger.  He has been drinking a lot of water because he tells me he thinks that helps curb his appetite and is healthy.  Because he is drinking so much water he reports that he is urinating quite a bit still but tells me that he stops drinking water in the evening and then does not really have to urinate much at night.  He does not recall having any blood sugars under 100 or any symptoms of hypoglycemia.  Other than feeling thirsty which he tells me is markedly different than before when he felt like he was dehydrated and needed to drink water he now tells me it is different, he is drinking a lot of water because he thinks it is beneficial as opposed to required.    Does have bilateral numbness and burning in his feet.  This includes toes on both feet and to a lesser extent his forefoot.  He has not had any ulcerations.  No fevers or chills.                  Objective    /66 (BP Location: Right arm, Patient Position: Sitting, Cuff Size: Adult Regular)   Pulse 76   Temp 97.3  F (36.3  C) (Tympanic)   Resp 16   Wt 79.7 kg (175 lb 9.6 oz)   SpO2 97%   BMI 26.31 kg/m    Body mass index is 26.31 kg/m .  Physical Exam   GENERAL: alert and no distress  RESP: lungs clear to auscultation - no rales, rhonchi or wheezes  CV:  regular rate and rhythm, normal S1 S2, no S3 or S4, no murmur, click or rub, no peripheral edema  SKIN: No significant callus formation or ulceration.      Monofilament testing on the right shows intact sensation on top of foot and heel.  Diminished but not absent sensation at MTP pads.  Toes have normal sensation.    Monofilament testing on the left side shows intact sensation on the top of foot and the heel.  Also has intact sensation on great toe and first MTP.  He has absent sensation on the third toe, third MTP and fifth toe.  He has diminished sensation at the fifth MTP.    PSYCH: mentation appears normal, affect normal/bright    Results for orders placed or performed in visit on 05/20/25   UA Macroscopic with reflex to Microscopic and Culture     Status: Abnormal    Specimen: Urine, Midstream   Result Value Ref Range    Color Urine Yellow Colorless, Straw, Light Yellow, Yellow    Appearance Urine Clear Clear    Glucose Urine >1000 (A) Negative mg/dL    Bilirubin Urine Negative Negative    Ketones Urine Negative Negative mg/dL    Specific Gravity Urine 1.024 1.000 - 1.030    Blood Urine Negative Negative    pH Urine 6.0 5.0 - 9.0    Protein Albumin Urine Negative Negative mg/dL    Urobilinogen Urine Normal Normal mg/dL    Nitrite Urine Negative Negative    Leukocyte Esterase Urine Negative Negative    Narrative    Microscopic not indicated           Signed Electronically by: Zeb Carrillo MD

## 2025-05-20 NOTE — PATIENT INSTRUCTIONS
Please see me back in 2 months and we will recheck your labs.    Keep taking your insulin and eating healthy.    You can try some tea tree oil for your toenails.

## 2025-06-13 DIAGNOSIS — G47.00 INSOMNIA, UNSPECIFIED TYPE: ICD-10-CM

## 2025-06-17 RX ORDER — TRAZODONE HYDROCHLORIDE 150 MG/1
150 TABLET ORAL AT BEDTIME
Qty: 90 TABLET | Refills: 0 | OUTPATIENT
Start: 2025-06-17

## 2025-06-17 NOTE — TELEPHONE ENCOUNTER
"[id number] ([facility]) of [place] sent Rx request for the following:      Requested Prescriptions   Pending Prescriptions Disp Refills    traZODone (DESYREL) 150 MG tablet [Pharmacy Med Name: TRAZODONE 150MG TABLET] 960 tablet 0     Sig: TAKE 1 TABLET (150 MG) BY MOUTH AT BEDTIME       Serotonin Modulators Passed - 6/17/2025  1:24 PM          Last Prescription Date:   12/5/24  Last Fill Qty/Refills:         90, R-11    Last office visit:                1/9/24 (Discussed)  Upcoming appts:              none    Per pharmacy, \"We are filling last refill today and the patient is requesting authorization to refill once that supply has been used. Thank you!\"    Patient due for annual physical. Routing to scheduling to assist in setting up an annual physical/medication management appointment.     HAYLEY VALENCIA RN on 6/17/2025 at 1:24 PM      "

## 2025-07-11 ENCOUNTER — OFFICE VISIT (OUTPATIENT)
Dept: FAMILY MEDICINE | Facility: OTHER | Age: 60
End: 2025-07-11
Attending: FAMILY MEDICINE
Payer: COMMERCIAL

## 2025-07-11 VITALS
HEIGHT: 68 IN | TEMPERATURE: 98.5 F | RESPIRATION RATE: 16 BRPM | WEIGHT: 173.2 LBS | DIASTOLIC BLOOD PRESSURE: 66 MMHG | SYSTOLIC BLOOD PRESSURE: 128 MMHG | OXYGEN SATURATION: 98 % | HEART RATE: 92 BPM | BODY MASS INDEX: 26.25 KG/M2

## 2025-07-11 DIAGNOSIS — E11.9 TYPE 2 DIABETES MELLITUS WITHOUT COMPLICATION, WITHOUT LONG-TERM CURRENT USE OF INSULIN (H): ICD-10-CM

## 2025-07-11 DIAGNOSIS — L30.1 DYSHIDROTIC ECZEMA: ICD-10-CM

## 2025-07-11 DIAGNOSIS — R22.1 NECK MASS: Primary | ICD-10-CM

## 2025-07-11 DIAGNOSIS — E11.22 TYPE 2 DIABETES MELLITUS WITH STAGE 3A CHRONIC KIDNEY DISEASE, WITH LONG-TERM CURRENT USE OF INSULIN (H): ICD-10-CM

## 2025-07-11 DIAGNOSIS — N18.31 TYPE 2 DIABETES MELLITUS WITH STAGE 3A CHRONIC KIDNEY DISEASE, WITH LONG-TERM CURRENT USE OF INSULIN (H): ICD-10-CM

## 2025-07-11 DIAGNOSIS — Z79.4 TYPE 2 DIABETES MELLITUS WITH STAGE 3A CHRONIC KIDNEY DISEASE, WITH LONG-TERM CURRENT USE OF INSULIN (H): ICD-10-CM

## 2025-07-11 LAB
ANION GAP SERPL CALCULATED.3IONS-SCNC: 14 MMOL/L (ref 7–15)
BUN SERPL-MCNC: 21.6 MG/DL (ref 6–20)
CALCIUM SERPL-MCNC: 9.9 MG/DL (ref 8.8–10.4)
CHLORIDE SERPL-SCNC: 93 MMOL/L (ref 98–107)
CREAT SERPL-MCNC: 1.38 MG/DL (ref 0.67–1.17)
CREAT UR-MCNC: 21 MG/DL
EGFRCR SERPLBLD CKD-EPI 2021: 60 ML/MIN/1.73M2
EST. AVERAGE GLUCOSE BLD GHB EST-MCNC: >355 MG/DL
GLUCOSE SERPL-MCNC: 375 MG/DL (ref 70–99)
HBA1C MFR BLD: >14 %
HCO3 SERPL-SCNC: 21 MMOL/L (ref 22–29)
MICROALBUMIN UR-MCNC: <12 MG/L
MICROALBUMIN/CREAT UR: NORMAL MG/G{CREAT}
POTASSIUM SERPL-SCNC: 4.5 MMOL/L (ref 3.4–5.3)
SODIUM SERPL-SCNC: 128 MMOL/L (ref 135–145)

## 2025-07-11 PROCEDURE — 83036 HEMOGLOBIN GLYCOSYLATED A1C: CPT | Mod: ZL | Performed by: FAMILY MEDICINE

## 2025-07-11 PROCEDURE — 36415 COLL VENOUS BLD VENIPUNCTURE: CPT | Mod: ZL | Performed by: FAMILY MEDICINE

## 2025-07-11 PROCEDURE — 80048 BASIC METABOLIC PNL TOTAL CA: CPT | Mod: ZL | Performed by: FAMILY MEDICINE

## 2025-07-11 PROCEDURE — 82570 ASSAY OF URINE CREATININE: CPT | Mod: ZL | Performed by: FAMILY MEDICINE

## 2025-07-11 PROCEDURE — G0463 HOSPITAL OUTPT CLINIC VISIT: HCPCS

## 2025-07-11 RX ORDER — INSULIN ASPART 100 [IU]/ML
INJECTION, SOLUTION INTRAVENOUS; SUBCUTANEOUS
COMMUNITY
Start: 2025-06-27

## 2025-07-11 RX ORDER — ATORVASTATIN CALCIUM 20 MG/1
20 TABLET, FILM COATED ORAL EVERY EVENING
Qty: 90 TABLET | Refills: 3 | Status: SHIPPED | OUTPATIENT
Start: 2025-07-11

## 2025-07-11 ASSESSMENT — PAIN SCALES - GENERAL: PAINLEVEL_OUTOF10: MILD PAIN (2)

## 2025-07-11 NOTE — NURSING NOTE
"Chief Complaint   Patient presents with    Recheck Medication    Neck Problem     Mass on back of neck       Hand Problem     Hands are dry and peeling         Initial /66   Pulse 92   Temp 98.5  F (36.9  C) (Tympanic)   Resp 16   Ht 1.727 m (5' 8\")   Wt 78.6 kg (173 lb 3.2 oz)   SpO2 98%   BMI 26.33 kg/m   Estimated body mass index is 26.33 kg/m  as calculated from the following:    Height as of this encounter: 1.727 m (5' 8\").    Weight as of this encounter: 78.6 kg (173 lb 3.2 oz).  Medication Review: complete    The next two questions are to help us understand your food security.  If you are feeling you need any assistance in this area, we have resources available to support you today.          4/11/2025   SDOH- Food Insecurity   Within the past 12 months, did you worry that your food would run out before you got money to buy more? N    N    N   Within the past 12 months, did the food you bought just not last and you didn t have money to get more? N    N    N       Multiple values from one day are sorted in reverse-chronological order         Health Care Directive:  Patient does not have a Health Care Directive: Discussed advance care planning with patient; information given to patient to review.    Jen Nieto LPN      "

## 2025-07-11 NOTE — PATIENT INSTRUCTIONS
I put in an ultrasound for you to take a look at that neck mass.  I think it is most likely a lipoma but it could potentially be a lymph node or other mass.    I do want you to start taking aspirin but please wait till after your surgical consult to remove the mass.    Also please see the eye doctor     You will get a call with your lab results either from my nurse or myself depending on what the results show

## 2025-07-11 NOTE — PROGRESS NOTES
Assessment & Plan     (R22.1) Neck mass  (primary encounter diagnosis)  Comment: Suspect this is a lipoma but he is concerned about it and it has been there a while.  Will obtain ultrasound to see if it looks like fat or possible lymph node.  Depending on the results of that would suggest biopsy versus removal.  Plan: Adult Gen Surg  Referral, US Head Neck        Soft Tissue    (E11.9) Type 2 diabetes mellitus without complication, without long-term current use of insulin (H)  Comment: Patient reports exemplary control of his blood sugar.  Does not have objective readings.  After the visit his A1c came back greater than 14.  I called patient and again asked him if he is taking all of his medication including the Jardiance as prescribed and he adamantly reports he is and has not had blood sugar readings over 140.  This does not match with the lab findings.  I asked patient to please see diabetic educator and bring in meter/CGM and he thought that would be a good idea.  Plan: empagliflozin (JARDIANCE) 25 MG TABS tablet    (E11.22,  N18.31,  Z79.4) Type 2 diabetes mellitus with stage 3a chronic kidney disease, with long-term current use of insulin (H)  Comment: As noted above hesitant to increase his insulin until we have some readings as his A1c does not match what he is telling me.  Plan: atorvastatin (LIPITOR) 20 MG tablet, Albumin         Random Urine Quantitative with Creat Ratio,         Hemoglobin A1c, Basic Metabolic Panel    (L30.1) Dyshidrotic eczema  Comment: No evidence of infection.  Has not tried any cream.  I think he could try an emollient like Eucerin cream at night and see if this resolves.  If not would need follow-up.      Also after reviewing his chart I think we should hold off on the aspirin at least until after he has the biopsy of his neck.  Would also be good to get him hooked back up with gastroenterology.    BMI  Estimated body mass index is 26.33 kg/m  as calculated from the  "following:    Height as of this encounter: 1.727 m (5' 8\").    Weight as of this encounter: 78.6 kg (173 lb 3.2 oz).           Sabiha Hi is a 59 year old, presenting for the following health issues:  Recheck Medication      7/11/2025    11:03 AM   Additional Questions   Roomed by Jen Nieto LPN   Accompanied by self     History of Present Illness       Diabetes:   He presents for follow up of diabetes.  He is checking home blood glucose three times daily.   He checks blood glucose before meals.  Blood glucose is never over 200 and never under 70. He is aware of hypoglycemia symptoms including weakness.    He has no concerns regarding his diabetes at this time.  He is having excessive thirst.  The patient has not had a diabetic eye exam in the last 12 months.          He eats 2-3 servings of fruits and vegetables daily.He consumes 0 sweetened beverage(s) daily.He exercises with enough effort to increase his heart rate 20 to 29 minutes per day.  He exercises with enough effort to increase his heart rate 4 days per week.   He is taking medications regularly.        Tells me he is doing very well.  He has no concerns.  He reports blood sugars have been between 80 and 140.  He does not recall any readings outside of these parameters in the past month.  Does not have readings with him today and is not able to access blood sugar data.  He tells me he takes 20 units of Lantus every day and also takes 3 units of insulin aspart with meals.  He denies any symptoms of dizziness, confusion, sweatiness or other symptoms of low blood sugar.  He has also not had issues with polyuria, polydipsia or shortness of breath concerning for hyperglycemia.    Patient is on a statin.  He does not smoke.  He is due for an eye exam previously saw ophthalmologist that no longer is in business.  They apparently retired 2 years ago.  He plans on scheduling a visit with [facility].    Patient does not take aspirin.  " "Previously was drinking alcohol daily and suspects that was may be the reason he was not on it but he is not really sure.  Denies any black or bloody stools.  Review his chart and he had some very significant GI issues about 5 years ago there is mention of GI bleed but I cannot really see evidence that on the imaging.  Patient tells me he absolutely is no longer drinking and has not had any issues for years.     Patient has had dry and cracked hands.  Not really itchy.  He denies any exposure to chemicals or repetitive water exposure.  He does report his hands can get sweaty.    Final issue is a mass he has on his neck.  He reports it has been there for a year.  A little tender to the touch but that is about it.  Has not gotten bigger or smaller.                  Objective    /66   Pulse 92   Temp 98.5  F (36.9  C) (Tympanic)   Resp 16   Ht 1.727 m (5' 8\")   Wt 78.6 kg (173 lb 3.2 oz)   SpO2 98%   BMI 26.33 kg/m    Body mass index is 26.33 kg/m .  Physical Exam   GENERAL: alert and no distress  EYES: Eyes grossly normal to inspection, PERRL and conjunctivae and sclerae normal  NECK: 1 x 1.8 soft rubbery mass just below left occiput.  Slightly tender.  No erythema.  RESP: lungs clear to auscultation - no rales, rhonchi or wheezes  CV: regular rate and rhythm, normal S1 S2, no S3 or S4, no murmur, click or rub, no peripheral edema  ABDOMEN: ASNT  MS: no gross musculoskeletal defects noted, no edema  SKIN: Palms dry with slight peeling and a little bit of cracking.  NEURO: Normal strength and tone, mentation intact and speech normal  PSYCH: mentation appears normal, affect normal/bright    Results for orders placed or performed in visit on 07/11/25   Albumin Random Urine Quantitative with Creat Ratio     Status: None   Result Value Ref Range    Creatinine Urine mg/dL 21.0 mg/dL    Albumin Urine mg/L <12.0 mg/L    Albumin Urine mg/g Cr     Hemoglobin A1c     Status: Abnormal   Result Value Ref Range    " Estimated Average Glucose >355 (H) <117 mg/dL    Hemoglobin A1C >14.0 (H) <5.7 %   Basic Metabolic Panel     Status: Abnormal   Result Value Ref Range    Sodium 128 (L) 135 - 145 mmol/L    Potassium 4.5 3.4 - 5.3 mmol/L    Chloride 93 (L) 98 - 107 mmol/L    Carbon Dioxide (CO2) 21 (L) 22 - 29 mmol/L    Anion Gap 14 7 - 15 mmol/L    Urea Nitrogen 21.6 (H) 6.0 - 20.0 mg/dL    Creatinine 1.38 (H) 0.67 - 1.17 mg/dL    GFR Estimate 60 (L) >60 mL/min/1.73m2    Calcium 9.9 8.8 - 10.4 mg/dL    Glucose 375 (H) 70 - 99 mg/dL           Signed Electronically by: Zeb Carrillo MD

## 2025-07-23 ENCOUNTER — HOSPITAL ENCOUNTER (OUTPATIENT)
Dept: ULTRASOUND IMAGING | Facility: OTHER | Age: 60
Discharge: HOME | End: 2025-07-23
Attending: FAMILY MEDICINE
Payer: COMMERCIAL

## 2025-07-23 DIAGNOSIS — R22.1 NECK MASS: ICD-10-CM

## 2025-07-23 PROCEDURE — 76536 US EXAM OF HEAD AND NECK: CPT

## 2025-07-23 PROCEDURE — 76536 US EXAM OF HEAD AND NECK: CPT | Mod: 26 | Performed by: RADIOLOGY

## 2025-07-28 ENCOUNTER — OFFICE VISIT (OUTPATIENT)
Dept: SURGERY | Facility: OTHER | Age: 60
End: 2025-07-28
Attending: SURGERY
Payer: COMMERCIAL

## 2025-07-28 VITALS
BODY MASS INDEX: 26.24 KG/M2 | HEART RATE: 96 BPM | SYSTOLIC BLOOD PRESSURE: 128 MMHG | WEIGHT: 172.6 LBS | DIASTOLIC BLOOD PRESSURE: 86 MMHG | OXYGEN SATURATION: 96 % | TEMPERATURE: 99.6 F

## 2025-07-28 DIAGNOSIS — L72.9 SKIN CYST: Primary | ICD-10-CM

## 2025-07-28 DIAGNOSIS — G47.00 INSOMNIA, UNSPECIFIED TYPE: ICD-10-CM

## 2025-07-28 PROCEDURE — 99203 OFFICE O/P NEW LOW 30 MIN: CPT | Performed by: SURGERY

## 2025-07-28 PROCEDURE — G0463 HOSPITAL OUTPT CLINIC VISIT: HCPCS | Mod: 25

## 2025-07-28 PROCEDURE — G0463 HOSPITAL OUTPT CLINIC VISIT: HCPCS

## 2025-07-28 RX ORDER — TRAZODONE HYDROCHLORIDE 150 MG/1
150 TABLET ORAL AT BEDTIME
Qty: 30 TABLET | Refills: 0 | Status: SHIPPED | OUTPATIENT
Start: 2025-07-28

## 2025-07-28 ASSESSMENT — PAIN SCALES - GENERAL: PAINLEVEL_OUTOF10: NO PAIN (0)

## 2025-07-28 NOTE — NURSING NOTE
"Chief Complaint   Patient presents with    Procedure     Neck mass       Initial /86 (BP Location: Left arm, Patient Position: Sitting, Cuff Size: Adult Regular)   Pulse 96   Temp 99.6  F (37.6  C) (Temporal)   Wt 78.3 kg (172 lb 9.6 oz)   SpO2 96%   BMI 26.24 kg/m   Estimated body mass index is 26.24 kg/m  as calculated from the following:    Height as of 7/11/25: 1.727 m (5' 8\").    Weight as of this encounter: 78.3 kg (172 lb 9.6 oz).  Medication Review: complete    The next two questions are to help us understand your food security.  If you are feeling you need any assistance in this area, we have resources available to support you today.          4/11/2025   SDOH- Food Insecurity   Within the past 12 months, did you worry that your food would run out before you got money to buy more? N    N    N   Within the past 12 months, did the food you bought just not last and you didn t have money to get more? N    N    N       Multiple values from one day are sorted in reverse-chronological order         Health Care Directive:  Patient does not have a Health Care Directive: Discussed advance care planning with patient; however, patient declined at this time.    Imani Pickard LPN      "

## 2025-07-28 NOTE — TELEPHONE ENCOUNTER
Reason for call: Medication or medication refill    Have you contacted your pharmacy regarding this refill? ye    Name of medication requested: Trazodone    How many days of medication do you have left? He has been cutting them in half and now has only 1/2 tab left.    What pharmacy do you use? [facility]    Preferred method for responding to this message: Telephone Call    Phone number patient can be reached at: Cell number on file:    Telephone Information:   Mobile [phone]       If we cannot reach you directly, may we leave a detailed response at the number you provided? Yes    Olivia Jorge on 7/28/2025 at 3:22 PM

## 2025-07-28 NOTE — PROGRESS NOTES
GENERAL SURGERY CONSULTATION NOTE    Kolton Aragon   [address]  [facility] [address]  59 year old  male    Primary Care Provider:  No Ref-Primary, Physician      HPI: Kolton Aragon presents to clinic with draining neck lesion.  Patient states a few weeks ago he noticed a enlarging lump on his left posterior neck.  He was seen workup included ultrasound.  Clinical presentation at that time was that of lipoma or other mass, given no concern for infection no antibiotics were prescribed.  Denies significant amount of pain with this but did wake up 1 day with blood and pus on his pillow.  Patient states he denies feeling sick any episodes of fever or chills.  No previous history of infected cyst in the past.  Of note, patient does have extremely poorly controlled diabetes, last hemoglobin A1c greater than 14.     REVIEW OF SYSTEMS:    GENERAL: No fevers or chills. Denies fatigue, recent weight loss.  HEENT: No sinus drainage. No changes with vision or hearing. No difficulty swallowing.   LYMPHATICS:  Noswollen nodes in axilla, neck or groin.  CARDIOVASCULAR: Denies chest pain, palpitations and dyspnea on exertion.  PULMONARY: No shortness of breath or cough. No increase in sputum production.  GI: Denies melena,bright red blood in stools. No hematemesis. No constipation or diarrhea.  : No dysuria or hematuria.  SKIN: No recent rashes or ulcers.   HEMATOLOGY:  No history of easy bruising or bleeding.  ENDOCRINE:  No history of diabetes or thyroid problems.  NEUROLOGY:  No history of seizures or headaches. No motor or sensory changes.        Patient Active Problem List   Diagnosis    Acute alcoholic pancreatitis    Alcoholism (H)    Chewing tobacco use    Chronic kidney disease, stage II (mild)    DJD (degenerative joint disease), lumbar    Dyslipidemia    Gastroesophageal reflux disease without esophagitis    Hypertension    Insomnia    Reactive airway disease    Spondylolisthesis at L5-S1 level    Type 2  diabetes mellitus with stage 3a chronic kidney disease, with long-term current use of insulin (H)    GIB (gastrointestinal bleeding)    RUQ abdominal pain    Liver mass, left lobe    Chronic kidney disease, stage 3 (H)    Pseudocyst of pancreas    Abdominal pain, acute, right upper quadrant    Abdominal ultrasound, abnormal    Biliary calculus of other site without obstruction    Tobacco abuse, in remission    Uncontrolled type 2 diabetes mellitus with hyperglycemia (H)    Type 2 diabetes mellitus with diabetic polyneuropathy (H)       Past Medical History:   Diagnosis Date    Alcohol-induced acute pancreatitis without infection or necrosis     06/20/2016    Altered level of consciousness 10/11/2017    Essential (primary) hypertension     9/12/2013    Gastro-esophageal reflux disease without esophagitis     5/18/2015    Spondylolisthesis of lumbosacral region     7/9/2013,Patient has been referred for PT and has not followed through.  He was sent for MRI scan,and he was not able to do either the closed or open MRI due to claustrophobia.    Spondylosis of lumbar region without myelopathy or radiculopathy     4/22/2013    Tobacco use     5/17/2013    Uncomplicated asthma     No Comments Provided       Past Surgical History:   Procedure Laterality Date    ESOPHAGOSCOPY, GASTROSCOPY, DUODENOSCOPY (EGD), COMBINED N/A 06/16/2020    Procedure: ESOPHAGOGASTRODUODENOSCOPY, WITH BIOPSY;  Surgeon: Giovanni Reed MD;  Location: [facility] OR    pancreatic duct surgery  2021    stent removed from the pancreatic duct    VASECTOMY      No Comments Provided       Family History   Problem Relation Age of Onset    Family History Negative Mother         Good Health    Other - See Comments Father         Deserted as a child, patient knows father had a history of heart attack at 50    Heart Disease Father         Heart Disease,patient knows father had a history of heart attack at 50    Cancer Other         Cancer,History of cancer     Diabetes Brother     Diabetes Brother        Social History     Social History Narrative    , Two children, Worked at [employer], lives independently in [place]. He was working at [employer]. He is working at [employer] again.         He was in the [employer] for 2 years.        Social History     Socioeconomic History    Marital status:      Spouse name: Not on file    Number of children: Not on file    Years of education: Not on file    Highest education level: Not on file   Occupational History    Not on file   Tobacco Use    Smoking status: Never     Passive exposure: Past    Smokeless tobacco: Former     Types: Chew     Quit date: 4/14/2020    Tobacco comments:     Quit smoking: trying to quit chew using 1 pack week   Vaping Use    Vaping status: Never Used   Substance and Sexual Activity    Alcohol use: Yes     Comment: occ.    Drug use: No    Sexual activity: Not Currently     Partners: Female   Other Topics Concern    Parent/sibling w/ CABG, MI or angioplasty before 65F 55M? Not Asked   Social History Narrative    , Two children, Worked at [employer], lives independently in [place]. He was working at [employer]. He is working at [employer] again.         He was in the [employer] for 2 years.      Social Drivers of Health     Financial Resource Strain: Low Risk  (4/11/2025)    Financial Resource Strain     Within the past 12 months, have you or your family members you live with been unable to get utilities (heat, electricity) when it was really needed?: No   Food Insecurity: Low Risk  (4/11/2025)    Food Insecurity     Within the past 12 months, did you worry that your food would run out before you got money to buy more?: No     Within the past 12 months, did the food you bought just not last and you didn t have money to get more?: No   Transportation Needs: Low Risk  (4/11/2025)    Transportation Needs     Within the past 12 months, has lack of transportation kept you  from medical appointments, getting your medicines, non-medical meetings or appointments, work, or from getting things that you need?: No   Physical Activity: Unknown (4/11/2025)    Exercise Vital Sign     Days of Exercise per Week: 2 days     Minutes of Exercise per Session: Not on file   Stress: No Stress Concern Present (4/11/2025)    [employer] - Occupational Stress Questionnaire     Feeling of Stress : Only a little   Social Connections: Unknown (4/11/2025)    Social Connection and Isolation Panel [NHANES]     Frequency of Communication with Friends and Family: Not on file     Frequency of Social Gatherings with Friends and Family: Never     Attends Jain Services: Not on file     Active Member of Clubs or Organizations: Not on file     Attends Club or Organization Meetings: Not on file     Marital Status: Not on file   Interpersonal Safety: Low Risk  (7/28/2025)    Interpersonal Safety     Do you feel physically and emotionally safe where you currently live?: Yes     Within the past 12 months, have you been hit, slapped, kicked or otherwise physically hurt by someone?: No     Within the past 12 months, have you been humiliated or emotionally abused in other ways by your partner or ex-partner?: No   Housing Stability: Low Risk  (4/11/2025)    Housing Stability     Do you have housing? : Yes     Are you worried about losing your housing?: No       Current Outpatient Medications   Medication Sig Dispense Refill    acetaminophen (TYLENOL) 650 MG CR tablet Take 650 mg by mouth every 8 hours as needed.      atorvastatin (LIPITOR) 20 MG tablet Take 1 tablet (20 mg) by mouth every evening. 90 tablet 3    blood glucose (NO BRAND SPECIFIED) lancets standard Use to test blood sugar 4 times daily 500 Lancet 11    blood glucose (NO BRAND SPECIFIED) test strip Use to test blood sugar 4 times daily 500 strip 11    blood glucose (NO BRAND SPECIFIED) test strip Use to test blood sugar 4 times  daily or as directed. 100 strip 6    blood glucose monitoring (NO BRAND SPECIFIED) meter device kit Use to test blood sugar 4 times daily 1 kit 0    blood glucose monitoring (NO BRAND SPECIFIED) meter device kit Use to test blood sugar 4 times daily or as directed. 1 kit 0    Continuous Glucose  (DEXCOM G7 ) CHRISTINA 1 each daily. - use as directed with sensors 1 each 0    Continuous Glucose Sensor (DEXCOM G7 SENSOR) MISC 1 each every 10 days. Replace / Apply as directed 3 each 11    Continuous Glucose Transmitter (DEXCOM G6 TRANSMITTER) MISC Change every 3 months. 1 each 1    empagliflozin (JARDIANCE) 25 MG TABS tablet Take 1 tablet (25 mg) by mouth daily. 90 tablet 4    gabapentin (NEURONTIN) 300 MG capsule Take 1 capsule (300 mg) by mouth 3 times daily. 90 capsule 2    insulin aspart (FIASP FLEXTOUCH) 100 UNIT/ML pen-injector Inject 3 Units subcutaneously 3 times daily (with meals). 15 mL 1    Insulin Aspart FlexPen 100 UNIT/ML SOPN INJECT 3 UNITS UNDER THE SKIN 3 TIMES DAILY. (WITH MEALS).      insulin glargine (LANTUS PEN) 100 UNIT/ML pen Inject 20 Units subcutaneously every morning (before breakfast). 15 mL 4    insulin pen needle (32G X 4 MM) 32G X 4 MM miscellaneous Use 4 pen needles daily 100 each 11    Melatonin 10-10 MG TBCR Take 10 mg by mouth nightly as needed.      omeprazole (PRILOSEC OTC) 20 MG EC tablet Take 1 tablet (20 mg) by mouth daily. 90 tablet 3    thin (NO BRAND SPECIFIED) lancets Use with lanceting device. 100 each 6    thin (NO BRAND SPECIFIED) lancets Use with lanceting device. To accompany: Blood Glucose Monitor Brands: per insurance. 100 each 6    FLUoxetine (PROZAC) 20 MG capsule TAKE 1 CAPSULE (20 MG) BY MOUTH DAILY 90 capsule 0    lisinopril (ZESTRIL) 20 MG tablet TAKE 1 TABLET BY MOUTH DAILY 7 tablet 0    naltrexone (DEPADE/REVIA) 50 MG tablet TAKE 2 TABLETS (100 MG) BY MOUTH DAILY DOSE INCREASE  MG AT BEDTIME. 180 tablet 11    traZODone (DESYREL) 150 MG tablet  TAKE 1 TABLET (150 MG) BY MOUTH AT BEDTIME 30 tablet 0     No current facility-administered medications for this visit.         ALLERGIES/SENSITIVITIES:   Allergies   Allergen Reactions    Latex Hives    Metformin Diarrhea     Patient attests SEVERE diarrhea     Dust Mite Extract Other (See Comments)     Sneezing, itchy eyes       PHYSICAL EXAM:     /86 (BP Location: Left arm, Patient Position: Sitting, Cuff Size: Adult Regular)   Pulse 96   Temp 99.6  F (37.6  C) (Temporal)   Wt 78.3 kg (172 lb 9.6 oz)   SpO2 96%   BMI 26.24 kg/m      General Appearance:   Sitting up in the chair, no apparent distress  HEENT: Pupils are equal and reactive, no scleral icterus, 2 mm x 2 mm open wound in the middle of a approximately 1.5 x 2 cm area of indurated skin.  No active drainage or bleeding today.  Heart & CV:  RRR no murmur.  Intact distal pulses, good cap refill.  LUNGS: No increased work of breathing.Lugns are CTA B/L, no wheezing or crackles.  Abd: Soft, nontender  Ext: Normal bulk and tone, no lower extremity edema  Neuro: Alert and oriented, normal speech and mentation      Neck ultrasound shows likely cyst infected cyst that has undergone spontaneous        CONSULTATION ASSESSMENT AND PLAN:    59 year old male left posterior neck wound.  This is almost certainly from an infected skin cyst and it has undergone spontaneous drainage.  The area is likely healing and there is no further appreciable fluid collection.  Given the amount of induration still present I recommend patient continue to let the area heal and follow-up in surgery clinic in another week or 2 for definitive resection of this area to remove the cyst wall.      Giovanni Reed MD on 7/28/2025 at 4:21 PM

## 2025-07-28 NOTE — TELEPHONE ENCOUNTER
Called and spoke to Patient after verifying last name and date of birth. Pt informed of 30-day prescription, stating he already picked them up.    Routing to Dr. Carrillo, to review next week and advise if Pt needs to be seen, prior to extended refills.    Jennifer Hsu, Refill RN .............. 7/28/2025  4:22 PM

## 2025-07-28 NOTE — TELEPHONE ENCOUNTER
"[id number] ([facility]) of [place] sent Rx request for the following:      He has been cutting them in half and now has only 1/2 tab left.     Requested Prescriptions   Pending Prescriptions Disp Refills    traZODone (DESYREL) 150 MG tablet [Pharmacy Med Name: TRAZODONE 150MG TABLET] 30 tablet 0     Sig: TAKE 1 TABLET (150 MG) BY MOUTH AT BEDTIME   Last Prescription Date:   6/19/25  Last Fill Qty/Refills:         30, R-0    Insomnia, unspecified type [G47.00]          Per 6/17/25 Refill encounter, Pt reported taking 3 of the 150 mg tablets every night (total 450 mg) because 1 tablet was not enough. Dr. Carrillo filled small supply, stating Pt needed to take 1 tablet or 150 mg as prescribed until he followed up. Pt was seen 7/11, however medication was not discussed.    Last Office Visit:              7/11/25  Future Office visit:           None    Called and spoke to Patient after verifying last name and date of birth. Pt states he has been cutting the tablets in half, to save his medication, because he was about to run out. Pt is out now, and \"would really like to sleep, and not lay in bed with his eyes open.\" Pt states he asked Dr. Reed about this today, during his visit, and he directed to him to ask his PCP.    Dr. Carrillo is out of the clinic until 8/5. He is requesting a refill from a covering provider and is willing to pay out of pocket.     Unable to complete prescription refill per RN Medication Refill Policy. Jennifer Hsu, Refill RN .............. 7/28/2025  3:33 PM      "

## 2025-08-11 ENCOUNTER — OFFICE VISIT (OUTPATIENT)
Dept: SURGERY | Facility: OTHER | Age: 60
End: 2025-08-11
Attending: SURGERY
Payer: MEDICARE

## 2025-08-11 VITALS — HEART RATE: 64 BPM | OXYGEN SATURATION: 96 % | WEIGHT: 173.2 LBS | TEMPERATURE: 98.5 F | BODY MASS INDEX: 26.33 KG/M2

## 2025-08-11 DIAGNOSIS — L72.9 SKIN CYST: Primary | ICD-10-CM

## 2025-08-11 PROCEDURE — 12042 INTMD RPR N-HF/GENIT2.6-7.5: CPT

## 2025-08-11 PROCEDURE — 12042 INTMD RPR N-HF/GENIT2.6-7.5: CPT | Performed by: SURGERY

## 2025-08-11 PROCEDURE — 11423 EXC H-F-NK-SP B9+MARG 2.1-3: CPT | Performed by: SURGERY

## 2025-08-11 PROCEDURE — G0463 HOSPITAL OUTPT CLINIC VISIT: HCPCS | Mod: 25

## 2025-08-11 ASSESSMENT — PAIN SCALES - GENERAL: PAINLEVEL_OUTOF10: MILD PAIN (2)

## 2025-08-19 ENCOUNTER — ALLIED HEALTH/NURSE VISIT (OUTPATIENT)
Dept: EDUCATION SERVICES | Facility: OTHER | Age: 60
End: 2025-08-19
Attending: FAMILY MEDICINE
Payer: MEDICARE

## 2025-08-19 DIAGNOSIS — N18.31 TYPE 2 DIABETES MELLITUS WITH STAGE 3A CHRONIC KIDNEY DISEASE, WITH LONG-TERM CURRENT USE OF INSULIN (H): ICD-10-CM

## 2025-08-19 DIAGNOSIS — Z79.4 TYPE 2 DIABETES MELLITUS WITH STAGE 3A CHRONIC KIDNEY DISEASE, WITH LONG-TERM CURRENT USE OF INSULIN (H): ICD-10-CM

## 2025-08-19 DIAGNOSIS — E11.22 TYPE 2 DIABETES MELLITUS WITH STAGE 3A CHRONIC KIDNEY DISEASE, WITH LONG-TERM CURRENT USE OF INSULIN (H): ICD-10-CM

## 2025-08-19 PROCEDURE — G0108 DIAB MANAGE TRN  PER INDIV: HCPCS | Performed by: REGISTERED NURSE

## 2025-08-19 RX ORDER — INSULIN ASPART INJECTION 100 [IU]/ML
5 INJECTION, SOLUTION SUBCUTANEOUS
Qty: 15 ML | Refills: 1 | Status: SHIPPED | OUTPATIENT
Start: 2025-08-19

## 2025-08-25 DIAGNOSIS — G47.00 INSOMNIA, UNSPECIFIED TYPE: ICD-10-CM

## 2025-08-26 RX ORDER — TRAZODONE HYDROCHLORIDE 150 MG/1
150 TABLET ORAL AT BEDTIME
Qty: 30 TABLET | Refills: 1 | Status: SHIPPED | OUTPATIENT
Start: 2025-08-26

## (undated) DEVICE — SUCTION MANIFOLD NEPTUNE 2 SYS 4 PORT 0702-020-000

## (undated) DEVICE — ENDO FORCEP ENDOJAW BIOPSY 2.8MMX230CM FB-220U

## (undated) DEVICE — ENDO BITE BLOCK 60 MAXI LF 00712804

## (undated) DEVICE — SYR 50ML LL W/O NDL 309653

## (undated) DEVICE — ENDO KIT COMPLIANCE DYKENDOCMPLY

## (undated) DEVICE — Device

## (undated) DEVICE — SOL WATER 1500ML

## (undated) DEVICE — TUBING SUCTION 10'X3/16" N510

## (undated) RX ORDER — SODIUM CHLORIDE 9 MG/ML
INJECTION, SOLUTION INTRAVENOUS
Status: DISPENSED
Start: 2018-12-30

## (undated) RX ORDER — HYDROMORPHONE HYDROCHLORIDE 1 MG/ML
INJECTION, SOLUTION INTRAMUSCULAR; INTRAVENOUS; SUBCUTANEOUS
Status: DISPENSED
Start: 2021-03-18

## (undated) RX ORDER — FENTANYL CITRATE 50 UG/ML
INJECTION, SOLUTION INTRAMUSCULAR; INTRAVENOUS
Status: DISPENSED
Start: 2021-03-19

## (undated) RX ORDER — ONDANSETRON 2 MG/ML
INJECTION INTRAMUSCULAR; INTRAVENOUS
Status: DISPENSED
Start: 2020-04-23

## (undated) RX ORDER — SODIUM CHLORIDE 9 MG/ML
INJECTION, SOLUTION INTRAVENOUS
Status: DISPENSED
Start: 2020-04-23

## (undated) RX ORDER — AZITHROMYCIN 250 MG/1
TABLET, FILM COATED ORAL
Status: DISPENSED
Start: 2024-09-01

## (undated) RX ORDER — LIDOCAINE HYDROCHLORIDE 20 MG/ML
INJECTION, SOLUTION EPIDURAL; INFILTRATION; INTRACAUDAL; PERINEURAL
Status: DISPENSED
Start: 2020-06-16

## (undated) RX ORDER — ONDANSETRON 2 MG/ML
INJECTION INTRAMUSCULAR; INTRAVENOUS
Status: DISPENSED
Start: 2021-03-18

## (undated) RX ORDER — LIDOCAINE HYDROCHLORIDE 10 MG/ML
INJECTION, SOLUTION EPIDURAL; INFILTRATION; INTRACAUDAL; PERINEURAL
Status: DISPENSED
Start: 2020-09-10

## (undated) RX ORDER — LORAZEPAM 1 MG/1
TABLET ORAL
Status: DISPENSED
Start: 2021-03-18

## (undated) RX ORDER — HYDROMORPHONE HYDROCHLORIDE 1 MG/ML
INJECTION, SOLUTION INTRAMUSCULAR; INTRAVENOUS; SUBCUTANEOUS
Status: DISPENSED
Start: 2020-04-23

## (undated) RX ORDER — BENZONATATE 100 MG/1
CAPSULE ORAL
Status: DISPENSED
Start: 2024-09-01

## (undated) RX ORDER — SODIUM CHLORIDE 9 MG/ML
INJECTION, SOLUTION INTRAVENOUS
Status: DISPENSED
Start: 2019-08-11

## (undated) RX ORDER — TRIAMCINOLONE ACETONIDE 40 MG/ML
INJECTION, SUSPENSION INTRA-ARTICULAR; INTRAMUSCULAR
Status: DISPENSED
Start: 2020-09-10

## (undated) RX ORDER — ONDANSETRON 2 MG/ML
INJECTION INTRAMUSCULAR; INTRAVENOUS
Status: DISPENSED
Start: 2018-12-30

## (undated) RX ORDER — LANOLIN ALCOHOL/MO/W.PET/CERES
CREAM (GRAM) TOPICAL
Status: DISPENSED
Start: 2021-03-19

## (undated) RX ORDER — PROPOFOL 10 MG/ML
INJECTION, EMULSION INTRAVENOUS
Status: DISPENSED
Start: 2020-06-16

## (undated) RX ORDER — ACETAMINOPHEN 325 MG/1
TABLET ORAL
Status: DISPENSED
Start: 2024-09-01

## (undated) RX ORDER — POTASSIUM CHLORIDE 1500 MG/1
TABLET, EXTENDED RELEASE ORAL
Status: DISPENSED
Start: 2018-12-30

## (undated) RX ORDER — BUPIVACAINE HYDROCHLORIDE 5 MG/ML
INJECTION, SOLUTION EPIDURAL; INTRACAUDAL
Status: DISPENSED
Start: 2020-09-10

## (undated) RX ORDER — CODEINE PHOSPHATE AND GUAIFENESIN 10; 100 MG/5ML; MG/5ML
SOLUTION ORAL
Status: DISPENSED
Start: 2024-09-01

## (undated) RX ORDER — SODIUM CHLORIDE 9 MG/ML
INJECTION, SOLUTION INTRAVENOUS
Status: DISPENSED
Start: 2021-03-18